# Patient Record
Sex: FEMALE | Race: WHITE | NOT HISPANIC OR LATINO | Employment: OTHER | URBAN - NONMETROPOLITAN AREA
[De-identification: names, ages, dates, MRNs, and addresses within clinical notes are randomized per-mention and may not be internally consistent; named-entity substitution may affect disease eponyms.]

---

## 2017-01-03 RX ORDER — PRAVASTATIN SODIUM 80 MG/1
TABLET ORAL
Qty: 30 TABLET | Refills: 2 | Status: ON HOLD | OUTPATIENT
Start: 2017-01-03 | End: 2017-02-03

## 2017-01-05 RX ORDER — CEFUROXIME AXETIL 500 MG/1
500 TABLET ORAL 2 TIMES DAILY
Qty: 14 TABLET | Refills: 0 | Status: SHIPPED | OUTPATIENT
Start: 2017-01-05 | End: 2017-01-27 | Stop reason: SDUPTHER

## 2017-01-20 ENCOUNTER — DOCUMENTATION (OUTPATIENT)
Dept: CARDIOLOGY | Facility: CLINIC | Age: 82
End: 2017-01-20

## 2017-01-20 ENCOUNTER — HOSPITAL ENCOUNTER (OUTPATIENT)
Dept: EMERGENCY DEPT | Facility: HOSPITAL | Age: 82
Setting detail: OBSERVATION
LOS: 1 days | Discharge: HOME | End: 2017-01-22
Attending: HOSPITALIST | Admitting: HOSPITALIST

## 2017-01-20 DIAGNOSIS — Z78.9 IMPAIRED MOBILITY AND ADLS: ICD-10-CM

## 2017-01-20 DIAGNOSIS — Z74.09 IMPAIRED PHYSICAL MOBILITY: Primary | ICD-10-CM

## 2017-01-20 DIAGNOSIS — Z74.09 IMPAIRED MOBILITY AND ADLS: ICD-10-CM

## 2017-01-20 DIAGNOSIS — R00.2 PALPITATIONS: ICD-10-CM

## 2017-01-20 LAB
ALBUMIN SERPL-MCNC: 4 GM/DL (ref 3.4–4.8)
ALP SERPL-CCNC: 104 U/L (ref 38–126)
ALT SERPL-CCNC: 30 U/L (ref 9–52)
ANION GAP SERPL CALCULATED.3IONS-SCNC: 12 MMOL/L (ref 5–15)
APTT PPP: 31.4 SECONDS (ref 20–40.3)
AST SERPL-CCNC: 29 U/L (ref 14–36)
BASOPHILS # BLD AUTO: 0.09 X1000/UL (ref 0–0.2)
BASOPHILS NFR BLD AUTO: 1 % (ref 0–2)
BILIRUB SERPL-MCNC: 0.5 MG/DL (ref 0.2–1.3)
BUN SERPL-MCNC: 19 MG/DL (ref 7–21)
CALCIUM SERPL-MCNC: 9.7 MG/DL (ref 8.4–10.2)
CHLORIDE SERPL-SCNC: 95 MMOL/L (ref 95–110)
CK MB SERPL-MCNC: 2.5 NG/ML (ref 0–5)
CK SERPL-CCNC: 81 U/L (ref 30–135)
CO2 SERPL-SCNC: 32 MMOL/L (ref 22–31)
CONV AUTO IG#: 0.04 X1000/UL (ref 0.01–0.02)
CONV AUTO IG%: 0.4 % (ref 0–0.5)
CREAT SERPL-MCNC: 1 MG/DL (ref 0.5–1)
EOSINOPHIL # BLD AUTO: 1.1 X1000/UL (ref 0–0.7)
EOSINOPHIL NFR BLD AUTO: 11.8 % (ref 0–7)
ERYTHROCYTE [DISTWIDTH] IN BLOOD: 13.3 % (ref 11.5–14.5)
GLUCOSE SERPL-MCNC: 142 MG/DL (ref 60–100)
GRANULOCYTES # BLD AUTO: 6.38 X1000/UL (ref 2–8.6)
GRANULOCYTES NFR BLD AUTO: 68.2 % (ref 37–80)
HCT VFR BLD CALC: 40 % (ref 35–45)
HGB BLD-MCNC: 13.6 GM/DL (ref 12–15.5)
INR PPP: 1 (ref 0.8–1.2)
LYMPHOCYTES # BLD AUTO: 0.97 X1000/UL (ref 0.6–4.2)
LYMPHOCYTES NFR BLD AUTO: 10.4 % (ref 10–50)
MCH RBC QN: 32.7 PG (ref 26–34)
MCHC RBC-ENTMCNC: 34 GM/DL (ref 31.4–36)
MCV RBC: 96.2 FL (ref 80–98)
MONOCYTES # BLD AUTO: 0.77 X1000/UL (ref 0–0.9)
MONOCYTES NFR BLD AUTO: 8.2 % (ref 0–12)
PLATELET # BLD: 206 X1000/MM3 (ref 150–450)
PMV BLD: 10.7 FL (ref 8–12)
POTASSIUM SERPL-SCNC: 4 MMOL/L (ref 3.5–5.1)
PROT SERPL-MCNC: 7.6 GM/DL (ref 6.3–8.6)
PROTHROMBIN TIME: 13.5 SECONDS (ref 11.1–15.3)
RBC # BLD: 4.16 MEGA/MM3 (ref 3.77–5.16)
SODIUM SERPL-SCNC: 139 MMOL/L (ref 137–145)
TROPONIN I SERPL-MCNC: 0.02 NG/ML (ref 0–0.03)
WBC # BLD: 9.4 X1000/UL (ref 3.2–9.8)

## 2017-01-20 PROCEDURE — 85730 THROMBOPLASTIN TIME PARTIAL: CPT

## 2017-01-20 PROCEDURE — 36415 COLL VENOUS BLD VENIPUNCTURE: CPT

## 2017-01-20 PROCEDURE — 9990

## 2017-01-20 PROCEDURE — 71010: CPT

## 2017-01-20 PROCEDURE — 80053 COMPREHEN METABOLIC PANEL: CPT

## 2017-01-20 PROCEDURE — 9990 CBC

## 2017-01-20 PROCEDURE — 93005 ELECTROCARDIOGRAM TRACING: CPT

## 2017-01-20 PROCEDURE — 85610 PROTHROMBIN TIME: CPT

## 2017-01-20 PROCEDURE — 99285 EMERGENCY DEPT VISIT HI MDM: CPT

## 2017-01-20 PROCEDURE — 85025 COMPLETE CBC W/AUTO DIFF WBC: CPT

## 2017-01-20 PROCEDURE — 82553 CREATINE MB FRACTION: CPT

## 2017-01-20 PROCEDURE — 9990 COMPREHENSIVE METABOLIC PANEL

## 2017-01-20 PROCEDURE — 94640 AIRWAY INHALATION TREATMENT: CPT

## 2017-01-20 PROCEDURE — 82550 ASSAY OF CK (CPK): CPT

## 2017-01-20 PROCEDURE — 99284 EMERGENCY DEPT VISIT MOD MDM: CPT

## 2017-01-20 PROCEDURE — 84484 ASSAY OF TROPONIN QUANT: CPT

## 2017-01-20 RX ORDER — DILTIAZEM HYDROCHLORIDE 120 MG/1
120 CAPSULE, COATED, EXTENDED RELEASE ORAL DAILY
Qty: 30 CAPSULE | Refills: 11 | Status: SHIPPED | OUTPATIENT
Start: 2017-01-20 | End: 2017-03-24 | Stop reason: DRUGHIGH

## 2017-01-20 NOTE — PROGRESS NOTES
PTs daughter called, pts heart rate, 110 bpm. Dr Graham notified, new medication orders rec'd  Will see pt in 2 weeks in office

## 2017-01-21 PROBLEM — R00.2 PALPITATIONS: Status: ACTIVE | Noted: 2017-01-21

## 2017-01-21 LAB
ANION GAP SERPL CALCULATED.3IONS-SCNC: 9 MMOL/L (ref 5–15)
BUN BLD-MCNC: 17 MG/DL (ref 7–21)
BUN/CREAT SERPL: 20.7 (ref 7–25)
CALCIUM SPEC-SCNC: 9 MG/DL (ref 8.4–10.2)
CHLORIDE SERPL-SCNC: 99 MMOL/L (ref 95–110)
CK MB SERPL-MCNC: 2.4 NG/ML (ref 0–5)
CK SERPL-CCNC: 77 U/L (ref 30–135)
CO2 SERPL-SCNC: 32 MMOL/L (ref 22–31)
CREAT BLD-MCNC: 0.82 MG/DL (ref 0.5–1)
DEPRECATED RDW RBC AUTO: 49.1 FL (ref 36.4–46.3)
ERYTHROCYTE [DISTWIDTH] IN BLOOD BY AUTOMATED COUNT: 13.7 % (ref 11.5–14.5)
GFR SERPL CREATININE-BSD FRML MDRD: 66 ML/MIN/1.73 (ref 39–90)
GLUCOSE BLD-MCNC: 99 MG/DL (ref 60–100)
HCT VFR BLD AUTO: 37.2 % (ref 35–45)
HGB BLD-MCNC: 12.4 G/DL (ref 12–15.5)
MCH RBC QN AUTO: 32.5 PG (ref 26.5–34)
MCHC RBC AUTO-ENTMCNC: 33.3 G/DL (ref 31.4–36)
MCV RBC AUTO: 97.6 FL (ref 80–98)
PLATELET # BLD AUTO: 198 10*3/MM3 (ref 150–450)
PMV BLD AUTO: 11.8 FL (ref 8–12)
POTASSIUM BLD-SCNC: 3.9 MMOL/L (ref 3.5–5.1)
RBC # BLD AUTO: 3.81 10*6/MM3 (ref 3.77–5.16)
SODIUM BLD-SCNC: 140 MMOL/L (ref 137–145)
TROPONIN I SERPL-MCNC: 0.02 NG/ML
TROPONIN I SERPL-MCNC: 0.02 NG/ML
TROPONIN I SERPL-MCNC: <0.012 NG/ML
TROPONIN I SERPL-MCNC: <0.012 NG/ML (ref 0–0.03)
WBC NRBC COR # BLD: 8.27 10*3/MM3 (ref 3.2–9.8)

## 2017-01-21 PROCEDURE — 94640 AIRWAY INHALATION TREATMENT: CPT

## 2017-01-21 PROCEDURE — G0378 HOSPITAL OBSERVATION PER HR: HCPCS

## 2017-01-21 PROCEDURE — G8988 SELF CARE GOAL STATUS: HCPCS

## 2017-01-21 PROCEDURE — 93010 ELECTROCARDIOGRAM REPORT: CPT | Performed by: INTERNAL MEDICINE

## 2017-01-21 PROCEDURE — 9990

## 2017-01-21 PROCEDURE — 84484 ASSAY OF TROPONIN QUANT: CPT

## 2017-01-21 PROCEDURE — 93005 ELECTROCARDIOGRAM TRACING: CPT | Performed by: HOSPITALIST

## 2017-01-21 PROCEDURE — 85027 COMPLETE CBC AUTOMATED: CPT | Performed by: HOSPITALIST

## 2017-01-21 PROCEDURE — 82553 CREATINE MB FRACTION: CPT

## 2017-01-21 PROCEDURE — 84484 ASSAY OF TROPONIN QUANT: CPT | Performed by: HOSPITALIST

## 2017-01-21 PROCEDURE — 97530 THERAPEUTIC ACTIVITIES: CPT | Performed by: PHYSICAL THERAPIST

## 2017-01-21 PROCEDURE — 80048 BASIC METABOLIC PNL TOTAL CA: CPT | Performed by: HOSPITALIST

## 2017-01-21 PROCEDURE — G8987 SELF CARE CURRENT STATUS: HCPCS

## 2017-01-21 PROCEDURE — 63710000001 PREDNISONE PER 5 MG: Performed by: FAMILY MEDICINE

## 2017-01-21 PROCEDURE — 82550 ASSAY OF CK (CPK): CPT

## 2017-01-21 PROCEDURE — 96372 THER/PROPH/DIAG INJ SC/IM: CPT

## 2017-01-21 PROCEDURE — 97535 SELF CARE MNGMENT TRAINING: CPT

## 2017-01-21 PROCEDURE — 97166 OT EVAL MOD COMPLEX 45 MIN: CPT

## 2017-01-21 PROCEDURE — 94799 UNLISTED PULMONARY SVC/PX: CPT

## 2017-01-21 PROCEDURE — G8978 MOBILITY CURRENT STATUS: HCPCS | Performed by: PHYSICAL THERAPIST

## 2017-01-21 PROCEDURE — G8979 MOBILITY GOAL STATUS: HCPCS | Performed by: PHYSICAL THERAPIST

## 2017-01-21 PROCEDURE — 94760 N-INVAS EAR/PLS OXIMETRY 1: CPT

## 2017-01-21 PROCEDURE — 97162 PT EVAL MOD COMPLEX 30 MIN: CPT | Performed by: PHYSICAL THERAPIST

## 2017-01-21 PROCEDURE — 25010000002 ENOXAPARIN PER 10 MG: Performed by: HOSPITALIST

## 2017-01-21 RX ORDER — BUDESONIDE AND FORMOTEROL FUMARATE DIHYDRATE 160; 4.5 UG/1; UG/1
2 AEROSOL RESPIRATORY (INHALATION)
Status: DISCONTINUED | OUTPATIENT
Start: 2017-01-21 | End: 2017-01-22 | Stop reason: HOSPADM

## 2017-01-21 RX ORDER — IPRATROPIUM BROMIDE AND ALBUTEROL SULFATE 2.5; .5 MG/3ML; MG/3ML
3 SOLUTION RESPIRATORY (INHALATION) EVERY 4 HOURS PRN
Status: DISCONTINUED | OUTPATIENT
Start: 2017-01-21 | End: 2017-01-22 | Stop reason: HOSPADM

## 2017-01-21 RX ORDER — HYDROCODONE BITARTRATE AND ACETAMINOPHEN 5; 325 MG/1; MG/1
1 TABLET ORAL EVERY 6 HOURS PRN
Status: DISCONTINUED | OUTPATIENT
Start: 2017-01-21 | End: 2017-01-22 | Stop reason: HOSPADM

## 2017-01-21 RX ORDER — PRAVASTATIN SODIUM 40 MG
40 TABLET ORAL DAILY
Status: DISCONTINUED | OUTPATIENT
Start: 2017-01-21 | End: 2017-01-22 | Stop reason: HOSPADM

## 2017-01-21 RX ORDER — AMIODARONE HYDROCHLORIDE 200 MG/1
200 TABLET ORAL DAILY
Status: DISCONTINUED | OUTPATIENT
Start: 2017-01-21 | End: 2017-01-22 | Stop reason: HOSPADM

## 2017-01-21 RX ORDER — ALBUTEROL SULFATE 2.5 MG/3ML
2.5 SOLUTION RESPIRATORY (INHALATION) EVERY 4 HOURS PRN
Status: DISCONTINUED | OUTPATIENT
Start: 2017-01-21 | End: 2017-01-22 | Stop reason: HOSPADM

## 2017-01-21 RX ORDER — DILTIAZEM HYDROCHLORIDE 120 MG/1
120 CAPSULE, COATED, EXTENDED RELEASE ORAL DAILY
Status: DISCONTINUED | OUTPATIENT
Start: 2017-01-21 | End: 2017-01-22 | Stop reason: HOSPADM

## 2017-01-21 RX ORDER — POTASSIUM CHLORIDE 20MEQ/15ML
20 LIQUID (ML) ORAL DAILY
Status: DISCONTINUED | OUTPATIENT
Start: 2017-01-21 | End: 2017-01-22 | Stop reason: HOSPADM

## 2017-01-21 RX ORDER — POTASSIUM CHLORIDE 750 MG/1
10 TABLET, EXTENDED RELEASE ORAL 2 TIMES DAILY
Status: DISCONTINUED | OUTPATIENT
Start: 2017-01-21 | End: 2017-01-22 | Stop reason: HOSPADM

## 2017-01-21 RX ORDER — DOCUSATE SODIUM 100 MG/1
100 CAPSULE, LIQUID FILLED ORAL DAILY
Status: DISCONTINUED | OUTPATIENT
Start: 2017-01-21 | End: 2017-01-22 | Stop reason: HOSPADM

## 2017-01-21 RX ORDER — CALCIUM CARBONATE 200(500)MG
2 TABLET,CHEWABLE ORAL DAILY
Status: DISCONTINUED | OUTPATIENT
Start: 2017-01-21 | End: 2017-01-22 | Stop reason: HOSPADM

## 2017-01-21 RX ORDER — LOSARTAN POTASSIUM 50 MG/1
50 TABLET ORAL
Status: DISCONTINUED | OUTPATIENT
Start: 2017-01-21 | End: 2017-01-22 | Stop reason: HOSPADM

## 2017-01-21 RX ORDER — FUROSEMIDE 40 MG/1
40 TABLET ORAL DAILY
Status: DISCONTINUED | OUTPATIENT
Start: 2017-01-21 | End: 2017-01-22 | Stop reason: HOSPADM

## 2017-01-21 RX ORDER — POTASSIUM CHLORIDE 750 MG/1
TABLET, FILM COATED, EXTENDED RELEASE ORAL
Status: ON HOLD | COMMUNITY
Start: 2014-10-27 | End: 2017-02-03

## 2017-01-21 RX ORDER — ACETAMINOPHEN,DIPHENHYDRAMINE HCL 500; 25 MG/1; MG/1
1 TABLET, FILM COATED ORAL NIGHTLY PRN
Status: ON HOLD | COMMUNITY
End: 2017-02-03

## 2017-01-21 RX ORDER — PRAVASTATIN SODIUM 40 MG
80 TABLET ORAL
COMMUNITY
End: 2017-01-22 | Stop reason: HOSPADM

## 2017-01-21 RX ORDER — SODIUM CHLORIDE 0.9 % (FLUSH) 0.9 %
1-10 SYRINGE (ML) INJECTION AS NEEDED
Status: DISCONTINUED | OUTPATIENT
Start: 2017-01-21 | End: 2017-01-22 | Stop reason: HOSPADM

## 2017-01-21 RX ORDER — ALBUTEROL SULFATE 90 UG/1
2 AEROSOL, METERED RESPIRATORY (INHALATION) EVERY 4 HOURS PRN
Status: DISCONTINUED | OUTPATIENT
Start: 2017-01-21 | End: 2017-01-22 | Stop reason: HOSPADM

## 2017-01-21 RX ORDER — BUDESONIDE AND FORMOTEROL FUMARATE DIHYDRATE 160; 4.5 UG/1; UG/1
2 AEROSOL RESPIRATORY (INHALATION)
COMMUNITY
Start: 2015-01-26 | End: 2017-01-22 | Stop reason: HOSPADM

## 2017-01-21 RX ORDER — SODIUM CHLORIDE 0.9 % (FLUSH) 0.9 %
3 SYRINGE (ML) INJECTION AS NEEDED
Status: DISCONTINUED | OUTPATIENT
Start: 2017-01-21 | End: 2017-01-22 | Stop reason: HOSPADM

## 2017-01-21 RX ORDER — ALBUTEROL SULFATE 2.5 MG/3ML
SOLUTION RESPIRATORY (INHALATION)
Status: COMPLETED
Start: 2017-01-21 | End: 2017-01-21

## 2017-01-21 RX ORDER — PREDNISONE 10 MG/1
10 TABLET ORAL
Status: DISCONTINUED | OUTPATIENT
Start: 2017-01-21 | End: 2017-01-22 | Stop reason: HOSPADM

## 2017-01-21 RX ADMIN — ALBUTEROL SULFATE 2.5 MG: 2.5 SOLUTION RESPIRATORY (INHALATION) at 05:37

## 2017-01-21 RX ADMIN — ALBUTEROL SULFATE 2.5 MG: 2.5 SOLUTION RESPIRATORY (INHALATION) at 16:03

## 2017-01-21 RX ADMIN — LOSARTAN POTASSIUM 50 MG: 50 TABLET, FILM COATED ORAL at 12:23

## 2017-01-21 RX ADMIN — AMIODARONE HYDROCHLORIDE 200 MG: 200 TABLET ORAL at 12:23

## 2017-01-21 RX ADMIN — ALBUTEROL SULFATE 2.5 MG: 2.5 SOLUTION RESPIRATORY (INHALATION) at 09:52

## 2017-01-21 RX ADMIN — POTASSIUM CHLORIDE 20 MEQ: 20 SOLUTION ORAL at 12:25

## 2017-01-21 RX ADMIN — PRAVASTATIN SODIUM 40 MG: 40 TABLET ORAL at 12:25

## 2017-01-21 RX ADMIN — FUROSEMIDE 40 MG: 40 TABLET ORAL at 12:23

## 2017-01-21 RX ADMIN — BUDESONIDE AND FORMOTEROL FUMARATE DIHYDRATE 2 PUFF: 160; 4.5 AEROSOL RESPIRATORY (INHALATION) at 21:39

## 2017-01-21 RX ADMIN — BUDESONIDE AND FORMOTEROL FUMARATE DIHYDRATE 2 PUFF: 160; 4.5 AEROSOL RESPIRATORY (INHALATION) at 12:22

## 2017-01-21 RX ADMIN — POTASSIUM CHLORIDE 10 MEQ: 750 TABLET, EXTENDED RELEASE ORAL at 12:23

## 2017-01-21 RX ADMIN — ENOXAPARIN SODIUM 40 MG: 40 INJECTION SUBCUTANEOUS at 12:25

## 2017-01-21 RX ADMIN — ALBUTEROL SULFATE 2.5 MG: 2.5 SOLUTION RESPIRATORY (INHALATION) at 01:34

## 2017-01-21 RX ADMIN — APIXABAN 5 MG: 5 TABLET, FILM COATED ORAL at 21:39

## 2017-01-21 RX ADMIN — POTASSIUM CHLORIDE 10 MEQ: 750 TABLET, EXTENDED RELEASE ORAL at 19:37

## 2017-01-21 RX ADMIN — NIFEDIPINE 120 MG: 10 CAPSULE ORAL at 12:24

## 2017-01-21 RX ADMIN — PREDNISONE 10 MG: 10 TABLET ORAL at 12:24

## 2017-01-22 VITALS
WEIGHT: 185.6 LBS | DIASTOLIC BLOOD PRESSURE: 73 MMHG | SYSTOLIC BLOOD PRESSURE: 121 MMHG | BODY MASS INDEX: 35.04 KG/M2 | TEMPERATURE: 97.1 F | HEART RATE: 108 BPM | RESPIRATION RATE: 18 BRPM | OXYGEN SATURATION: 98 % | HEIGHT: 61 IN

## 2017-01-22 LAB
ALBUMIN SERPL-MCNC: 3.6 G/DL (ref 3.4–4.8)
ALBUMIN/GLOB SERPL: 1.2 G/DL (ref 1.1–1.8)
ALP SERPL-CCNC: 99 U/L (ref 38–126)
ALT SERPL W P-5'-P-CCNC: 36 U/L (ref 9–52)
ANION GAP SERPL CALCULATED.3IONS-SCNC: 11 MMOL/L (ref 5–15)
AST SERPL-CCNC: 20 U/L (ref 14–36)
BILIRUB SERPL-MCNC: 0.6 MG/DL (ref 0.2–1.3)
BUN BLD-MCNC: 22 MG/DL (ref 7–21)
BUN/CREAT SERPL: 26.2 (ref 7–25)
CALCIUM SPEC-SCNC: 9 MG/DL (ref 8.4–10.2)
CHLORIDE SERPL-SCNC: 99 MMOL/L (ref 95–110)
CO2 SERPL-SCNC: 29 MMOL/L (ref 22–31)
CREAT BLD-MCNC: 0.84 MG/DL (ref 0.5–1)
DEPRECATED RDW RBC AUTO: 46.6 FL (ref 36.4–46.3)
ERYTHROCYTE [DISTWIDTH] IN BLOOD BY AUTOMATED COUNT: 13.4 % (ref 11.5–14.5)
GFR SERPL CREATININE-BSD FRML MDRD: 64 ML/MIN/1.73 (ref 60–90)
GLOBULIN UR ELPH-MCNC: 3.1 GM/DL (ref 2.3–3.5)
GLUCOSE BLD-MCNC: 107 MG/DL (ref 60–100)
HCT VFR BLD AUTO: 38.2 % (ref 35–45)
HGB BLD-MCNC: 13.1 G/DL (ref 12–15.5)
MCH RBC QN AUTO: 32.8 PG (ref 26.5–34)
MCHC RBC AUTO-ENTMCNC: 34.3 G/DL (ref 31.4–36)
MCV RBC AUTO: 95.7 FL (ref 80–98)
PLATELET # BLD AUTO: 201 10*3/MM3 (ref 150–450)
PMV BLD AUTO: 10.9 FL (ref 8–12)
POTASSIUM BLD-SCNC: 4.1 MMOL/L (ref 3.5–5.1)
PROT SERPL-MCNC: 6.7 G/DL (ref 6.3–8.6)
RBC # BLD AUTO: 3.99 10*6/MM3 (ref 3.77–5.16)
SODIUM BLD-SCNC: 139 MMOL/L (ref 137–145)
WBC NRBC COR # BLD: 8.36 10*3/MM3 (ref 3.2–9.8)

## 2017-01-22 PROCEDURE — G0378 HOSPITAL OBSERVATION PER HR: HCPCS

## 2017-01-22 PROCEDURE — 94640 AIRWAY INHALATION TREATMENT: CPT

## 2017-01-22 PROCEDURE — 97110 THERAPEUTIC EXERCISES: CPT

## 2017-01-22 PROCEDURE — 80053 COMPREHEN METABOLIC PANEL: CPT | Performed by: FAMILY MEDICINE

## 2017-01-22 PROCEDURE — 97530 THERAPEUTIC ACTIVITIES: CPT

## 2017-01-22 PROCEDURE — 63710000001 PREDNISONE PER 5 MG: Performed by: FAMILY MEDICINE

## 2017-01-22 PROCEDURE — 85027 COMPLETE CBC AUTOMATED: CPT | Performed by: FAMILY MEDICINE

## 2017-01-22 PROCEDURE — 97755 ASSISTIVE TECHNOLOGY ASSESS: CPT

## 2017-01-22 PROCEDURE — 97535 SELF CARE MNGMENT TRAINING: CPT

## 2017-01-22 RX ADMIN — ALBUTEROL SULFATE 2.5 MG: 2.5 SOLUTION RESPIRATORY (INHALATION) at 00:17

## 2017-01-22 RX ADMIN — FUROSEMIDE 40 MG: 40 TABLET ORAL at 09:35

## 2017-01-22 RX ADMIN — APIXABAN 5 MG: 5 TABLET, FILM COATED ORAL at 09:38

## 2017-01-22 RX ADMIN — DOCUSATE SODIUM 100 MG: 100 CAPSULE, LIQUID FILLED ORAL at 09:36

## 2017-01-22 RX ADMIN — PRAVASTATIN SODIUM 40 MG: 40 TABLET ORAL at 09:36

## 2017-01-22 RX ADMIN — PREDNISONE 10 MG: 10 TABLET ORAL at 09:36

## 2017-01-22 RX ADMIN — NIFEDIPINE 120 MG: 10 CAPSULE ORAL at 09:35

## 2017-01-22 RX ADMIN — AMIODARONE HYDROCHLORIDE 200 MG: 200 TABLET ORAL at 09:36

## 2017-01-22 RX ADMIN — LOSARTAN POTASSIUM 50 MG: 50 TABLET, FILM COATED ORAL at 09:36

## 2017-01-22 RX ADMIN — BUDESONIDE AND FORMOTEROL FUMARATE DIHYDRATE 2 PUFF: 160; 4.5 AEROSOL RESPIRATORY (INHALATION) at 09:35

## 2017-01-22 RX ADMIN — POTASSIUM CHLORIDE 10 MEQ: 750 TABLET, EXTENDED RELEASE ORAL at 09:35

## 2017-01-27 RX ORDER — CEFUROXIME AXETIL 500 MG/1
500 TABLET ORAL 2 TIMES DAILY
Qty: 14 TABLET | Refills: 1 | Status: SHIPPED | OUTPATIENT
Start: 2017-01-27 | End: 2017-01-31

## 2017-01-27 RX ORDER — CEFUROXIME AXETIL 500 MG/1
500 TABLET ORAL 2 TIMES DAILY
Qty: 14 TABLET | Refills: 0 | Status: ON HOLD | OUTPATIENT
Start: 2017-01-27 | End: 2017-02-03

## 2017-01-31 ENCOUNTER — OFFICE VISIT (OUTPATIENT)
Dept: CARDIOLOGY | Facility: CLINIC | Age: 82
End: 2017-01-31

## 2017-01-31 VITALS
DIASTOLIC BLOOD PRESSURE: 82 MMHG | HEART RATE: 76 BPM | WEIGHT: 188 LBS | SYSTOLIC BLOOD PRESSURE: 128 MMHG | HEIGHT: 61 IN | BODY MASS INDEX: 35.5 KG/M2

## 2017-01-31 DIAGNOSIS — I48.0 EPISODIC ATRIAL FIBRILLATION (HCC): ICD-10-CM

## 2017-01-31 DIAGNOSIS — I10 ESSENTIAL HYPERTENSION: Primary | ICD-10-CM

## 2017-01-31 DIAGNOSIS — E78.2 MIXED HYPERLIPIDEMIA: ICD-10-CM

## 2017-01-31 PROCEDURE — 99213 OFFICE O/P EST LOW 20 MIN: CPT | Performed by: INTERNAL MEDICINE

## 2017-01-31 NOTE — PROGRESS NOTES
Norma Harkins  86 y.o. female    01/31/2017  1. Essential hypertension    2. Episodic atrial fibrillation    3. Mixed hyperlipidemia        History of Present Illness    Mrs. Gee is here for follow-up of her above stated problems.  She was recently admitted to [facility] with tachycardia and with adjustments of medication she did improve considerably.  Heart rate has been under good control and was 76 bpm today underlying rhythm appeared to be sinus is been compliant with antiarrhythmic therapy with amiodarone as well.  Blood pressure was in the normal range.  No signs of congestive heart failure was noted.  Next    She does have back pain for which she will have an epidural injection soon and will have to go off anticoagulation with Eliquis for a short period.        SUBJECTIVE    No Known Allergies      Past Medical History   Diagnosis Date   • Chronic obstructive lung disease 04/26/2016     on oxygen      • Chronic respiratory failure with hypoxia 04/26/2016   • Essential hypertension 09/08/2015   • H/O atrial fibrillation without current medication 12/01/2015      on Amiodarone      • Hyperlipidemia    • Nonspecific abnormal finding of lung field    • Obesity    • Paroxysmal atrial fibrillation 03/15/2016         Past Surgical History   Procedure Laterality Date   • Joint replacement       total knee    • Hysterectomy     • Cataract extraction           Family History   Problem Relation Age of Onset   • Heart disease Other    • Hypertension Other    • Lung cancer Other          Social History     Social History   • Marital status:      Spouse name: N/A   • Number of children: N/A   • Years of education: N/A     Occupational History   • Not on file.     Social History Main Topics   • Smoking status: Former Smoker   • Smokeless tobacco: Never Used      Comment:  Patient quit smoking more than 30 years ago, she smoked 1pd for 30 years prior to that   • Alcohol use No   • Drug use: No   • Sexual  "activity: Defer     Other Topics Concern   • Not on file     Social History Narrative         Current Outpatient Prescriptions   Medication Sig Dispense Refill   • acetaminophen (TYLENOL) 500 MG tablet Take 500 mg by mouth 1 (one) time as needed for mild pain (1-3).     • albuterol (PROVENTIL HFA;VENTOLIN HFA) 108 (90 BASE) MCG/ACT inhaler Inhale 2 puffs every 4 (four) hours as needed for wheezing.     • amiodarone (PACERONE) 200 MG tablet Take 1 tablet by mouth Daily. 30 tablet 6   • apixaban (ELIQUIS) 5 MG tablet tablet Take 1 tablet by mouth Every 12 (Twelve) Hours. 60 tablet 6   • calcium carbonate (OS-TRESSA) 600 MG tablet Take 600 mg by mouth daily.     • cefuroxime (CEFTIN) 500 MG tablet Take 1 tablet by mouth 2 (Two) Times a Day. 14 tablet 0   • diltiaZEM CD (CARDIZEM CD) 120 MG 24 hr capsule Take 1 capsule by mouth Daily. 30 capsule 11   • diphenhydrAMINE-acetaminophen (TYLENOL PM)  MG tablet per tablet Take 1 tablet by mouth At Night As Needed for sleep.     • docusate sodium (COLACE) 100 MG capsule Take 100 mg by mouth daily.     • furosemide (LASIX) 40 MG tablet take 1 tablet by mouth once daily 30 tablet 3   • HYDROcodone-acetaminophen (NORCO) 5-325 MG per tablet Take 1 tablet by mouth Every 6 (Six) Hours As Needed for moderate pain (4-6). 30 tablet 0   • ipratropium-albuterol (DUO-NEB) 0.5-2.5 mg/mL nebulizer Take 3 mL by nebulization every 4 (four) hours as needed for wheezing.     • potassium chloride (K-DUR) 10 MEQ CR tablet take 1 tablet by mouth once daily     • pravastatin (PRAVACHOL) 80 MG tablet take 1 tablet by mouth every evening 30 tablet 2   • SYMBICORT 160-4.5 MCG/ACT inhaler inhale 2 puffs by mouth twice a day 30.6 inhaler 2     No current facility-administered medications for this visit.          OBJECTIVE    Visit Vitals   • /82   • Pulse 76   • Ht 61\" (154.9 cm)   • Wt 188 lb (85.3 kg)   • BMI 35.52 kg/m2           Review of Systems     Constitutional:  Denies recent weight " loss, weight gain, fever or chills     HENT:  Denies any hearing loss, epistaxis, hoarseness, or difficulty speaking.     Eyes: Wears eyeglasses or contact lenses     Respiratory:  Dyspnea with exertion chronic,no cough, wheezing, or hemoptysis.     Cardiovascular: Negative for palpations, chest pain     Gastrointestinal:  Denies change in bowel habits, dyspepsia, ulcer disease, hematochezia, or melena.     Endocrine: Negative for cold intolerance, heat intolerance, polydipsia, polyphagia and polyuria. Denies any history of weight change, heat/cold intolerance, polydipsia, polyuria     Genitourinary: Negative.      Musculoskeletal: c/o Back pain    Skin:  Denies any change in hair or nails, rashes, or skin lesions.     Allergic/Immunologic: Negative.  Negative for environmental allergies, food allergies and immunocompromised state.     Neurological:  Denies any history of recurrent headaches, strokes, TIA, or seizure disorder.     Hematological: Denies any food allergies, seasonal allergies, bleeding disorders, or lymphadenopathy.     Psychiatric/Behavioral: Denies any history of depression, substance abuse, or change in cognitive function.         Physical Exam     Constitutional: Cooperative, alert and oriented, On home O2. Chronically ill appearing    HENT:   Head: Normocephalic, normal hair patterns, no masses or tenderness.  Ears, Nose, and Throat: No gross abnormalities. No pallor or cyanosis. Dentition good.   Eyes: EOMS intact, PERRL, conjunctivae and lids unremarkable. Fundoscopic exam and visual fields not performed.   Neck: No palpable masses or adenopathy, no thyromegaly, no JVD, carotid pulses are full and equal bilaterally and without  Bruits.     Cardiovascular: Regular rhythm, S1 and S2 normal, no S3 or S4. Apical impulse not displaced. No murmurs, gallops, or rubs detected.     Pulmonary/Chest: Chest: increased AP diameter, no tenderness to palpation, normal respiratory excursion, no intercostal  retraction, no use of accessory muscles.            Pulmonary: Normal breath sounds. No rales or ronchi.    Abdominal: Abdomen soft, bowel sounds normoactive, no masses, no hepatosplenomegaly, non-tender, no bruits.     Musculoskeletal: No deformities, clubbing, cyanosis, erythema, or edema observed. There are no spinal abnormalities noted. Normal muscle strength and tone. Pulses full and equal in all extremities, no bruits auscultated.     Neurological: No gross motor or sensory deficits noted, affect appropriate, oriented to time, person, place.     Skin: Warm and dry to the touch, no apparent skin lesions or masses noted.     Psychiatric: She has a normal mood and affect. Her behavior is normal. Judgment and thought content normal.         Procedures      Lab Results   Component Value Date    WBC 8.36 01/22/2017    HGB 13.1 01/22/2017    HCT 38.2 01/22/2017    MCV 95.7 01/22/2017     01/22/2017     Lab Results   Component Value Date    GLUCOSE 107 (H) 01/22/2017    BUN 22 (H) 01/22/2017    CREATININE 0.84 01/22/2017    EGFRIFNONA 64 01/22/2017    BCR 26.2 (H) 01/22/2017    CO2 29.0 01/22/2017    CALCIUM 9.0 01/22/2017    ALBUMIN 3.60 01/22/2017    LABIL2 1.2 01/22/2017    AST 20 01/22/2017    ALT 36 01/22/2017     No results found for: CHOL  Lab Results   Component Value Date    TRIG 128 07/22/2015     No results found for: HDL  Lab Results   Component Value Date    LDLCALC 124 07/22/2015     No results found for: LDL  No results found for: HDLLDLRATIO  No components found for: CHOLHDL  No results found for: HGBA1C  Lab Results   Component Value Date    TSH 4.76 (H) 08/12/2015           ASSESSMENT AND PLAN  Mrs. Gee is clinically stable with no evidence of angina and arrhythmia or congestive heart failure.  She has chronic dyspnea which is most likely secondary to a pulmonary status.  Present antiarrhythmic therapy with amiodarone and rate controlled with diltiazem CD, anticoagulation with apixaban,  lipid-lowering with pravastatin and Lasix have been continued.    Diagnoses and all orders for this visit:    Essential hypertension    Episodic atrial fibrillation    Mixed hyperlipidemia        Sofya Graham MD  1/31/2017  10:20 AM

## 2017-02-02 RX ORDER — FUROSEMIDE 40 MG/1
TABLET ORAL
Qty: 30 TABLET | Refills: 3 | Status: ON HOLD | OUTPATIENT
Start: 2017-02-02 | End: 2017-02-03

## 2017-02-03 ENCOUNTER — APPOINTMENT (OUTPATIENT)
Dept: GENERAL RADIOLOGY | Facility: HOSPITAL | Age: 82
End: 2017-02-03

## 2017-02-03 ENCOUNTER — HOSPITAL ENCOUNTER (OUTPATIENT)
Facility: HOSPITAL | Age: 82
Setting detail: OBSERVATION
Discharge: HOME | End: 2017-02-05
Attending: EMERGENCY MEDICINE | Admitting: INTERNAL MEDICINE

## 2017-02-03 DIAGNOSIS — R06.02 SHORTNESS OF BREATH: ICD-10-CM

## 2017-02-03 DIAGNOSIS — J40 BRONCHITIS: ICD-10-CM

## 2017-02-03 DIAGNOSIS — J44.1 CHRONIC OBSTRUCTIVE PULMONARY DISEASE WITH ACUTE EXACERBATION (HCC): Primary | ICD-10-CM

## 2017-02-03 LAB
ALBUMIN SERPL-MCNC: 4.2 G/DL (ref 3.4–4.8)
ALBUMIN/GLOB SERPL: 1.3 G/DL (ref 1.1–1.8)
ALP SERPL-CCNC: 73 U/L (ref 38–126)
ALT SERPL W P-5'-P-CCNC: 30 U/L (ref 9–52)
ANION GAP SERPL CALCULATED.3IONS-SCNC: 9 MMOL/L (ref 5–15)
AST SERPL-CCNC: 27 U/L (ref 14–36)
BASOPHILS # BLD AUTO: 0.04 10*3/MM3 (ref 0–0.2)
BASOPHILS NFR BLD AUTO: 0.3 % (ref 0–2)
BILIRUB SERPL-MCNC: 0.8 MG/DL (ref 0.2–1.3)
BILIRUB UR QL STRIP: NEGATIVE
BUN BLD-MCNC: 13 MG/DL (ref 7–21)
BUN/CREAT SERPL: 19.1 (ref 7–25)
CALCIUM SPEC-SCNC: 9.5 MG/DL (ref 8.4–10.2)
CHLORIDE SERPL-SCNC: 96 MMOL/L (ref 95–110)
CLARITY UR: CLEAR
CO2 SERPL-SCNC: 33 MMOL/L (ref 22–31)
COLOR UR: YELLOW
CREAT BLD-MCNC: 0.68 MG/DL (ref 0.5–1)
DEPRECATED RDW RBC AUTO: 46.1 FL (ref 36.4–46.3)
EOSINOPHIL # BLD AUTO: 2.22 10*3/MM3 (ref 0–0.7)
EOSINOPHIL NFR BLD AUTO: 18.8 % (ref 0–7)
ERYTHROCYTE [DISTWIDTH] IN BLOOD BY AUTOMATED COUNT: 13.3 % (ref 11.5–14.5)
GFR SERPL CREATININE-BSD FRML MDRD: 82 ML/MIN/1.73 (ref 39–90)
GLOBULIN UR ELPH-MCNC: 3.2 GM/DL (ref 2.3–3.5)
GLUCOSE BLD-MCNC: 126 MG/DL (ref 60–100)
GLUCOSE UR STRIP-MCNC: NEGATIVE MG/DL
HCT VFR BLD AUTO: 36.3 % (ref 35–45)
HGB BLD-MCNC: 12.4 G/DL (ref 12–15.5)
HGB UR QL STRIP.AUTO: NEGATIVE
HOLD SPECIMEN: NORMAL
HOLD SPECIMEN: NORMAL
IMM GRANULOCYTES # BLD: 0.03 10*3/MM3 (ref 0–0.02)
IMM GRANULOCYTES NFR BLD: 0.3 % (ref 0–0.5)
KETONES UR QL STRIP: NEGATIVE
LEUKOCYTE ESTERASE UR QL STRIP.AUTO: NEGATIVE
LYMPHOCYTES # BLD AUTO: 0.61 10*3/MM3 (ref 0.6–4.2)
LYMPHOCYTES NFR BLD AUTO: 5.2 % (ref 10–50)
MCH RBC QN AUTO: 33.2 PG (ref 26.5–34)
MCHC RBC AUTO-ENTMCNC: 34.2 G/DL (ref 31.4–36)
MCV RBC AUTO: 97.1 FL (ref 80–98)
MONOCYTES # BLD AUTO: 0.82 10*3/MM3 (ref 0–0.9)
MONOCYTES NFR BLD AUTO: 6.9 % (ref 0–12)
NEUTROPHILS # BLD AUTO: 8.1 10*3/MM3 (ref 2–8.6)
NEUTROPHILS NFR BLD AUTO: 68.5 % (ref 37–80)
NITRITE UR QL STRIP: NEGATIVE
NRBC BLD MANUAL-RTO: 0 /100 WBC (ref 0–0)
NT-PROBNP SERPL-MCNC: 897 PG/ML (ref 0–1800)
PH UR STRIP.AUTO: 7.5 [PH] (ref 5–9)
PLATELET # BLD AUTO: 158 10*3/MM3 (ref 150–450)
PMV BLD AUTO: 11 FL (ref 8–12)
POTASSIUM BLD-SCNC: 3.9 MMOL/L (ref 3.5–5.1)
PROT SERPL-MCNC: 7.4 G/DL (ref 6.3–8.6)
PROT UR QL STRIP: NEGATIVE
RBC # BLD AUTO: 3.74 10*6/MM3 (ref 3.77–5.16)
SODIUM BLD-SCNC: 138 MMOL/L (ref 137–145)
SP GR UR STRIP: 1.01 (ref 1–1.03)
TROPONIN I SERPL-MCNC: 0.02 NG/ML
UROBILINOGEN UR QL STRIP: NORMAL
WBC NRBC COR # BLD: 11.82 10*3/MM3 (ref 3.2–9.8)
WHOLE BLOOD HOLD SPECIMEN: NORMAL

## 2017-02-03 PROCEDURE — G0378 HOSPITAL OBSERVATION PER HR: HCPCS

## 2017-02-03 PROCEDURE — 25010000002 METHYLPREDNISOLONE PER 40 MG: Performed by: INTERNAL MEDICINE

## 2017-02-03 PROCEDURE — 85025 COMPLETE CBC W/AUTO DIFF WBC: CPT | Performed by: EMERGENCY MEDICINE

## 2017-02-03 PROCEDURE — 96375 TX/PRO/DX INJ NEW DRUG ADDON: CPT

## 2017-02-03 PROCEDURE — 93010 ELECTROCARDIOGRAM REPORT: CPT | Performed by: INTERNAL MEDICINE

## 2017-02-03 PROCEDURE — 96366 THER/PROPH/DIAG IV INF ADDON: CPT

## 2017-02-03 PROCEDURE — 71020 HC CHEST PA AND LATERAL: CPT

## 2017-02-03 PROCEDURE — 83880 ASSAY OF NATRIURETIC PEPTIDE: CPT | Performed by: EMERGENCY MEDICINE

## 2017-02-03 PROCEDURE — 25010000002 METHYLPREDNISOLONE PER 125 MG: Performed by: EMERGENCY MEDICINE

## 2017-02-03 PROCEDURE — 25010000002 LORAZEPAM PER 2 MG: Performed by: EMERGENCY MEDICINE

## 2017-02-03 PROCEDURE — 81003 URINALYSIS AUTO W/O SCOPE: CPT | Performed by: EMERGENCY MEDICINE

## 2017-02-03 PROCEDURE — 93005 ELECTROCARDIOGRAM TRACING: CPT | Performed by: EMERGENCY MEDICINE

## 2017-02-03 PROCEDURE — 99284 EMERGENCY DEPT VISIT MOD MDM: CPT

## 2017-02-03 PROCEDURE — 96365 THER/PROPH/DIAG IV INF INIT: CPT

## 2017-02-03 PROCEDURE — 94640 AIRWAY INHALATION TREATMENT: CPT

## 2017-02-03 PROCEDURE — 84484 ASSAY OF TROPONIN QUANT: CPT | Performed by: EMERGENCY MEDICINE

## 2017-02-03 PROCEDURE — 80053 COMPREHEN METABOLIC PANEL: CPT | Performed by: EMERGENCY MEDICINE

## 2017-02-03 PROCEDURE — 94799 UNLISTED PULMONARY SVC/PX: CPT

## 2017-02-03 PROCEDURE — 94760 N-INVAS EAR/PLS OXIMETRY 1: CPT

## 2017-02-03 PROCEDURE — 96376 TX/PRO/DX INJ SAME DRUG ADON: CPT

## 2017-02-03 RX ORDER — SODIUM CHLORIDE 0.9 % (FLUSH) 0.9 %
10 SYRINGE (ML) INJECTION AS NEEDED
Status: DISCONTINUED | OUTPATIENT
Start: 2017-02-03 | End: 2017-02-05

## 2017-02-03 RX ORDER — METHYLPREDNISOLONE SODIUM SUCCINATE 125 MG/2ML
125 INJECTION, POWDER, LYOPHILIZED, FOR SOLUTION INTRAMUSCULAR; INTRAVENOUS ONCE
Status: COMPLETED | OUTPATIENT
Start: 2017-02-03 | End: 2017-02-03

## 2017-02-03 RX ORDER — METHYLPREDNISOLONE SODIUM SUCCINATE 40 MG/ML
40 INJECTION, POWDER, LYOPHILIZED, FOR SOLUTION INTRAMUSCULAR; INTRAVENOUS EVERY 8 HOURS
Status: DISCONTINUED | OUTPATIENT
Start: 2017-02-03 | End: 2017-02-05 | Stop reason: HOSPADM

## 2017-02-03 RX ORDER — ALBUTEROL SULFATE 2.5 MG/3ML
2.5 SOLUTION RESPIRATORY (INHALATION) EVERY 6 HOURS PRN
Status: COMPLETED | OUTPATIENT
Start: 2017-02-03 | End: 2017-02-04

## 2017-02-03 RX ORDER — DOCUSATE SODIUM 100 MG/1
300 CAPSULE, LIQUID FILLED ORAL DAILY
Status: DISCONTINUED | OUTPATIENT
Start: 2017-02-03 | End: 2017-02-04

## 2017-02-03 RX ORDER — LORAZEPAM 2 MG/ML
1 INJECTION INTRAMUSCULAR ONCE
Status: COMPLETED | OUTPATIENT
Start: 2017-02-03 | End: 2017-02-03

## 2017-02-03 RX ORDER — SODIUM CHLORIDE 0.9 % (FLUSH) 0.9 %
1-10 SYRINGE (ML) INJECTION AS NEEDED
Status: DISCONTINUED | OUTPATIENT
Start: 2017-02-03 | End: 2017-02-05

## 2017-02-03 RX ORDER — ACETAMINOPHEN 500 MG
500 TABLET ORAL ONCE AS NEEDED
Status: DISCONTINUED | OUTPATIENT
Start: 2017-02-03 | End: 2017-02-05 | Stop reason: HOSPADM

## 2017-02-03 RX ORDER — AMIODARONE HYDROCHLORIDE 200 MG/1
200 TABLET ORAL DAILY
Status: DISCONTINUED | OUTPATIENT
Start: 2017-02-03 | End: 2017-02-04

## 2017-02-03 RX ORDER — HYDROCODONE BITARTRATE AND ACETAMINOPHEN 5; 325 MG/1; MG/1
1 TABLET ORAL EVERY 6 HOURS PRN
Status: DISCONTINUED | OUTPATIENT
Start: 2017-02-03 | End: 2017-02-05 | Stop reason: HOSPADM

## 2017-02-03 RX ORDER — BUDESONIDE AND FORMOTEROL FUMARATE DIHYDRATE 160; 4.5 UG/1; UG/1
2 AEROSOL RESPIRATORY (INHALATION)
Status: DISCONTINUED | OUTPATIENT
Start: 2017-02-03 | End: 2017-02-04

## 2017-02-03 RX ORDER — DILTIAZEM HYDROCHLORIDE 120 MG/1
120 CAPSULE, COATED, EXTENDED RELEASE ORAL DAILY
Status: DISCONTINUED | OUTPATIENT
Start: 2017-02-03 | End: 2017-02-04

## 2017-02-03 RX ORDER — ALBUTEROL SULFATE 2.5 MG/3ML
2.5 SOLUTION RESPIRATORY (INHALATION)
Status: COMPLETED | OUTPATIENT
Start: 2017-02-03 | End: 2017-02-03

## 2017-02-03 RX ADMIN — ALBUTEROL SULFATE 2.5 MG: 2.5 SOLUTION RESPIRATORY (INHALATION) at 17:40

## 2017-02-03 RX ADMIN — METHYLPREDNISOLONE SODIUM SUCCINATE 40 MG: 40 INJECTION, POWDER, FOR SOLUTION INTRAMUSCULAR; INTRAVENOUS at 19:03

## 2017-02-03 RX ADMIN — ALBUTEROL SULFATE 2.5 MG: 2.5 SOLUTION RESPIRATORY (INHALATION) at 11:26

## 2017-02-03 RX ADMIN — LORAZEPAM 1 MG: 2 INJECTION INTRAMUSCULAR; INTRAVENOUS at 11:26

## 2017-02-03 RX ADMIN — Medication 10 ML: at 10:22

## 2017-02-03 RX ADMIN — Medication 10 ML: at 11:27

## 2017-02-03 RX ADMIN — CEFAZOLIN 1 G: 1 INJECTION, POWDER, FOR SOLUTION INTRAMUSCULAR; INTRAVENOUS; PARENTERAL at 12:35

## 2017-02-03 RX ADMIN — ALBUTEROL SULFATE 2.5 MG: 2.5 SOLUTION RESPIRATORY (INHALATION) at 10:23

## 2017-02-03 RX ADMIN — METHYLPREDNISOLONE SODIUM SUCCINATE 125 MG: 125 INJECTION, POWDER, FOR SOLUTION INTRAMUSCULAR; INTRAVENOUS at 10:22

## 2017-02-03 RX ADMIN — ALBUTEROL SULFATE 2.5 MG: 2.5 SOLUTION RESPIRATORY (INHALATION) at 10:48

## 2017-02-03 RX ADMIN — BUDESONIDE AND FORMOTEROL FUMARATE DIHYDRATE 2 PUFF: 160; 4.5 AEROSOL RESPIRATORY (INHALATION) at 19:13

## 2017-02-03 RX ADMIN — APIXABAN 5 MG: 5 TABLET, FILM COATED ORAL at 21:49

## 2017-02-03 NOTE — ED NOTES
Patient reports has COPD with increased SOA today, Denies any fever, on O/2 at 2lnc at home. Patient denies any fever or cough at this time.     Kavitha Walsh LPN  02/03/17 0844

## 2017-02-03 NOTE — ED PROVIDER NOTES
Subjective   Patient is a 86 y.o. female presenting with shortness of breath.   Shortness of Breath   Severity:  Severe  Onset quality:  Gradual  Duration:  3 days  Timing:  Constant  Progression:  Worsening  Chronicity:  Chronic  Context: activity    Context: not emotional upset, not smoke exposure and not URI    Relieved by:  Nothing  Worsened by:  Exertion  Ineffective treatments:  Oxygen and inhaler  Associated symptoms: cough (mild), sputum production (yellow) and wheezing    Associated symptoms: no abdominal pain, no chest pain, no ear pain, no fever, no headaches, no neck pain, no rash, no sore throat, no syncope and no vomiting    Risk factors: tobacco use (quit years ago)    Risk factors: no recent alcohol use, no family hx of DVT, no hx of cancer and no hx of PE/DVT        Review of Systems   Constitutional: Negative for activity change, appetite change, chills and fever.   HENT: Negative for congestion, ear pain and sore throat.    Eyes: Negative.  Negative for discharge and redness.   Respiratory: Positive for cough (mild), sputum production (yellow), shortness of breath and wheezing. Negative for chest tightness.    Cardiovascular: Negative.  Negative for chest pain, palpitations, leg swelling and syncope.   Gastrointestinal: Negative.  Negative for abdominal pain, diarrhea, nausea and vomiting.   Genitourinary: Negative for difficulty urinating, dysuria, flank pain and urgency.   Musculoskeletal: Positive for back pain. Negative for arthralgias, joint swelling, myalgias and neck pain.   Skin: Negative.  Negative for color change and rash.   Neurological: Negative.  Negative for dizziness, seizures, speech difficulty, weakness, numbness and headaches.   Psychiatric/Behavioral: Negative for behavioral problems.   All other systems reviewed and are negative.      Past Medical History   Diagnosis Date   • Chronic obstructive lung disease 04/26/2016     on oxygen      • Chronic respiratory failure with  hypoxia 04/26/2016   • Essential hypertension 09/08/2015   • Hyperlipidemia    • Obesity    • Paroxysmal atrial fibrillation 03/15/2016       No Known Allergies    Past Surgical History   Procedure Laterality Date   • Joint replacement       total knee    • Hysterectomy     • Cataract extraction         Family History   Problem Relation Age of Onset   • Heart disease Other    • Hypertension Other    • Lung cancer Other        Social History     Social History   • Marital status:      Spouse name: N/A   • Number of children: N/A   • Years of education: N/A     Social History Main Topics   • Smoking status: Former Smoker   • Smokeless tobacco: Never Used      Comment:  Patient quit smoking more than 30 years ago, she smoked 1pd for 30 years prior to that   • Alcohol use No   • Drug use: No   • Sexual activity: Defer     Other Topics Concern   • None     Social History Narrative           Objective   Physical Exam   Constitutional: She is oriented to person, place, and time. She appears well-developed and well-nourished. She appears distressed (mild).   HENT:   Head: Normocephalic and atraumatic.   Right Ear: External ear normal.   Left Ear: External ear normal.   Nose: Nose normal.   Mouth/Throat: Oropharynx is clear and moist.   Eyes: Conjunctivae and EOM are normal. Pupils are equal, round, and reactive to light.   Neck: Normal range of motion. Neck supple.   Cardiovascular: Normal rate, regular rhythm, normal heart sounds and intact distal pulses.    Pulmonary/Chest: She is in respiratory distress. She has wheezes. She has no rales. She exhibits no tenderness.   Abdominal: Soft. Bowel sounds are normal.   Musculoskeletal: Normal range of motion. She exhibits edema (+ 2 edema both ankles).   Neurological: She is alert and oriented to person, place, and time. She has normal reflexes.   Skin: Skin is warm and dry.   Psychiatric: She has a normal mood and affect. Her behavior is normal.   Nursing note and vitals  reviewed.      ECG 12 Lead    Date/Time: 2/3/2017 8:43 AM  Performed by: LAM MILLER  Authorized by: LAM MILLER   Comparison: not compared with previous ECG   Rhythm: sinus rhythm  Rate: normal  QRS axis: normal  Conduction: right bundle branch block  ST Segments: ST segments normal  T Waves: T waves normal  Other: no other findings  Clinical impression: abnormal ECG               ED Course  ED Course   Comment By Time   Patient with and acute exacerbation of COPD and bronchitis. She was given breathing treatments and antibiotics and improved. She admitted to Dr. Wu. Discussed with family about the care.  Lam Miller MD 02/04 1341      Labs Reviewed   COMPREHENSIVE METABOLIC PANEL - Abnormal; Notable for the following:        Result Value    Glucose 126 (*)     CO2 33.0 (*)     All other components within normal limits    Narrative:     The MDRD GFR formula is only valid for adults with stable renal function between ages 18 and 70.   CBC WITH AUTO DIFFERENTIAL - Abnormal; Notable for the following:     WBC 11.82 (*)     RBC 3.74 (*)     Lymphocyte % 5.2 (*)     Eosinophil % 18.8 (*)     Eosinophils, Absolute 2.22 (*)     Immature Grans, Absolute 0.03 (*)     All other components within normal limits   BNP (IN-HOUSE) - Normal   TROPONIN (IN-HOUSE) - Normal   URINALYSIS W/ CULTURE IF INDICATED - Normal    Narrative:     Urine microscopic not indicated.   RAINBOW DRAW    Narrative:     The following orders were created for panel order [facility] Draw.  Procedure                               Abnormality         Status                     ---------                               -----------         ------                     Light Blue Top[[id number]]                                                               Green Top (Gel)[[id number]]                                   Final result               Lavender Top[[id number]]                                      Final result               Gold Top -  SST[[id number]]                                                                 Please view results for these tests on the individual orders.   CBC AND DIFFERENTIAL    Narrative:     The following orders were created for panel order CBC & Differential.  Procedure                               Abnormality         Status                     ---------                               -----------         ------                     CBC Auto Differential[[id number]]         Abnormal            Final result                 Please view results for these tests on the individual orders.   GREEN TOP   LAVENDER TOP   EXTRA TUBES    Narrative:     The following orders were created for panel order Extra Tubes.  Procedure                               Abnormality         Status                     ---------                               -----------         ------                     Light Blue Top[[id number]]                                    Final result               Lavender Top[[id number]]                                      Final result               Gold Top - SST[[id number]]                                    Final result               Blood Culture Bottle Set[[id number]]                                                       Please view results for these tests on the individual orders.   LIGHT BLUE TOP   LAVENDER TOP   GOLD TOP - SST     Xr Chest 2 View    Result Date: 2/3/2017  Narrative: PROCEDURE: Chest PA and lateral REASON FOR EXAM: CHF/COPD Protocol FINDINGS: Comparison study dated January 20, 2017.      . Cardiac and pulmonary vasculature are normal . Lungs are clear. Pleural spaces are normal . No acute osseous abnormality.     Impression: Negative chest Electronically signed by:  Rodrigo Mead  2/3/2017 9:26 AM CST     Xr Chest 1 View    Result Date: 1/20/2017  Narrative: Exam- AP portable chest  Indication- Shortness of breath  Comparison- 8/22/2016  Findings- AP portable chest. The bony structures are intact. The heart size is  at the upper limits of normal. Plaque is present in the aorta. Lungs are mildly hyperinflated. No acute infiltrate, pneumothorax or pleural effusion.  Impression- No acute cardiopulmonary abnormality.  Electronically signed by-  Oscar Whipple  1/20/2017 9-50 PM CST  Electronically Signed By- Oscar Whipple MD On: 2017-01-20 21:50:13                MDM    Final diagnoses:   Chronic obstructive pulmonary disease with acute exacerbation   Bronchitis   Shortness of breath            Lam Tam MD  02/04/17 [id number]

## 2017-02-03 NOTE — ED NOTES
Attempted to call report; nurse unavailable and will call back to receive report.     Maria D Johnson, ABDIFATAH  02/03/17 [id number]

## 2017-02-03 NOTE — H&P
History and Physical  Daren Wu MD  Hospitalist      Patient Care Team:  Krista Matos MD as PCP - General  Jenny Salguero as Care Coordinator (Population Health)    Chief complaint   Chief Complaint   Patient presents with   • Shortness of Breath       Subjective     Patient is a 86 y.o. female presents with dyspnea. Onset of symptoms was gradual starting 3 days ago.  Symptoms are associated with off.  Symptoms are aggravated by exertion.   Symptoms improve with rest.       History  Past Medical History   Diagnosis Date   • Chronic obstructive lung disease 04/26/2016     on oxygen      • Chronic respiratory failure with hypoxia 04/26/2016   • Essential hypertension 09/08/2015   • Hyperlipidemia    • Obesity    • Paroxysmal atrial fibrillation 03/15/2016     Past Surgical History   Procedure Laterality Date   • Joint replacement       total knee    • Hysterectomy     • Cataract extraction       Family History   Problem Relation Age of Onset   • Heart disease Other    • Hypertension Other    • Lung cancer Other      Social History   Substance Use Topics   • Smoking status: Former Smoker   • Smokeless tobacco: Never Used      Comment:  Patient quit smoking more than 30 years ago, she smoked 1pd for 30 years prior to that   • Alcohol use No     Prescriptions Prior to Admission   Medication Sig Dispense Refill Last Dose   • acetaminophen (TYLENOL) 500 MG tablet Take 500 mg by mouth 1 (one) time as needed for mild pain (1-3).   2/2/2017 at 2000   • amiodarone (PACERONE) 200 MG tablet Take 1 tablet by mouth Daily. 30 tablet 6 2/3/2017 at 0700   • apixaban (ELIQUIS) 5 MG tablet tablet Take 1 tablet by mouth Every 12 (Twelve) Hours. 60 tablet 6 2/3/2017 at Unknown time   • diltiaZEM CD (CARDIZEM CD) 120 MG 24 hr capsule Take 1 capsule by mouth Daily. 30 capsule 11 2/3/2017 at Unknown time   • docusate sodium (COLACE) 100 MG capsule Take 300 mg by mouth Daily.   2/3/2017 at Unknown time   • SYMBICORT 160-4.5 MCG/ACT  inhaler inhale 2 puffs by mouth twice a day 30.6 inhaler 2 2/3/2017 at Unknown time   • HYDROcodone-acetaminophen (NORCO) 5-325 MG per tablet Take 1 tablet by mouth Every 6 (Six) Hours As Needed for moderate pain (4-6). 30 tablet 0 Unknown at Unknown time     Allergies:  Review of patient's allergies indicates no known allergies.    Review of Systems  Review of Systems   Constitutional: Negative for appetite change, chills and fever.   Respiratory: Positive for cough, shortness of breath and wheezing.    Cardiovascular: Negative for chest pain and leg swelling.   Gastrointestinal: Negative for abdominal distention and abdominal pain.   Musculoskeletal: Positive for back pain.   Neurological: Positive for dizziness and weakness.   Psychiatric/Behavioral: Negative for confusion. The patient is not nervous/anxious.    All other systems reviewed and are negative.      Objective     Vital Signs  Temp:  [97.9 °F (36.6 °C)] 97.9 °F (36.6 °C)  Heart Rate:  [64-85] 85  Resp:  [18-24] 24  BP: (134-190)/(68-95) 160/95    Physical Exam:  Physical Exam   Constitutional: She is oriented to person, place, and time. She appears well-developed and well-nourished.   HENT:   Head: Normocephalic and atraumatic.   Eyes: EOM are normal. Pupils are equal, round, and reactive to light.   Cardiovascular: Normal rate and regular rhythm.    Pulmonary/Chest: Effort normal. She has wheezes. She exhibits tenderness.   Abdominal: Soft. Bowel sounds are normal.   Musculoskeletal: Normal range of motion. She exhibits no edema.   Neurological: She is alert and oriented to person, place, and time.   Skin: Skin is warm and dry.   Psychiatric: She has a normal mood and affect.   Vitals reviewed.      Results Review:   Lab Results (last 24 hours)     Procedure Component Value Units Date/Time    CBC & Differential [[id number]] Collected:  02/03/17 0837    Specimen:  Blood Updated:  02/03/17 0918    Narrative:       The following orders were created for  panel order CBC & Differential.  Procedure                               Abnormality         Status                     ---------                               -----------         ------                     CBC Auto Differential[[id number]]         Abnormal            Final result                 Please view results for these tests on the individual orders.    CBC Auto Differential [[id number]]  (Abnormal) Collected:  02/03/17 0837    Specimen:  Blood Updated:  02/03/17 0918     WBC 11.82 (H) 10*3/mm3      RBC 3.74 (L) 10*6/mm3      Hemoglobin 12.4 g/dL      Hematocrit 36.3 %      MCV 97.1 fL      MCH 33.2 pg      MCHC 34.2 g/dL      RDW 13.3 %      RDW-SD 46.1 fl      MPV 11.0 fL      Platelets 158 10*3/mm3      Neutrophil % 68.5 %      Lymphocyte % 5.2 (L) %      Monocyte % 6.9 %      Eosinophil % 18.8 (H) %      Basophil % 0.3 %      Immature Grans % 0.3 %      Neutrophils, Absolute 8.10 10*3/mm3      Lymphocytes, Absolute 0.61 10*3/mm3      Monocytes, Absolute 0.82 10*3/mm3      Eosinophils, Absolute 2.22 (H) 10*3/mm3      Basophils, Absolute 0.04 10*3/mm3      Immature Grans, Absolute 0.03 (H) 10*3/mm3      nRBC 0.0 /100 WBC     Urinalysis With / Culture If Indicated [[id number]]  (Normal) Collected:  02/03/17 1008    Specimen:  Urine from Urine, Clean Catch Updated:  02/03/17 1021     Color, UA Yellow      Appearance, UA Clear      pH, UA 7.5      Specific Gravity, UA 1.006      Glucose, UA Negative      Ketones, UA Negative      Bilirubin, UA Negative      Blood, UA Negative      Protein, UA Negative      Leuk Esterase, UA Negative      Nitrite, UA Negative      Urobilinogen, UA 0.2 E.U./dL     Narrative:       Urine microscopic not indicated.    Lavender Top [[id number]] Collected:  02/03/17 1212    Specimen:  Blood from Arm, Left Updated:  02/03/17 1226    Light Blue Top [[id number]] Collected:  02/03/17 1212    Specimen:  Blood from Arm, Left Updated:  02/03/17 1226    Gold Top - SST [[id number]] Collected:  02/03/17  1212    Specimen:  Blood from Arm, Left Updated:  02/03/17 1226    Green Top (Gel) [[id number]] Collected:  02/03/17 1209    Specimen:  Blood from Arm, Left Updated:  02/03/17 1226    Comprehensive Metabolic Panel [[id number]]  (Abnormal) Collected:  02/03/17 1209    Specimen:  Blood from Arm, Left Updated:  02/03/17 1253     Glucose 126 (H) mg/dL      BUN 13 mg/dL      Creatinine 0.68 mg/dL      Sodium 138 mmol/L      Potassium 3.9 mmol/L      Chloride 96 mmol/L      CO2 33.0 (H) mmol/L      Calcium 9.5 mg/dL      Total Protein 7.4 g/dL      Albumin 4.20 g/dL      ALT (SGPT) 30 U/L      AST (SGOT) 27 U/L      Alkaline Phosphatase 73 U/L      Total Bilirubin 0.8 mg/dL      eGFR Non African Amer 82 mL/min/1.73      Globulin 3.2 gm/dL      A/G Ratio 1.3 g/dL      BUN/Creatinine Ratio 19.1      Anion Gap 9.0 mmol/L     Narrative:       The MDRD GFR formula is only valid for adults with stable renal function between ages 18 and 70.    [place] Draw [[id number]] Collected:  02/03/17 0837    Specimen:  Blood Updated:  02/03/17 1301    Narrative:       The following orders were created for panel order [place] Draw.  Procedure                               Abnormality         Status                     ---------                               -----------         ------                     Light Blue Top[[id number]]                                                               Green Top (Gel)[[id number]]                                   In process                 Lavender Top[[id number]]                                      Final result               Gold Top - [facility][[id number]]                                                                 Please view results for these tests on the individual orders.    Lavender Top [[id number]] Collected:  02/03/17 0837    Specimen:  Blood Updated:  02/03/17 1301     Extra Tube hold for add-on       Auto resulted       BNP [[id number]]  (Normal) Collected:  02/03/17 1209    Specimen:  Blood from Arm, Left  Updated:  02/03/17 1302     proBNP 897.0 pg/mL     Troponin [[id number]]  (Normal) Collected:  02/03/17 1209    Specimen:  Blood from Arm, Left Updated:  02/03/17 1307     Troponin I 0.022 ng/mL     Extra Tubes [[id number]] Collected:  02/03/17 1211    Specimen:  Blood from Blood, Venous Line Updated:  02/03/17 1638    Narrative:       The following orders were created for panel order Extra Tubes.  Procedure                               Abnormality         Status                     ---------                               -----------         ------                     Light Blue Top[[id number]]                                    In process                 Lavender Top[[id number]]                                      In process                 Gold Top - SST[[id number]]                                    In process                 Blood Culture Bottle Set[[id number]]                                                       Please view results for these tests on the individual orders.          Imaging Results (last 24 hours)     Procedure Component Value Units Date/Time    XR Chest 2 View [[id number]] Collected:  02/03/17 0925     Updated:  02/03/17 0928    Narrative:           PROCEDURE: Chest PA and lateral    REASON FOR EXAM: CHF/COPD Protocol    FINDINGS: Comparison study dated January 20, 2017.      . Cardiac and pulmonary vasculature are normal . Lungs are clear. Pleural spaces are normal . No acute osseous abnormality.      Impression:       Negative chest    Electronically signed by:  Rodrigo Mead  2/3/2017 9:26 AM CST            Assessment/Plan     Principal Problem:    Chronic respiratory failure with hypoxia and hypercapnia  Active Problems:    Chronic obstructive pulmonary disease with acute exacerbation    Paroxysmal atrial fibrillation      Place in observation.  Provide her with oxygen, nebulized treatments, IV steroids.  The admission chest x-ray is negative.    Daren Wu MD  02/03/17  4:42 PM

## 2017-02-04 PROCEDURE — G0378 HOSPITAL OBSERVATION PER HR: HCPCS

## 2017-02-04 PROCEDURE — 96376 TX/PRO/DX INJ SAME DRUG ADON: CPT

## 2017-02-04 PROCEDURE — 93005 ELECTROCARDIOGRAM TRACING: CPT | Performed by: INTERNAL MEDICINE

## 2017-02-04 PROCEDURE — 94640 AIRWAY INHALATION TREATMENT: CPT

## 2017-02-04 PROCEDURE — 94799 UNLISTED PULMONARY SVC/PX: CPT

## 2017-02-04 PROCEDURE — 94760 N-INVAS EAR/PLS OXIMETRY 1: CPT

## 2017-02-04 PROCEDURE — 25010000002 METHYLPREDNISOLONE PER 40 MG: Performed by: INTERNAL MEDICINE

## 2017-02-04 PROCEDURE — 93010 ELECTROCARDIOGRAM REPORT: CPT | Performed by: INTERNAL MEDICINE

## 2017-02-04 RX ORDER — DOCUSATE SODIUM 100 MG/1
300 CAPSULE, LIQUID FILLED ORAL DAILY
Status: DISCONTINUED | OUTPATIENT
Start: 2017-02-05 | End: 2017-02-05 | Stop reason: HOSPADM

## 2017-02-04 RX ORDER — FUROSEMIDE 40 MG/1
40 TABLET ORAL DAILY
Status: DISCONTINUED | OUTPATIENT
Start: 2017-02-04 | End: 2017-02-05 | Stop reason: HOSPADM

## 2017-02-04 RX ORDER — HYDROCODONE BITARTRATE AND ACETAMINOPHEN 5; 325 MG/1; MG/1
1 TABLET ORAL EVERY 6 HOURS PRN
Status: CANCELLED | OUTPATIENT
Start: 2017-02-04

## 2017-02-04 RX ORDER — AMIODARONE HYDROCHLORIDE 200 MG/1
200 TABLET ORAL DAILY
Status: DISCONTINUED | OUTPATIENT
Start: 2017-02-05 | End: 2017-02-05 | Stop reason: HOSPADM

## 2017-02-04 RX ORDER — DILTIAZEM HYDROCHLORIDE 120 MG/1
120 CAPSULE, COATED, EXTENDED RELEASE ORAL DAILY
Status: DISCONTINUED | OUTPATIENT
Start: 2017-02-05 | End: 2017-02-05 | Stop reason: HOSPADM

## 2017-02-04 RX ORDER — BUDESONIDE AND FORMOTEROL FUMARATE DIHYDRATE 160; 4.5 UG/1; UG/1
2 AEROSOL RESPIRATORY (INHALATION)
Status: DISCONTINUED | OUTPATIENT
Start: 2017-02-04 | End: 2017-02-05 | Stop reason: HOSPADM

## 2017-02-04 RX ADMIN — BUDESONIDE AND FORMOTEROL FUMARATE DIHYDRATE 2 PUFF: 160; 4.5 AEROSOL RESPIRATORY (INHALATION) at 08:12

## 2017-02-04 RX ADMIN — NIFEDIPINE 120 MG: 10 CAPSULE ORAL at 09:20

## 2017-02-04 RX ADMIN — METHYLPREDNISOLONE SODIUM SUCCINATE 40 MG: 40 INJECTION, POWDER, FOR SOLUTION INTRAMUSCULAR; INTRAVENOUS at 17:43

## 2017-02-04 RX ADMIN — BUDESONIDE AND FORMOTEROL FUMARATE DIHYDRATE 2 PUFF: 160; 4.5 AEROSOL RESPIRATORY (INHALATION) at 19:53

## 2017-02-04 RX ADMIN — ALBUTEROL SULFATE 2.5 MG: 2.5 SOLUTION RESPIRATORY (INHALATION) at 14:42

## 2017-02-04 RX ADMIN — ALBUTEROL SULFATE 2.5 MG: 2.5 SOLUTION RESPIRATORY (INHALATION) at 01:02

## 2017-02-04 RX ADMIN — APIXABAN 5 MG: 5 TABLET, FILM COATED ORAL at 09:20

## 2017-02-04 RX ADMIN — FUROSEMIDE 40 MG: 40 TABLET ORAL at 17:44

## 2017-02-04 RX ADMIN — METHYLPREDNISOLONE SODIUM SUCCINATE 40 MG: 40 INJECTION, POWDER, FOR SOLUTION INTRAMUSCULAR; INTRAVENOUS at 02:40

## 2017-02-04 RX ADMIN — AMIODARONE HYDROCHLORIDE 200 MG: 200 TABLET ORAL at 09:19

## 2017-02-04 RX ADMIN — DOCUSATE SODIUM 300 MG: 100 CAPSULE, LIQUID FILLED ORAL at 09:20

## 2017-02-04 RX ADMIN — Medication 10 ML: at 17:46

## 2017-02-04 RX ADMIN — Medication 10 ML: at 17:45

## 2017-02-04 RX ADMIN — METHYLPREDNISOLONE SODIUM SUCCINATE 40 MG: 40 INJECTION, POWDER, FOR SOLUTION INTRAMUSCULAR; INTRAVENOUS at 09:22

## 2017-02-04 NOTE — PLAN OF CARE
Problem: Patient Care Overview (Adult)  Goal: Discharge Needs Assessment  Outcome: Ongoing (interventions implemented as appropriate)    Problem: Fall Risk (Adult)  Goal: Identify Related Risk Factors and Signs and Symptoms  Outcome: Ongoing (interventions implemented as appropriate)  Goal: Absence of Falls  Outcome: Ongoing (interventions implemented as appropriate)    Problem: COPD, Chronic Bronchitis/Emphysema (Adult)  Goal: Signs and Symptoms of Listed Potential Problems Will be Absent or Manageable (COPD, Chronic Bronchitis/Emphysema)  Outcome: Ongoing (interventions implemented as appropriate)

## 2017-02-04 NOTE — PLAN OF CARE
Problem: Patient Care Overview (Adult)  Goal: Plan of Care Review  Outcome: Ongoing (interventions implemented as appropriate)    02/03/17 4498   Coping/Psychosocial Response Interventions   Plan Of Care Reviewed With patient   Patient Care Overview   Progress no change   Outcome Evaluation   Outcome Summary/Follow up Plan patient short of breath this shift, nebs ordered.       Goal: Adult Individualization and Mutuality  Outcome: Ongoing (interventions implemented as appropriate)  Goal: Discharge Needs Assessment  Outcome: Ongoing (interventions implemented as appropriate)    Problem: Fall Risk (Adult)  Goal: Identify Related Risk Factors and Signs and Symptoms  Outcome: Ongoing (interventions implemented as appropriate)  Goal: Absence of Falls  Outcome: Ongoing (interventions implemented as appropriate)    Problem: COPD, Chronic Bronchitis/Emphysema (Adult)  Goal: Signs and Symptoms of Listed Potential Problems Will be Absent or Manageable (COPD, Chronic Bronchitis/Emphysema)  Outcome: Ongoing (interventions implemented as appropriate)

## 2017-02-04 NOTE — PROGRESS NOTES
Progress Note  Daren Wu MD  Hospitalist     LOS: 0 days   Patient Care Team:  Krista Matos MD as PCP - General  Jenny Salguero as Care Coordinator ([facility])    Chief Complaint: shortness of breath, cough    Subjective     Interval History:     Patient Complaints: Feels better, less dyspnea, less cough.  History taken from: patient    Medication Review:   Current Facility-Administered Medications   Medication Dose Route Frequency Provider Last Rate Last Dose   • acetaminophen (TYLENOL) tablet 500 mg  500 mg Oral Once PRN Daren Wu MD       • albuterol (PROVENTIL) nebulizer solution 0.083% 2.5 mg/3mL  2.5 mg Nebulization Q6H PRN Daren Wu MD   2.5 mg at 02/04/17 0102   • amiodarone (PACERONE) tablet 200 mg  200 mg Oral Daily Daren Wu MD   200 mg at 02/03/17 1919   • apixaban (ELIQUIS) tablet 5 mg  5 mg Oral Q12H Daren Wu MD   5 mg at 02/03/17 2149   • budesonide-formoterol (SYMBICORT) 160-4.5 MCG/ACT inhaler 2 puff  2 puff Inhalation BID - RT Daren Wu MD   2 puff at 02/04/17 0812   • diltiaZEM CD (CARDIZEM CD) 24 hr capsule 120 mg  120 mg Oral Daily Daren Wu MD   120 mg at 02/03/17 1919   • docusate sodium (COLACE) capsule 300 mg  300 mg Oral Daily Daren Wu MD   300 mg at 02/03/17 1920   • HYDROcodone-acetaminophen (NORCO) 5-325 MG per tablet 1 tablet  1 tablet Oral Q6H PRN Daren Wu MD       • methylPREDNISolone sodium succinate (SOLU-Medrol) injection 40 mg  40 mg Intravenous Q8H Daren Wu MD   40 mg at 02/04/17 0240   • sodium chloride 0.9 % flush 1-10 mL  1-10 mL Intravenous PRN Daren Wu MD       • sodium chloride 0.9 % flush 10 mL  10 mL Intravenous PRN Lam Tam MD   10 mL at 02/03/17 1127       Review of Systems:   Review of Systems   HENT: Positive for congestion.    Respiratory: Positive for cough (less), shortness of breath (less) and wheezing (less).    Cardiovascular: Negative for chest pain.   Gastrointestinal: Negative  for abdominal pain.   Neurological: Positive for weakness. Negative for light-headedness and numbness.   Psychiatric/Behavioral: Negative for confusion.   All other systems reviewed and are negative.      Objective     Vital Signs  Temp:  [97.1 °F (36.2 °C)-98.1 °F (36.7 °C)] 97.1 °F (36.2 °C)  Heart Rate:  [59-91] 84  Resp:  [18-24] 18  BP: (112-186)/(62-95) 166/77    Physical Exam:  Physical Exam   Constitutional: She is oriented to person, place, and time. She appears well-developed and well-nourished.   HENT:   Head: Normocephalic and atraumatic.   Eyes: EOM are normal. Pupils are equal, round, and reactive to light.   Neck: Neck supple.   Cardiovascular: Normal rate and regular rhythm.    Pulmonary/Chest: Effort normal. She has wheezes (less). She has no rales. She exhibits no tenderness.   Abdominal: Soft.   Neurological: She is alert and oriented to person, place, and time.   Skin: Skin is warm and dry.   Vitals reviewed.       Results Review:    Lab Results (last 24 hours)     Procedure Component Value Units Date/Time    CBC & Differential [[id number]] Collected:  02/03/17 0837    Specimen:  Blood Updated:  02/03/17 0918    Narrative:       The following orders were created for panel order CBC & Differential.  Procedure                               Abnormality         Status                     ---------                               -----------         ------                     CBC Auto Differential[[id number]]         Abnormal            Final result                 Please view results for these tests on the individual orders.    CBC Auto Differential [[id number]]  (Abnormal) Collected:  02/03/17 0837    Specimen:  Blood Updated:  02/03/17 0918     WBC 11.82 (H) 10*3/mm3      RBC 3.74 (L) 10*6/mm3      Hemoglobin 12.4 g/dL      Hematocrit 36.3 %      MCV 97.1 fL      MCH 33.2 pg      MCHC 34.2 g/dL      RDW 13.3 %      RDW-SD 46.1 fl      MPV 11.0 fL      Platelets 158 10*3/mm3      Neutrophil % 68.5 %       Lymphocyte % 5.2 (L) %      Monocyte % 6.9 %      Eosinophil % 18.8 (H) %      Basophil % 0.3 %      Immature Grans % 0.3 %      Neutrophils, Absolute 8.10 10*3/mm3      Lymphocytes, Absolute 0.61 10*3/mm3      Monocytes, Absolute 0.82 10*3/mm3      Eosinophils, Absolute 2.22 (H) 10*3/mm3      Basophils, Absolute 0.04 10*3/mm3      Immature Grans, Absolute 0.03 (H) 10*3/mm3      nRBC 0.0 /100 WBC     Urinalysis With / Culture If Indicated [[id number]]  (Normal) Collected:  02/03/17 1008    Specimen:  Urine from Urine, Clean Catch Updated:  02/03/17 1021     Color, UA Yellow      Appearance, UA Clear      pH, UA 7.5      Specific Gravity, UA 1.006      Glucose, UA Negative      Ketones, UA Negative      Bilirubin, UA Negative      Blood, UA Negative      Protein, UA Negative      Leuk Esterase, UA Negative      Nitrite, UA Negative      Urobilinogen, UA 0.2 E.U./dL     Narrative:       Urine microscopic not indicated.    Comprehensive Metabolic Panel [[id number]]  (Abnormal) Collected:  02/03/17 1209    Specimen:  Blood from Arm, Left Updated:  02/03/17 1253     Glucose 126 (H) mg/dL      BUN 13 mg/dL      Creatinine 0.68 mg/dL      Sodium 138 mmol/L      Potassium 3.9 mmol/L      Chloride 96 mmol/L      CO2 33.0 (H) mmol/L      Calcium 9.5 mg/dL      Total Protein 7.4 g/dL      Albumin 4.20 g/dL      ALT (SGPT) 30 U/L      AST (SGOT) 27 U/L      Alkaline Phosphatase 73 U/L      Total Bilirubin 0.8 mg/dL      eGFR Non African Amer 82 mL/min/1.73      Globulin 3.2 gm/dL      A/G Ratio 1.3 g/dL      BUN/Creatinine Ratio 19.1      Anion Gap 9.0 mmol/L     Narrative:       The MDRD GFR formula is only valid for adults with stable renal function between ages 18 and 70.    Lavender Top [[id number]] Collected:  02/03/17 0837    Specimen:  Blood Updated:  02/03/17 1301     Extra Tube hold for add-on       Auto resulted       BNP [[id number]]  (Normal) Collected:  02/03/17 1209    Specimen:  Blood from Arm, Left Updated:  02/03/17  1302     proBNP 897.0 pg/mL     Troponin [[id number]]  (Normal) Collected:  02/03/17 1209    Specimen:  Blood from Arm, Left Updated:  02/03/17 1307     Troponin I 0.022 ng/mL     Light Blue Top [[id number]] Collected:  02/03/17 1212    Specimen:  Blood from Arm, Left Updated:  02/03/17 1701     Extra Tube hold for add-on       Auto resulted       Lavender Top [[id number]] Collected:  02/03/17 1212    Specimen:  Blood from Arm, Left Updated:  02/03/17 1701     Extra Tube hold for add-on       Auto resulted       [place] Draw [[id number]] Collected:  02/03/17 0837    Specimen:  Blood Updated:  02/03/17 1701    Narrative:       The following orders were created for panel order [place] Draw.  Procedure                               Abnormality         Status                     ---------                               -----------         ------                     Light Blue Top[[id number]]                                                               Green Top (Gel)[[id number]]                                   Final result               Lavender Top[[id number]]                                      Final result               Gold Top - SST[[id number]]                                                                 Please view results for these tests on the individual orders.    Green Top (Gel) [[id number]] Collected:  02/03/17 1209    Specimen:  Blood from Arm, Left Updated:  02/03/17 1701     Extra Tube Hold for add-ons.       Auto resulted.       Extra Tubes [[id number]] Collected:  02/03/17 1211    Specimen:  Blood from Blood, Venous Line Updated:  02/03/17 1701    Narrative:       The following orders were created for panel order Extra Tubes.  Procedure                               Abnormality         Status                     ---------                               -----------         ------                     Light Blue Top[[id number]]                                    Final result               Lavender Top[[id number]]                                       Final result               Gold Top - SST[[id number]]                                    Final result               Blood Culture Bottle Set[[id number]]                                                       Please view results for these tests on the individual orders.    Gold Top - SST [[id number]] Collected:  02/03/17 1212    Specimen:  Blood from Arm, Left Updated:  02/03/17 1701     Extra Tube Hold for add-ons.       Auto resulted.             Imaging Results (last 24 hours)     Procedure Component Value Units Date/Time    XR Chest 2 View [[id number]] Collected:  02/03/17 0925     Updated:  02/03/17 0928    Narrative:           PROCEDURE: Chest PA and lateral    REASON FOR EXAM: CHF/COPD Protocol    FINDINGS: Comparison study dated January 20, 2017.      . Cardiac and pulmonary vasculature are normal . Lungs are clear. Pleural spaces are normal . No acute osseous abnormality.      Impression:       Negative chest    Electronically signed by:  Rodrigo Mead  2/3/2017 9:26 AM CST            Assessment/Plan     Principal Problem:    Chronic respiratory failure with hypoxia and hypercapnia  Active Problems:    Chronic obstructive pulmonary disease with acute exacerbation    Paroxysmal atrial fibrillation      Continue with nebs, IV steroids, O2. Feels better overall.    Daren Wu MD  02/04/17  8:54 AM

## 2017-02-04 NOTE — PLAN OF CARE
Problem: Patient Care Overview (Adult)  Goal: Plan of Care Review  Outcome: Ongoing (interventions implemented as appropriate)    02/04/17 0610   Coping/Psychosocial Response Interventions   Plan Of Care Reviewed With patient   Patient Care Overview   Progress improving   Outcome Evaluation   Outcome Summary/Follow up Plan pt short of breath with mild exertion

## 2017-02-05 VITALS
HEART RATE: 85 BPM | OXYGEN SATURATION: 95 % | BODY MASS INDEX: 35.68 KG/M2 | SYSTOLIC BLOOD PRESSURE: 127 MMHG | RESPIRATION RATE: 18 BRPM | DIASTOLIC BLOOD PRESSURE: 78 MMHG | WEIGHT: 189 LBS | TEMPERATURE: 97.3 F | HEIGHT: 61 IN

## 2017-02-05 PROCEDURE — 94640 AIRWAY INHALATION TREATMENT: CPT

## 2017-02-05 PROCEDURE — 25010000002 METHYLPREDNISOLONE PER 40 MG: Performed by: INTERNAL MEDICINE

## 2017-02-05 PROCEDURE — G0378 HOSPITAL OBSERVATION PER HR: HCPCS

## 2017-02-05 PROCEDURE — 96376 TX/PRO/DX INJ SAME DRUG ADON: CPT

## 2017-02-05 RX ORDER — ALBUTEROL SULFATE 90 UG/1
2 AEROSOL, METERED RESPIRATORY (INHALATION)
Qty: 1 INHALER | Refills: 1 | Status: SHIPPED | OUTPATIENT
Start: 2017-02-05 | End: 2017-11-20 | Stop reason: SDUPTHER

## 2017-02-05 RX ORDER — FUROSEMIDE 40 MG/1
40 TABLET ORAL DAILY
Qty: 30 TABLET | Refills: 1 | Status: SHIPPED | OUTPATIENT
Start: 2017-02-05 | End: 2017-02-13 | Stop reason: SDUPTHER

## 2017-02-05 RX ORDER — METHYLPREDNISOLONE 4 MG/1
TABLET ORAL
Qty: 21 TABLET | Refills: 0 | Status: SHIPPED | OUTPATIENT
Start: 2017-02-05 | End: 2017-02-13

## 2017-02-05 RX ORDER — ALBUTEROL SULFATE 0.63 MG/3ML
1 SOLUTION RESPIRATORY (INHALATION) EVERY 6 HOURS PRN
Qty: 129 VIAL | Refills: 1 | Status: SHIPPED | OUTPATIENT
Start: 2017-02-05 | End: 2017-12-15 | Stop reason: SDUPTHER

## 2017-02-05 RX ADMIN — DOCUSATE SODIUM 300 MG: 100 CAPSULE, LIQUID FILLED ORAL at 09:00

## 2017-02-05 RX ADMIN — METHYLPREDNISOLONE SODIUM SUCCINATE 40 MG: 40 INJECTION, POWDER, FOR SOLUTION INTRAMUSCULAR; INTRAVENOUS at 09:50

## 2017-02-05 RX ADMIN — FUROSEMIDE 40 MG: 40 TABLET ORAL at 09:00

## 2017-02-05 RX ADMIN — DILTIAZEM HYDROCHLORIDE 120 MG: 120 CAPSULE, COATED, EXTENDED RELEASE ORAL at 09:00

## 2017-02-05 RX ADMIN — BUDESONIDE AND FORMOTEROL FUMARATE DIHYDRATE 2 PUFF: 160; 4.5 AEROSOL RESPIRATORY (INHALATION) at 07:25

## 2017-02-05 RX ADMIN — AMIODARONE HYDROCHLORIDE 200 MG: 200 TABLET ORAL at 09:51

## 2017-02-05 RX ADMIN — METHYLPREDNISOLONE SODIUM SUCCINATE 40 MG: 40 INJECTION, POWDER, FOR SOLUTION INTRAMUSCULAR; INTRAVENOUS at 05:00

## 2017-02-05 RX ADMIN — HYDROCODONE BITARTRATE AND ACETAMINOPHEN 1 TABLET: 5; 325 TABLET ORAL at 02:07

## 2017-02-05 NOTE — PLAN OF CARE
Problem: Patient Care Overview (Adult)  Goal: Plan of Care Review  Outcome: Ongoing (interventions implemented as appropriate)    02/05/17 0556   Coping/Psychosocial Response Interventions   Plan Of Care Reviewed With patient   Patient Care Overview   Progress improving   Outcome Evaluation   Outcome Summary/Follow up Plan pt states she is feeling better this shift. Occassional cough.       Goal: Discharge Needs Assessment  Outcome: Ongoing (interventions implemented as appropriate)    Problem: Fall Risk (Adult)  Goal: Identify Related Risk Factors and Signs and Symptoms  Outcome: Outcome(s) achieved Date Met:  02/05/17  Goal: Absence of Falls  Outcome: Ongoing (interventions implemented as appropriate)    Problem: COPD, Chronic Bronchitis/Emphysema (Adult)  Goal: Signs and Symptoms of Listed Potential Problems Will be Absent or Manageable (COPD, Chronic Bronchitis/Emphysema)  Outcome: Ongoing (interventions implemented as appropriate)

## 2017-02-05 NOTE — DISCHARGE SUMMARY
Discharge Summary  Daren Wu MD  Hospitalist     Date of Discharge:  2/5/2017    Discharge Diagnosis: chronic bronchitis with exacerbation    Presenting Problem/History of Present Illness  Shortness of breath [R06.02]  Bronchitis [J40]  Chronic obstructive pulmonary disease with acute exacerbation [J44.1]     Hospital Course  Patient is a 86 y.o. female presented with dyspnea, cough, chronic bronchitis exacerbation. Her CXRay showed no acute infiltrates. She felt better with the help of nebulized treatments, IV steroids. She wants to go home.       Procedures Performed      Consults:   Consults     No orders found from 1/5/2017 to 2/4/2017.          Pertinent Test Results: radiology: X-Ray: no acute infiltrates    Condition on Discharge:  Stable, back to baseline    Vital Signs  Temp:  [97.6 °F (36.4 °C)-98.5 °F (36.9 °C)] 97.6 °F (36.4 °C)  Heart Rate:  [49-84] 68  Resp:  [16-22] 16  BP: (140-144)/(67-82) 144/78    Physical Exam:  Physical Exam   Constitutional: She is oriented to person, place, and time. She appears well-developed.   HENT:   Head: Normocephalic and atraumatic.   Eyes: EOM are normal. Pupils are equal, round, and reactive to light.   Neck: Neck supple.   Cardiovascular: Normal rate.    Pulmonary/Chest: Effort normal and breath sounds normal. No respiratory distress. She has no wheezes. She has no rales. She exhibits no tenderness.   Abdominal: Soft. Bowel sounds are normal.   Neurological: She is alert and oriented to person, place, and time.   Skin: Skin is warm and dry.   Psychiatric: She has a normal mood and affect.   Vitals reviewed.      Discharge Disposition  Home or Self Care    Discharge Medications   Norma Harkins   Home Medication Instructions REHANA:[id number]    Printed on:02/05/17 4107   Medication Information                      acetaminophen (TYLENOL) 500 MG tablet  Take 500 mg by mouth 1 (one) time as needed for mild pain (1-3).             albuterol (ACCUNEB) 0.63 MG/3ML  nebulizer solution  Take 3 mL by nebulization Every 6 (Six) Hours As Needed for wheezing.             albuterol (PROVENTIL HFA;VENTOLIN HFA) 108 (90 BASE) MCG/ACT inhaler  Inhale 2 puffs 4 (Four) Times a Day.             amiodarone (PACERONE) 200 MG tablet  Take 1 tablet by mouth Daily.             apixaban (ELIQUIS) 5 MG tablet tablet  Take 1 tablet by mouth Every 12 (Twelve) Hours.             diltiaZEM CD (CARDIZEM CD) 120 MG 24 hr capsule  Take 1 capsule by mouth Daily.             docusate sodium (COLACE) 100 MG capsule  Take 300 mg by mouth Daily.             furosemide (LASIX) 40 MG tablet  Take 1 tablet by mouth Daily.             HYDROcodone-acetaminophen (NORCO) 5-325 MG per tablet  Take 1 tablet by mouth Every 6 (Six) Hours As Needed for moderate pain (4-6).             MethylPREDNISolone (MEDROL, LEANDRO,) 4 MG tablet  Take as directed on package instructions.             SYMBICORT 160-4.5 MCG/ACT inhaler  inhale 2 puffs by mouth twice a day                 Discharge Diet:     Activity at Discharge:     Follow-up Appointments  Future Appointments  Date Time Provider Department Center   7/27/2017 2:15 PM Sofya Graham MD [facility] None     Referrals and Follow-ups to Schedule     Follow-Up    As directed    Krista Matos MD in 1 week               Continue with the O2 nasal cannula at 2 or 3 L/min. Did not want Home Health.    Test Results Pending at Discharge       Daren Wu MD  02/05/17  7:56 AM

## 2017-02-05 NOTE — PLAN OF CARE
Problem: Patient Care Overview (Adult)  Goal: Plan of Care Review  Outcome: Ongoing (interventions implemented as appropriate)    02/04/17 1811   Coping/Psychosocial Response Interventions   Plan Of Care Reviewed With patient   Patient Care Overview   Progress improving   Outcome Evaluation   Outcome Summary/Follow up Plan Pt lasix started, pt feeling better, having productive cough secretions thinning.       Goal: Adult Individualization and Mutuality  Outcome: Ongoing (interventions implemented as appropriate)  Goal: Discharge Needs Assessment  Outcome: Ongoing (interventions implemented as appropriate)    Problem: Fall Risk (Adult)  Goal: Identify Related Risk Factors and Signs and Symptoms  Outcome: Ongoing (interventions implemented as appropriate)  Goal: Absence of Falls  Outcome: Ongoing (interventions implemented as appropriate)    Problem: COPD, Chronic Bronchitis/Emphysema (Adult)  Goal: Signs and Symptoms of Listed Potential Problems Will be Absent or Manageable (COPD, Chronic Bronchitis/Emphysema)  Outcome: Ongoing (interventions implemented as appropriate)

## 2017-02-05 NOTE — DISCHARGE INSTR - LAB
Dr. Matos's office should call you tomorrow to schedule appointment for 1 week, if not received call please call office and schedule follow-up appointment

## 2017-02-05 NOTE — DISCHARGE INSTRUCTIONS
Notify MD or return to ER with shortness of breath, fever or chest pain or any other unresolved issued.

## 2017-02-06 NOTE — DISCHARGE SUMMARY
"    [facility] Medicine Services  DISCHARGE SUMMARY       Date of Admission: 1/20/2017  Date of Discharge:  1/22/2017  Primary Care Physician: Krista Matos MD    Presenting Problem/History of Present Illness:  Palpitations [R00.2]       Final Discharge Diagnoses:  Hospital Problem List     Palpitations          Consults:   Consults     No orders found from 12/22/2016 to 1/21/2017.          Procedures Performed:                 Pertinent Test Results: None    Chief Complaint on Day of Discharge: Palpitations    Hospital Course:  The patient is a 86 y.o. female who presented to [facility] with complaints of palpitations. She was found to have a HR in the 110s. She was concerned and called her PCP who informed her to go to the ED for further evaluation. She was admitted, and placed on telemetry. She was observed, and HR was more stable. She was SOA on exertion, PT/OT helped to strengthen her. On Day of D/C, patient was asymptomatic and HR was normalized.     Condition on Discharge:  Stable    Physical Exam on Discharge:  Visit Vitals   • /73 (BP Location: Left arm, Patient Position: Sitting)   • Pulse 108   • Temp 97.1 °F (36.2 °C) (Oral)   • Resp 18   • Ht 61\" (154.9 cm)   • Wt 185 lb 9.6 oz (84.2 kg)   • SpO2 98%   • BMI 35.07 kg/m2     Physical Exam   Constitutional: She is oriented to person, place, and time. She appears well-developed and well-nourished.   Cardiovascular: Normal rate, regular rhythm and normal heart sounds.  Exam reveals no gallop and no friction rub.    No murmur heard.  Pulmonary/Chest: Effort normal and breath sounds normal. No respiratory distress. She has no wheezes. She has no rales.   Abdominal: Soft. Bowel sounds are normal. There is no tenderness.   Musculoskeletal: Normal range of motion. She exhibits no edema.   Neurological: She is alert and oriented to person, place, and time.   Skin: Skin is warm and dry.   Psychiatric: " She has a normal mood and affect. Her behavior is normal.   Vitals reviewed.        Discharge Disposition:  Home or Self Care    Discharge Medications:   Norma Harkins   Home Medication Instructions REHANA:[id number]    Printed on:02/06/17 0202   Medication Information                      acetaminophen (TYLENOL) 500 MG tablet  Take 500 mg by mouth 1 (one) time as needed for mild pain (1-3).             amiodarone (PACERONE) 200 MG tablet  Take 1 tablet by mouth Daily.             apixaban (ELIQUIS) 5 MG tablet tablet  Take 1 tablet by mouth Every 12 (Twelve) Hours.             diltiaZEM CD (CARDIZEM CD) 120 MG 24 hr capsule  Take 1 capsule by mouth Daily.             docusate sodium (COLACE) 100 MG capsule  Take 300 mg by mouth Daily.             HYDROcodone-acetaminophen (NORCO) 5-325 MG per tablet  Take 1 tablet by mouth Every 6 (Six) Hours As Needed for moderate pain (4-6).             SYMBICORT 160-4.5 MCG/ACT inhaler  inhale 2 puffs by mouth twice a day                 Discharge Diet:   Diet Instructions     Diet: Cardiac; Thin Liquids, No Restrictions       Discharge Diet:  Cardiac   Fluid Consistency:  Thin Liquids, No Restrictions                 Activity at Discharge:   Activity Instructions     Activity as Tolerated                     Discharge Care Plan/Instructions: Follow up with PCP    Follow-up Appointments:   Future Appointments  Date Time Provider Department Center   7/27/2017 2:15 PM Sofya Graham MD [facility] None       Test Results Pending at Discharge:     Wili Adan MD    Time: Greater than 30 mins spent on discharge planning

## 2017-02-13 ENCOUNTER — OFFICE VISIT (OUTPATIENT)
Dept: INTERNAL MEDICINE | Facility: CLINIC | Age: 82
End: 2017-02-13

## 2017-02-13 VITALS
WEIGHT: 185.7 LBS | DIASTOLIC BLOOD PRESSURE: 80 MMHG | HEIGHT: 61 IN | BODY MASS INDEX: 35.06 KG/M2 | SYSTOLIC BLOOD PRESSURE: 110 MMHG

## 2017-02-13 DIAGNOSIS — J44.1 CHRONIC OBSTRUCTIVE PULMONARY DISEASE WITH ACUTE EXACERBATION (HCC): Primary | ICD-10-CM

## 2017-02-13 DIAGNOSIS — I48.0 PAROXYSMAL ATRIAL FIBRILLATION (HCC): ICD-10-CM

## 2017-02-13 DIAGNOSIS — J96.11 CHRONIC HYPOXEMIC RESPIRATORY FAILURE (HCC): ICD-10-CM

## 2017-02-13 DIAGNOSIS — I10 ESSENTIAL HYPERTENSION: ICD-10-CM

## 2017-02-13 PROCEDURE — 99213 OFFICE O/P EST LOW 20 MIN: CPT | Performed by: INTERNAL MEDICINE

## 2017-02-13 PROCEDURE — 96372 THER/PROPH/DIAG INJ SC/IM: CPT | Performed by: INTERNAL MEDICINE

## 2017-02-13 RX ORDER — CEFUROXIME AXETIL 500 MG/1
500 TABLET ORAL 2 TIMES DAILY
Qty: 14 TABLET | Refills: 0 | Status: SHIPPED | OUTPATIENT
Start: 2017-02-13 | End: 2017-03-28 | Stop reason: HOSPADM

## 2017-02-13 RX ORDER — POTASSIUM CHLORIDE 750 MG/1
10 TABLET, EXTENDED RELEASE ORAL 2 TIMES DAILY
Qty: 60 TABLET | Refills: 5 | Status: ON HOLD | OUTPATIENT
Start: 2017-02-13 | End: 2017-10-10 | Stop reason: SDUPTHER

## 2017-02-13 RX ORDER — FUROSEMIDE 40 MG/1
40 TABLET ORAL DAILY
Qty: 30 TABLET | Refills: 5 | Status: SHIPPED | OUTPATIENT
Start: 2017-02-13 | End: 2017-10-25 | Stop reason: SDUPTHER

## 2017-02-13 RX ORDER — IPRATROPIUM BROMIDE AND ALBUTEROL SULFATE 2.5; .5 MG/3ML; MG/3ML
SOLUTION RESPIRATORY (INHALATION)
Refills: 0 | COMMUNITY
Start: 2017-02-07 | End: 2017-03-28 | Stop reason: HOSPADM

## 2017-02-13 RX ORDER — PREDNISONE 20 MG/1
TABLET ORAL
Qty: 15 TABLET | Refills: 0 | Status: ON HOLD | OUTPATIENT
Start: 2017-02-13 | End: 2017-02-20

## 2017-02-13 RX ORDER — POTASSIUM CHLORIDE 750 MG/1
TABLET, EXTENDED RELEASE ORAL
COMMUNITY
Start: 2017-02-02 | End: 2017-02-13 | Stop reason: SDUPTHER

## 2017-02-13 RX ORDER — METHYLPREDNISOLONE ACETATE 80 MG/ML
80 INJECTION, SUSPENSION INTRA-ARTICULAR; INTRALESIONAL; INTRAMUSCULAR; SOFT TISSUE ONCE
Status: COMPLETED | OUTPATIENT
Start: 2017-02-13 | End: 2017-02-13

## 2017-02-13 RX ADMIN — METHYLPREDNISOLONE ACETATE 80 MG: 80 INJECTION, SUSPENSION INTRA-ARTICULAR; INTRALESIONAL; INTRAMUSCULAR; SOFT TISSUE at 10:30

## 2017-02-13 NOTE — PROGRESS NOTES
Subjective   Norma Harkins is a 86 y.o. female     Patient is in today complaining of chest congestion, cough and worsening wheezing over the last couple of days. Cough is productive of yellowish sputum.  Her chest is feeling tight.  She has been using her Nebulizers 3 times a day.  Patient has severe COPD with frequent exacerbations. She is on Oxygen at 3 liters. Her O2 saturation is 90% on Oxygen.  She was hospitalized for COPD exacerbation couple of weeks ago and was treated with IV steroids.    Her BP is controlled on Diltiazem and Lasix. She remains in NSR, on Amiodarone. Patient is anticoagulated with Eliquis.  She required elective cardioversion for rapid atrial fibrillation in December 2016.  Denies any bruising or bleeding related to therapy with Eliquis.    Past Medical History   Diagnosis Date   • Chronic obstructive lung disease 04/26/2016     on oxygen      • Chronic respiratory failure with hypoxia 04/26/2016   • Essential hypertension 09/08/2015   • Hyperlipidemia    • Obesity    • Paroxysmal atrial fibrillation 03/15/2016     Past Surgical History   Procedure Laterality Date   • Joint replacement       total knee    • Hysterectomy     • Cataract extraction         Social History   Substance Use Topics   • Smoking status: Former Smoker   • Smokeless tobacco: Never Used      Comment:  Patient quit smoking more than 30 years ago, she smoked 1pd for 30 years prior to that   • Alcohol use No     Family History   Problem Relation Age of Onset   • Heart disease Other    • Hypertension Other    • Lung cancer Other      No Known Allergies  Current Outpatient Prescriptions on File Prior to Visit   Medication Sig Dispense Refill   • acetaminophen (TYLENOL) 500 MG tablet Take 500 mg by mouth 1 (one) time as needed for mild pain (1-3).     • albuterol (ACCUNEB) 0.63 MG/3ML nebulizer solution Take 3 mL by nebulization Every 6 (Six) Hours As Needed for wheezing. 129 vial 1   • albuterol (PROVENTIL HFA;VENTOLIN  HFA) 108 (90 BASE) MCG/ACT inhaler Inhale 2 puffs 4 (Four) Times a Day. 1 inhaler 1   • amiodarone (PACERONE) 200 MG tablet Take 1 tablet by mouth Daily. 30 tablet 6   • apixaban (ELIQUIS) 5 MG tablet tablet Take 1 tablet by mouth Every 12 (Twelve) Hours. 60 tablet 6   • diltiaZEM CD (CARDIZEM CD) 120 MG 24 hr capsule Take 1 capsule by mouth Daily. 30 capsule 11   • docusate sodium (COLACE) 100 MG capsule Take 300 mg by mouth Daily.     • HYDROcodone-acetaminophen (NORCO) 5-325 MG per tablet Take 1 tablet by mouth Every 6 (Six) Hours As Needed for moderate pain (4-6). 30 tablet 0   • SYMBICORT 160-4.5 MCG/ACT inhaler inhale 2 puffs by mouth twice a day 30.6 inhaler 2   • [DISCONTINUED] furosemide (LASIX) 40 MG tablet Take 1 tablet by mouth Daily. 30 tablet 1   • [DISCONTINUED] MethylPREDNISolone (MEDROL, LEANDRO,) 4 MG tablet Take as directed on package instructions. 21 tablet 0     No current facility-administered medications on file prior to visit.      Review of Systems   Constitutional: Positive for fatigue.   HENT: Positive for sinus pressure and sore throat. Negative for trouble swallowing and voice change.    Eyes: Negative for photophobia and visual disturbance.   Respiratory: Positive for cough, chest tightness, shortness of breath and wheezing.    Cardiovascular: Negative for chest pain, palpitations and leg swelling.   Gastrointestinal: Negative for abdominal pain, constipation and diarrhea.   Endocrine: Negative for cold intolerance, heat intolerance, polydipsia and polyuria.   Musculoskeletal: Positive for back pain.   Neurological: Negative for dizziness and light-headedness.   Hematological: Does not bruise/bleed easily.       Objective   Physical Exam   Constitutional: She is oriented to person, place, and time. She appears well-developed and well-nourished.   HENT:   Head: Normocephalic and atraumatic.   Nose: Mucosal edema present.   Pharynx injected   Eyes: Pupils are equal, round, and reactive to  light.   Neck: Neck supple. No JVD present. No thyromegaly present.   Cardiovascular: Normal rate and regular rhythm.  Exam reveals no gallop.    No murmur heard.  Pulmonary/Chest: Effort normal. She has wheezes.   Rhonchi bilaterally   Abdominal: Soft. Bowel sounds are normal. She exhibits no mass.   Neurological: She is alert and oriented to person, place, and time.   Skin: Skin is warm and dry.   Psychiatric: She has a normal mood and affect. Her behavior is normal.           Patient Active Problem List   Diagnosis   • Hypertension   • Chronic obstructive pulmonary disease with acute exacerbation   • Chronic respiratory failure with hypoxia and hypercapnia   • Low back pain   • Hyperlipidemia   • Palpitations   • Paroxysmal atrial fibrillation               Assessment    Diagnosis Plan   1. Chronic obstructive pulmonary disease with acute exacerbation  methylPREDNISolone acetate (DEPO-medrol) injection 80 mg   2. Chronic hypoxemic respiratory failure     3. Essential hypertension     4. Paroxysmal atrial fibrillation           Plan      1. Depo Medrol 80 mg IM.      Prednisone taper.      Ceftin 500 mg bid for 1 week.      Continue Symbicort.      Continue Duonebs qid.  2. Patient will remain on 3 liters of Oxygen.      She wopuld benefit from Pulmonary Rehab but is not interested.  3. Continue Cardizem and Lasix.      DASH diet.      1.5 gr Sodium restriction.  4. Patient will continue Amiodarone and Eliquis.      Side effects of the medications discussed with the patient.        Follow up in 3 months or earlier if not improved.

## 2017-02-15 ENCOUNTER — HOSPITAL ENCOUNTER (INPATIENT)
Facility: HOSPITAL | Age: 82
LOS: 4 days | Discharge: NURSING, REHAB OR LONG-TERM CARE | End: 2017-02-20
Attending: EMERGENCY MEDICINE | Admitting: INTERNAL MEDICINE

## 2017-02-15 ENCOUNTER — APPOINTMENT (OUTPATIENT)
Dept: GENERAL RADIOLOGY | Facility: HOSPITAL | Age: 82
End: 2017-02-15

## 2017-02-15 DIAGNOSIS — M54.50 CHRONIC BILATERAL LOW BACK PAIN WITHOUT SCIATICA: ICD-10-CM

## 2017-02-15 DIAGNOSIS — G89.29 CHRONIC BILATERAL LOW BACK PAIN WITHOUT SCIATICA: ICD-10-CM

## 2017-02-15 DIAGNOSIS — J44.1 CHRONIC OBSTRUCTIVE PULMONARY DISEASE WITH ACUTE EXACERBATION (HCC): Primary | ICD-10-CM

## 2017-02-15 DIAGNOSIS — J20.9 ACUTE BRONCHITIS, UNSPECIFIED ORGANISM: ICD-10-CM

## 2017-02-15 DIAGNOSIS — Z74.09 IMPAIRED MOBILITY AND ADLS: ICD-10-CM

## 2017-02-15 DIAGNOSIS — Z74.09 IMPAIRED MOBILITY AND ENDURANCE: ICD-10-CM

## 2017-02-15 DIAGNOSIS — R77.8 TROPONIN I ABOVE REFERENCE RANGE: ICD-10-CM

## 2017-02-15 DIAGNOSIS — R06.02 SHORTNESS OF BREATH: ICD-10-CM

## 2017-02-15 DIAGNOSIS — Z78.9 IMPAIRED MOBILITY AND ADLS: ICD-10-CM

## 2017-02-15 DIAGNOSIS — R73.9 HYPERGLYCEMIA: ICD-10-CM

## 2017-02-15 LAB
ALBUMIN SERPL-MCNC: 3.5 G/DL (ref 3.4–4.8)
ALBUMIN/GLOB SERPL: 1.2 G/DL (ref 1.1–1.8)
ALP SERPL-CCNC: 43 U/L (ref 38–126)
ALT SERPL W P-5'-P-CCNC: 35 U/L (ref 9–52)
ANION GAP SERPL CALCULATED.3IONS-SCNC: 9 MMOL/L (ref 5–15)
AST SERPL-CCNC: 26 U/L (ref 14–36)
BASOPHILS # BLD AUTO: 0.01 10*3/MM3 (ref 0–0.2)
BASOPHILS NFR BLD AUTO: 0.1 % (ref 0–2)
BILIRUB SERPL-MCNC: 0.5 MG/DL (ref 0.2–1.3)
BUN BLD-MCNC: 16 MG/DL (ref 7–21)
BUN/CREAT SERPL: 19.5 (ref 7–25)
CALCIUM SPEC-SCNC: 8.9 MG/DL (ref 8.4–10.2)
CHLORIDE SERPL-SCNC: 91 MMOL/L (ref 95–110)
CO2 SERPL-SCNC: 32 MMOL/L (ref 22–31)
CREAT BLD-MCNC: 0.82 MG/DL (ref 0.5–1)
DEPRECATED RDW RBC AUTO: 45.9 FL (ref 36.4–46.3)
EOSINOPHIL # BLD AUTO: 0.01 10*3/MM3 (ref 0–0.7)
EOSINOPHIL NFR BLD AUTO: 0.1 % (ref 0–7)
ERYTHROCYTE [DISTWIDTH] IN BLOOD BY AUTOMATED COUNT: 13.3 % (ref 11.5–14.5)
GFR SERPL CREATININE-BSD FRML MDRD: 66 ML/MIN/1.73 (ref 39–90)
GLOBULIN UR ELPH-MCNC: 3 GM/DL (ref 2.3–3.5)
GLUCOSE BLD-MCNC: 171 MG/DL (ref 60–100)
HCT VFR BLD AUTO: 34.5 % (ref 35–45)
HGB BLD-MCNC: 11.9 G/DL (ref 12–15.5)
HOLD SPECIMEN: NORMAL
HOLD SPECIMEN: NORMAL
IMM GRANULOCYTES # BLD: 0.05 10*3/MM3 (ref 0–0.02)
IMM GRANULOCYTES NFR BLD: 0.5 % (ref 0–0.5)
INR PPP: 1.04 (ref 0.8–1.2)
LYMPHOCYTES # BLD AUTO: 0.39 10*3/MM3 (ref 0.6–4.2)
LYMPHOCYTES NFR BLD AUTO: 3.8 % (ref 10–50)
MCH RBC QN AUTO: 32.9 PG (ref 26.5–34)
MCHC RBC AUTO-ENTMCNC: 34.5 G/DL (ref 31.4–36)
MCV RBC AUTO: 95.3 FL (ref 80–98)
MONOCYTES # BLD AUTO: 0.57 10*3/MM3 (ref 0–0.9)
MONOCYTES NFR BLD AUTO: 5.5 % (ref 0–12)
NEUTROPHILS # BLD AUTO: 9.32 10*3/MM3 (ref 2–8.6)
NEUTROPHILS NFR BLD AUTO: 90 % (ref 37–80)
NT-PROBNP SERPL-MCNC: 1040 PG/ML (ref 0–1800)
PLATELET # BLD AUTO: 129 10*3/MM3 (ref 150–450)
PMV BLD AUTO: 11.1 FL (ref 8–12)
POTASSIUM BLD-SCNC: 3.5 MMOL/L (ref 3.5–5.1)
PROT SERPL-MCNC: 6.5 G/DL (ref 6.3–8.6)
PROTHROMBIN TIME: 13.6 SECONDS (ref 11.1–15.3)
RBC # BLD AUTO: 3.62 10*6/MM3 (ref 3.77–5.16)
RBC MORPH BLD: NORMAL
SMALL PLATELETS BLD QL SMEAR: ADEQUATE
SODIUM BLD-SCNC: 132 MMOL/L (ref 137–145)
TROPONIN I SERPL-MCNC: 0.04 NG/ML
WBC MORPH BLD: NORMAL
WBC NRBC COR # BLD: 10.35 10*3/MM3 (ref 3.2–9.8)
WHOLE BLOOD HOLD SPECIMEN: NORMAL
WHOLE BLOOD HOLD SPECIMEN: NORMAL

## 2017-02-15 PROCEDURE — G0378 HOSPITAL OBSERVATION PER HR: HCPCS

## 2017-02-15 PROCEDURE — 71020 HC CHEST PA AND LATERAL: CPT

## 2017-02-15 PROCEDURE — 93005 ELECTROCARDIOGRAM TRACING: CPT | Performed by: EMERGENCY MEDICINE

## 2017-02-15 PROCEDURE — 85007 BL SMEAR W/DIFF WBC COUNT: CPT | Performed by: EMERGENCY MEDICINE

## 2017-02-15 PROCEDURE — 93010 ELECTROCARDIOGRAM REPORT: CPT | Performed by: INTERNAL MEDICINE

## 2017-02-15 PROCEDURE — 85025 COMPLETE CBC W/AUTO DIFF WBC: CPT | Performed by: EMERGENCY MEDICINE

## 2017-02-15 PROCEDURE — 84484 ASSAY OF TROPONIN QUANT: CPT | Performed by: EMERGENCY MEDICINE

## 2017-02-15 PROCEDURE — 85610 PROTHROMBIN TIME: CPT | Performed by: EMERGENCY MEDICINE

## 2017-02-15 PROCEDURE — 99285 EMERGENCY DEPT VISIT HI MDM: CPT

## 2017-02-15 PROCEDURE — 25010000002 METHYLPREDNISOLONE PER 125 MG: Performed by: EMERGENCY MEDICINE

## 2017-02-15 PROCEDURE — 36415 COLL VENOUS BLD VENIPUNCTURE: CPT

## 2017-02-15 PROCEDURE — 80053 COMPREHEN METABOLIC PANEL: CPT | Performed by: EMERGENCY MEDICINE

## 2017-02-15 PROCEDURE — 83880 ASSAY OF NATRIURETIC PEPTIDE: CPT | Performed by: EMERGENCY MEDICINE

## 2017-02-15 PROCEDURE — 94640 AIRWAY INHALATION TREATMENT: CPT

## 2017-02-15 RX ORDER — AMIODARONE HYDROCHLORIDE 200 MG/1
200 TABLET ORAL DAILY
Status: DISCONTINUED | OUTPATIENT
Start: 2017-02-15 | End: 2017-02-16

## 2017-02-15 RX ORDER — METHYLPREDNISOLONE SODIUM SUCCINATE 125 MG/2ML
60 INJECTION, POWDER, LYOPHILIZED, FOR SOLUTION INTRAMUSCULAR; INTRAVENOUS EVERY 6 HOURS
Status: DISCONTINUED | OUTPATIENT
Start: 2017-02-16 | End: 2017-02-16

## 2017-02-15 RX ORDER — SODIUM CHLORIDE 0.9 % (FLUSH) 0.9 %
10 SYRINGE (ML) INJECTION AS NEEDED
Status: DISCONTINUED | OUTPATIENT
Start: 2017-02-15 | End: 2017-02-20 | Stop reason: HOSPADM

## 2017-02-15 RX ORDER — ALBUTEROL SULFATE 2.5 MG/3ML
2.5 SOLUTION RESPIRATORY (INHALATION)
Status: DISPENSED | OUTPATIENT
Start: 2017-02-15 | End: 2017-02-15

## 2017-02-15 RX ORDER — ALBUTEROL SULFATE 2.5 MG/3ML
2.5 SOLUTION RESPIRATORY (INHALATION)
Status: COMPLETED | OUTPATIENT
Start: 2017-02-15 | End: 2017-02-16

## 2017-02-15 RX ORDER — SODIUM CHLORIDE 0.9 % (FLUSH) 0.9 %
1-10 SYRINGE (ML) INJECTION AS NEEDED
Status: DISCONTINUED | OUTPATIENT
Start: 2017-02-15 | End: 2017-02-20 | Stop reason: HOSPADM

## 2017-02-15 RX ORDER — DILTIAZEM HYDROCHLORIDE 120 MG/1
120 CAPSULE, COATED, EXTENDED RELEASE ORAL DAILY
Status: DISCONTINUED | OUTPATIENT
Start: 2017-02-16 | End: 2017-02-16

## 2017-02-15 RX ORDER — FUROSEMIDE 40 MG/1
40 TABLET ORAL DAILY
Status: DISCONTINUED | OUTPATIENT
Start: 2017-02-15 | End: 2017-02-16

## 2017-02-15 RX ORDER — POTASSIUM CHLORIDE 750 MG/1
10 TABLET, EXTENDED RELEASE ORAL 2 TIMES DAILY
Status: DISCONTINUED | OUTPATIENT
Start: 2017-02-15 | End: 2017-02-16

## 2017-02-15 RX ORDER — BUDESONIDE AND FORMOTEROL FUMARATE DIHYDRATE 160; 4.5 UG/1; UG/1
2 AEROSOL RESPIRATORY (INHALATION)
Status: DISCONTINUED | OUTPATIENT
Start: 2017-02-15 | End: 2017-02-20 | Stop reason: HOSPADM

## 2017-02-15 RX ORDER — METHYLPREDNISOLONE SODIUM SUCCINATE 125 MG/2ML
125 INJECTION, POWDER, LYOPHILIZED, FOR SOLUTION INTRAMUSCULAR; INTRAVENOUS ONCE
Status: COMPLETED | OUTPATIENT
Start: 2017-02-15 | End: 2017-02-15

## 2017-02-15 RX ORDER — DOCUSATE SODIUM 100 MG/1
300 CAPSULE, LIQUID FILLED ORAL DAILY
Status: DISCONTINUED | OUTPATIENT
Start: 2017-02-15 | End: 2017-02-20 | Stop reason: HOSPADM

## 2017-02-15 RX ORDER — HYDROCODONE BITARTRATE AND ACETAMINOPHEN 5; 325 MG/1; MG/1
1 TABLET ORAL EVERY 6 HOURS PRN
Status: DISCONTINUED | OUTPATIENT
Start: 2017-02-15 | End: 2017-02-16

## 2017-02-15 RX ADMIN — POTASSIUM CHLORIDE 10 MEQ: 750 TABLET, EXTENDED RELEASE ORAL at 22:31

## 2017-02-15 RX ADMIN — METHYLPREDNISOLONE SODIUM SUCCINATE 60 MG: 125 INJECTION, POWDER, FOR SOLUTION INTRAMUSCULAR; INTRAVENOUS at 23:01

## 2017-02-15 RX ADMIN — AMIODARONE HYDROCHLORIDE 200 MG: 200 TABLET ORAL at 22:31

## 2017-02-15 RX ADMIN — ALBUTEROL SULFATE 2.5 MG: 2.5 SOLUTION RESPIRATORY (INHALATION) at 20:36

## 2017-02-15 RX ADMIN — ALBUTEROL SULFATE 2.5 MG: 2.5 SOLUTION RESPIRATORY (INHALATION) at 16:29

## 2017-02-15 RX ADMIN — BUDESONIDE AND FORMOTEROL FUMARATE DIHYDRATE 2 PUFF: 160; 4.5 AEROSOL RESPIRATORY (INHALATION) at 20:43

## 2017-02-15 RX ADMIN — APIXABAN 5 MG: 5 TABLET, FILM COATED ORAL at 22:30

## 2017-02-15 RX ADMIN — METHYLPREDNISOLONE SODIUM SUCCINATE 125 MG: 125 INJECTION, POWDER, FOR SOLUTION INTRAMUSCULAR; INTRAVENOUS at 16:29

## 2017-02-15 RX ADMIN — CEFAZOLIN 1 G: 1 INJECTION, POWDER, FOR SOLUTION INTRAMUSCULAR; INTRAVENOUS; PARENTERAL at 18:56

## 2017-02-15 NOTE — H&P
[facility] Medicine Admission      Date of Admission: 1/20/2017      Primary Care Physician: Krista Matos MD    Following information is obtained partially from patient, family and/or medical records.    Chief Complaint: No chief complaint on file.      HPI: Pt is a 86 y.o. female presenting to the ER with palpitations.  She states that they came on suddenly and she felt as if her heart was beating irregularly.  She did not lose consciousness although she did feel like she was about to pass out.. She had generalized weakness and dizziness during this time.  Her symptoms started yesterday, in the past when this is happened the symptom resolves relatively quickly, however, today it persisted.  Of note, she was started on Cardizem today because of her A. fib as well       Concurrent Medical History:   Patient Active Problem List   Diagnosis   • Hypertension   • Chronic obstructive pulmonary disease with acute exacerbation   • Chronic respiratory failure with hypoxia and hypercapnia   • Low back pain   • Hyperlipidemia   • Palpitations   • Paroxysmal atrial fibrillation       Past Surgical History:   Past Surgical History   Procedure Laterality Date   • Joint replacement       total knee    • Hysterectomy     • Cataract extraction         Family History: family history includes Heart disease in her other; Hypertension in her other; Lung cancer in her other.    Social History:  reports that she has quit smoking. She has never used smokeless tobacco. She reports that she does not drink alcohol or use illicit drugs.    Allergies: No Known Allergies    Home Medications:   Prior to Admission medications    Medication Sig Start Date End Date Taking? Authorizing Provider   acetaminophen (TYLENOL) 500 MG tablet Take 500 mg by mouth 1 (one) time as needed for mild pain (1-3).    Historical Provider, MD   albuterol (ACCUNEB) 0.63 MG/3ML nebulizer solution Take 3 mL by nebulization  Every 6 (Six) Hours As Needed for wheezing. 2/5/17   Daren Wu MD   albuterol (PROVENTIL HFA;VENTOLIN HFA) 108 (90 BASE) MCG/ACT inhaler Inhale 2 puffs 4 (Four) Times a Day. 2/5/17   Daren Wu MD   amiodarone (PACERONE) 200 MG tablet Take 1 tablet by mouth Daily. 12/21/16   Sofya Graham MD   apixaban (ELIQUIS) 5 MG tablet tablet Take 1 tablet by mouth Every 12 (Twelve) Hours. 12/21/16   Sofya Graham MD   cefuroxime (CEFTIN) 500 MG tablet Take 1 tablet by mouth 2 (Two) Times a Day. 2/13/17   Krista Matos MD   diltiaZEM CD (CARDIZEM CD) 120 MG 24 hr capsule Take 1 capsule by mouth Daily. 1/20/17   Sfoya Graham MD   docusate sodium (COLACE) 100 MG capsule Take 300 mg by mouth Daily.    Historical Provider, MD   furosemide (LASIX) 40 MG tablet Take 1 tablet by mouth Daily. 2/13/17   Krista Matos MD   HYDROcodone-acetaminophen (NORCO) 5-325 MG per tablet Take 1 tablet by mouth Every 6 (Six) Hours As Needed for moderate pain (4-6). 12/19/16   Krista Matos MD   ipratropium-albuterol (DUO-NEB) 0.5-2.5 mg/mL nebulizer inhale contents of 1 vial TWO TO FOUR TIMES DAILY IF NEEDED 2/7/17   Historical Provider, MD   potassium chloride (K-DUR,KLOR-CON) 10 MEQ CR tablet Take 1 tablet by mouth 2 (Two) Times a Day. 2/13/17   Krista Matos MD   predniSONE (DELTASONE) 20 MG tablet 1 PO bid for 5 days, 1 PO daily for 5 days 2/13/17   Krista Matos MD   SYMBICORT 160-4.5 MCG/ACT inhaler inhale 2 puffs by mouth twice a day 12/19/16   Krista Matos MD       Review of Systems:  Review of Systems   Constitutional: Negative.    HENT: Negative.    Eyes: Negative.    Respiratory: Positive for shortness of breath.    Cardiovascular: Positive for palpitations.   Gastrointestinal: Negative.    Endocrine: Negative.    Genitourinary: Negative.    Skin: Negative.    Neurological: Negative.       Otherwise complete ROS is negative except as mentioned above and in HPI.    Physical Exam:       Physical Exam   Constitutional: She is oriented to person, place, and time. She appears well-developed and well-nourished.   HENT:   Head: Normocephalic and atraumatic.   Nose: Nose normal.   Mouth/Throat: Oropharynx is clear and moist.   Eyes: Conjunctivae are normal. Pupils are equal, round, and reactive to light. No scleral icterus.   Neck: No tracheal deviation present.   Cardiovascular: Normal heart sounds.  Exam reveals no friction rub.    Tachycardic   Pulmonary/Chest: Effort normal and breath sounds normal. No respiratory distress. She has no wheezes. She has no rales.   Abdominal: Soft. Bowel sounds are normal. She exhibits no distension. There is no tenderness.   Musculoskeletal: Normal range of motion.   Neurological: She is alert and oriented to person, place, and time.   Skin: Skin is warm and dry.         Results Reviewed:  I have personally reviewed current lab, radiology, and data and agree with results.  CBC: (VIVIAN: 01/20/2017 20:48)  ( MsgRcvd 01/20/2017 21:15) Final results     **Test** **Result** **Flag**   **Units** **(Reference)**  WBC 9.4 x1000/uL   (3.2-9.8)   RBC 4.16 hudson/mm3   (3.77-5.16)   Hemoglobin 13.6 gm/dl   (12.0-15.5)   Hematocrit 40.0 %   (35.0-45.0)   MCV 96.2 fl   (80.0-98.0)   MCH 32.7 pg   (26.0-34.0)   MCHC 34.0 gm/dl   (31.4-36.0)   RDW 13.3 %   (11.5-14.5)   Platelet Count 206   x1000/mm3 (150-450)   MPV 10.7 fl   (8.0-12.0)   Auto Segs % 68.2 %   (37.0-80.0)   Auto Lymph % 10.4 %   (10.0-50.0)   Auto Mono % 8.2 %   (0.0-12.0)   Auto Eos % 11.8 H %   (0.0-7.0)   Auto Baso % 1.0 %   (0.0-2.0)   Auto IG% 0.40 %   (0.00-0.50)   Auto Segs # 6.38 x1000/uL   (2.00-8.60)   Auto Lymph # 0.97 x1000/uL   (0.60-4.20)   Auto Mono # 0.77 x1000/uL   (0.00-0.90)   Auto Eos # 1.10 H x1000/uL   (0.00-0.70)   Auto Baso # 0.09 x1000/uL   (0.00-0.20)   Auto IG# 0.040 H x1000/uL   (0.005-0.022)      CMP: (VIVIAN: 01/20/2017 20:48)  ( MsgRcvd 01/20/2017 21:20) Final results     **Test**  **Result** **Flag**   **Units** **(Reference)**  Sodium 139 mmol/L   (137-145)   Potassium 4.0 mmol/L   (3.5-5.1)   Chloride 95 mmol/L   ()   CO2 32 H mmol/L   (22-31)   Anion Gap 12.0 mmol/L   (5.0-15.0)   Glucose 142 H mg/dl   ()   BUN 19 mg/dl   (7-21)   Creatinine 1.0 mg/dl   (0.5-1.0)   GFR (non AA) 53   mL/min/1.73 sq. (39-90)   Invalid if creatinine is changing or the patient is on dialysis. Use   AAresult if patient is -American, non AA result otherwise.     GFR (AA) 64   mL/min/1.73 sq. (39-90)   Calcium, Total 9.7 mg/dl   (8.4-10.2)   Protein,Total 7.6 gm/dl   (6.3-8.6)   Albumin 4.0 gm/dl   (3.4-4.8)   Bili., Total 0.5 mg/dl   (0.2-1.3)   Alk. Phos. 104 U/L   ()   AST 29 U/L   (14-36)   ALT 30 U/L   (9-52)      Chest x-ray shows no acute cardiopulmonary disease    Assessment and Plan:     Palpitations: EKG shows sinus tachycardia.  Patient was recently started on Cardizem by cardiologist.  We'll continue this, place patient on telemetry.  Monitor    Merlin Avila MD  02/14/17  10:16 PM

## 2017-02-15 NOTE — ED PROVIDER NOTES
Subjective   Patient is a 86 y.o. female presenting with shortness of breath.   Shortness of Breath   Severity:  Severe  Onset quality:  Gradual  Duration:  2 weeks  Timing:  Constant  Progression:  Worsening  Chronicity:  Recurrent  Context: URI    Context: not activity, not fumes, not occupational exposure, not pollens and not strong odors    Relieved by:  Nothing  Worsened by:  Nothing  Ineffective treatments: breathint treatments help only some   Associated symptoms: cough and sputum production (cream color)    Associated symptoms: no abdominal pain, no chest pain, no claudication, no diaphoresis, no ear pain, no fever, no headaches, no hemoptysis, no neck pain, no rash, no sore throat and no vomiting    Risk factors: no recent alcohol use, no hx of cancer, no hx of PE/DVT and no tobacco use (quit 35 years ago)        Review of Systems   Constitutional: Negative for activity change, appetite change, chills, diaphoresis and fever.   HENT: Negative for congestion, ear pain and sore throat.    Eyes: Negative.  Negative for discharge and redness.   Respiratory: Positive for cough, sputum production (cream color) and shortness of breath. Negative for hemoptysis and chest tightness.    Cardiovascular: Positive for leg swelling. Negative for chest pain, palpitations and claudication.   Gastrointestinal: Negative.  Negative for abdominal pain, diarrhea, nausea and vomiting.   Genitourinary: Negative for difficulty urinating, dysuria, flank pain and urgency.   Musculoskeletal: Negative.  Negative for arthralgias, back pain, joint swelling, myalgias and neck pain.   Skin: Negative.  Negative for color change and rash.   Neurological: Negative.  Negative for dizziness, seizures, speech difficulty, weakness, numbness and headaches.   Psychiatric/Behavioral: Negative for behavioral problems.   All other systems reviewed and are negative.      Past Medical History   Diagnosis Date   • Chronic obstructive lung disease  04/26/2016     on oxygen      • Chronic respiratory failure with hypoxia 04/26/2016   • Essential hypertension 09/08/2015   • Hyperlipidemia    • Obesity    • Paroxysmal atrial fibrillation 03/15/2016       No Known Allergies    Past Surgical History   Procedure Laterality Date   • Joint replacement       total knee    • Hysterectomy     • Cataract extraction         Family History   Problem Relation Age of Onset   • Heart disease Other    • Hypertension Other    • Lung cancer Other        Social History     Social History   • Marital status:      Spouse name: N/A   • Number of children: N/A   • Years of education: N/A     Social History Main Topics   • Smoking status: Former Smoker   • Smokeless tobacco: Never Used      Comment:  Patient quit smoking more than 30 years ago, she smoked 1pd for 30 years prior to that   • Alcohol use No   • Drug use: No   • Sexual activity: Defer     Other Topics Concern   • None     Social History Narrative           Objective   Physical Exam   Constitutional: She is oriented to person, place, and time. She appears well-developed and well-nourished. She appears distressed.   HENT:   Head: Normocephalic and atraumatic.   Eyes: Conjunctivae and EOM are normal. Pupils are equal, round, and reactive to light.   Neck: Normal range of motion. Neck supple.   Cardiovascular: Normal rate, regular rhythm, normal heart sounds and intact distal pulses.    Pulmonary/Chest: She is in respiratory distress (mild). She has wheezes in the right upper field, the right middle field, the right lower field, the left upper field, the left middle field and the left lower field.   Abdominal: Soft. Bowel sounds are normal.   Musculoskeletal: Normal range of motion. She exhibits edema (mild).   Neurological: She is alert and oriented to person, place, and time.   Skin: Skin is warm and dry.   Psychiatric: She has a normal mood and affect. Her behavior is normal.   Nursing note and vitals  reviewed.      ECG 12 Lead    Date/Time: 2/15/2017 4:25 PM  Performed by: LAM MILLER  Authorized by: LAM MILLER   Interpreted by physician  Comparison: compared with previous ECG   Similar to previous ECG  Rhythm: sinus rhythm  QRS axis: left  Conduction: right bundle branch block  ST Segments: ST segments normal  T Waves: T waves normal  Other: no other findings  Clinical impression: abnormal ECG               ED Course  ED Course   Comment By Time   Patient with acute exacerbation of COPD and bronchitis. Patient was given albuterol and solumedrol and rocephin was ordered. Patient is placed in observation to Dr. RANDY Maldonado. Patient's troponin was mildly elevated at .035. She has no chest pain but his will be followed as this may be a NSTEM. Dr. Wu made aware.  Lam Miller MD 02/15 5024      Labs Reviewed   COMPREHENSIVE METABOLIC PANEL - Abnormal; Notable for the following:        Result Value    Glucose 171 (*)     Sodium 132 (*)     Chloride 91 (*)     CO2 32.0 (*)     All other components within normal limits    Narrative:     The MDRD GFR formula is only valid for adults with stable renal function between ages 18 and 70.   TROPONIN (IN-HOUSE) - Abnormal; Notable for the following:     Troponin I 0.035 (*)     All other components within normal limits   CBC WITH AUTO DIFFERENTIAL - Abnormal; Notable for the following:     WBC 10.35 (*)     RBC 3.62 (*)     Hemoglobin 11.9 (*)     Hematocrit 34.5 (*)     Platelets 129 (*)     Neutrophil % 90.0 (*)     Lymphocyte % 3.8 (*)     Neutrophils, Absolute 9.32 (*)     Lymphocytes, Absolute 0.39 (*)     Immature Grans, Absolute 0.05 (*)     All other components within normal limits   BNP (IN-HOUSE) - Normal   PROTIME-INR - Normal    Narrative:     Therapeutic range for most indications is 2.0-3.0 INR,  or 2.5-3.5 for mechanical heart valves.   SCAN SLIDE   RAINBOW DRAW    Narrative:     The following orders were created for panel order [place]  Draw.  Procedure                               Abnormality         Status                     ---------                               -----------         ------                     Light Blue Top[[id number]]                                    In process                 Green Top (Gel)[[id number]]                                   In process                 Lavender Top[[id number]]                                      In process                 Gold Top - SST[[id number]]                                    In process                   Please view results for these tests on the individual orders.   CBC AND DIFFERENTIAL    Narrative:     The following orders were created for panel order CBC & Differential.  Procedure                               Abnormality         Status                     ---------                               -----------         ------                     Scan Slide[[id number]]                                        Final result               CBC Auto Differential[[id number]]         Abnormal            Final result                 Please view results for these tests on the individual orders.   LIGHT BLUE TOP   GREEN TOP   LAVENDER TOP   GOLD TOP - SST     Xr Chest 2 View    Result Date: 2/15/2017  Narrative: Patient Name:  ANN MARIE BOWERS COOMESPatient ID:  [id number] Ordering: JAZMINE MILLERAttending:  JAZMINE MILLERReferring:  JAZMINE MILLER------------------------------------------------Shortness of breath. Chest, AP and lateral views. Comparison is made with study dated to February 3, 2017. There is the upper limits of normal in size. There are mild bilateral interstitial markings. No pleural effusion is identified. No acute bony abnormality is seen. There are degenerative changes of the right shoulder. Degenerative changes of the thoracic spine. There is osteopenia.     Impression: CONCLUSION: Mild bilateral interstitial markings. Electronically signed by:  Frank Hill MD  2/15/2017 4:56 PM [facility] Workstation:  [id number]    Xr Chest 2 View    Result Date: 2/3/2017  Narrative: PROCEDURE: Chest PA and lateral REASON FOR EXAM: CHF/COPD Protocol FINDINGS: Comparison study dated January 20, 2017.      . Cardiac and pulmonary vasculature are normal . Lungs are clear. Pleural spaces are normal . No acute osseous abnormality.     Impression: Negative chest Electronically signed by:  Rodrigo Mead  2/3/2017 9:26 AM CST     Xr Chest 1 View    Result Date: 1/20/2017  Narrative: Exam- AP portable chest  Indication- Shortness of breath  Comparison- 8/22/2016  Findings- AP portable chest. The bony structures are intact. The heart size is at the upper limits of normal. Plaque is present in the aorta. Lungs are mildly hyperinflated. No acute infiltrate, pneumothorax or pleural effusion.  Impression- No acute cardiopulmonary abnormality.  Electronically signed by-  Oscar Whipple  1/20/2017 9-50 PM CST  Electronically Signed By- Oscar Whipple MD On: 2017-01-20 21:50:13              MDM  Number of Diagnoses or Management Options  Acute bronchitis, unspecified organism: established and worsening  Chronic obstructive pulmonary disease with acute exacerbation: established and worsening  Hyperglycemia: established and worsening  Shortness of breath: established and worsening  Troponin I above reference range: new and requires workup     Amount and/or Complexity of Data Reviewed  Clinical lab tests: ordered and reviewed  Tests in the radiology section of CPT®: ordered and reviewed  Review and summarize past medical records: yes  Discuss the patient with other providers: yes  Independent visualization of images, tracings, or specimens: yes    Risk of Complications, Morbidity, and/or Mortality  Presenting problems: moderate  Diagnostic procedures: low  Management options: low    Patient Progress  Patient progress: improved      Final diagnoses:   Chronic obstructive pulmonary disease with acute exacerbation   Acute bronchitis, unspecified  organism   Shortness of breath   Troponin I above reference range   Hyperglycemia            Lam Tam MD  02/15/17 8921

## 2017-02-16 PROCEDURE — 93010 ELECTROCARDIOGRAM REPORT: CPT | Performed by: INTERNAL MEDICINE

## 2017-02-16 PROCEDURE — 94799 UNLISTED PULMONARY SVC/PX: CPT

## 2017-02-16 PROCEDURE — 93005 ELECTROCARDIOGRAM TRACING: CPT | Performed by: INTERNAL MEDICINE

## 2017-02-16 PROCEDURE — 94640 AIRWAY INHALATION TREATMENT: CPT

## 2017-02-16 PROCEDURE — 25010000002 METHYLPREDNISOLONE PER 125 MG: Performed by: INTERNAL MEDICINE

## 2017-02-16 PROCEDURE — 63710000001 DIPHENHYDRAMINE PER 50 MG: Performed by: INTERNAL MEDICINE

## 2017-02-16 PROCEDURE — 94760 N-INVAS EAR/PLS OXIMETRY 1: CPT

## 2017-02-16 PROCEDURE — 25010000002 METHYLPREDNISOLONE PER 40 MG: Performed by: INTERNAL MEDICINE

## 2017-02-16 RX ORDER — POTASSIUM CHLORIDE 750 MG/1
10 TABLET, EXTENDED RELEASE ORAL 2 TIMES DAILY
Status: DISCONTINUED | OUTPATIENT
Start: 2017-02-16 | End: 2017-02-16

## 2017-02-16 RX ORDER — ALBUTEROL SULFATE 2.5 MG/3ML
2.5 SOLUTION RESPIRATORY (INHALATION)
Status: DISCONTINUED | OUTPATIENT
Start: 2017-02-16 | End: 2017-02-20 | Stop reason: HOSPADM

## 2017-02-16 RX ORDER — HYDROCODONE BITARTRATE AND ACETAMINOPHEN 5; 325 MG/1; MG/1
1 TABLET ORAL EVERY 6 HOURS PRN
Status: DISCONTINUED | OUTPATIENT
Start: 2017-02-16 | End: 2017-02-16

## 2017-02-16 RX ORDER — DIPHENHYDRAMINE HYDROCHLORIDE, ZINC ACETATE 2; .1 G/100G; G/100G
CREAM TOPICAL 3 TIMES DAILY PRN
Status: DISCONTINUED | OUTPATIENT
Start: 2017-02-16 | End: 2017-02-20 | Stop reason: HOSPADM

## 2017-02-16 RX ORDER — DIPHENHYDRAMINE HCL 25 MG
25 CAPSULE ORAL EVERY 6 HOURS PRN
Status: DISCONTINUED | OUTPATIENT
Start: 2017-02-16 | End: 2017-02-17 | Stop reason: RX

## 2017-02-16 RX ORDER — POTASSIUM CHLORIDE 20 MEQ/1
20 TABLET, EXTENDED RELEASE ORAL ONCE
Status: COMPLETED | OUTPATIENT
Start: 2017-02-16 | End: 2017-02-16

## 2017-02-16 RX ORDER — POTASSIUM CHLORIDE 20 MEQ/1
20 TABLET, EXTENDED RELEASE ORAL 2 TIMES DAILY
Status: DISCONTINUED | OUTPATIENT
Start: 2017-02-16 | End: 2017-02-18

## 2017-02-16 RX ORDER — METHYLPREDNISOLONE SODIUM SUCCINATE 40 MG/ML
40 INJECTION, POWDER, LYOPHILIZED, FOR SOLUTION INTRAMUSCULAR; INTRAVENOUS EVERY 6 HOURS
Status: DISCONTINUED | OUTPATIENT
Start: 2017-02-16 | End: 2017-02-17

## 2017-02-16 RX ORDER — HYDROCODONE BITARTRATE AND ACETAMINOPHEN 5; 325 MG/1; MG/1
1 TABLET ORAL EVERY 6 HOURS PRN
Status: DISCONTINUED | OUTPATIENT
Start: 2017-02-16 | End: 2017-02-20 | Stop reason: HOSPADM

## 2017-02-16 RX ORDER — DILTIAZEM HYDROCHLORIDE 120 MG/1
120 CAPSULE, COATED, EXTENDED RELEASE ORAL DAILY
Status: DISCONTINUED | OUTPATIENT
Start: 2017-02-16 | End: 2017-02-20 | Stop reason: HOSPADM

## 2017-02-16 RX ORDER — AMIODARONE HYDROCHLORIDE 200 MG/1
200 TABLET ORAL DAILY
Status: DISCONTINUED | OUTPATIENT
Start: 2017-02-16 | End: 2017-02-20 | Stop reason: HOSPADM

## 2017-02-16 RX ORDER — FUROSEMIDE 40 MG/1
40 TABLET ORAL DAILY
Status: DISCONTINUED | OUTPATIENT
Start: 2017-02-16 | End: 2017-02-20 | Stop reason: HOSPADM

## 2017-02-16 RX ADMIN — DIPHENHYDRAMINE HYDROCHLORIDE 25 MG: 25 CAPSULE ORAL at 20:25

## 2017-02-16 RX ADMIN — ALBUTEROL SULFATE 2.5 MG: 2.5 SOLUTION RESPIRATORY (INHALATION) at 21:22

## 2017-02-16 RX ADMIN — POTASSIUM CHLORIDE 20 MEQ: 20 TABLET, EXTENDED RELEASE ORAL at 17:58

## 2017-02-16 RX ADMIN — BUDESONIDE AND FORMOTEROL FUMARATE DIHYDRATE 2 PUFF: 160; 4.5 AEROSOL RESPIRATORY (INHALATION) at 09:24

## 2017-02-16 RX ADMIN — METHYLPREDNISOLONE SODIUM SUCCINATE 60 MG: 125 INJECTION, POWDER, FOR SOLUTION INTRAMUSCULAR; INTRAVENOUS at 05:56

## 2017-02-16 RX ADMIN — Medication 10 ML: at 08:04

## 2017-02-16 RX ADMIN — FUROSEMIDE 40 MG: 40 TABLET ORAL at 08:05

## 2017-02-16 RX ADMIN — DILTIAZEM HYDROCHLORIDE 120 MG: 120 CAPSULE, COATED, EXTENDED RELEASE ORAL at 08:05

## 2017-02-16 RX ADMIN — BUDESONIDE AND FORMOTEROL FUMARATE DIHYDRATE 2 PUFF: 160; 4.5 AEROSOL RESPIRATORY (INHALATION) at 21:28

## 2017-02-16 RX ADMIN — METHYLPREDNISOLONE SODIUM SUCCINATE 60 MG: 125 INJECTION, POWDER, FOR SOLUTION INTRAMUSCULAR; INTRAVENOUS at 11:15

## 2017-02-16 RX ADMIN — ALBUTEROL SULFATE 2.5 MG: 2.5 SOLUTION RESPIRATORY (INHALATION) at 07:14

## 2017-02-16 RX ADMIN — POTASSIUM CHLORIDE 10 MEQ: 750 TABLET, EXTENDED RELEASE ORAL at 08:05

## 2017-02-16 RX ADMIN — ALBUTEROL SULFATE 2.5 MG: 2.5 SOLUTION RESPIRATORY (INHALATION) at 00:07

## 2017-02-16 RX ADMIN — AMIODARONE HYDROCHLORIDE 200 MG: 200 TABLET ORAL at 08:05

## 2017-02-16 RX ADMIN — METHYLPREDNISOLONE SODIUM SUCCINATE 40 MG: 40 INJECTION, POWDER, FOR SOLUTION INTRAMUSCULAR; INTRAVENOUS at 17:58

## 2017-02-16 RX ADMIN — DOCUSATE SODIUM 300 MG: 100 CAPSULE, LIQUID FILLED ORAL at 08:05

## 2017-02-16 RX ADMIN — ALBUTEROL SULFATE 2.5 MG: 2.5 SOLUTION RESPIRATORY (INHALATION) at 15:33

## 2017-02-16 NOTE — H&P
History and Physical  Daren Wu MD  Hospitalist      Patient Care Team:  Krista Matos MD as PCP - General  Jenny Salguero as Care Coordinator (Population Health)    Chief complaint   Chief Complaint   Patient presents with   • Shortness of Breath       Subjective     Patient is a 86 y.o. female admitted again with cough, congestion, wheezing. She did not do too well since her last discharge 10 days ago or so. She has tried again oral antibiotics / oral steroids but her symptoms have worsened once more.      History  Past Medical History   Diagnosis Date   • Chronic obstructive lung disease 04/26/2016     on oxygen      • Chronic respiratory failure with hypoxia 04/26/2016   • Essential hypertension 09/08/2015   • Hyperlipidemia    • Obesity    • Paroxysmal atrial fibrillation 03/15/2016     Past Surgical History   Procedure Laterality Date   • Joint replacement       total knee    • Hysterectomy     • Cataract extraction       Family History   Problem Relation Age of Onset   • Heart disease Other    • Hypertension Other    • Lung cancer Other      Social History   Substance Use Topics   • Smoking status: Former Smoker   • Smokeless tobacco: Never Used      Comment:  Patient quit smoking more than 30 years ago, she smoked 1pd for 30 years prior to that   • Alcohol use No       (Not in a hospital admission)  Allergies:  Review of patient's allergies indicates no known allergies.    Review of Systems  Review of Systems   Constitutional: Positive for appetite change and fatigue. Negative for chills and fever.   HENT: Positive for congestion.    Respiratory: Positive for cough, chest tightness, shortness of breath and wheezing.    Cardiovascular: Negative for chest pain, palpitations and leg swelling.   Gastrointestinal: Negative for abdominal distention, abdominal pain, constipation and diarrhea.   Genitourinary: Negative for dysuria and frequency.   Musculoskeletal: Positive for back pain.   Skin: Negative for  pallor.   Neurological: Positive for weakness and light-headedness. Negative for syncope.   Psychiatric/Behavioral: Negative for agitation and behavioral problems.   All other systems reviewed and are negative.      Objective     Vital Signs  Temp:  [98.1 °F (36.7 °C)] 98.1 °F (36.7 °C)  Heart Rate:  [88-97] 97  Resp:  [18-20] 18  BP: (132-137)/(81-87) 132/81    Physical Exam:  Physical Exam   Constitutional: She appears well-developed and well-nourished.   HENT:   Head: Normocephalic and atraumatic.   Eyes: EOM are normal.   Neck: Neck supple.   Cardiovascular: Normal rate and regular rhythm.    Pulmonary/Chest: She is in respiratory distress (mild to moderate). She has wheezes. She has rales.   Abdominal: Soft. Bowel sounds are normal.   Musculoskeletal: Normal range of motion. She exhibits no edema or deformity.   Neurological: She is alert.   Skin: Skin is warm and dry.   Psychiatric: She has a normal mood and affect.   Vitals reviewed.      Results Review:   Lab Results (last 24 hours)     Procedure Component Value Units Date/Time    [place] Draw [[id number]] Collected:  02/15/17 1636    Specimen:  Blood Updated:  02/15/17 1701    Narrative:       The following orders were created for panel order [place] Draw.  Procedure                               Abnormality         Status                     ---------                               -----------         ------                     Light Blue Top[[id number]]                                    In process                 Green Top (Gel)[[id number]]                                   In process                 Lavender Top[[id number]]                                      In process                 Gold Top - SST[[id number]]                                    In process                   Please view results for these tests on the individual orders.    Gold Top - SST [[id number]] Collected:  02/15/17 1636    Specimen:  Blood Updated:  02/15/17 1701    Light Blue Top [[id number]]  Collected:  02/15/17 1636    Specimen:  Blood Updated:  02/15/17 1701    Lavender Top [[id number]] Collected:  02/15/17 1636    Specimen:  Blood Updated:  02/15/17 1701    Green Top (Gel) [[id number]] Collected:  02/15/17 1636    Specimen:  Blood Updated:  02/15/17 1701    CBC Auto Differential [[id number]]  (Abnormal) Collected:  02/15/17 1636    Specimen:  Blood Updated:  02/15/17 1713     WBC 10.35 (H) 10*3/mm3      RBC 3.62 (L) 10*6/mm3      Hemoglobin 11.9 (L) g/dL      Hematocrit 34.5 (L) %      MCV 95.3 fL      MCH 32.9 pg      MCHC 34.5 g/dL      RDW 13.3 %      RDW-SD 45.9 fl      MPV 11.1 fL      Platelets 129 (L) 10*3/mm3      Neutrophil % 90.0 (H) %      Lymphocyte % 3.8 (L) %      Monocyte % 5.5 %      Eosinophil % 0.1 %      Basophil % 0.1 %      Immature Grans % 0.5 %      Neutrophils, Absolute 9.32 (H) 10*3/mm3      Lymphocytes, Absolute 0.39 (L) 10*3/mm3      Monocytes, Absolute 0.57 10*3/mm3      Eosinophils, Absolute 0.01 10*3/mm3      Basophils, Absolute 0.01 10*3/mm3      Immature Grans, Absolute 0.05 (H) 10*3/mm3     Comprehensive Metabolic Panel [[id number]]  (Abnormal) Collected:  02/15/17 1636    Specimen:  Blood Updated:  02/15/17 1716     Glucose 171 (H) mg/dL      BUN 16 mg/dL      Creatinine 0.82 mg/dL      Sodium 132 (L) mmol/L      Potassium 3.5 mmol/L      Chloride 91 (L) mmol/L      CO2 32.0 (H) mmol/L      Calcium 8.9 mg/dL      Total Protein 6.5 g/dL      Albumin 3.50 g/dL      ALT (SGPT) 35 U/L      AST (SGOT) 26 U/L      Alkaline Phosphatase 43 U/L      Total Bilirubin 0.5 mg/dL      eGFR Non African Amer 66 mL/min/1.73      Globulin 3.0 gm/dL      A/G Ratio 1.2 g/dL      BUN/Creatinine Ratio 19.5      Anion Gap 9.0 mmol/L     Narrative:       The MDRD GFR formula is only valid for adults with stable renal function between ages 18 and 70.    BNP [[id number]]  (Normal) Collected:  02/15/17 1636    Specimen:  Blood Updated:  02/15/17 1731     proBNP 1040.0 pg/mL     Protime-INR [[id number]]   (Normal) Collected:  02/15/17 1636    Specimen:  Blood Updated:  02/15/17 1733     Protime 13.6 Seconds      INR 1.04     Narrative:       Therapeutic range for most indications is 2.0-3.0 INR,  or 2.5-3.5 for mechanical heart valves.    Troponin [[id number]]  (Abnormal) Collected:  02/15/17 1636    Specimen:  Blood Updated:  02/15/17 1735     Troponin I 0.035 (H) ng/mL     CBC & Differential [[id number]] Collected:  02/15/17 1636    Specimen:  Blood Updated:  02/15/17 1807    Narrative:       The following orders were created for panel order CBC & Differential.  Procedure                               Abnormality         Status                     ---------                               -----------         ------                     Scan Slide[[id number]]                                        Final result               CBC Auto Differential[[id number]]         Abnormal            Final result                 Please view results for these tests on the individual orders.    Scan Slide [[id number]] Collected:  02/15/17 1636    Specimen:  Blood Updated:  02/15/17 1807     RBC Morphology Normal      WBC Morphology Normal      Platelet Estimate Adequate           Imaging Results (last 24 hours)     Procedure Component Value Units Date/Time    XR Chest 2 View [[id number]] Collected:  02/15/17 1654     Updated:  02/15/17 1657    Narrative:       Patient Name:  ANN MARIE BOWERS COOMESPatient ID:  [id number] Ordering:   JAZMINE Genaoending:  JAZMINE MILLERReferring:  JAZMINE MILLER------------------------------------------------Shortness  of breath.    Chest, AP and lateral views.    Comparison is made with study dated to February 3, 2017.    There is the upper limits of normal in size. There are mild  bilateral interstitial markings. No pleural effusion is  identified. No acute bony abnormality is seen. There are  degenerative changes of the right shoulder. Degenerative changes  of the thoracic spine. There is osteopenia.      Impression:        CONCLUSION: Mild bilateral interstitial markings.    Electronically signed by:  Frank Hill MD  2/15/2017 4:56  PM CST Workstation: [id number]          Assessment/Plan     Active Problems:    Acute on chronic respiratory failure with hypoxia    Chronic obstructive pulmonary disease with acute exacerbation    Resume nebulized treatments, IV steroids, inhaled steroids.    Daren Wu MD  02/15/17  7:14 PM

## 2017-02-16 NOTE — PROGRESS NOTES
hospitalist  Active Problems:    Chronic obstructive pulmonary disease with acute exacerbation    Acute on chronic respiratory failure with hypoxia        Subjective: Doing better today.  Decrease shortness of breath.  No chest pain.  Mild dry cough.  No other new complaints.  Chart reviewed.  No family members are present.        Review of Systems:    Review of Systems   Constitutional: Negative for chills and fever.   All other systems reviewed and are negative.      Objective     Vital Signs:  Temp:  [97.8 °F (36.6 °C)-98.6 °F (37 °C)] 97.8 °F (36.6 °C)  Heart Rate:  [] 76  Resp:  [18-20] 18  BP: (122-166)/(70-87) 166/80    Physical Exam:   Physical Exam   Constitutional: She is oriented to person, place, and time. She appears well-developed and well-nourished. No distress.   HENT:   Nose: Nose normal.   Mouth/Throat: Oropharynx is clear and moist. No oropharyngeal exudate.   Eyes: Conjunctivae and EOM are normal. No scleral icterus.   Neck: Normal range of motion. Neck supple. No JVD present. No thyromegaly present.   Cardiovascular: Normal rate, regular rhythm and normal heart sounds.    Pulmonary/Chest: Tachypnea noted. No respiratory distress. She has no wheezes. She has rales.   Abdominal: Soft. She exhibits no distension. There is no tenderness.   Musculoskeletal: Normal range of motion. She exhibits no edema.   Neurological: She is alert and oriented to person, place, and time. No cranial nerve deficit.   Skin: Skin is warm and dry. No rash noted. She is not diaphoretic. No erythema. No pallor.   Psychiatric: She has a normal mood and affect.          Results Review:       Lab Results (last 24 hours)     Procedure Component Value Units Date/Time    CBC Auto Differential [[id number]]  (Abnormal) Collected:  02/15/17 1636    Specimen:  Blood Updated:  02/15/17 1713     WBC 10.35 (H) 10*3/mm3      RBC 3.62 (L) 10*6/mm3      Hemoglobin 11.9 (L) g/dL      Hematocrit 34.5 (L) %      MCV 95.3 fL      MCH 32.9  pg      MCHC 34.5 g/dL      RDW 13.3 %      RDW-SD 45.9 fl      MPV 11.1 fL      Platelets 129 (L) 10*3/mm3      Neutrophil % 90.0 (H) %      Lymphocyte % 3.8 (L) %      Monocyte % 5.5 %      Eosinophil % 0.1 %      Basophil % 0.1 %      Immature Grans % 0.5 %      Neutrophils, Absolute 9.32 (H) 10*3/mm3      Lymphocytes, Absolute 0.39 (L) 10*3/mm3      Monocytes, Absolute 0.57 10*3/mm3      Eosinophils, Absolute 0.01 10*3/mm3      Basophils, Absolute 0.01 10*3/mm3      Immature Grans, Absolute 0.05 (H) 10*3/mm3     Comprehensive Metabolic Panel [[id number]]  (Abnormal) Collected:  02/15/17 1636    Specimen:  Blood Updated:  02/15/17 1716     Glucose 171 (H) mg/dL      BUN 16 mg/dL      Creatinine 0.82 mg/dL      Sodium 132 (L) mmol/L      Potassium 3.5 mmol/L      Chloride 91 (L) mmol/L      CO2 32.0 (H) mmol/L      Calcium 8.9 mg/dL      Total Protein 6.5 g/dL      Albumin 3.50 g/dL      ALT (SGPT) 35 U/L      AST (SGOT) 26 U/L      Alkaline Phosphatase 43 U/L      Total Bilirubin 0.5 mg/dL      eGFR Non African Amer 66 mL/min/1.73      Globulin 3.0 gm/dL      A/G Ratio 1.2 g/dL      BUN/Creatinine Ratio 19.5      Anion Gap 9.0 mmol/L     Narrative:       The MDRD GFR formula is only valid for adults with stable renal function between ages 18 and 70.    BNP [[id number]]  (Normal) Collected:  02/15/17 1636    Specimen:  Blood Updated:  02/15/17 1731     proBNP 1040.0 pg/mL     Protime-INR [[id number]]  (Normal) Collected:  02/15/17 1636    Specimen:  Blood Updated:  02/15/17 1733     Protime 13.6 Seconds      INR 1.04     Narrative:       Therapeutic range for most indications is 2.0-3.0 INR,  or 2.5-3.5 for mechanical heart valves.    Troponin [[id number]]  (Abnormal) Collected:  02/15/17 1636    Specimen:  Blood Updated:  02/15/17 1735     Troponin I 0.035 (H) ng/mL     CBC & Differential [[id number]] Collected:  02/15/17 1636    Specimen:  Blood Updated:  02/15/17 1807    Narrative:       The following orders were created  for panel order CBC & Differential.  Procedure                               Abnormality         Status                     ---------                               -----------         ------                     Scan Slide[[id number]]                                        Final result               CBC Auto Differential[[id number]]         Abnormal            Final result                 Please view results for these tests on the individual orders.    Scan Slide [[id number]] Collected:  02/15/17 1636    Specimen:  Blood Updated:  02/15/17 1807     RBC Morphology Normal      WBC Morphology Normal      Platelet Estimate Adequate     [place] Draw [[id number]] Collected:  02/15/17 1636    Specimen:  Blood Updated:  02/15/17 2101    Narrative:       The following orders were created for panel order [place] Draw.  Procedure                               Abnormality         Status                     ---------                               -----------         ------                     Light Blue Top[[id number]]                                    Final result               Green Top (Gel)[[id number]]                                   Final result               Lavender Top[[id number]]                                      Final result               Gold Top - SST[[id number]]                                    Final result                 Please view results for these tests on the individual orders.    Light Blue Top [[id number]] Collected:  02/15/17 1636    Specimen:  Blood Updated:  02/15/17 2101     Extra Tube hold for add-on       Auto resulted       Green Top (Gel) [[id number]] Collected:  02/15/17 1636    Specimen:  Blood Updated:  02/15/17 2101     Extra Tube Hold for add-ons.       Auto resulted.       Lavender Top [[id number]] Collected:  02/15/17 1636    Specimen:  Blood Updated:  02/15/17 2101     Extra Tube hold for add-on       Auto resulted       Gold Top - SST [[id number]] Collected:  02/15/17 1636    Specimen:  Blood Updated:   02/15/17 2101     Extra Tube Hold for add-ons.       Auto resulted.           Imaging Results (last 24 hours)     Procedure Component Value Units Date/Time    XR Chest 2 View [[id number]] Collected:  02/15/17 1654     Updated:  02/15/17 1657    Narrative:       Patient Name:  ANN MARIE SALDANAaticheo ID:  [id number] Ordering:   JAZMINE MILLERAttending:  JAZMINE MILLERReferring:  JAZMINE MILLER------------------------------------------------Shortness  of breath.    Chest, AP and lateral views.    Comparison is made with study dated to February 3, 2017.    There is the upper limits of normal in size. There are mild  bilateral interstitial markings. No pleural effusion is  identified. No acute bony abnormality is seen. There are  degenerative changes of the right shoulder. Degenerative changes  of the thoracic spine. There is osteopenia.      Impression:       CONCLUSION: Mild bilateral interstitial markings.    Electronically signed by:  Frank Hill MD  2/15/2017 4:56  PM CST Workstation: [id number]        EKG: No new changes    Medication Review: medications have been reviewed    Assessment/Plan:    1.  Acute on chronic respiratory failure, hypoxic in nature.  Home oxygen and nebulizer treatment dependent  -Started on IV steroids, nebulizer treatment, oxygen.  Start tapering steroids.  2. COPD with acute exacerbation  -Treatment as above  3.  History of cardiac arrhythmias  -Resume home medicines since  4.  Hypokalemia  -Replace potassium    DVT prophylaxis: Lovenox  Length of stay: More than 2 midnights  Anticipated disposition: Home        Aneudy Maldonado MD  02/16/17  2:57 PM

## 2017-02-16 NOTE — PLAN OF CARE
Problem: Patient Care Overview (Adult)  Goal: Plan of Care Review  Outcome: Ongoing (interventions implemented as appropriate)    02/16/17 0401   Coping/Psychosocial Response Interventions   Plan Of Care Reviewed With patient   Patient Care Overview   Progress no change       Goal: Discharge Needs Assessment  Outcome: Ongoing (interventions implemented as appropriate)    Problem: Respiratory Insufficiency (Adult)  Goal: Identify Related Risk Factors and Signs and Symptoms  Outcome: Ongoing (interventions implemented as appropriate)  Goal: Acid/Base Balance  Outcome: Ongoing (interventions implemented as appropriate)  Goal: Effective Ventilation  Outcome: Ongoing (interventions implemented as appropriate)  The patient is wearing 3L NC; has a nonproductive cough    Problem: Fall Risk (Adult)  Goal: Identify Related Risk Factors and Signs and Symptoms  Outcome: Ongoing (interventions implemented as appropriate)  Goal: Absence of Falls  Outcome: Ongoing (interventions implemented as appropriate)  The patient continues to remain free of falls

## 2017-02-16 NOTE — PLAN OF CARE
Problem: Patient Care Overview (Adult)  Goal: Plan of Care Review  Outcome: Ongoing (interventions implemented as appropriate)    02/16/17 1701   Coping/Psychosocial Response Interventions   Plan Of Care Reviewed With patient   Patient Care Overview   Progress no change   Outcome Evaluation   Outcome Summary/Follow up Plan pt reports some SOA, neb treatments ordered and pt states they help, given IV steroids. Still on O2 @ 3L nasal cannula       Goal: Adult Individualization and Mutuality  Outcome: Ongoing (interventions implemented as appropriate)  Goal: Discharge Needs Assessment  Outcome: Ongoing (interventions implemented as appropriate)    Problem: Fall Risk (Adult)  Goal: Identify Related Risk Factors and Signs and Symptoms  Outcome: Outcome(s) achieved Date Met:  02/16/17  Goal: Absence of Falls  Outcome: Ongoing (interventions implemented as appropriate)

## 2017-02-17 LAB
ANION GAP SERPL CALCULATED.3IONS-SCNC: 7 MMOL/L (ref 5–15)
BUN BLD-MCNC: 25 MG/DL (ref 7–21)
BUN/CREAT SERPL: 34.2 (ref 7–25)
CALCIUM SPEC-SCNC: 9.3 MG/DL (ref 8.4–10.2)
CHLORIDE SERPL-SCNC: 93 MMOL/L (ref 95–110)
CO2 SERPL-SCNC: 34 MMOL/L (ref 22–31)
CREAT BLD-MCNC: 0.73 MG/DL (ref 0.5–1)
GFR SERPL CREATININE-BSD FRML MDRD: 76 ML/MIN/1.73 (ref 60–90)
GLUCOSE BLD-MCNC: 292 MG/DL (ref 60–100)
POTASSIUM BLD-SCNC: 4.3 MMOL/L (ref 3.5–5.1)
SODIUM BLD-SCNC: 134 MMOL/L (ref 137–145)
TROPONIN I SERPL-MCNC: 0.03 NG/ML

## 2017-02-17 PROCEDURE — 97535 SELF CARE MNGMENT TRAINING: CPT

## 2017-02-17 PROCEDURE — 97162 PT EVAL MOD COMPLEX 30 MIN: CPT

## 2017-02-17 PROCEDURE — 94799 UNLISTED PULMONARY SVC/PX: CPT

## 2017-02-17 PROCEDURE — 84484 ASSAY OF TROPONIN QUANT: CPT | Performed by: INTERNAL MEDICINE

## 2017-02-17 PROCEDURE — 97166 OT EVAL MOD COMPLEX 45 MIN: CPT

## 2017-02-17 PROCEDURE — 94640 AIRWAY INHALATION TREATMENT: CPT

## 2017-02-17 PROCEDURE — 80048 BASIC METABOLIC PNL TOTAL CA: CPT | Performed by: INTERNAL MEDICINE

## 2017-02-17 PROCEDURE — G8988 SELF CARE GOAL STATUS: HCPCS

## 2017-02-17 PROCEDURE — 63710000001 DIPHENHYDRAMINE PER 50 MG: Performed by: INTERNAL MEDICINE

## 2017-02-17 PROCEDURE — G8987 SELF CARE CURRENT STATUS: HCPCS

## 2017-02-17 PROCEDURE — 94760 N-INVAS EAR/PLS OXIMETRY 1: CPT

## 2017-02-17 PROCEDURE — G8978 MOBILITY CURRENT STATUS: HCPCS

## 2017-02-17 PROCEDURE — 25010000002 METHYLPREDNISOLONE PER 40 MG: Performed by: INTERNAL MEDICINE

## 2017-02-17 PROCEDURE — G8979 MOBILITY GOAL STATUS: HCPCS

## 2017-02-17 RX ORDER — METHYLPREDNISOLONE SODIUM SUCCINATE 40 MG/ML
40 INJECTION, POWDER, LYOPHILIZED, FOR SOLUTION INTRAMUSCULAR; INTRAVENOUS EVERY 8 HOURS
Status: DISCONTINUED | OUTPATIENT
Start: 2017-02-17 | End: 2017-02-20 | Stop reason: HOSPADM

## 2017-02-17 RX ORDER — PRAVASTATIN SODIUM 40 MG
80 TABLET ORAL NIGHTLY
Status: DISCONTINUED | OUTPATIENT
Start: 2017-02-17 | End: 2017-02-20 | Stop reason: HOSPADM

## 2017-02-17 RX ORDER — DIPHENHYDRAMINE HCL 25 MG
25 TABLET ORAL EVERY 6 HOURS PRN
Status: DISCONTINUED | OUTPATIENT
Start: 2017-02-17 | End: 2017-02-20 | Stop reason: HOSPADM

## 2017-02-17 RX ADMIN — DOCUSATE SODIUM 300 MG: 100 CAPSULE, LIQUID FILLED ORAL at 08:02

## 2017-02-17 RX ADMIN — DIPHENHYDRAMINE HCL 25 MG: 25 TABLET ORAL at 15:37

## 2017-02-17 RX ADMIN — BUDESONIDE AND FORMOTEROL FUMARATE DIHYDRATE 2 PUFF: 160; 4.5 AEROSOL RESPIRATORY (INHALATION) at 19:50

## 2017-02-17 RX ADMIN — METHYLPREDNISOLONE SODIUM SUCCINATE 40 MG: 40 INJECTION, POWDER, FOR SOLUTION INTRAMUSCULAR; INTRAVENOUS at 21:18

## 2017-02-17 RX ADMIN — METHYLPREDNISOLONE SODIUM SUCCINATE 40 MG: 40 INJECTION, POWDER, FOR SOLUTION INTRAMUSCULAR; INTRAVENOUS at 14:01

## 2017-02-17 RX ADMIN — ALBUTEROL SULFATE 2.5 MG: 2.5 SOLUTION RESPIRATORY (INHALATION) at 19:50

## 2017-02-17 RX ADMIN — METHYLPREDNISOLONE SODIUM SUCCINATE 40 MG: 40 INJECTION, POWDER, FOR SOLUTION INTRAMUSCULAR; INTRAVENOUS at 02:08

## 2017-02-17 RX ADMIN — POTASSIUM CHLORIDE 20 MEQ: 20 TABLET, EXTENDED RELEASE ORAL at 17:29

## 2017-02-17 RX ADMIN — Medication 80 MG: at 22:45

## 2017-02-17 RX ADMIN — FUROSEMIDE 40 MG: 40 TABLET ORAL at 08:03

## 2017-02-17 RX ADMIN — POTASSIUM CHLORIDE 20 MEQ: 20 TABLET, EXTENDED RELEASE ORAL at 08:05

## 2017-02-17 RX ADMIN — DIPHENHYDRAMINE HYDROCHLORIDE 25 MG: 25 CAPSULE ORAL at 07:18

## 2017-02-17 RX ADMIN — ALBUTEROL SULFATE 2.5 MG: 2.5 SOLUTION RESPIRATORY (INHALATION) at 13:49

## 2017-02-17 RX ADMIN — ALBUTEROL SULFATE 2.5 MG: 2.5 SOLUTION RESPIRATORY (INHALATION) at 08:23

## 2017-02-17 RX ADMIN — DILTIAZEM HYDROCHLORIDE 120 MG: 120 CAPSULE, COATED, EXTENDED RELEASE ORAL at 08:04

## 2017-02-17 RX ADMIN — AMIODARONE HYDROCHLORIDE 200 MG: 200 TABLET ORAL at 08:03

## 2017-02-17 RX ADMIN — ALBUTEROL SULFATE 2.5 MG: 2.5 SOLUTION RESPIRATORY (INHALATION) at 02:28

## 2017-02-17 RX ADMIN — METHYLPREDNISOLONE SODIUM SUCCINATE 40 MG: 40 INJECTION, POWDER, FOR SOLUTION INTRAMUSCULAR; INTRAVENOUS at 05:56

## 2017-02-17 RX ADMIN — BUDESONIDE AND FORMOTEROL FUMARATE DIHYDRATE 2 PUFF: 160; 4.5 AEROSOL RESPIRATORY (INHALATION) at 11:18

## 2017-02-17 NOTE — PROGRESS NOTES
Acute Care - Physical Therapy Initial Evaluation  [facility]     Patient Name: Norma Harkins  : 1931  MRN: [id number]  Today's Date: 2017   Onset of Illness/Injury or Date of Surgery Date: 17  Date of Referral to PT: 17  Referring Physician: Dr CLOVER Maldonado      Admit Date: 2/15/2017     Visit Dx:    ICD-10-CM ICD-9-CM   1. Chronic obstructive pulmonary disease with acute exacerbation J44.1 491.21   2. Acute bronchitis, unspecified organism J20.9 466.0   3. Shortness of breath R06.02 786.05   4. Troponin I above reference range R79.89 790.6   5. Hyperglycemia R73.9 790.29   6. Impaired mobility and endurance Z74.09 V49.89     Patient Active Problem List   Diagnosis   • Hypertension   • Chronic obstructive pulmonary disease with acute exacerbation   • Acute on chronic respiratory failure with hypoxia   • Low back pain   • Hyperlipidemia   • Paroxysmal atrial fibrillation     Past Medical History   Diagnosis Date   • Chronic obstructive lung disease 2016     on oxygen      • Chronic respiratory failure with hypoxia 2016   • Essential hypertension 2015   • Hyperlipidemia    • Obesity    • Paroxysmal atrial fibrillation 03/15/2016     Past Surgical History   Procedure Laterality Date   • Joint replacement       total knee    • Hysterectomy     • Cataract extraction            PT ASSESSMENT (last 72 hours)      PT Evaluation       17 1021 17 0855    Rehab Evaluation    Document Type evaluation  -RW evaluation  -AW    Subjective Information agree to therapy  -RW no complaints;agree to therapy  -AW    Patient Effort, Rehab Treatment  excellent  -AW    General Information    Patient Profile Review yes  -RW yes  -AW    Onset of Illness/Injury or Date of Surgery Date  17  -AW    Referring Physician  Dr CLOVER Maldonado  -AW    Precautions/Limitations fall precautions;oxygen therapy device and L/min  -RW     Prior Level of Function independent:;ADL's;transfer;gait;all  household mobility   independent with laundry  -RW independent:;gait;transfer  -AW    Equipment Currently Used at Home other (see comments);bath bench;cane, straight;grab bar;raised toilet;oxygen;walker, standard   rollator  -RW oxygen   rollator  -AW    Plans/Goals Discussed With patient  -RW patient  -AW    Risks Reviewed patient:;LOB  -RW     Benefits Reviewed patient:;increase independence  -RW patient:;improve function  -AW    Barriers to Rehab none identified  -RW medically complex  -AW    Living Environment    Lives With alone  -RW alone  -AW    Living Arrangements house  -RW house  -AW    Home Accessibility stairs to enter home;bed and bath on same level;tub/shower is not walk in;grab bars present (bathtub)  -RW stairs to enter home  -AW    Number of Stairs to Enter Home 1  -RW 1  -AW    Stair Railings at Home none  -RW none  -AW    Type of Financial/Environmental Concern none  -RW     Transportation Available family or friend will provide  -RW     Clinical Impression    Date of Referral to PT  02/16/17  -AW    PT Diagnosis  impaired mobility and endurance  -AW    Prognosis   good  -AW    Functional Level At Time Of Evaluation  assist with gait, decreased endurance  -AW    Criteria for Skilled Therapeutic Interventions Met  no;current level of function same as previous level of function  -AW    Impairments Found (describe specific impairments)  gait, locomotion, and balance;aerobic capacity/endurance  -AW    Rehab Potential  good, to achieve stated therapy goals  -AW    Predicted Duration of Therapy Intervention (days/wks)  till d/c  -AW    Vital Signs    Pre Systolic BP Rehab 132  -RW     Pre Treatment Diastolic BP 64  -RW     Pretreatment Heart Rate (beats/min) 79  -RW     Pre SpO2 (%) 96  -RW 96  -AW    O2 Delivery Pre Treatment supplemental O2  -RW supplemental O2  -AW    Intra SpO2 (%)  86  -AW    O2 Delivery Intra Treatment  supplemental O2  -AW    Post SpO2 (%)  95  -AW    O2 Delivery Post Treatment   supplemental O2  -AW    Pre Patient Position Sitting  -RW     Pain Assessment    Pain Assessment No/denies pain  -RW No/denies pain  -AW    Vision Assessment/Intervention    Visual Impairment WFL  -RW     Cognitive Assessment/Intervention    Current Cognitive/Communication Assessment functional  -RW functional  -AW    Orientation Status oriented x 4  -RW oriented x 4  -AW    Follows Commands/Answers Questions 100% of the time  -% of the time  -AW    ROM (Range of Motion)    General ROM no range of motion deficits identified  -RW upper extremity range of motion deficits identified  -AW    General ROM Detail  --   shoulders limited ~90'  -AW    MMT (Manual Muscle Testing)    General MMT Assessment upper extremity strength deficits identified  -RW no strength deficits identified  -AW    General MMT Assessment Detail grossly 4-/5  -RW     Upper Extremity    Upper Ext Manual Muscle Testing left shoulder strength deficit;right shoulder strength deficit  -RW     Upper Ext Manual Muscle Testing Detail 3/5  -RW     Bed Mobility, Assessment/Treatment    Bed Mob, Supine to Sit, [place]  not tested  -AW    Bed Mob, Sit to Supine, [place]  not tested  -AW    Transfer Assessment/Treatment    Transfers, Sit-Stand [place]  contact guard assist  -AW    Transfers, Stand-Sit [place]  contact guard assist  -AW    Transfers, Sit-Stand-Sit, Assist Device  rolling walker  -AW    Toilet Transfer, [place]  contact guard assist  -AW    Toilet Transfer, Assistive Device  rolling walker  -AW    Gait Assessment/Treatment    Gait, [place] Level  contact guard assist;verbal cues required  -AW    Gait, Assistive Device  rolling walker  -AW    Gait, Distance (Feet)  60  -AW    Gait, Safety Issues  other (see comments)   straddles AD with turns  -AW    Sensory Assessment/Intervention    Light Touch  LUE;RUE;LLE;RLE  -AW    LUE Light Touch  WNL  -AW    RUE Light Touch  WNL  -AW    LLE Light Touch  WNL  -AW    RLE  Light Touch  WNL  -AW    Positioning and Restraints    Pre-Treatment Position  sitting in chair/recliner  -AW    Post Treatment Position  chair  -AW    In Chair  call light within reach  -AW      02/15/17 2260       General Information    Equipment Currently Used at Home oxygen;respiratory supplies  -MH     Living Environment    Lives With alone  -MH     Living Arrangements house  -     Home Accessibility no concerns  -     Stair Railings at Home none  -     Type of Financial/Environmental Concern none  -     Transportation Available car  -       User Key  (r) = Recorded By, (t) = Taken By, (c) = Cosigned By    Initials Name Provider Type    RW Dayanna Cook, OTR/L Occupational Therapist    AW Gail Barajas, PT Physical Therapist     Ghulam Perales, RN Registered Nurse          Physical Therapy Education     Title: PT OT SLP Therapies (Active)     Topic: Physical Therapy (Active)     Point: Precautions (Active)    Learning Progress Summary    Learner Readiness Method Response Comment Documented by Status   Patient Acceptance E NR Pt instructed to remain within walker during turns inastead of straddling walker. Pt may need reinforcement. AW 02/17/17 1037 Active                      User Key     Initials Effective Dates Name Provider Type Discipline     10/17/16 -  Gail Barajas, PT Physical Therapist PT                PT Recommendation and Plan  Anticipated Discharge Disposition: skilled nursing facility, home with home health  Planned Therapy Interventions: gait training, home exercise program, stair training, strengthening, transfer training  PT Frequency: 5 times/wk  Plan of Care Review  Plan Of Care Reviewed With: patient  Outcome Summary/Follow up Plan: Pt presents with decreased endurance and safety . Pt needs PT to improve functional mobility and safety awareness.           IP PT Goals       02/17/17 1039          Transfer Training PT LTG    Transfer Training PT LTG, Date Established  02/17/17  -AW      Transfer Training PT LTG, Time to Achieve by discharge  -AW      Transfer Training PT LTG, Activity Type sit to stand/stand to sit  -AW      Transfer Training PT LTG, [place] Level conditional independence  -AW      Transfer Training PT LTG, Assist Device walker, rolling  -AW      Gait Training PT STG    Gait Training Goal PT STG, Date Established 02/17/17  -AW      Gait Training Goal PT STG, Time to Achieve 3 days  -AW      Gait Training Goal PT STG, [place] Level supervision required  -AW      Gait Training Goal PT STG, Assist Device walker, rolling  -AW      Gait Training Goal PT STG, Distance to Achieve 100  -AW      Gait Training PT LTG    Gait Training Goal PT LTG, Date Established 02/17/17  -AW      Gait Training Goal PT LTG, Time to Achieve by discharge  -AW      Gait Training Goal PT LTG, [place] Level conditional independence  -AW      Gait Training Goal PT LTG, Assist Device walker, rolling  -AW      Gait Training Goal PT LTG, Distance to Achieve 150  -AW      Stair Training PT LTG    Stair Training Goal PT LTG, Date Established 02/17/17  -AW      Stair Training Goal PT LTG, Time to Achieve by discharge  -AW      Stair Training Goal PT LTG, Number of Steps 1  -AW      Stair Training Goal PT LTG, [place] Level contact guard assist  -AW        User Key  (r) = Recorded By, (t) = Taken By, (c) = Cosigned By    Initials Name Provider Type    MAXIMILIANO Barajas, PT Physical Therapist                Outcome Measures       02/17/17 0855          How much help from another person do you currently need...    Turning from your back to your side while in flat bed without using bedrails? 4  -AW      Moving from lying on back to sitting on the side of a flat bed without bedrails? 3  -AW      Moving to and from a bed to a chair (including a wheelchair)? 3  -AW      Standing up from a chair using your arms (e.g., wheelchair, bedside chair)? 3  -AW      Climbing 3-5 steps with a  railing? 3  -AW      To walk in hospital room? 3  -AW      AM-PAC 6 Clicks Score 19  -AW      Functional Assessment    Outcome Measure Options AM-PAC 6 Clicks Basic Mobility (PT)  -AW        User Key  (r) = Recorded By, (t) = Taken By, (c) = Cosigned By    Initials Name Provider Type    MAXIMILIANO Barajas PT Physical Therapist           Time Calculation:         PT Charges       02/17/17 1043          Time Calculation    Start Time 0855  -AW      Stop Time 0912  -AW      Time Calculation (min) 17 min  -AW      PT Received On 02/17/17  -AW      PT Goal Re-Cert Due Date 03/02/17  -AW        User Key  (r) = Recorded By, (t) = Taken By, (c) = Cosigned By    Initials Name Provider Type    MAXIMILIANO Barajas PT Physical Therapist          Therapy Charges for Today     Code Description Service Date Service Provider Modifiers Qty    83577630053 HC PT MOBILITY CURRENT 2/17/2017 Gail Barajas, PT GP, CJ 1    56872843755 HC PT MOBILITY PROJECTED 2/17/2017 Gail Barajas, PT GP, CI 1    68420620578 HC PT EVAL MOD COMPLEXITY 1 2/17/2017 Gail Barajas, PT GP 1          PT G-Codes  Outcome Measure Options: AM-PAC 6 Clicks Basic Mobility (PT)  Score: 19  Functional Limitation: Mobility: Walking and moving around  Mobility: Walking and Moving Around Current Status (): At least 20 percent but less than 40 percent impaired, limited or restricted  Mobility: Walking and Moving Around Goal Status (): At least 1 percent but less than 20 percent impaired, limited or restricted      Gail Barajas, PT  2/17/2017

## 2017-02-17 NOTE — DISCHARGE PLACEMENT REQUEST
"Norma Harkins (86 y.o. Female)     Date of Birth Social Security Number Address Home Phone MRN    01/05/1931  [address] [facility] [phone] [id number]    Scientologist Marital Status          Muslim        Admission Date Admission Type Admitting Provider Attending Provider Department, Room/Bed    2/15/17 Emergency Alfredo Maldonado MD Patel, Harshul Amrutlal, MD [address], [address]    Discharge Date Discharge Disposition Discharge Destination                      Attending Provider: Alfredo Maldonado MD     Allergies:  No Known Allergies    Isolation:  None   Infection:  None   Code Status:  FULL    Ht:  61\" (154.9 cm)   Wt:  186 lb (84.4 kg)    Admission Cmt:  None   Principal Problem:  None                Active Insurance as of 2/15/2017     Primary Coverage     Payor Plan Insurance Group Employer/Plan Group    MEDICARE MEDICARE A & B      Payor Plan Address Payor Plan Phone Number Effective From Effective To    [address] [phone] 1/1/1996     [place] [address]       Subscriber Name Subscriber Birth Date Member ID       NORMA HARKINS 1/5/1931 [id number]           Secondary Coverage     Payor Plan Insurance Group Employer/Plan Group    [employer] MED SUPP AAR HEALTH CARE OPTIONS      Payor Plan Address Payor Plan Phone Number Effective From Effective To    [facility] [phone] 1/1/2016     [address]       [place] [address]       Subscriber Name Subscriber Birth Date Member ID       NORMA HARKINS 1/5/1931 [id number]                 Emergency Contacts      (Rel.) Home Phone Work Phone Mobile Phone    Dang Harry (Daughter) -- [phone]               History & Physical      Daren Wu MD at 2/15/2017  7:14 PM          History and Physical  Daren Wu MD  Hospitalist      Patient Care Team:  Krista Matos MD as PCP - General  Jenny Salguero as Care Coordinator (Population " Health)    Chief complaint   Chief Complaint   Patient presents with   • Shortness of Breath       Subjective     Patient is a 86 y.o. female admitted again with cough, congestion, wheezing. She did not do too well since her last discharge 10 days ago or so. She has tried again oral antibiotics / oral steroids but her symptoms have worsened once more.      History  Past Medical History   Diagnosis Date   • Chronic obstructive lung disease 04/26/2016     on oxygen      • Chronic respiratory failure with hypoxia 04/26/2016   • Essential hypertension 09/08/2015   • Hyperlipidemia    • Obesity    • Paroxysmal atrial fibrillation 03/15/2016     Past Surgical History   Procedure Laterality Date   • Joint replacement       total knee    • Hysterectomy     • Cataract extraction       Family History   Problem Relation Age of Onset   • Heart disease Other    • Hypertension Other    • Lung cancer Other      Social History   Substance Use Topics   • Smoking status: Former Smoker   • Smokeless tobacco: Never Used      Comment:  Patient quit smoking more than 30 years ago, she smoked 1pd for 30 years prior to that   • Alcohol use No       (Not in a hospital admission)  Allergies:  Review of patient's allergies indicates no known allergies.    Review of Systems  Review of Systems   Constitutional: Positive for appetite change and fatigue. Negative for chills and fever.   HENT: Positive for congestion.    Respiratory: Positive for cough, chest tightness, shortness of breath and wheezing.    Cardiovascular: Negative for chest pain, palpitations and leg swelling.   Gastrointestinal: Negative for abdominal distention, abdominal pain, constipation and diarrhea.   Genitourinary: Negative for dysuria and frequency.   Musculoskeletal: Positive for back pain.   Skin: Negative for pallor.   Neurological: Positive for weakness and light-headedness. Negative for syncope.   Psychiatric/Behavioral: Negative for agitation and behavioral  problems.   All other systems reviewed and are negative.      Objective     Vital Signs  Temp:  [98.1 °F (36.7 °C)] 98.1 °F (36.7 °C)  Heart Rate:  [88-97] 97  Resp:  [18-20] 18  BP: (132-137)/(81-87) 132/81    Physical Exam:  Physical Exam   Constitutional: She appears well-developed and well-nourished.   HENT:   Head: Normocephalic and atraumatic.   Eyes: EOM are normal.   Neck: Neck supple.   Cardiovascular: Normal rate and regular rhythm.    Pulmonary/Chest: She is in respiratory distress (mild to moderate). She has wheezes. She has rales.   Abdominal: Soft. Bowel sounds are normal.   Musculoskeletal: Normal range of motion. She exhibits no edema or deformity.   Neurological: She is alert.   Skin: Skin is warm and dry.   Psychiatric: She has a normal mood and affect.   Vitals reviewed.      Results Review:   Lab Results (last 24 hours)     Procedure Component Value Units Date/Time    [place] Draw [[id number]] Collected:  02/15/17 1636    Specimen:  Blood Updated:  02/15/17 1701    Narrative:       The following orders were created for panel order [place] Draw.  Procedure                               Abnormality         Status                     ---------                               -----------         ------                     Light Blue Top[[id number]]                                    In process                 Green Top (Gel)[[id number]]                                   In process                 Lavender Top[[id number]]                                      In process                 Gold Top - SST[[id number]]                                    In process                   Please view results for these tests on the individual orders.    Gold Top - SST [[id number]] Collected:  02/15/17 1636    Specimen:  Blood Updated:  02/15/17 1701    Light Blue Top [[id number]] Collected:  02/15/17 1636    Specimen:  Blood Updated:  02/15/17 1701    Lavender Top [[id number]] Collected:  02/15/17 1636    Specimen:  Blood Updated:   02/15/17 1701    Green Top (Gel) [[id number]] Collected:  02/15/17 1636    Specimen:  Blood Updated:  02/15/17 1701    CBC Auto Differential [[id number]]  (Abnormal) Collected:  02/15/17 1636    Specimen:  Blood Updated:  02/15/17 1713     WBC 10.35 (H) 10*3/mm3      RBC 3.62 (L) 10*6/mm3      Hemoglobin 11.9 (L) g/dL      Hematocrit 34.5 (L) %      MCV 95.3 fL      MCH 32.9 pg      MCHC 34.5 g/dL      RDW 13.3 %      RDW-SD 45.9 fl      MPV 11.1 fL      Platelets 129 (L) 10*3/mm3      Neutrophil % 90.0 (H) %      Lymphocyte % 3.8 (L) %      Monocyte % 5.5 %      Eosinophil % 0.1 %      Basophil % 0.1 %      Immature Grans % 0.5 %      Neutrophils, Absolute 9.32 (H) 10*3/mm3      Lymphocytes, Absolute 0.39 (L) 10*3/mm3      Monocytes, Absolute 0.57 10*3/mm3      Eosinophils, Absolute 0.01 10*3/mm3      Basophils, Absolute 0.01 10*3/mm3      Immature Grans, Absolute 0.05 (H) 10*3/mm3     Comprehensive Metabolic Panel [[id number]]  (Abnormal) Collected:  02/15/17 1636    Specimen:  Blood Updated:  02/15/17 1716     Glucose 171 (H) mg/dL      BUN 16 mg/dL      Creatinine 0.82 mg/dL      Sodium 132 (L) mmol/L      Potassium 3.5 mmol/L      Chloride 91 (L) mmol/L      CO2 32.0 (H) mmol/L      Calcium 8.9 mg/dL      Total Protein 6.5 g/dL      Albumin 3.50 g/dL      ALT (SGPT) 35 U/L      AST (SGOT) 26 U/L      Alkaline Phosphatase 43 U/L      Total Bilirubin 0.5 mg/dL      eGFR Non African Amer 66 mL/min/1.73      Globulin 3.0 gm/dL      A/G Ratio 1.2 g/dL      BUN/Creatinine Ratio 19.5      Anion Gap 9.0 mmol/L     Narrative:       The MDRD GFR formula is only valid for adults with stable renal function between ages 18 and 70.    BNP [[id number]]  (Normal) Collected:  02/15/17 1636    Specimen:  Blood Updated:  02/15/17 1731     proBNP 1040.0 pg/mL     Protime-INR [[id number]]  (Normal) Collected:  02/15/17 1636    Specimen:  Blood Updated:  02/15/17 1733     Protime 13.6 Seconds      INR 1.04     Narrative:       Therapeutic  range for most indications is 2.0-3.0 INR,  or 2.5-3.5 for mechanical heart valves.    Troponin [[id number]]  (Abnormal) Collected:  02/15/17 1636    Specimen:  Blood Updated:  02/15/17 1735     Troponin I 0.035 (H) ng/mL     CBC & Differential [[id number]] Collected:  02/15/17 1636    Specimen:  Blood Updated:  02/15/17 1807    Narrative:       The following orders were created for panel order CBC & Differential.  Procedure                               Abnormality         Status                     ---------                               -----------         ------                     Scan Slide[[id number]]                                        Final result               CBC Auto Differential[[id number]]         Abnormal            Final result                 Please view results for these tests on the individual orders.    Scan Slide [[id number]] Collected:  02/15/17 1636    Specimen:  Blood Updated:  02/15/17 1807     RBC Morphology Normal      WBC Morphology Normal      Platelet Estimate Adequate           Imaging Results (last 24 hours)     Procedure Component Value Units Date/Time    XR Chest 2 View [[id number]] Collected:  02/15/17 1654     Updated:  02/15/17 1657    Narrative:       Patient Name:  ANN MARIE BWOERS COOMESPatient ID:  [id number] Ordering:   JAZMINE MILLERAttending:  JAZMINE MILLERReferring:  JAZMINE MILLER------------------------------------------------Shortness  of breath.    Chest, AP and lateral views.    Comparison is made with study dated to February 3, 2017.    There is the upper limits of normal in size. There are mild  bilateral interstitial markings. No pleural effusion is  identified. No acute bony abnormality is seen. There are  degenerative changes of the right shoulder. Degenerative changes  of the thoracic spine. There is osteopenia.      Impression:       CONCLUSION: Mild bilateral interstitial markings.    Electronically signed by:  Frank Hill MD  2/15/2017 4:56  PM CST Workstation:  [facility]          Assessment/Plan     Active Problems:    Acute on chronic respiratory failure with hypoxia    Chronic obstructive pulmonary disease with acute exacerbation    Resume nebulized treatments, IV steroids, inhaled steroids.    Daren Wu MD  02/15/17  7:14 PM       Electronically signed by Daren Wu MD at 2/15/2017 11:30 PM        Lines, Drains & Airways    Active LDAs     Name:   Placement date:   Placement time:   Site:   Days:    Peripheral IV Line - Single Lumen 02/15/17 2250 cephalic vein (lateral side of arm), right 22 gauge  02/15/17    2250      1                Hospital Medications (active)       Dose Frequency Start End    albuterol (PROVENTIL) nebulizer solution 0.083% 2.5 mg/3mL 2.5 mg Every 6 Hours - RT 2/16/2017     Sig - Route: Take 2.5 mg by nebulization Every 6 (Six) Hours. - Nebulization    amiodarone (PACERONE) tablet 200 mg 200 mg Daily 2/16/2017     Sig - Route: Take 1 tablet by mouth Daily. - Oral    Non-formulary Exception Code: Patient supplied medication    apixaban (ELIQUIS) tablet 5 mg 5 mg Every 12 Hours Scheduled 2/16/2017     Sig - Route: Take 1 tablet by mouth Every 12 (Twelve) Hours. - Oral    Non-formulary Exception Code: Patient supplied medication    budesonide-formoterol (SYMBICORT) 160-4.5 MCG/ACT inhaler 2 puff 2 puff 2 Times Daily - RT 2/15/2017     Sig - Route: Inhale 2 puffs 2 (Two) Times a Day. - Inhalation    diltiaZEM CD (CARDIZEM CD) 24 hr capsule 120 mg 120 mg Daily 2/16/2017     Sig - Route: Take 1 capsule by mouth Daily. - Oral    Non-formulary Exception Code: Patient supplied medication    diphenhydrAMINE (BENADRYL) capsule 25 mg 25 mg Every 6 Hours PRN 2/16/2017     Sig - Route: Take 1 capsule by mouth Every 6 (Six) Hours As Needed for itching. - Oral    diphenhydrAMINE-zinc acetate 2-0.1 % cream  3 Times Daily PRN 2/16/2017     Sig - Route: Apply  topically 3 (Three) Times a Day As Needed for itching or rash. - Topical    docusate sodium  (COLACE) capsule 300 mg 300 mg Daily 2/15/2017     Sig - Route: Take 3 capsules by mouth Daily. - Oral    furosemide (LASIX) tablet 40 mg 40 mg Daily 2/16/2017     Sig - Route: Take 1 tablet by mouth Daily. - Oral    Non-formulary Exception Code: Patient supplied medication    HYDROcodone-acetaminophen (NORCO) 5-325 MG per tablet 1 tablet 1 tablet Every 6 Hours PRN 2/16/2017 2/26/2017    Sig - Route: Take 1 tablet by mouth Every 6 (Six) Hours As Needed for moderate pain (4-6). - Oral    Non-formulary Exception Code: Patient supplied medication    methylPREDNISolone sodium succinate (SOLU-Medrol) injection 40 mg 40 mg Every 8 Hours 2/17/2017     Sig - Route: Infuse 1 mL into a venous catheter Every 8 (Eight) Hours. - Intravenous    potassium chloride (K-DUR,KLOR-CON) CR tablet 20 mEq 20 mEq 2 Times Daily 2/16/2017     Sig - Route: Take 1 tablet by mouth 2 (Two) Times a Day. - Oral    Non-formulary Exception Code: Patient supplied medication    potassium chloride (K-DUR,KLOR-CON) CR tablet 20 mEq 20 mEq Once 2/16/2017 2/16/2017    Sig - Route: Take 1 tablet by mouth 1 (One) Time. - Oral    sodium chloride 0.9 % flush 1-10 mL 1-10 mL As Needed 2/15/2017     Sig - Route: Infuse 1-10 mL into a venous catheter As Needed for line care. - Intravenous    sodium chloride 0.9 % flush 10 mL 10 mL As Needed 2/15/2017     Sig - Route: Infuse 10 mL into a venous catheter As Needed for line care. - Intravenous    methylPREDNISolone sodium succinate (SOLU-Medrol) injection 40 mg (Discontinued) 40 mg Every 6 Hours 2/16/2017 2/17/2017    Sig - Route: Infuse 1 mL into a venous catheter Every 6 (Six) Hours. - Intravenous            Lab Results (last 24 hours)     Procedure Component Value Units Date/Time    Troponin [[id number]]  (Normal) Collected:  02/17/17 1145    Specimen:  Blood Updated:  02/17/17 1231     Troponin I 0.032 ng/mL     Basic Metabolic Panel [[id number]]  (Abnormal) Collected:  02/17/17 1145    Specimen:  Blood Updated:   02/17/17 1234     Glucose 292 (H) mg/dL      BUN 25 (H) mg/dL      Creatinine 0.73 mg/dL      Sodium 134 (L) mmol/L      Potassium 4.3 mmol/L      Chloride 93 (L) mmol/L      CO2 34.0 (H) mmol/L      Calcium 9.3 mg/dL      eGFR Non African Amer 76 mL/min/1.73      BUN/Creatinine Ratio 34.2 (H)      Anion Gap 7.0 mmol/L     Narrative:       The MDRD GFR formula is only valid for adults with stable renal function between ages 18 and 70.        Imaging Results (last 24 hours)     ** No results found for the last 24 hours. **           Physician Progress Notes (all)      Aneudy Maldonado MD at 2/16/2017  2:57 PM  Version 1 of 1         hospitalist  Active Problems:    Chronic obstructive pulmonary disease with acute exacerbation    Acute on chronic respiratory failure with hypoxia        Subjective: Doing better today.  Decrease shortness of breath.  No chest pain.  Mild dry cough.  No other new complaints.  Chart reviewed.  No family members are present.        Review of Systems:    Review of Systems   Constitutional: Negative for chills and fever. other systems reviewed and are negative.      Objective     Vital Signs:  Temp:  [97.8 °F (36.6 °C)-98.6 °F (37 °C)] 97.8 °F (36.6 °C)  Heart Rate:  [] 76  Resp:  [18-20] 18  BP: (122-166)/(70-87) 166/80    Physical Exam:   Physical Exam   Constitutional: She is oriented to person, place, and time. She appears well-developed and well-nourished. No distress.   HENT:   Nose: Nose normal.   Mouth/Throat: Oropharynx is clear and moist. No oropharyngeal exudate.   Eyes: Conjunctivae and EOM are normal. No scleral icterus.   Neck: Normal range of motion. Neck supple. No JVD present. No thyromegaly present.   Cardiovascular: Normal rate, regular rhythm and normal heart sounds.    Pulmonary/Chest: Tachypnea noted. No respiratory distress. She has no wheezes. She has rales.   Abdominal: Soft. She exhibits no distension. There is no tenderness.   Musculoskeletal: Normal  range of motion. She exhibits no edema.   Neurological: She is alert and oriented to person, place, and time. No cranial nerve deficit.   Skin: Skin is warm and dry. No rash noted. She is not diaphoretic. No erythema. No pallor.   Psychiatric: She has a normal mood and affect.          Results Review:       Lab Results (last 24 hours)     Procedure Component Value Units Date/Time    CBC Auto Differential [[id number]]  (Abnormal) Collected:  02/15/17 1636    Specimen:  Blood Updated:  02/15/17 1713     WBC 10.35 (H) 10*3/mm3      RBC 3.62 (L) 10*6/mm3      Hemoglobin 11.9 (L) g/dL      Hematocrit 34.5 (L) %      MCV 95.3 fL      MCH 32.9 pg      MCHC 34.5 g/dL      RDW 13.3 %      RDW-SD 45.9 fl      MPV 11.1 fL      Platelets 129 (L) 10*3/mm3      Neutrophil % 90.0 (H) %      Lymphocyte % 3.8 (L) %      Monocyte % 5.5 %      Eosinophil % 0.1 %      Basophil % 0.1 %      Immature Grans % 0.5 %      Neutrophils, Absolute 9.32 (H) 10*3/mm3      Lymphocytes, Absolute 0.39 (L) 10*3/mm3      Monocytes, Absolute 0.57 10*3/mm3      Eosinophils, Absolute 0.01 10*3/mm3      Basophils, Absolute 0.01 10*3/mm3      Immature Grans, Absolute 0.05 (H) 10*3/mm3     Comprehensive Metabolic Panel [[id number]]  (Abnormal) Collected:  02/15/17 1636    Specimen:  Blood Updated:  02/15/17 1716     Glucose 171 (H) mg/dL      BUN 16 mg/dL      Creatinine 0.82 mg/dL      Sodium 132 (L) mmol/L      Potassium 3.5 mmol/L      Chloride 91 (L) mmol/L      CO2 32.0 (H) mmol/L      Calcium 8.9 mg/dL      Total Protein 6.5 g/dL      Albumin 3.50 g/dL      ALT (SGPT) 35 U/L      AST (SGOT) 26 U/L      Alkaline Phosphatase 43 U/L      Total Bilirubin 0.5 mg/dL      eGFR Non African Amer 66 mL/min/1.73      Globulin 3.0 gm/dL      A/G Ratio 1.2 g/dL      BUN/Creatinine Ratio 19.5      Anion Gap 9.0 mmol/L     Narrative:       The MDRD GFR formula is only valid for adults with stable renal function between ages 18 and 70.    BNP [[id number]]  (Normal)  Collected:  02/15/17 1636    Specimen:  Blood Updated:  02/15/17 1731     proBNP 1040.0 pg/mL     Protime-INR [[id number]]  (Normal) Collected:  02/15/17 1636    Specimen:  Blood Updated:  02/15/17 1733     Protime 13.6 Seconds      INR 1.04     Narrative:       Therapeutic range for most indications is 2.0-3.0 INR,  or 2.5-3.5 for mechanical heart valves.    Troponin [[id number]]  (Abnormal) Collected:  02/15/17 1636    Specimen:  Blood Updated:  02/15/17 1735     Troponin I 0.035 (H) ng/mL     CBC & Differential [[id number]] Collected:  02/15/17 1636    Specimen:  Blood Updated:  02/15/17 1807    Narrative:       The following orders were created for panel order CBC & Differential.  Procedure                               Abnormality         Status                     ---------                               -----------         ------                     Scan Slide[[id number]]                                        Final result               CBC Auto Differential[[id number]]         Abnormal            Final result                 Please view results for these tests on the individual orders.    Scan Slide [[id number]] Collected:  02/15/17 1636    Specimen:  Blood Updated:  02/15/17 1807     RBC Morphology Normal      WBC Morphology Normal      Platelet Estimate Adequate     [place] Draw [[id number]] Collected:  02/15/17 1636    Specimen:  Blood Updated:  02/15/17 2101    Narrative:       The following orders were created for panel order [place] Draw.  Procedure                               Abnormality         Status                     ---------                               -----------         ------                     Light Blue Top[[id number]]                                    Final result               Green Top (Gel)[[id number]]                                   Final result               Lavender Top[[id number]]                                      Final result               Gold Top - SST[[id number]]                                     Final result                 Please view results for these tests on the individual orders.    Light Blue Top [[id number]] Collected:  02/15/17 1636    Specimen:  Blood Updated:  02/15/17 2101     Extra Tube hold for add-on       Auto resulted       Green Top (Gel) [[id number]] Collected:  02/15/17 1636    Specimen:  Blood Updated:  02/15/17 2101     Extra Tube Hold for add-ons.       Auto resulted.       Lavender Top [[id number]] Collected:  02/15/17 1636    Specimen:  Blood Updated:  02/15/17 2101     Extra Tube hold for add-on       Auto resulted       Gold Top - SST [[id number]] Collected:  02/15/17 1636    Specimen:  Blood Updated:  02/15/17 2101     Extra Tube Hold for add-ons.       Auto resulted.           Imaging Results (last 24 hours)     Procedure Component Value Units Date/Time    XR Chest 2 View [[id number]] Collected:  02/15/17 1654     Updated:  02/15/17 1657    Narrative:       Patient Name:  ANN MARIE BOWERS COOMESPatient ID:  [id number] Ordering:   JAZMINE MILLERAttending:  JAZMINE MILLERReferring:  JAZMINE MLILER------------------------------------------------Shortness  of breath.    Chest, AP and lateral views.    Comparison is made with study dated to February 3, 2017.    There is the upper limits of normal in size. There are mild  bilateral interstitial markings. No pleural effusion is  identified. No acute bony abnormality is seen. There are  degenerative changes of the right shoulder. Degenerative changes  of the thoracic spine. There is osteopenia.      Impression:       CONCLUSION: Mild bilateral interstitial markings.    Electronically signed by:  Frank Hill MD  2/15/2017 4:56  PM CST Workstation: [employer]        EKG: No new changes    Medication Review: medications have been reviewed    Assessment/Plan:    1.  Acute on chronic respiratory failure, hypoxic in nature.  Home oxygen and nebulizer treatment dependent  -Started on IV steroids, nebulizer treatment, oxygen.  Start tapering  steroids.  2. COPD with acute exacerbation  -Treatment as above  3.  History of cardiac arrhythmias  -Resume home medicines since  4.  Hypokalemia  -Replace potassium    DVT prophylaxis: Lovenox  Length of stay: More than 2 midnights  Anticipated disposition: Home        Aneudy Maldonado MD  02/16/17  2:57 PM             Electronically signed by Aneudy Maldonado MD at 2/16/2017  3:01 PM      Aneudy Maldonado MD at 2/17/2017 11:21 AM  Version 1 of 1         hospitalist  Active Problems:    Chronic obstructive pulmonary disease with acute exacerbation    Acute on chronic respiratory failure with hypoxia        Subjective: Doing better .  Decrease shortness of breath.  No chest pain.  Mild dry cough.  Having rash off and on since yesterday evening.  Gets better after getting Benadryl and her Solu-Medrol.  Also does have itching but that also gets better with above medicines.  No other new complaints.  Chart reviewed.got 1 dose of Ancef in ER.  No family members are present.  Allergies: No prior history of allergy to penicillin or related medicines.      Review of Systems:    Review of Systems   Constitutional: Negative for chills and fever. other systems reviewed and are negative.      Objective     Vital Signs:  Temp:  [96.3 °F (35.7 °C)-97.5 °F (36.4 °C)] 97.5 °F (36.4 °C)  Heart Rate:  [78-92] 81  Resp:  [18-22] 20  BP: (132-154)/(62-88) 136/62    Physical Exam:   Physical Exam   Constitutional: She is oriented to person, place, and time. She appears well-developed and well-nourished. No distress.   HENT:   Nose: Nose normal.   Mouth/Throat: Oropharynx is clear and moist. No oropharyngeal exudate.   Eyes: Conjunctivae and EOM are normal. No scleral icterus.   Neck: Normal range of motion. Neck supple. No JVD present. No thyromegaly present.   Cardiovascular: Normal rate, regular rhythm and normal heart sounds.    Pulmonary/Chest: Tachypnea noted. No respiratory distress. She has no wheezes. She has  rales.   Abdominal: Soft. She exhibits no distension. There is no tenderness.   Musculoskeletal: Normal range of motion. She exhibits no edema.   Neurological: She is alert and oriented to person, place, and time. No cranial nerve deficit.   Skin: Skin is warm and dry. No rash noted. She is not diaphoretic. No erythema. No pallor.   Psychiatric: She has a normal mood and affect.          Results Review:       Lab Results (last 24 hours)     ** No results found for the last 24 hours. **        Imaging Results (last 24 hours)     Procedure Component Value Units Date/Time    XR Chest 2 View [[id number]] Collected:  02/15/17 1654     Updated:  02/15/17 1657    Narrative:       Patient Name:  ANN MARIE BOWERS COOMESPatient ID:  [id number] Ordering:   JAZMINE MILLERAttending:  JAZMINE MILLERReferring:  JAZMINE MILLER------------------------------------------------Shortness  of breath.    Chest, AP and lateral views.    Comparison is made with study dated to February 3, 2017.    There is the upper limits of normal in size. There are mild  bilateral interstitial markings. No pleural effusion is  identified. No acute bony abnormality is seen. There are  degenerative changes of the right shoulder. Degenerative changes  of the thoracic spine. There is osteopenia.      Impression:       CONCLUSION: Mild bilateral interstitial markings.    Electronically signed by:  Frank Hill MD  2/15/2017 4:56  PM CST Workstation: [employer]        EKG: No new changes    Medication Review: medications have been reviewed    Assessment/Plan:    1.  Acute on chronic respiratory failure, hypoxic in nature.  Home oxygen and nebulizer treatment dependent  -Started on IV steroids, nebulizer treatment, oxygen.  Continue tapering steroids.  2. COPD with acute exacerbation  -Treatment as above  3.  History of cardiac arrhythmias  -Resume home medicines   4.  Hypokalemia  -Replace potassium, better, checked this morning  5.  Rash, unclear etiology, suggestive  of possibility of urticaria  -Continue when necessary Benadryl and continue steroids  6.  Borderline abnormal troponin  -Repeat troponin  7.  Generalized functional decline  -PT and OT to evaluate and treat    DVT prophylaxis: Eliquis  Length of stay: More than 2 midnights  Anticipated disposition: Home possibly in 2 days        Aneudy Maldonado MD  02/17/17  11:21 AM             Electronically signed by Aneudy Maldonado MD at 2/17/2017 11:26 AM        Consult Notes (all)     No notes of this type exist for this encounter.           Physical Therapy Notes (all)      Gail Barajas, PT at 2/17/2017 10:43 AM  Version 1 of 1         Problem: Patient Care Overview (Adult)  Goal: Plan of Care Review  Outcome: Ongoing (interventions implemented as appropriate)    02/17/17 1039   Coping/Psychosocial Response Interventions   Plan Of Care Reviewed With patient   Outcome Evaluation   Outcome Summary/Follow up Plan Pt presents with decreased endurance and safety . Pt needs PT to improve functional mobility and safety awareness.          Problem: Inpatient Physical Therapy  Goal: Transfer Training Goal 1 LTG- PT  Outcome: Ongoing (interventions implemented as appropriate)    02/17/17 1039   Transfer Training PT LTG   Transfer Training PT LTG, Date Established 02/17/17   Transfer Training PT LTG, Time to Achieve by discharge   Transfer Training PT LTG, Activity Type sit to stand/stand to sit   Transfer Training PT LTG, [place] Level conditional independence   Transfer Training PT LTG, Assist Device walker, rolling       Goal: Gait Training Goal STG- PT  Outcome: Ongoing (interventions implemented as appropriate)    02/17/17 1039   Gait Training PT STG   Gait Training Goal PT STG, Date Established 02/17/17   Gait Training Goal PT STG, Time to Achieve 3 days   Gait Training Goal PT STG, [place] Level supervision required   Gait Training Goal PT STG, Assist Device walker, rolling   Gait Training Goal PT STG,  Distance to Achieve 100       Goal: Gait Training Goal LTG- PT  Outcome: Ongoing (interventions implemented as appropriate)    17 1039   Gait Training PT LTG   Gait Training Goal PT LTG, Date Established 17   Gait Training Goal PT LTG, Time to Achieve by discharge   Gait Training Goal PT LTG, [place] Level conditional independence   Gait Training Goal PT LTG, Assist Device walker, rolling   Gait Training Goal PT LTG, Distance to Achieve 150       Goal: Stair Training Goal LTG- PT  Outcome: Ongoing (interventions implemented as appropriate)    17 1039   Stair Training PT LTG   Stair Training Goal PT LTG, Date Established 17   Stair Training Goal PT LTG, Time to Achieve by discharge   Stair Training Goal PT LTG, Number of Steps 1   Stair Training Goal PT LTG, [place] Level contact guard assist              Electronically signed by Gail Barajas PT at 2017 10:43 AM      Gail Barajas PT at 2017 10:45 AM  Version 1 of 1         Acute Care - Physical Therapy Initial Evaluation  [facility]     Patient Name: Norma Harkins  : 1931  MRN: [id number]  Today's Date: 2017   Onset of Illness/Injury or Date of Surgery Date: 17  Date of Referral to PT: 17  Referring Physician: Dr CLOVER Maldonado      Admit Date: 2/15/2017     Visit Dx:    ICD-10-CM ICD-9-CM   1. Chronic obstructive pulmonary disease with acute exacerbation J44.1 491.21   2. Acute bronchitis, unspecified organism J20.9 466.0   3. Shortness of breath R06.02 786.05   4. Troponin I above reference range R79.89 790.6   5. Hyperglycemia R73.9 790.29   6. Impaired mobility and endurance Z74.09 V49.89     Patient Active Problem List   Diagnosis   • Hypertension   • Chronic obstructive pulmonary disease with acute exacerbation   • Acute on chronic respiratory failure with hypoxia   • Low back pain   • Hyperlipidemia   • Paroxysmal atrial fibrillation     Past Medical History   Diagnosis Date   • Chronic  obstructive lung disease 04/26/2016     on oxygen      • Chronic respiratory failure with hypoxia 04/26/2016   • Essential hypertension 09/08/2015   • Hyperlipidemia    • Obesity    • Paroxysmal atrial fibrillation 03/15/2016     Past Surgical History   Procedure Laterality Date   • Joint replacement       total knee    • Hysterectomy     • Cataract extraction            PT ASSESSMENT (last 72 hours)      PT Evaluation       02/17/17 1021 02/17/17 0855    Rehab Evaluation    Document Type evaluation  -RW evaluation  -AW    Subjective Information agree to therapy  -RW no complaints;agree to therapy  -AW    Patient Effort, Rehab Treatment  excellent  -AW    General Information    Patient Profile Review yes  -RW yes  -AW    Onset of Illness/Injury or Date of Surgery Date  02/16/17  -AW    Referring Physician  Dr CLOVER Maldonado  -AW    Precautions/Limitations fall precautions;oxygen therapy device and L/min  -RW     Prior Level of Function independent:;ADL's;transfer;gait;all household mobility   independent with laundry  -RW independent:;gait;transfer  -AW    Equipment Currently Used at Home other (see comments);bath bench;cane, straight;grab bar;raised toilet;oxygen;walker, standard   rollator  -RW oxygen   rollator  -AW    Plans/Goals Discussed With patient  -RW patient  -AW    Risks Reviewed patient:;LOB  -RW     Benefits Reviewed patient:;increase independence  -RW patient:;improve function  -AW    Barriers to Rehab none identified  -RW medically complex  -AW    Living Environment    Lives With alone  -RW alone  -AW    Living Arrangements house  -RW house  -AW    Home Accessibility stairs to enter home;bed and bath on same level;tub/shower is not walk in;grab bars present (bathtub)  -RW stairs to enter home  -AW    Number of Stairs to Enter Home 1  -RW 1  -AW    Stair Railings at Home none  -RW none  -AW    Type of Financial/Environmental Concern none  -RW     Transportation Available family or friend will provide  -RW      Clinical Impression    Date of Referral to PT  02/16/17  -AW    PT Diagnosis  impaired mobility and endurance  -AW    Prognosis   good  -AW    Functional Level At Time Of Evaluation  assist with gait, decreased endurance  -AW    Criteria for Skilled Therapeutic Interventions Met  no;current level of function same as previous level of function  -AW    Impairments Found (describe specific impairments)  gait, locomotion, and balance;aerobic capacity/endurance  -AW    Rehab Potential  good, to achieve stated therapy goals  -AW    Predicted Duration of Therapy Intervention (days/wks)  till d/c  -AW    Vital Signs    Pre Systolic BP Rehab 132  -RW     Pre Treatment Diastolic BP 64  -RW     Pretreatment Heart Rate (beats/min) 79  -RW     Pre SpO2 (%) 96  -RW 96  -AW    O2 Delivery Pre Treatment supplemental O2  -RW supplemental O2  -AW    Intra SpO2 (%)  86  -AW    O2 Delivery Intra Treatment  supplemental O2  -AW    Post SpO2 (%)  95  -AW    O2 Delivery Post Treatment  supplemental O2  -AW    Pre Patient Position Sitting  -RW     Pain Assessment    Pain Assessment No/denies pain  -RW No/denies pain  -AW    Vision Assessment/Intervention    Visual Impairment WFL  -RW     Cognitive Assessment/Intervention    Current Cognitive/Communication Assessment functional  -RW functional  -AW    Orientation Status oriented x 4  -RW oriented x 4  -AW    Follows Commands/Answers Questions 100% of the time  -% of the time  -AW    ROM (Range of Motion)    General ROM no range of motion deficits identified  -RW upper extremity range of motion deficits identified  -AW    General ROM Detail  --   shoulders limited ~90'  -AW    MMT (Manual Muscle Testing)    General MMT Assessment upper extremity strength deficits identified  -RW no strength deficits identified  -AW    General MMT Assessment Detail grossly 4-/5  -RW     Upper Extremity    Upper Ext Manual Muscle Testing left shoulder strength deficit;right shoulder strength deficit   -RW     Upper Ext Manual Muscle Testing Detail 3/5  -RW     Bed Mobility, Assessment/Treatment    Bed Mob, Supine to Sit, [place]  not tested  -AW    Bed Mob, Sit to Supine, [place]  not tested  -AW    Transfer Assessment/Treatment    Transfers, Sit-Stand [place]  contact guard assist  -AW    Transfers, Stand-Sit [place]  contact guard assist  -AW    Transfers, Sit-Stand-Sit, Assist Device  rolling walker  -AW    Toilet Transfer, [place]  contact guard assist  -AW    Toilet Transfer, Assistive Device  rolling walker  -AW    Gait Assessment/Treatment    Gait, [place] Level  contact guard assist;verbal cues required  -AW    Gait, Assistive Device  rolling walker  -AW    Gait, Distance (Feet)  60  -AW    Gait, Safety Issues  other (see comments)   straddles AD with turns  -AW    Sensory Assessment/Intervention    Light Touch  LUE;RUE;LLE;RLE  -AW    LUE Light Touch  WNL  -AW    RUE Light Touch  WNL  -AW    LLE Light Touch  WNL  -AW    RLE Light Touch  WNL  -AW    Positioning and Restraints    Pre-Treatment Position  sitting in chair/recliner  -AW    Post Treatment Position  chair  -AW    In Chair  call light within reach  -AW      02/15/17 5960       General Information    Equipment Currently Used at Home oxygen;respiratory supplies  -MH     Living Environment    Lives With alone  -MH     Living Arrangements house  -MH     Home Accessibility no concerns  -     Stair Railings at Home none  -     Type of Financial/Environmental Concern none  -     Transportation Available car  -       User Key  (r) = Recorded By, (t) = Taken By, (c) = Cosigned By    Initials Name Provider Type    RW Dayanna Cook OTR/L Occupational Therapist    AW Gail Barajas, PT Physical Therapist     Ghulam Perales RN Registered Nurse          Physical Therapy Education     Title: PT OT SLP Therapies (Active)     Topic: Physical Therapy (Active)     Point: Precautions (Active)    Learning Progress  Summary    Learner Readiness Method Response Comment Documented by Status   Patient Acceptance E NR Pt instructed to remain within walker during turns inastead of straddling walker. Pt may need reinforcement. AW 02/17/17 1037 Active                      User Key     Initials Effective Dates Name Provider Type Discipline    AW 10/17/16 -  Gail Barajas, PT Physical Therapist PT                PT Recommendation and Plan  Anticipated Discharge Disposition: skilled nursing facility, home with home health  Planned Therapy Interventions: gait training, home exercise program, stair training, strengthening, transfer training  PT Frequency: 5 times/wk  Plan of Care Review  Plan Of Care Reviewed With: patient  Outcome Summary/Follow up Plan: Pt presents with decreased endurance and safety . Pt needs PT to improve functional mobility and safety awareness.           IP PT Goals       02/17/17 1039          Transfer Training PT LTG    Transfer Training PT LTG, Date Established 02/17/17  -AW      Transfer Training PT LTG, Time to Achieve by discharge  -AW      Transfer Training PT LTG, Activity Type sit to stand/stand to sit  -AW      Transfer Training PT LTG, [place] Level conditional independence  -AW      Transfer Training PT LTG, Assist Device walker, rolling  -AW      Gait Training PT STG    Gait Training Goal PT STG, Date Established 02/17/17  -AW      Gait Training Goal PT STG, Time to Achieve 3 days  -AW      Gait Training Goal PT STG, [place] Level supervision required  -AW      Gait Training Goal PT STG, Assist Device walker, rolling  -AW      Gait Training Goal PT STG, Distance to Achieve 100  -AW      Gait Training PT LTG    Gait Training Goal PT LTG, Date Established 02/17/17  -AW      Gait Training Goal PT LTG, Time to Achieve by discharge  -AW      Gait Training Goal PT LTG, [place] Level conditional independence  -AW      Gait Training Goal PT LTG, Assist Device walker, rolling  -AW      Gait  Training Goal PT LTG, Distance to Achieve 150  -AW      Stair Training PT LTG    Stair Training Goal PT LTG, Date Established 02/17/17  -AW      Stair Training Goal PT LTG, Time to Achieve by discharge  -AW      Stair Training Goal PT LTG, Number of Steps 1  -AW      Stair Training Goal PT LTG, [place] Level contact guard assist  -AW        User Key  (r) = Recorded By, (t) = Taken By, (c) = Cosigned By    Initials Name Provider Type    MAXIMILIANO Barajas PT Physical Therapist                Outcome Measures       02/17/17 0855          How much help from another person do you currently need...    Turning from your back to your side while in flat bed without using bedrails? 4  -AW      Moving from lying on back to sitting on the side of a flat bed without bedrails? 3  -AW      Moving to and from a bed to a chair (including a wheelchair)? 3  -AW      Standing up from a chair using your arms (e.g., wheelchair, bedside chair)? 3  -AW      Climbing 3-5 steps with a railing? 3  -AW      To walk in hospital room? 3  -AW      AM-PAC 6 Clicks Score 19  -AW      Functional Assessment    Outcome Measure Options AM-PAC 6 Clicks Basic Mobility (PT)  -AW        User Key  (r) = Recorded By, (t) = Taken By, (c) = Cosigned By    Initials Name Provider Type    MAXIMILIANO Barajas PT Physical Therapist           Time Calculation:         PT Charges       02/17/17 1043          Time Calculation    Start Time 0855  -AW      Stop Time 0912  -AW      Time Calculation (min) 17 min  -AW      PT Received On 02/17/17  -AW      PT Goal Re-Cert Due Date 03/02/17  -AW        User Key  (r) = Recorded By, (t) = Taken By, (c) = Cosigned By    Initials Name Provider Type    MAXIMILIANO Barajas PT Physical Therapist          Therapy Charges for Today     Code Description Service Date Service Provider Modifiers Qty    61310418146 HC PT MOBILITY CURRENT 2/17/2017 Gail Barajas, PT GP, CJ 1    87733083195 HC PT MOBILITY PROJECTED 2/17/2017 Gail DOWNEY  Karl, PT GP, CI 1    [id number]  PT EVAL MOD COMPLEXITY 1 2/17/2017 Gail Barajas, PT GP 1          PT G-Codes  Outcome Measure Options: AM-PAC 6 Clicks Basic Mobility (PT)  Score: 19  Functional Limitation: Mobility: Walking and moving around  Mobility: Walking and Moving Around Current Status (): At least 20 percent but less than 40 percent impaired, limited or restricted  Mobility: Walking and Moving Around Goal Status (): At least 1 percent but less than 20 percent impaired, limited or restricted      Gail Barajas, PT  2/17/2017          Electronically signed by Gail Barajas, PT at 2/17/2017 10:45 AM           Occupational Therapy Notes (all)      Dayanna Cook OTR/L at 2/17/2017  1:24 PM  Version 1 of 1         Problem: Patient Care Overview (Adult)  Goal: Plan of Care Review  Outcome: Ongoing (interventions implemented as appropriate)    02/17/17 1311   Coping/Psychosocial Response Interventions   Plan Of Care Reviewed With patient   Outcome Evaluation   Outcome Summary/Follow up Plan Initial OT evaluation completed. Pt ambulated to bathroom, performed toilet transfer, & washed her hands at the sink with CGA. Post functional activity, her SpO2 was 82% on 3L. Pt would benefit from skilled OT services to increase ax tolerance for ability to safely perform ADLs & functional transfers. She may benefit from TCU/SNF placement or HH OT evaluation at discharge.        Goal: Discharge Needs Assessment  Outcome: Ongoing (interventions implemented as appropriate)    02/17/17 1311   Discharge Needs Assessment   Equipment Needed After Discharge none   Discharge Facility/Level Of Care Needs home with home health;nursing facility, skilled         Problem: Inpatient Occupational Therapy  Goal: Transfer Training Goal 1 LTG- OT  Outcome: Ongoing (interventions implemented as appropriate)    02/17/17 1311   Transfer Training OT LTG   Transfer Training OT LTG, Date Established 02/17/17   Transfer Training  OT LTG, Time to Achieve by discharge   Transfer Training OT LTG, Activity Type bed to chair /chair to bed;walk-in shower;sit to stand/stand to sit;toilet   Transfer Training OT LTG, [place] Level conditional independence       Goal: Dymanic Standing Balance Goal STG- OT  Outcome: Ongoing (interventions implemented as appropriate)    17 1311   Dynamic Standing Balance OT STG   Dynamic Standing Balance OT STG, Date Established 17   Dynamic Standing Balance OT STG, Time to Achieve 5 - 7 days   Dynamic Standing Balance OT STG, [place] Level supervision required   Dynamic Standing Balance OT STG, Assist Device assistive Device   Dynamic Standing Balance OT STG, Additional Goal 5 min       Goal: ADL Goal LTG- OT  Outcome: Ongoing (interventions implemented as appropriate)    17 1311   ADL OT LTG   ADL OT LTG, Date Established 17   ADL OT LTG, Time to Achieve by discharge   ADL OT LTG, Activity Type ADL skills   ADL OT LTG, [place] Level modified independent       Goal: Activity Tolerance Goal STG- OT  Outcome: Ongoing (interventions implemented as appropriate)    17 1311   Activity Tolerance OT STG   Activity Tolerance Goal OT STG, Date Established 17   Activity Tolerance Goal OT STG, Time to Achieve 5 - 7 days   Activity Tolerance Goal OT STG, Activity Level 10 min activity;O2 sat >/equal to 90%              Electronically signed by Dayanna Cook OTR/L at 2017  1:24 PM      Dayanna Cook OTR/L at 2017  1:26 PM  Version 1 of 1         Acute Care - Occupational Therapy Initial Evaluation  [facility]     Patient Name: Norma Harkins  : 1931  MRN: [id number]  Today's Date: 2017  Onset of Illness/Injury or Date of Surgery Date: 17  Date of Referral to OT: 17  Referring Physician: Dr. HERNÁN Maldonado    Admit Date: 2/15/2017       ICD-10-CM ICD-9-CM   1. Chronic obstructive pulmonary disease with acute exacerbation J44.1 491.21   2.  Acute bronchitis, unspecified organism J20.9 466.0   3. Shortness of breath R06.02 786.05   4. Troponin I above reference range R79.89 790.6   5. Hyperglycemia R73.9 790.29   6. Impaired mobility and endurance Z74.09 V49.89   7. Impaired mobility and ADLs Z74.09 799.89     Patient Active Problem List   Diagnosis   • Hypertension   • Chronic obstructive pulmonary disease with acute exacerbation   • Acute on chronic respiratory failure with hypoxia   • Low back pain   • Hyperlipidemia   • Paroxysmal atrial fibrillation     Past Medical History   Diagnosis Date   • Chronic obstructive lung disease 04/26/2016     on oxygen      • Chronic respiratory failure with hypoxia 04/26/2016   • Essential hypertension 09/08/2015   • Hyperlipidemia    • Obesity    • Paroxysmal atrial fibrillation 03/15/2016     Past Surgical History   Procedure Laterality Date   • Joint replacement       total knee    • Hysterectomy     • Cataract extraction            OT ASSESSMENT FLOWSHEET (last 72 hours)      OT Evaluation       02/17/17 1021 02/17/17 0855 02/15/17 2250          Rehab Evaluation    Document Type evaluation  -RW evaluation  -AW       Subjective Information agree to therapy  -RW no complaints;agree to therapy  -AW       Patient Effort, Rehab Treatment good  -RW excellent  -AW       Symptoms Noted During/After Treatment shortness of breath;significant change in vital signs  -RW        General Information    Patient Profile Review yes  -RW yes  -AW       Onset of Illness/Injury or Date of Surgery Date  02/16/17  -AW       Referring Physician Dr. HERNÁN Maldonado  -RW Dr CLOVER Maldonado  -AW       Precautions/Limitations fall precautions;oxygen therapy device and L/min  -RW        Prior Level of Function independent:;ADL's;transfer;gait;all household mobility   independent with laundry  -RW independent:;gait;transfer  -AW       Equipment Currently Used at Home other (see comments);bath bench;cane, straight;grab bar;raised toilet;oxygen;walker,  "standard   rollator  -RW oxygen   rollator  - oxygen;respiratory supplies  -      Plans/Goals Discussed With patient  -RW patient  -AW       Risks Reviewed patient:;LOB  -RW        Benefits Reviewed patient:;increase independence  -RW patient:;improve function  -AW       Barriers to Rehab none identified  -RW medically complex  -AW       Living Environment    Lives With alone  -RW alone  -AW alone  -      Living Arrangements house  -RW house  -AW house  -      Home Accessibility stairs to enter home;bed and bath on same level;tub/shower is not walk in;grab bars present (bathtub)  -RW stairs to enter home  -AW no concerns  -      Number of Stairs to Enter Home 1  -RW 1  -AW       Stair Railings at Home none  -RW none  -AW none  -      Type of Financial/Environmental Concern none  -RW  none  -      Transportation Available family or friend will provide  -RW  car  -      Clinical Impression    Date of Referral to OT 02/16/17  -RW        OT Diagnosis impaired mobility and ADLs  -RW        Impairments Found (describe specific impairments) aerobic capacity/endurance;gait, locomotion, and balance;muscle performance;ventilation and respiration/gas exchange   ADLs, functional mobility/transfers, ax tolerance, safety  -RW        Patient/Family Goals Statement \"be able to breathe while I do things\"  -RW        Criteria for Skilled Therapeutic Interventions Met yes;treatment indicated  -RW        Rehab Potential good, to achieve stated therapy goals  -RW        Therapy Frequency other (see comments)   3-14 times/wk  -RW        Predicted Duration of Therapy Intervention (days/wks) until discharge  -RW        Anticipated Discharge Disposition home with home health  -RW        Functional Level Prior    Ambulation 1-->assistive equipment  -RW  1-->assistive equipment  -      Transferring 1-->assistive equipment  -RW  1-->assistive equipment  -      Toileting 1-->assistive equipment  -  1-->assistive equipment  " -      Bathing 1-->assistive equipment  -RW  1-->assistive equipment  -      Dressing 0-->independent  -RW  0-->independent  -      Eating 0-->independent  -RW  0-->independent  -      Communication 0-->understands/communicates without difficulty  -RW  0-->understands/communicates without difficulty  -      Swallowing 0-->swallows foods/liquids without difficulty  -RW  0-->swallows foods/liquids without difficulty  -      Prior Functional Level Comment   N/A  -      Vital Signs    Pre Systolic BP Rehab 132  -RW        Pre Treatment Diastolic BP 64  -RW        Post Systolic BP Rehab 144  -RW        Post Treatment Diastolic BP 92  -RW        Pretreatment Heart Rate (beats/min) 79  -RW        Posttreatment Heart Rate (beats/min) 80  -RW        Pre SpO2 (%) 96  -RW 96  -AW       O2 Delivery Pre Treatment supplemental O2  -RW supplemental O2  -AW       Intra SpO2 (%) 82  -RW 86  -AW       O2 Delivery Intra Treatment supplemental O2  -RW supplemental O2  -AW       Post SpO2 (%) 94  -RW 95  -AW       O2 Delivery Post Treatment supplemental O2  -RW supplemental O2  -AW       Pre Patient Position Sitting  -RW        Intra Patient Position Sitting  -RW        Post Patient Position Sitting  -RW        Pain Assessment    Pain Assessment No/denies pain  -RW No/denies pain  -AW       Vision Assessment/Intervention    Visual Impairment WFL  -RW        Cognitive Assessment/Intervention    Current Cognitive/Communication Assessment functional  -RW functional  -AW       Orientation Status oriented x 4  -RW oriented x 4  -AW       Follows Commands/Answers Questions 100% of the time  -% of the time  -AW       Personal Safety decreased awareness, need for assist;decreased awareness, need for safety;mild impairment  -RW        Personal Safety Interventions gait belt;nonskid shoes/slippers when out of bed;supervised activity  -RW        ROM (Range of Motion)    General ROM no range of motion deficits identified  -RW  upper extremity range of motion deficits identified  -AW       General ROM Detail  --   shoulders limited ~90'  -AW       MMT (Manual Muscle Testing)    General MMT Assessment upper extremity strength deficits identified  -RW no strength deficits identified  -AW       General MMT Assessment Detail grossly 4-/5  -RW        Upper Extremity    Upper Ext Manual Muscle Testing left shoulder strength deficit;right shoulder strength deficit  -RW        Upper Ext Manual Muscle Testing Detail 3/5  -RW        Bed Mobility, Assessment/Treatment    Bed Mob, Supine to Sit, [place]  not tested  -AW       Bed Mob, Sit to Supine, [place]  not tested  -AW       Transfer Assessment/Treatment    Transfers, Sit-Stand [place] stand by assist  -RW contact guard assist  -AW       Transfers, Stand-Sit [place] stand by assist  -RW contact guard assist  -AW       Transfers, Sit-Stand-Sit, Assist Device  rolling walker  -AW       Toilet Transfer, [place] contact guard assist  -RW contact guard assist  -AW       Toilet Transfer, Assistive Device elevated toilet seat;rolling walker  -RW rolling walker  -AW       Transfer, Impairments impaired balance  -RW        Functional Mobility    Functional Mobility- Ind. Level contact guard assist  -RW        Functional Mobility- Device rolling walker  -RW        Functional Mobility-Distance (Feet) 25  -RW        Functional Mobility- Safety Issues supplemental O2;other (see comments)   ambulates with RW too far away  -RW        Toileting Assessment/Training    Toileting Assess/Train, Assistive Device grab bars;raised toilet seat  -RW        Toileting Assess/Train, Position sitting  -RW        Toileting Assess/Train, Indepen Level supervision required  -RW        Toileting Assess/Train, Impairments impaired balance  -RW        Toileting Assess/Train, Comment Pt managed gown & performed bladder hygiene.  -RW        Grooming Assessment/Training    Grooming Assess/Train, Position  standing;sink side  -RW        Grooming Assess/Train, Indepen Level contact guard assist  -RW        Grooming Assess/Train, Impairments strength decreased;impaired balance  -RW        Grooming Assess/Train, Comment Pt stood at sink to wash hands after using toilet.  -RW        Balance Skills Training    Standing-Level of Assistance Contact guard  -RW        Static Standing Balance Support Left upper extremity supported  -RW        Standing-Balance Activities Forward lean  -RW        Sensory Assessment/Intervention    Light Touch LUE;RUE  -RW LUE;RUE;LLE;RLE  -AW       LUE Light Touch WNL  -RW WNL  -AW       RUE Light Touch WNL  -RW WNL  -AW       LLE Light Touch  WNL  -AW       RLE Light Touch  WNL  -AW       Edema Management    Edema Amount none  -RW        General Therapy Interventions    Planned Therapy Interventions activity intolerance;adaptive equipment training;ADL retraining;balance training;energy conservation;home exercise program;strengthening;transfer training  -RW        Positioning and Restraints    Pre-Treatment Position sitting in chair/recliner  -RW sitting in chair/recliner  -AW       Post Treatment Position chair  -RW chair  -AW       In Chair reclined;call light within reach;encouraged to call for assist  -RW call light within reach  -AW         User Key  (r) = Recorded By, (t) = Taken By, (c) = Cosigned By    Initials Name Effective Dates    RW Dayanna Cook, OTR/L 10/17/16 -     AW Gail Barajas, PT 10/17/16 -      Ghulam Perales, ABDIFATAH 10/17/16 -            Occupational Therapy Education     Title: PT OT SLP Therapies (Active)     Topic: Occupational Therapy (Active)     Point: ADL training (Done)    Description: Instruct learner(s) on proper safety adaptation and remediation techniques during self care or transfers.   Instruct in proper use of assistive devices.    Learning Progress Summary    Learner Readiness Method Response Comment Documented by Status   Patient Acceptance E VU,NR  functional transfer safety, fall precautions  02/17/17 1156 Done               Point: Precautions (Done)    Description: Instruct learner(s) on prescribed precautions during self-care and functional transfers.    Learning Progress Summary    Learner Readiness Method Response Comment Documented by Status   Patient Acceptance E VU,NR functional transfer safety, fall precautions  02/17/17 1156 Done                      User Key     Initials Effective Dates Name Provider Type Discipline     10/17/16 -  Dayanna Cook, OTR/L Occupational Therapist OT                  OT Recommendation and Plan  Anticipated Discharge Disposition: home with home health  Planned Therapy Interventions: activity intolerance, adaptive equipment training, ADL retraining, balance training, energy conservation, home exercise program, strengthening, transfer training  Therapy Frequency: other (see comments) (3-14 times/wk)  Plan of Care Review  Plan Of Care Reviewed With: patient  Outcome Summary/Follow up Plan: Initial OT evaluation completed. Pt ambulated to bathroom, performed toilet transfer, & washed her hands at the sink with CGA. Post functional activity, her SpO2 was 82% on 3L. Pt would benefit from skilled OT services to increase ax tolerance for ability to safely perform ADLs & functional transfers. She may benefit from TCU/SNF placement or HH OT evaluation at discharge.           OT Goals       02/17/17 1311          Transfer Training OT LTG    Transfer Training OT LTG, Date Established 02/17/17  -RW      Transfer Training OT LTG, Time to Achieve by discharge  -RW      Transfer Training OT LTG, Activity Type bed to chair /chair to bed;walk-in shower;sit to stand/stand to sit;toilet  -RW      Transfer Training OT LTG, [place] Level conditional independence  -RW      Dynamic Standing Balance OT STG    Dynamic Standing Balance OT STG, Date Established 02/17/17  -RW      Dynamic Standing Balance OT STG, Time to Achieve 5 - 7  days  -RW      Dynamic Standing Balance OT STG, [place] Level supervision required  -RW      Dynamic Standing Balance OT STG, Assist Device assistive Device  -RW      Dynamic Standing Balance OT STG, Additional Goal 5 min  -RW      ADL OT LTG    ADL OT LTG, Date Established 02/17/17  -RW      ADL OT LTG, Time to Achieve by discharge  -RW      ADL OT LTG, Activity Type ADL skills  -RW      ADL OT LTG, [place] Level modified independent  -RW      Activity Tolerance OT STG    Activity Tolerance Goal OT STG, Date Established 02/17/17  -RW      Activity Tolerance Goal OT STG, Time to Achieve 5 - 7 days  -RW      Activity Tolerance Goal OT STG, Activity Level 10 min activity;O2 sat >/equal to 90%  -RW        User Key  (r) = Recorded By, (t) = Taken By, (c) = Cosigned By    Initials Name Provider Type    MONSERRAT Cook OTR/L Occupational Therapist                Outcome Measures       02/17/17 1021 02/17/17 0855       How much help from another person do you currently need...    Turning from your back to your side while in flat bed without using bedrails?  4  -AW     Moving from lying on back to sitting on the side of a flat bed without bedrails?  3  -AW     Moving to and from a bed to a chair (including a wheelchair)?  3  -AW     Standing up from a chair using your arms (e.g., wheelchair, bedside chair)?  3  -AW     Climbing 3-5 steps with a railing?  3  -AW     To walk in hospital room?  3  -AW     AM-PAC 6 Clicks Score  19  -AW     How much help from another is currently needed...    Putting on and taking off regular lower body clothing? 3  -RW      Bathing (including washing, rinsing, and drying) 3  -RW      Toileting (which includes using toilet bed pan or urinal) 3  -RW      Putting on and taking off regular upper body clothing 3  -RW      Taking care of personal grooming (such as brushing teeth) 3  -RW      Eating meals 4  -RW      Score 19  -RW      Functional Assessment    Outcome Measure Options  AM-PAC 6 Clicks Daily Activity (OT)  -RW AM-PAC 6 Clicks Basic Mobility (PT)  -AW       User Key  (r) = Recorded By, (t) = Taken By, (c) = Cosigned By    Initials Name Provider Type    RW Dayanna Cook, OTR/L Occupational Therapist    MAXIMILIANO Barajas, PT Physical Therapist          Time Calculation:   OT Start Time: 1021  OT Stop Time: 1101  OT Time Calculation (min): 40 min    Therapy Charges for Today     Code Description Service Date Service Provider Modifiers Qty    [id number]  OT SELFCARE CURRENT 2/17/2017 Dayanna CHERYL Wieling, OTR/L GO, CK 1    [id number] HC OT SELFCARE PROJECTED 2/17/2017 Dayanna E Wieling, OTR/L GO, CJ 1    [id number]  OT EVAL MOD COMPLEXITY 2 2/17/2017 Dayannaaminata Pastoreling, OTR/L GO 1    [id number]  OT SELF CARE/MGMT/TRAIN EA 15 MIN 2/17/2017 Dayanna Cook, OTR/L GO 1          OT G-codes  OT Professional Judgement Used?: Yes  OT Functional Scales Options: AM-PAC 6 Clicks Daily Activity (OT)  Score: 19  Functional Limitation: Self care  Self Care Current Status (): At least 40 percent but less than 60 percent impaired, limited or restricted  Self Care Goal Status (): At least 20 percent but less than 40 percent impaired, limited or restricted    Dayanna Cook, OTR/L  2/17/2017     Electronically signed by Dayanna Cook OTR/L at 2/17/2017  1:27 PM        Respiratory Therapy Notes (last 24 hours) (Notes from 2/16/2017  3:06 PM through 2/17/2017  3:06 PM)     No notes of this type exist for this encounter.

## 2017-02-17 NOTE — PLAN OF CARE
Problem: Patient Care Overview (Adult)  Goal: Plan of Care Review  Outcome: Ongoing (interventions implemented as appropriate)    02/17/17 1039   Coping/Psychosocial Response Interventions   Plan Of Care Reviewed With patient   Outcome Evaluation   Outcome Summary/Follow up Plan Pt presents with decreased endurance and safety . Pt needs PT to improve functional mobility and safety awareness.          Problem: Inpatient Physical Therapy  Goal: Transfer Training Goal 1 LTG- PT  Outcome: Ongoing (interventions implemented as appropriate)    02/17/17 1039   Transfer Training PT LTG   Transfer Training PT LTG, Date Established 02/17/17   Transfer Training PT LTG, Time to Achieve by discharge   Transfer Training PT LTG, Activity Type sit to stand/stand to sit   Transfer Training PT LTG, [place] Level conditional independence   Transfer Training PT LTG, Assist Device walker, rolling       Goal: Gait Training Goal STG- PT  Outcome: Ongoing (interventions implemented as appropriate)    02/17/17 1039   Gait Training PT STG   Gait Training Goal PT STG, Date Established 02/17/17   Gait Training Goal PT STG, Time to Achieve 3 days   Gait Training Goal PT STG, [place] Level supervision required   Gait Training Goal PT STG, Assist Device walker, rolling   Gait Training Goal PT STG, Distance to Achieve 100       Goal: Gait Training Goal LTG- PT  Outcome: Ongoing (interventions implemented as appropriate)    02/17/17 1039   Gait Training PT LTG   Gait Training Goal PT LTG, Date Established 02/17/17   Gait Training Goal PT LTG, Time to Achieve by discharge   Gait Training Goal PT LTG, [place] Level conditional independence   Gait Training Goal PT LTG, Assist Device walker, rolling   Gait Training Goal PT LTG, Distance to Achieve 150       Goal: Stair Training Goal LTG- PT  Outcome: Ongoing (interventions implemented as appropriate)    02/17/17 1039   Stair Training PT LTG   Stair Training Goal PT LTG, Date Established  02/17/17   Stair Training Goal PT LTG, Time to Achieve by discharge   Stair Training Goal PT LTG, Number of Steps 1   Stair Training Goal PT LTG, [place] Level contact guard assist

## 2017-02-17 NOTE — DISCHARGE PLACEMENT REQUEST
"Norma Harkins (86 y.o. Female)     Date of Birth Social Security Number Address Home Phone MRN    01/05/1931  [address] [facility] [id number] [phone] [id number]    [employer]al Marital Status          Latter day        Admission Date Admission Type Admitting Provider Attending Provider Department, Room/Bed    2/15/17 Emergency Alfredo Maldonado MD Patel, Harshul Amrutlal, MD [address], 414[address]1    Discharge Date Discharge Disposition Discharge Destination                      Attending Provider: Alfredo Maldonado MD     Allergies:  No Known Allergies    Isolation:  None   Infection:  None   Code Status:  FULL    Ht:  61\" (154.9 cm)   Wt:  186 lb (84.4 kg)    Admission Cmt:  None   Principal Problem:  None                Active Insurance as of 2/15/2017     Primary Coverage     Payor Plan Insurance Group Employer/Plan Group    MEDICARE MEDICARE A & B      Payor Plan Address Payor Plan Phone Number Effective From Effective To    [address] [phone] 1/1/1996     [place] [address]       Subscriber Name Subscriber Birth Date Member ID       NORMA HARKINS 1/5/1931 [id number]           Secondary Coverage     Payor Plan Insurance Group Employer/Plan Group    AARP MED SUPP AAR HEALTH CARE OPTIONS      Payor Plan Address Payor Plan Phone Number Effective From Effective To    [facility] [phone] 1/1/2016     [address]       [place] [address]       Subscriber Name Subscriber Birth Date Member ID       NORMA HARKINS 1/5/1931 [id number]                 Emergency Contacts      (Rel.) Home Phone Work Phone Mobile Phone    Dang Harry (Daughter) -- [phone]               History & Physical      Daren Wu MD at 2/15/2017  7:14 PM          History and Physical  Daren Wu MD  Hospitalist      Patient Care Team:  Krista Matos MD as PCP - General  Jenny Salguero as Care Coordinator (Population " Health)    Chief complaint   Chief Complaint   Patient presents with   • Shortness of Breath       Subjective     Patient is a 86 y.o. female admitted again with cough, congestion, wheezing. She did not do too well since her last discharge 10 days ago or so. She has tried again oral antibiotics / oral steroids but her symptoms have worsened once more.      History  Past Medical History   Diagnosis Date   • Chronic obstructive lung disease 04/26/2016     on oxygen      • Chronic respiratory failure with hypoxia 04/26/2016   • Essential hypertension 09/08/2015   • Hyperlipidemia    • Obesity    • Paroxysmal atrial fibrillation 03/15/2016     Past Surgical History   Procedure Laterality Date   • Joint replacement       total knee    • Hysterectomy     • Cataract extraction       Family History   Problem Relation Age of Onset   • Heart disease Other    • Hypertension Other    • Lung cancer Other      Social History   Substance Use Topics   • Smoking status: Former Smoker   • Smokeless tobacco: Never Used      Comment:  Patient quit smoking more than 30 years ago, she smoked 1pd for 30 years prior to that   • Alcohol use No       (Not in a hospital admission)  Allergies:  Review of patient's allergies indicates no known allergies.    Review of Systems  Review of Systems   Constitutional: Positive for appetite change and fatigue. Negative for chills and fever.   HENT: Positive for congestion.    Respiratory: Positive for cough, chest tightness, shortness of breath and wheezing.    Cardiovascular: Negative for chest pain, palpitations and leg swelling.   Gastrointestinal: Negative for abdominal distention, abdominal pain, constipation and diarrhea.   Genitourinary: Negative for dysuria and frequency.   Musculoskeletal: Positive for back pain.   Skin: Negative for pallor.   Neurological: Positive for weakness and light-headedness. Negative for syncope.   Psychiatric/Behavioral: Negative for agitation and behavioral  problems.   All other systems reviewed and are negative.      Objective     Vital Signs  Temp:  [98.1 °F (36.7 °C)] 98.1 °F (36.7 °C)  Heart Rate:  [88-97] 97  Resp:  [18-20] 18  BP: (132-137)/(81-87) 132/81    Physical Exam:  Physical Exam   Constitutional: She appears well-developed and well-nourished.   HENT:   Head: Normocephalic and atraumatic.   Eyes: EOM are normal.   Neck: Neck supple.   Cardiovascular: Normal rate and regular rhythm.    Pulmonary/Chest: She is in respiratory distress (mild to moderate). She has wheezes. She has rales.   Abdominal: Soft. Bowel sounds are normal.   Musculoskeletal: Normal range of motion. She exhibits no edema or deformity.   Neurological: She is alert.   Skin: Skin is warm and dry.   Psychiatric: She has a normal mood and affect.   Vitals reviewed.      Results Review:   Lab Results (last 24 hours)     Procedure Component Value Units Date/Time    [facility] Draw [[id number]] Collected:  02/15/17 1636    Specimen:  Blood Updated:  02/15/17 1701    Narrative:       The following orders were created for panel order [facility] Draw.  Procedure                               Abnormality         Status                     ---------                               -----------         ------                     Light Blue Top[[id number]]                                    In process                 Green Top (Gel)[[id number]]                                   In process                 Lavender Top[[id number]]                                      In process                 Gold Top - SST[[id number]]                                    In process                   Please view results for these tests on the individual orders.    Gold Top - SST [[id number]] Collected:  02/15/17 1636    Specimen:  Blood Updated:  02/15/17 1701    Light Blue Top [[id number]] Collected:  02/15/17 1636    Specimen:  Blood Updated:  02/15/17 1701    Lavender Top [[id number]] Collected:  02/15/17 1636    Specimen:  Blood Updated:   02/15/17 1701    Green Top (Gel) [[id number]] Collected:  02/15/17 1636    Specimen:  Blood Updated:  02/15/17 1701    CBC Auto Differential [[id number]]  (Abnormal) Collected:  02/15/17 1636    Specimen:  Blood Updated:  02/15/17 1713     WBC 10.35 (H) 10*3/mm3      RBC 3.62 (L) 10*6/mm3      Hemoglobin 11.9 (L) g/dL      Hematocrit 34.5 (L) %      MCV 95.3 fL      MCH 32.9 pg      MCHC 34.5 g/dL      RDW 13.3 %      RDW-SD 45.9 fl      MPV 11.1 fL      Platelets 129 (L) 10*3/mm3      Neutrophil % 90.0 (H) %      Lymphocyte % 3.8 (L) %      Monocyte % 5.5 %      Eosinophil % 0.1 %      Basophil % 0.1 %      Immature Grans % 0.5 %      Neutrophils, Absolute 9.32 (H) 10*3/mm3      Lymphocytes, Absolute 0.39 (L) 10*3/mm3      Monocytes, Absolute 0.57 10*3/mm3      Eosinophils, Absolute 0.01 10*3/mm3      Basophils, Absolute 0.01 10*3/mm3      Immature Grans, Absolute 0.05 (H) 10*3/mm3     Comprehensive Metabolic Panel [[id number]]  (Abnormal) Collected:  02/15/17 1636    Specimen:  Blood Updated:  02/15/17 1716     Glucose 171 (H) mg/dL      BUN 16 mg/dL      Creatinine 0.82 mg/dL      Sodium 132 (L) mmol/L      Potassium 3.5 mmol/L      Chloride 91 (L) mmol/L      CO2 32.0 (H) mmol/L      Calcium 8.9 mg/dL      Total Protein 6.5 g/dL      Albumin 3.50 g/dL      ALT (SGPT) 35 U/L      AST (SGOT) 26 U/L      Alkaline Phosphatase 43 U/L      Total Bilirubin 0.5 mg/dL      eGFR Non African Amer 66 mL/min/1.73      Globulin 3.0 gm/dL      A/G Ratio 1.2 g/dL      BUN/Creatinine Ratio 19.5      Anion Gap 9.0 mmol/L     Narrative:       The MDRD GFR formula is only valid for adults with stable renal function between ages 18 and 70.    BNP [[id number]]  (Normal) Collected:  02/15/17 1636    Specimen:  Blood Updated:  02/15/17 1731     proBNP 1040.0 pg/mL     Protime-INR [[id number]]  (Normal) Collected:  02/15/17 1636    Specimen:  Blood Updated:  02/15/17 1733     Protime 13.6 Seconds      INR 1.04     Narrative:       Therapeutic  range for most indications is 2.0-3.0 INR,  or 2.5-3.5 for mechanical heart valves.    Troponin [[id number]]  (Abnormal) Collected:  02/15/17 1636    Specimen:  Blood Updated:  02/15/17 1735     Troponin I 0.035 (H) ng/mL     CBC & Differential [[id number]] Collected:  02/15/17 1636    Specimen:  Blood Updated:  02/15/17 1807    Narrative:       The following orders were created for panel order CBC & Differential.  Procedure                               Abnormality         Status                     ---------                               -----------         ------                     Scan Slide[[id number]]                                        Final result               CBC Auto Differential[[id number]]         Abnormal            Final result                 Please view results for these tests on the individual orders.    Scan Slide [[id number]] Collected:  02/15/17 1636    Specimen:  Blood Updated:  02/15/17 1807     RBC Morphology Normal      WBC Morphology Normal      Platelet Estimate Adequate           Imaging Results (last 24 hours)     Procedure Component Value Units Date/Time    XR Chest 2 View [[id number]] Collected:  02/15/17 1654     Updated:  02/15/17 1657    Narrative:       Patient Name:  ANN MARIE BOWERS COOMESPatient ID:  [id number] Ordering:   JAZMINE MILLERAttending:  JAZMINE MILLERReferring:  JAZMINE MILLER------------------------------------------------Shortness  of breath.    Chest, AP and lateral views.    Comparison is made with study dated to February 3, 2017.    There is the upper limits of normal in size. There are mild  bilateral interstitial markings. No pleural effusion is  identified. No acute bony abnormality is seen. There are  degenerative changes of the right shoulder. Degenerative changes  of the thoracic spine. There is osteopenia.      Impression:       CONCLUSION: Mild bilateral interstitial markings.    Electronically signed by:  Frank Hill MD  2/15/2017 4:56  PM CST Workstation:  [facility]          Assessment/Plan     Active Problems:    Acute on chronic respiratory failure with hypoxia    Chronic obstructive pulmonary disease with acute exacerbation    Resume nebulized treatments, IV steroids, inhaled steroids.    Daren Wu MD  02/15/17  7:14 PM       Electronically signed by Daren Wu MD at 2/15/2017 11:30 PM        Hospital Medications (active)       Dose Frequency Start End    albuterol (PROVENTIL) nebulizer solution 0.083% 2.5 mg/3mL 2.5 mg Every 6 Hours - RT 2/16/2017     Sig - Route: Take 2.5 mg by nebulization Every 6 (Six) Hours. - Nebulization    amiodarone (PACERONE) tablet 200 mg 200 mg Daily 2/16/2017     Sig - Route: Take 1 tablet by mouth Daily. - Oral    Non-formulary Exception Code: Patient supplied medication    apixaban (ELIQUIS) tablet 5 mg 5 mg Every 12 Hours Scheduled 2/16/2017     Sig - Route: Take 1 tablet by mouth Every 12 (Twelve) Hours. - Oral    Non-formulary Exception Code: Patient supplied medication    budesonide-formoterol (SYMBICORT) 160-4.5 MCG/ACT inhaler 2 puff 2 puff 2 Times Daily - RT 2/15/2017     Sig - Route: Inhale 2 puffs 2 (Two) Times a Day. - Inhalation    diltiaZEM CD (CARDIZEM CD) 24 hr capsule 120 mg 120 mg Daily 2/16/2017     Sig - Route: Take 1 capsule by mouth Daily. - Oral    Non-formulary Exception Code: Patient supplied medication    diphenhydrAMINE (BENADRYL) capsule 25 mg 25 mg Every 6 Hours PRN 2/16/2017     Sig - Route: Take 1 capsule by mouth Every 6 (Six) Hours As Needed for itching. - Oral    diphenhydrAMINE-zinc acetate 2-0.1 % cream  3 Times Daily PRN 2/16/2017     Sig - Route: Apply  topically 3 (Three) Times a Day As Needed for itching or rash. - Topical    docusate sodium (COLACE) capsule 300 mg 300 mg Daily 2/15/2017     Sig - Route: Take 3 capsules by mouth Daily. - Oral    furosemide (LASIX) tablet 40 mg 40 mg Daily 2/16/2017     Sig - Route: Take 1 tablet by mouth Daily. - Oral    Non-formulary Exception Code:  Patient supplied medication    HYDROcodone-acetaminophen (NORCO) 5-325 MG per tablet 1 tablet 1 tablet Every 6 Hours PRN 2/16/2017 2/26/2017    Sig - Route: Take 1 tablet by mouth Every 6 (Six) Hours As Needed for moderate pain (4-6). - Oral    Non-formulary Exception Code: Patient supplied medication    methylPREDNISolone sodium succinate (SOLU-Medrol) injection 40 mg 40 mg Every 6 Hours 2/16/2017     Sig - Route: Infuse 1 mL into a venous catheter Every 6 (Six) Hours. - Intravenous    potassium chloride (K-DUR,KLOR-CON) CR tablet 20 mEq 20 mEq 2 Times Daily 2/16/2017     Sig - Route: Take 1 tablet by mouth 2 (Two) Times a Day. - Oral    Non-formulary Exception Code: Patient supplied medication    potassium chloride (K-DUR,KLOR-CON) CR tablet 20 mEq 20 mEq Once 2/16/2017 2/16/2017    Sig - Route: Take 1 tablet by mouth 1 (One) Time. - Oral    sodium chloride 0.9 % flush 1-10 mL 1-10 mL As Needed 2/15/2017     Sig - Route: Infuse 1-10 mL into a venous catheter As Needed for line care. - Intravenous    sodium chloride 0.9 % flush 10 mL 10 mL As Needed 2/15/2017     Sig - Route: Infuse 10 mL into a venous catheter As Needed for line care. - Intravenous    methylPREDNISolone sodium succinate (SOLU-Medrol) injection 60 mg (Discontinued) 60 mg Every 6 Hours 2/16/2017 2/16/2017    Sig - Route: Infuse 0.96 mL into a venous catheter Every 6 (Six) Hours. - Intravenous    potassium chloride (K-DUR,KLOR-CON) CR tablet 10 mEq (Discontinued) 10 mEq 2 Times Daily 2/16/2017 2/16/2017    Sig - Route: Take 1 tablet by mouth 2 (Two) Times a Day. - Oral    Non-formulary Exception Code: Patient supplied medication             Physician Progress Notes (last 72 hours) (Notes from 2/14/2017  9:32 AM through 2/17/2017  9:32 AM)      Aneudy Maldonado MD at 2/16/2017  2:57 PM  Version 1 of 1         hospitalist  Active Problems:    Chronic obstructive pulmonary disease with acute exacerbation    Acute on chronic respiratory failure with  hypoxia        Subjective: Doing better today.  Decrease shortness of breath.  No chest pain.  Mild dry cough.  No other new complaints.  Chart reviewed.  No family members are present.        Review of Systems:    Review of Systems   Constitutional: Negative for chills and fever. other systems reviewed and are negative.      Objective     Vital Signs:  Temp:  [97.8 °F (36.6 °C)-98.6 °F (37 °C)] 97.8 °F (36.6 °C)  Heart Rate:  [] 76  Resp:  [18-20] 18  BP: (122-166)/(70-87) 166/80    Physical Exam:   Physical Exam   Constitutional: She is oriented to person, place, and time. She appears well-developed and well-nourished. No distress.   HENT:   Nose: Nose normal.   Mouth/Throat: Oropharynx is clear and moist. No oropharyngeal exudate.   Eyes: Conjunctivae and EOM are normal. No scleral icterus.   Neck: Normal range of motion. Neck supple. No JVD present. No thyromegaly present.   Cardiovascular: Normal rate, regular rhythm and normal heart sounds.    Pulmonary/Chest: Tachypnea noted. No respiratory distress. She has no wheezes. She has rales.   Abdominal: Soft. She exhibits no distension. There is no tenderness.   Musculoskeletal: Normal range of motion. She exhibits no edema.   Neurological: She is alert and oriented to person, place, and time. No cranial nerve deficit.   Skin: Skin is warm and dry. No rash noted. She is not diaphoretic. No erythema. No pallor.   Psychiatric: She has a normal mood and affect.          Results Review:       Lab Results (last 24 hours)     Procedure Component Value Units Date/Time    CBC Auto Differential [[id number]]  (Abnormal) Collected:  02/15/17 1636    Specimen:  Blood Updated:  02/15/17 1713     WBC 10.35 (H) 10*3/mm3      RBC 3.62 (L) 10*6/mm3      Hemoglobin 11.9 (L) g/dL      Hematocrit 34.5 (L) %      MCV 95.3 fL      MCH 32.9 pg      MCHC 34.5 g/dL      RDW 13.3 %      RDW-SD 45.9 fl      MPV 11.1 fL      Platelets 129 (L) 10*3/mm3      Neutrophil % 90.0 (H) %       Lymphocyte % 3.8 (L) %      Monocyte % 5.5 %      Eosinophil % 0.1 %      Basophil % 0.1 %      Immature Grans % 0.5 %      Neutrophils, Absolute 9.32 (H) 10*3/mm3      Lymphocytes, Absolute 0.39 (L) 10*3/mm3      Monocytes, Absolute 0.57 10*3/mm3      Eosinophils, Absolute 0.01 10*3/mm3      Basophils, Absolute 0.01 10*3/mm3      Immature Grans, Absolute 0.05 (H) 10*3/mm3     Comprehensive Metabolic Panel [[id number]]  (Abnormal) Collected:  02/15/17 1636    Specimen:  Blood Updated:  02/15/17 1716     Glucose 171 (H) mg/dL      BUN 16 mg/dL      Creatinine 0.82 mg/dL      Sodium 132 (L) mmol/L      Potassium 3.5 mmol/L      Chloride 91 (L) mmol/L      CO2 32.0 (H) mmol/L      Calcium 8.9 mg/dL      Total Protein 6.5 g/dL      Albumin 3.50 g/dL      ALT (SGPT) 35 U/L      AST (SGOT) 26 U/L      Alkaline Phosphatase 43 U/L      Total Bilirubin 0.5 mg/dL      eGFR Non African Amer 66 mL/min/1.73      Globulin 3.0 gm/dL      A/G Ratio 1.2 g/dL      BUN/Creatinine Ratio 19.5      Anion Gap 9.0 mmol/L     Narrative:       The MDRD GFR formula is only valid for adults with stable renal function between ages 18 and 70.    BNP [[id number]]  (Normal) Collected:  02/15/17 1636    Specimen:  Blood Updated:  02/15/17 1731     proBNP 1040.0 pg/mL     Protime-INR [[id number]]  (Normal) Collected:  02/15/17 1636    Specimen:  Blood Updated:  02/15/17 1733     Protime 13.6 Seconds      INR 1.04     Narrative:       Therapeutic range for most indications is 2.0-3.0 INR,  or 2.5-3.5 for mechanical heart valves.    Troponin [[id number]]  (Abnormal) Collected:  02/15/17 1636    Specimen:  Blood Updated:  02/15/17 1735     Troponin I 0.035 (H) ng/mL     CBC & Differential [[id number]] Collected:  02/15/17 1636    Specimen:  Blood Updated:  02/15/17 1807    Narrative:       The following orders were created for panel order CBC & Differential.  Procedure                               Abnormality         Status                     ---------                                -----------         ------                     Scan Slide[[id number]]                                        Final result               CBC Auto Differential[[id number]]         Abnormal            Final result                 Please view results for these tests on the individual orders.    Scan Slide [[id number]] Collected:  02/15/17 1636    Specimen:  Blood Updated:  02/15/17 1807     RBC Morphology Normal      WBC Morphology Normal      Platelet Estimate Adequate     [facility] Draw [[id number]] Collected:  02/15/17 1636    Specimen:  Blood Updated:  02/15/17 2101    Narrative:       The following orders were created for panel order [facility] Draw.  Procedure                               Abnormality         Status                     ---------                               -----------         ------                     Light Blue Top[[id number]]                                    Final result               Green Top (Gel)[[id number]]                                   Final result               Lavender Top[[id number]]                                      Final result               Gold Top - SST[[id number]]                                    Final result                 Please view results for these tests on the individual orders.    Light Blue Top [[id number]] Collected:  02/15/17 1636    Specimen:  Blood Updated:  02/15/17 2101     Extra Tube hold for add-on       Auto resulted       Green Top (Gel) [[id number]] Collected:  02/15/17 1636    Specimen:  Blood Updated:  02/15/17 2101     Extra Tube Hold for add-ons.       Auto resulted.       Lavender Top [[id number]] Collected:  02/15/17 1636    Specimen:  Blood Updated:  02/15/17 2101     Extra Tube hold for add-on       Auto resulted       Gold Top - SST [[id number]] Collected:  02/15/17 1636    Specimen:  Blood Updated:  02/15/17 2101     Extra Tube Hold for add-ons.       Auto resulted.           Imaging Results (last 24 hours)     Procedure Component Value Units  Date/Time    XR Chest 2 View [[id number]] Collected:  02/15/17 1654     Updated:  02/15/17 1657    Narrative:       Patient Name:  ANN MARIE Mejía ID:  [id number] Ordering:   JAZMINE Genaoending:  JAZMINE MILLERReferring:  JAZMINE MILLER------------------------------------------------Shortness  of breath.    Chest, AP and lateral views.    Comparison is made with study dated to February 3, 2017.    There is the upper limits of normal in size. There are mild  bilateral interstitial markings. No pleural effusion is  identified. No acute bony abnormality is seen. There are  degenerative changes of the right shoulder. Degenerative changes  of the thoracic spine. There is osteopenia.      Impression:       CONCLUSION: Mild bilateral interstitial markings.    Electronically signed by:  Frank Hill MD  2/15/2017 4:56  PM CST Workstation: [id number]        EKG: No new changes    Medication Review: medications have been reviewed    Assessment/Plan:    1.  Acute on chronic respiratory failure, hypoxic in nature.  Home oxygen and nebulizer treatment dependent  -Started on IV steroids, nebulizer treatment, oxygen.  Start tapering steroids.  2. COPD with acute exacerbation  -Treatment as above  3.  History of cardiac arrhythmias  -Resume home medicines since  4.  Hypokalemia  -Replace potassium    DVT prophylaxis: Lovenox  Length of stay: More than 2 midnights  Anticipated disposition: Home        Aneudy Maldonado MD  02/16/17  2:57 PM             Electronically signed by Aneudy Maldonado MD at 2/16/2017  3:01 PM

## 2017-02-17 NOTE — PLAN OF CARE
Problem: Patient Care Overview (Adult)  Goal: Plan of Care Review  Outcome: Ongoing (interventions implemented as appropriate)    02/17/17 0543   Coping/Psychosocial Response Interventions   Plan Of Care Reviewed With patient   Patient Care Overview   Progress improving   Outcome Evaluation   Outcome Summary/Follow up Plan Pt SOA during shift twice, usually after a coughing spell. Pt slept well in chair. Pt demonstrates controlled breathing when SOA. Pt skin rash improved after PO benadryl.       Goal: Adult Individualization and Mutuality  Outcome: Ongoing (interventions implemented as appropriate)    Problem: Respiratory Insufficiency (Adult)  Goal: Identify Related Risk Factors and Signs and Symptoms  Outcome: Ongoing (interventions implemented as appropriate)  Goal: Acid/Base Balance  Outcome: Ongoing (interventions implemented as appropriate)  Goal: Effective Ventilation  Outcome: Ongoing (interventions implemented as appropriate)    Problem: Fall Risk (Adult)  Goal: Absence of Falls  Outcome: Ongoing (interventions implemented as appropriate)

## 2017-02-17 NOTE — PROGRESS NOTES
Acute Care - Occupational Therapy Initial Evaluation  [facility]     Patient Name: Norma Harkins  : 1931  MRN: [id number]  Today's Date: 2017  Onset of Illness/Injury or Date of Surgery Date: 17  Date of Referral to OT: 17  Referring Physician: Dr. HERNÁN Maldonado    Admit Date: 2/15/2017       ICD-10-CM ICD-9-CM   1. Chronic obstructive pulmonary disease with acute exacerbation J44.1 491.21   2. Acute bronchitis, unspecified organism J20.9 466.0   3. Shortness of breath R06.02 786.05   4. Troponin I above reference range R79.89 790.6   5. Hyperglycemia R73.9 790.29   6. Impaired mobility and endurance Z74.09 V49.89   7. Impaired mobility and ADLs Z74.09 799.89     Patient Active Problem List   Diagnosis   • Hypertension   • Chronic obstructive pulmonary disease with acute exacerbation   • Acute on chronic respiratory failure with hypoxia   • Low back pain   • Hyperlipidemia   • Paroxysmal atrial fibrillation     Past Medical History   Diagnosis Date   • Chronic obstructive lung disease 2016     on oxygen      • Chronic respiratory failure with hypoxia 2016   • Essential hypertension 2015   • Hyperlipidemia    • Obesity    • Paroxysmal atrial fibrillation 03/15/2016     Past Surgical History   Procedure Laterality Date   • Joint replacement       total knee    • Hysterectomy     • Cataract extraction            OT ASSESSMENT FLOWSHEET (last 72 hours)      OT Evaluation       17 1021 17 0855 02/15/17 2250          Rehab Evaluation    Document Type evaluation  -RW evaluation  -AW       Subjective Information agree to therapy  -RW no complaints;agree to therapy  -AW       Patient Effort, Rehab Treatment good  -RW excellent  -AW       Symptoms Noted During/After Treatment shortness of breath;significant change in vital signs  -RW        General Information    Patient Profile Review yes  -RW yes  -AW       Onset of Illness/Injury or Date of Surgery Date  17  -AW   "     Referring Physician Dr. HERNÁN Maldonado  -RW Dr CLOVER Maldonado  -AW       Precautions/Limitations fall precautions;oxygen therapy device and L/min  -RW        Prior Level of Function independent:;ADL's;transfer;gait;all household mobility   independent with laundry  -RW independent:;gait;transfer  -AW       Equipment Currently Used at Home other (see comments);bath bench;cane, straight;grab bar;raised toilet;oxygen;walker, standard   rollator  -RW oxygen   rollator  -AW oxygen;respiratory supplies  -      Plans/Goals Discussed With patient  -RW patient  -AW       Risks Reviewed patient:;LOB  -RW        Benefits Reviewed patient:;increase independence  -RW patient:;improve function  -AW       Barriers to Rehab none identified  -RW medically complex  -AW       Living Environment    Lives With alone  -RW alone  -AW alone  -      Living Arrangements house  -RW house  -AW house  -      Home Accessibility stairs to enter home;bed and bath on same level;tub/shower is not walk in;grab bars present (bathtub)  -RW stairs to enter home  -AW no concerns  -      Number of Stairs to Enter Home 1  -RW 1  -AW       Stair Railings at Home none  -RW none  -AW none  -      Type of Financial/Environmental Concern none  -RW  none  -      Transportation Available family or friend will provide  -RW  car  -      Clinical Impression    Date of Referral to OT 02/16/17  -RW        OT Diagnosis impaired mobility and ADLs  -RW        Impairments Found (describe specific impairments) aerobic capacity/endurance;gait, locomotion, and balance;muscle performance;ventilation and respiration/gas exchange   ADLs, functional mobility/transfers, ax tolerance, safety  -RW        Patient/Family Goals Statement \"be able to breathe while I do things\"  -RW        Criteria for Skilled Therapeutic Interventions Met yes;treatment indicated  -RW        Rehab Potential good, to achieve stated therapy goals  -RW        Therapy Frequency other (see comments) "   3-14 times/wk  -RW        Predicted Duration of Therapy Intervention (days/wks) until discharge  -RW        Anticipated Discharge Disposition home with home health  -RW        Functional Level Prior    Ambulation 1-->assistive equipment  -RW  1-->assistive equipment  -      Transferring 1-->assistive equipment  -RW  1-->assistive equipment  -      Toileting 1-->assistive equipment  -RW  1-->assistive equipment  -      Bathing 1-->assistive equipment  -RW  1-->assistive equipment  -      Dressing 0-->independent  -RW  0-->independent  -MH      Eating 0-->independent  -RW  0-->independent  -      Communication 0-->understands/communicates without difficulty  -RW  0-->understands/communicates without difficulty  -      Swallowing 0-->swallows foods/liquids without difficulty  -RW  0-->swallows foods/liquids without difficulty  -MH      Prior Functional Level Comment   N/A  -MH      Vital Signs    Pre Systolic BP Rehab 132  -RW        Pre Treatment Diastolic BP 64  -RW        Post Systolic BP Rehab 144  -RW        Post Treatment Diastolic BP 92  -RW        Pretreatment Heart Rate (beats/min) 79  -RW        Posttreatment Heart Rate (beats/min) 80  -RW        Pre SpO2 (%) 96  -RW 96  -AW       O2 Delivery Pre Treatment supplemental O2  -RW supplemental O2  -AW       Intra SpO2 (%) 82  -RW 86  -AW       O2 Delivery Intra Treatment supplemental O2  -RW supplemental O2  -AW       Post SpO2 (%) 94  -RW 95  -AW       O2 Delivery Post Treatment supplemental O2  -RW supplemental O2  -AW       Pre Patient Position Sitting  -RW        Intra Patient Position Sitting  -RW        Post Patient Position Sitting  -RW        Pain Assessment    Pain Assessment No/denies pain  -RW No/denies pain  -AW       Vision Assessment/Intervention    Visual Impairment WFL  -RW        Cognitive Assessment/Intervention    Current Cognitive/Communication Assessment functional  -RW functional  -AW       Orientation Status oriented x 4  -RW  oriented x 4  -AW       Follows Commands/Answers Questions 100% of the time  -% of the time  -AW       Personal Safety decreased awareness, need for assist;decreased awareness, need for safety;mild impairment  -RW        Personal Safety Interventions gait belt;nonskid shoes/slippers when out of bed;supervised activity  -RW        ROM (Range of Motion)    General ROM no range of motion deficits identified  -RW upper extremity range of motion deficits identified  -AW       General ROM Detail  --   shoulders limited ~90'  -AW       MMT (Manual Muscle Testing)    General MMT Assessment upper extremity strength deficits identified  -RW no strength deficits identified  -AW       General MMT Assessment Detail grossly 4-/5  -RW        Upper Extremity    Upper Ext Manual Muscle Testing left shoulder strength deficit;right shoulder strength deficit  -RW        Upper Ext Manual Muscle Testing Detail 3/5  -RW        Bed Mobility, Assessment/Treatment    Bed Mob, Supine to Sit, [place]  not tested  -AW       Bed Mob, Sit to Supine, [place]  not tested  -AW       Transfer Assessment/Treatment    Transfers, Sit-Stand [place] stand by assist  -RW contact guard assist  -AW       Transfers, Stand-Sit [place] stand by assist  -RW contact guard assist  -AW       Transfers, Sit-Stand-Sit, Assist Device  rolling walker  -AW       Toilet Transfer, [place] contact guard assist  -RW contact guard assist  -AW       Toilet Transfer, Assistive Device elevated toilet seat;rolling walker  -RW rolling walker  -AW       Transfer, Impairments impaired balance  -RW        Functional Mobility    Functional Mobility- Ind. Level contact guard assist  -RW        Functional Mobility- Device rolling walker  -RW        Functional Mobility-Distance (Feet) 25  -RW        Functional Mobility- Safety Issues supplemental O2;other (see comments)   ambulates with RW too far away  -RW        Toileting Assessment/Training    Toileting  Assess/Train, Assistive Device grab bars;raised toilet seat  -RW        Toileting Assess/Train, Position sitting  -RW        Toileting Assess/Train, Indepen Level supervision required  -RW        Toileting Assess/Train, Impairments impaired balance  -RW        Toileting Assess/Train, Comment Pt managed gown & performed bladder hygiene.  -RW        Grooming Assessment/Training    Grooming Assess/Train, Position standing;sink side  -RW        Grooming Assess/Train, Indepen Level contact guard assist  -RW        Grooming Assess/Train, Impairments strength decreased;impaired balance  -RW        Grooming Assess/Train, Comment Pt stood at sink to wash hands after using toilet.  -RW        Balance Skills Training    Standing-Level of Assistance Contact guard  -RW        Static Standing Balance Support Left upper extremity supported  -RW        Standing-Balance Activities Forward lean  -RW        Sensory Assessment/Intervention    Light Touch LUE;RUE  -RW LUE;RUE;LLE;RLE  -AW       LUE Light Touch WNL  -RW WNL  -AW       RUE Light Touch WNL  -RW WNL  -AW       LLE Light Touch  WNL  -AW       RLE Light Touch  WNL  -AW       Edema Management    Edema Amount none  -RW        General Therapy Interventions    Planned Therapy Interventions activity intolerance;adaptive equipment training;ADL retraining;balance training;energy conservation;home exercise program;strengthening;transfer training  -RW        Positioning and Restraints    Pre-Treatment Position sitting in chair/recliner  -RW sitting in chair/recliner  -AW       Post Treatment Position chair  -RW chair  -AW       In Chair reclined;call light within reach;encouraged to call for assist  -RW call light within reach  -AW         User Key  (r) = Recorded By, (t) = Taken By, (c) = Cosigned By    Initials Name Effective Dates    RW Dayanna Cook, OTR/L 10/17/16 -     AW Gail Barajas, PT 10/17/16 -      Ghulam Perales, RN 10/17/16 -            Occupational Therapy  Education     Title: PT OT SLP Therapies (Active)     Topic: Occupational Therapy (Active)     Point: ADL training (Done)    Description: Instruct learner(s) on proper safety adaptation and remediation techniques during self care or transfers.   Instruct in proper use of assistive devices.    Learning Progress Summary    Learner Readiness Method Response Comment Documented by Status   Patient Acceptance E VU,NR functional transfer safety, fall precautions RW 02/17/17 1156 Done               Point: Precautions (Done)    Description: Instruct learner(s) on prescribed precautions during self-care and functional transfers.    Learning Progress Summary    Learner Readiness Method Response Comment Documented by Status   Patient Acceptance E VU,NR functional transfer safety, fall precautions RW 02/17/17 1156 Done                      User Key     Initials Effective Dates Name Provider Type Discipline     10/17/16 -  Dayanna Cook, OTR/L Occupational Therapist OT                  OT Recommendation and Plan  Anticipated Discharge Disposition: home with home health  Planned Therapy Interventions: activity intolerance, adaptive equipment training, ADL retraining, balance training, energy conservation, home exercise program, strengthening, transfer training  Therapy Frequency: other (see comments) (3-14 times/wk)  Plan of Care Review  Plan Of Care Reviewed With: patient  Outcome Summary/Follow up Plan: Initial OT evaluation completed. Pt ambulated to bathroom, performed toilet transfer, & washed her hands at the sink with CGA. Post functional activity, her SpO2 was 82% on 3L. Pt would benefit from skilled OT services to increase ax tolerance for ability to safely perform ADLs & functional transfers. She may benefit from TCU/SNF placement or  OT evaluation at discharge.           OT Goals       02/17/17 1311          Transfer Training OT LTG    Transfer Training OT LTG, Date Established 02/17/17  -      Transfer  Training OT LTG, Time to Achieve by discharge  -RW      Transfer Training OT LTG, Activity Type bed to chair /chair to bed;walk-in shower;sit to stand/stand to sit;toilet  -RW      Transfer Training OT LTG, [place] Level conditional independence  -RW      Dynamic Standing Balance OT STG    Dynamic Standing Balance OT STG, Date Established 02/17/17  -RW      Dynamic Standing Balance OT STG, Time to Achieve 5 - 7 days  -RW      Dynamic Standing Balance OT STG, [place] Level supervision required  -RW      Dynamic Standing Balance OT STG, Assist Device assistive Device  -RW      Dynamic Standing Balance OT STG, Additional Goal 5 min  -RW      ADL OT LTG    ADL OT LTG, Date Established 02/17/17  -RW      ADL OT LTG, Time to Achieve by discharge  -RW      ADL OT LTG, Activity Type ADL skills  -RW      ADL OT LTG, [place] Level modified independent  -RW      Activity Tolerance OT STG    Activity Tolerance Goal OT STG, Date Established 02/17/17  -RW      Activity Tolerance Goal OT STG, Time to Achieve 5 - 7 days  -RW      Activity Tolerance Goal OT STG, Activity Level 10 min activity;O2 sat >/equal to 90%  -RW        User Key  (r) = Recorded By, (t) = Taken By, (c) = Cosigned By    Initials Name Provider Type    RW Dayanna Cook OTR/L Occupational Therapist                Outcome Measures       02/17/17 1021 02/17/17 0855       How much help from another person do you currently need...    Turning from your back to your side while in flat bed without using bedrails?  4  -AW     Moving from lying on back to sitting on the side of a flat bed without bedrails?  3  -AW     Moving to and from a bed to a chair (including a wheelchair)?  3  -AW     Standing up from a chair using your arms (e.g., wheelchair, bedside chair)?  3  -AW     Climbing 3-5 steps with a railing?  3  -AW     To walk in hospital room?  3  -AW     AM-PAC 6 Clicks Score  19  -AW     How much help from another is currently needed...     Putting on and taking off regular lower body clothing? 3  -RW      Bathing (including washing, rinsing, and drying) 3  -RW      Toileting (which includes using toilet bed pan or urinal) 3  -RW      Putting on and taking off regular upper body clothing 3  -RW      Taking care of personal grooming (such as brushing teeth) 3  -RW      Eating meals 4  -RW      Score 19  -RW      Functional Assessment    Outcome Measure Options AM-PAC 6 Clicks Daily Activity (OT)  -RW AM-PAC 6 Clicks Basic Mobility (PT)  -AW       User Key  (r) = Recorded By, (t) = Taken By, (c) = Cosigned By    Initials Name Provider Type    RW Dayannaaminata Pastoreling, OTR/L Occupational Therapist    AW Gail Barajas, PT Physical Therapist          Time Calculation:   OT Start Time: 1021  OT Stop Time: 1101  OT Time Calculation (min): 40 min    Therapy Charges for Today     Code Description Service Date Service Provider Modifiers Qty    87996207999  OT SELFCARE CURRENT 2/17/2017 Dayanna E Wieling, OTR/L GO, CK 1    [id number]  OT SELFCARE PROJECTED 2/17/2017 Dayanna E Wieling, OTR/L GO, CJ 1    [id number]  OT EVAL MOD COMPLEXITY 2 2/17/2017 Dayanna E Wieling, OTR/L GO 1    [id number]  OT SELF CARE/MGMT/TRAIN EA 15 MIN 2/17/2017 Dayanna E Wieling, OTR/L GO 1          OT G-codes  OT Professional Judgement Used?: Yes  OT Functional Scales Options: AM-PAC 6 Clicks Daily Activity (OT)  Score: 19  Functional Limitation: Self care  Self Care Current Status (): At least 40 percent but less than 60 percent impaired, limited or restricted  Self Care Goal Status (): At least 20 percent but less than 40 percent impaired, limited or restricted    Dayanna Cook, OTR/L  2/17/2017

## 2017-02-17 NOTE — PLAN OF CARE
Problem: Patient Care Overview (Adult)  Goal: Plan of Care Review  Outcome: Ongoing (interventions implemented as appropriate)    02/17/17 1311   Coping/Psychosocial Response Interventions   Plan Of Care Reviewed With patient   Outcome Evaluation   Outcome Summary/Follow up Plan Initial OT evaluation completed. Pt ambulated to bathroom, performed toilet transfer, & washed her hands at the sink with CGA. Post functional activity, her SpO2 was 82% on 3L. Pt would benefit from skilled OT services to increase ax tolerance for ability to safely perform ADLs & functional transfers. She may benefit from TCU/SNF placement or  OT evaluation at discharge.        Goal: Discharge Needs Assessment  Outcome: Ongoing (interventions implemented as appropriate)    02/17/17 1311   Discharge Needs Assessment   Equipment Needed After Discharge none   Discharge Facility/Level Of Care Needs home with home health;nursing facility, skilled         Problem: Inpatient Occupational Therapy  Goal: Transfer Training Goal 1 LTG- OT  Outcome: Ongoing (interventions implemented as appropriate)    02/17/17 1311   Transfer Training OT LTG   Transfer Training OT LTG, Date Established 02/17/17   Transfer Training OT LTG, Time to Achieve by discharge   Transfer Training OT LTG, Activity Type bed to chair /chair to bed;walk-in shower;sit to stand/stand to sit;toilet   Transfer Training OT LTG, [place] Level conditional independence       Goal: Dymanic Standing Balance Goal STG- OT  Outcome: Ongoing (interventions implemented as appropriate)    02/17/17 1311   Dynamic Standing Balance OT STG   Dynamic Standing Balance OT STG, Date Established 02/17/17   Dynamic Standing Balance OT STG, Time to Achieve 5 - 7 days   Dynamic Standing Balance OT STG, [place] Level supervision required   Dynamic Standing Balance OT STG, Assist Device assistive Device   Dynamic Standing Balance OT STG, Additional Goal 5 min       Goal: ADL Goal LTG- OT  Outcome:  Ongoing (interventions implemented as appropriate)    02/17/17 1311   ADL OT LTG   ADL OT LTG, Date Established 02/17/17   ADL OT LTG, Time to Achieve by discharge   ADL OT LTG, Activity Type ADL skills   ADL OT LTG, [place] Level modified independent       Goal: Activity Tolerance Goal STG- OT  Outcome: Ongoing (interventions implemented as appropriate)    02/17/17 1311   Activity Tolerance OT STG   Activity Tolerance Goal OT STG, Date Established 02/17/17   Activity Tolerance Goal OT STG, Time to Achieve 5 - 7 days   Activity Tolerance Goal OT STG, Activity Level 10 min activity;O2 sat >/equal to 90%

## 2017-02-17 NOTE — PLAN OF CARE
Problem: Patient Care Overview (Adult)  Goal: Plan of Care Review  Outcome: Ongoing (interventions implemented as appropriate)    02/17/17 1613   Coping/Psychosocial Response Interventions   Plan Of Care Reviewed With patient   Patient Care Overview   Progress improving   Outcome Evaluation   Outcome Summary/Follow up Plan pt reports less SOA today, and hives are improving with benedryl. pt steroids being weaned       Goal: Adult Individualization and Mutuality  Outcome: Ongoing (interventions implemented as appropriate)  Goal: Discharge Needs Assessment  Outcome: Ongoing (interventions implemented as appropriate)    Problem: Respiratory Insufficiency (Adult)  Goal: Identify Related Risk Factors and Signs and Symptoms  Outcome: Outcome(s) achieved Date Met:  02/17/17  Goal: Acid/Base Balance  Outcome: Ongoing (interventions implemented as appropriate)    Problem: Fall Risk (Adult)  Goal: Absence of Falls  Outcome: Ongoing (interventions implemented as appropriate)

## 2017-02-17 NOTE — PROGRESS NOTES
hospitalist  Active Problems:    Chronic obstructive pulmonary disease with acute exacerbation    Acute on chronic respiratory failure with hypoxia        Subjective: Doing better .  Decrease shortness of breath.  No chest pain.  Mild dry cough.  Having rash off and on since yesterday evening.  Gets better after getting Benadryl and her Solu-Medrol.  Also does have itching but that also gets better with above medicines.  No other new complaints.  Chart reviewed.  She got 1 dose of Ancef in ER.  No family members are present.  Allergies: No prior history of allergy to penicillin or related medicines.      Review of Systems:    Review of Systems   Constitutional: Negative for chills and fever.   All other systems reviewed and are negative.      Objective     Vital Signs:  Temp:  [96.3 °F (35.7 °C)-97.5 °F (36.4 °C)] 97.5 °F (36.4 °C)  Heart Rate:  [78-92] 81  Resp:  [18-22] 20  BP: (132-154)/(62-88) 136/62    Physical Exam:   Physical Exam   Constitutional: She is oriented to person, place, and time. She appears well-developed and well-nourished. No distress.   HENT:   Nose: Nose normal.   Mouth/Throat: Oropharynx is clear and moist. No oropharyngeal exudate.   Eyes: Conjunctivae and EOM are normal. No scleral icterus.   Neck: Normal range of motion. Neck supple. No JVD present. No thyromegaly present.   Cardiovascular: Normal rate, regular rhythm and normal heart sounds.    Pulmonary/Chest: Tachypnea noted. No respiratory distress. She has no wheezes. She has rales.   Abdominal: Soft. She exhibits no distension. There is no tenderness.   Musculoskeletal: Normal range of motion. She exhibits no edema.   Neurological: She is alert and oriented to person, place, and time. No cranial nerve deficit.   Skin: Skin is warm and dry. No rash noted. She is not diaphoretic. No erythema. No pallor.   Psychiatric: She has a normal mood and affect.          Results Review:       Lab Results (last 24 hours)     ** No results found  for the last 24 hours. **        Imaging Results (last 24 hours)     Procedure Component Value Units Date/Time    XR Chest 2 View [[id number]] Collected:  02/15/17 1654     Updated:  02/15/17 1657    Narrative:       Patient Name:  ANN MARIE Cartyent ID:  [id number] Ordering:   JAZMINE MILLERAttending:  JAZMINE MILLERReferring:  JAZMINE MILLER------------------------------------------------Shortness  of breath.    Chest, AP and lateral views.    Comparison is made with study dated to February 3, 2017.    There is the upper limits of normal in size. There are mild  bilateral interstitial markings. No pleural effusion is  identified. No acute bony abnormality is seen. There are  degenerative changes of the right shoulder. Degenerative changes  of the thoracic spine. There is osteopenia.      Impression:       CONCLUSION: Mild bilateral interstitial markings.    Electronically signed by:  Frank Hill MD  2/15/2017 4:56  PM CST Workstation: [id number]        EKG: No new changes    Medication Review: medications have been reviewed    Assessment/Plan:    1.  Acute on chronic respiratory failure, hypoxic in nature.  Home oxygen and nebulizer treatment dependent  -Started on IV steroids, nebulizer treatment, oxygen.  Continue tapering steroids.  2. COPD with acute exacerbation  -Treatment as above  3.  History of cardiac arrhythmias  -Resume home medicines   4.  Hypokalemia  -Replace potassium, better, checked this morning  5.  Rash, unclear etiology, suggestive of possibility of urticaria  -Continue when necessary Benadryl and continue steroids  6.  Borderline abnormal troponin  -Repeat troponin  7.  Generalized functional decline  -PT and OT to evaluate and treat    DVT prophylaxis: Eliquis  Length of stay: More than 2 midnights  Anticipated disposition: Home possibly in 2 days        Aneudy Maldonado MD  02/17/17  11:21 AM

## 2017-02-18 LAB
ANION GAP SERPL CALCULATED.3IONS-SCNC: 4 MMOL/L (ref 5–15)
BASOPHILS # BLD AUTO: 0.01 10*3/MM3 (ref 0–0.2)
BASOPHILS NFR BLD AUTO: 0.1 % (ref 0–2)
BUN BLD-MCNC: 26 MG/DL (ref 7–21)
BUN/CREAT SERPL: 32.1 (ref 7–25)
CALCIUM SPEC-SCNC: 8.9 MG/DL (ref 8.4–10.2)
CHLORIDE SERPL-SCNC: 94 MMOL/L (ref 95–110)
CO2 SERPL-SCNC: 36 MMOL/L (ref 22–31)
CREAT BLD-MCNC: 0.81 MG/DL (ref 0.5–1)
DEPRECATED RDW RBC AUTO: 47.8 FL (ref 36.4–46.3)
EOSINOPHIL # BLD AUTO: 0 10*3/MM3 (ref 0–0.7)
EOSINOPHIL NFR BLD AUTO: 0 % (ref 0–7)
ERYTHROCYTE [DISTWIDTH] IN BLOOD BY AUTOMATED COUNT: 13.4 % (ref 11.5–14.5)
GFR SERPL CREATININE-BSD FRML MDRD: 67 ML/MIN/1.73 (ref 60–90)
GLUCOSE BLD-MCNC: 249 MG/DL (ref 60–100)
HCT VFR BLD AUTO: 35.4 % (ref 35–45)
HGB BLD-MCNC: 12 G/DL (ref 12–15.5)
IMM GRANULOCYTES # BLD: 0.05 10*3/MM3 (ref 0–0.02)
IMM GRANULOCYTES NFR BLD: 0.4 % (ref 0–0.5)
LYMPHOCYTES # BLD AUTO: 0.7 10*3/MM3 (ref 0.6–4.2)
LYMPHOCYTES NFR BLD AUTO: 5.2 % (ref 10–50)
MCH RBC QN AUTO: 32.9 PG (ref 26.5–34)
MCHC RBC AUTO-ENTMCNC: 33.9 G/DL (ref 31.4–36)
MCV RBC AUTO: 97 FL (ref 80–98)
MONOCYTES # BLD AUTO: 0.8 10*3/MM3 (ref 0–0.9)
MONOCYTES NFR BLD AUTO: 6 % (ref 0–12)
NEUTROPHILS # BLD AUTO: 11.8 10*3/MM3 (ref 2–8.6)
NEUTROPHILS NFR BLD AUTO: 88.3 % (ref 37–80)
PLATELET # BLD AUTO: 161 10*3/MM3 (ref 150–450)
PMV BLD AUTO: 11.1 FL (ref 8–12)
POTASSIUM BLD-SCNC: 4.9 MMOL/L (ref 3.5–5.1)
RBC # BLD AUTO: 3.65 10*6/MM3 (ref 3.77–5.16)
SODIUM BLD-SCNC: 134 MMOL/L (ref 137–145)
WBC NRBC COR # BLD: 13.36 10*3/MM3 (ref 3.2–9.8)

## 2017-02-18 PROCEDURE — 85025 COMPLETE CBC W/AUTO DIFF WBC: CPT | Performed by: INTERNAL MEDICINE

## 2017-02-18 PROCEDURE — 63710000001 DIPHENHYDRAMINE PER 50 MG: Performed by: INTERNAL MEDICINE

## 2017-02-18 PROCEDURE — 63710000001 INSULIN DETEMIR PER 5 UNITS: Performed by: INTERNAL MEDICINE

## 2017-02-18 PROCEDURE — 25010000002 METHYLPREDNISOLONE PER 40 MG: Performed by: INTERNAL MEDICINE

## 2017-02-18 PROCEDURE — 94799 UNLISTED PULMONARY SVC/PX: CPT

## 2017-02-18 PROCEDURE — 94640 AIRWAY INHALATION TREATMENT: CPT

## 2017-02-18 PROCEDURE — 80048 BASIC METABOLIC PNL TOTAL CA: CPT | Performed by: INTERNAL MEDICINE

## 2017-02-18 PROCEDURE — 94760 N-INVAS EAR/PLS OXIMETRY 1: CPT

## 2017-02-18 PROCEDURE — 97116 GAIT TRAINING THERAPY: CPT

## 2017-02-18 RX ORDER — POTASSIUM CHLORIDE 20 MEQ/1
20 TABLET, EXTENDED RELEASE ORAL DAILY
Status: DISCONTINUED | OUTPATIENT
Start: 2017-02-19 | End: 2017-02-20 | Stop reason: HOSPADM

## 2017-02-18 RX ADMIN — POTASSIUM CHLORIDE 20 MEQ: 20 TABLET, EXTENDED RELEASE ORAL at 08:44

## 2017-02-18 RX ADMIN — DIPHENHYDRAMINE HCL 25 MG: 25 TABLET ORAL at 07:33

## 2017-02-18 RX ADMIN — METHYLPREDNISOLONE SODIUM SUCCINATE 40 MG: 40 INJECTION, POWDER, FOR SOLUTION INTRAMUSCULAR; INTRAVENOUS at 13:25

## 2017-02-18 RX ADMIN — BUDESONIDE AND FORMOTEROL FUMARATE DIHYDRATE 2 PUFF: 160; 4.5 AEROSOL RESPIRATORY (INHALATION) at 11:23

## 2017-02-18 RX ADMIN — DIPHENHYDRAMINE HCL 25 MG: 25 TABLET ORAL at 00:30

## 2017-02-18 RX ADMIN — AMIODARONE HYDROCHLORIDE 200 MG: 200 TABLET ORAL at 08:45

## 2017-02-18 RX ADMIN — METHYLPREDNISOLONE SODIUM SUCCINATE 40 MG: 40 INJECTION, POWDER, FOR SOLUTION INTRAMUSCULAR; INTRAVENOUS at 21:33

## 2017-02-18 RX ADMIN — ALBUTEROL SULFATE 2.5 MG: 2.5 SOLUTION RESPIRATORY (INHALATION) at 01:01

## 2017-02-18 RX ADMIN — ALBUTEROL SULFATE 2.5 MG: 2.5 SOLUTION RESPIRATORY (INHALATION) at 11:16

## 2017-02-18 RX ADMIN — METHYLPREDNISOLONE SODIUM SUCCINATE 40 MG: 40 INJECTION, POWDER, FOR SOLUTION INTRAMUSCULAR; INTRAVENOUS at 05:02

## 2017-02-18 RX ADMIN — ALBUTEROL SULFATE 2.5 MG: 2.5 SOLUTION RESPIRATORY (INHALATION) at 19:15

## 2017-02-18 RX ADMIN — INSULIN DETEMIR 20 UNITS: 100 INJECTION, SOLUTION SUBCUTANEOUS at 15:59

## 2017-02-18 RX ADMIN — Medication 80 MG: at 20:07

## 2017-02-18 RX ADMIN — DOCUSATE SODIUM 300 MG: 100 CAPSULE, LIQUID FILLED ORAL at 08:47

## 2017-02-18 RX ADMIN — FUROSEMIDE 40 MG: 40 TABLET ORAL at 08:47

## 2017-02-18 RX ADMIN — BUDESONIDE AND FORMOTEROL FUMARATE DIHYDRATE 2 PUFF: 160; 4.5 AEROSOL RESPIRATORY (INHALATION) at 19:15

## 2017-02-18 RX ADMIN — DILTIAZEM HYDROCHLORIDE 120 MG: 120 CAPSULE, COATED, EXTENDED RELEASE ORAL at 08:48

## 2017-02-18 NOTE — PROGRESS NOTES
hospitalist  Active Problems:    Chronic obstructive pulmonary disease with acute exacerbation    Acute on chronic respiratory failure with hypoxia        Subjective: Doing better .  Decrease shortness of breath.  No chest pain.  Mild dry cough.  Still getting rash with itching but milder than before and gets better with Benadryl.  No other new complaints.  Chart reviewed.  She got 1 dose of Ancef in ER.  No family members are present.  Allergies: No prior history of allergy to penicillin or related medicines.      Review of Systems:    Review of Systems   Constitutional: Negative for chills and fever.   All other systems reviewed and are negative.      Objective     Vital Signs:  Temp:  [95.8 °F (35.4 °C)-98.4 °F (36.9 °C)] 95.8 °F (35.4 °C)  Heart Rate:  [] 66  Resp:  [20-22] 20  BP: (128-169)/(56-81) 166/80    Physical Exam:   Physical Exam   Constitutional: She is oriented to person, place, and time. She appears well-developed and well-nourished. No distress.   HENT:   Nose: Nose normal.   Mouth/Throat: Oropharynx is clear and moist. No oropharyngeal exudate.   Eyes: Conjunctivae and EOM are normal. No scleral icterus.   Neck: Neck supple. No JVD present.   Cardiovascular: Normal rate, regular rhythm and normal heart sounds.    Pulmonary/Chest: Tachypnea noted. No respiratory distress. She has wheezes (mild, bilaterally). She has rales.   Abdominal: Soft. She exhibits no distension. There is no tenderness.   Musculoskeletal: Normal range of motion. She exhibits no edema.   Neurological: She is alert and oriented to person, place, and time. No cranial nerve deficit.   Skin: Skin is warm and dry. No rash noted. She is not diaphoretic. No erythema. No pallor.   Psychiatric: She has a normal mood and affect.          Results Review:       Lab Results (last 24 hours)     Procedure Component Value Units Date/Time    Troponin [[id number]]  (Normal) Collected:  02/17/17 1145    Specimen:  Blood Updated:  02/17/17  1231     Troponin I 0.032 ng/mL     Basic Metabolic Panel [[id number]]  (Abnormal) Collected:  02/17/17 1145    Specimen:  Blood Updated:  02/17/17 1234     Glucose 292 (H) mg/dL      BUN 25 (H) mg/dL      Creatinine 0.73 mg/dL      Sodium 134 (L) mmol/L      Potassium 4.3 mmol/L      Chloride 93 (L) mmol/L      CO2 34.0 (H) mmol/L      Calcium 9.3 mg/dL      eGFR Non African Amer 76 mL/min/1.73      BUN/Creatinine Ratio 34.2 (H)      Anion Gap 7.0 mmol/L     Narrative:       The MDRD GFR formula is only valid for adults with stable renal function between ages 18 and 70.    CBC & Differential [[id number]] Collected:  02/18/17 0621    Specimen:  Blood Updated:  02/18/17 0656    Narrative:       The following orders were created for panel order CBC & Differential.  Procedure                               Abnormality         Status                     ---------                               -----------         ------                     CBC Auto Differential[[id number]]         Abnormal            Final result                 Please view results for these tests on the individual orders.    CBC Auto Differential [[id number]]  (Abnormal) Collected:  02/18/17 0621    Specimen:  Blood Updated:  02/18/17 0656     WBC 13.36 (H) 10*3/mm3      RBC 3.65 (L) 10*6/mm3      Hemoglobin 12.0 g/dL      Hematocrit 35.4 %      MCV 97.0 fL      MCH 32.9 pg      MCHC 33.9 g/dL      RDW 13.4 %      RDW-SD 47.8 (H) fl      MPV 11.1 fL      Platelets 161 10*3/mm3      Neutrophil % 88.3 (H) %      Lymphocyte % 5.2 (L) %      Monocyte % 6.0 %      Eosinophil % 0.0 %      Basophil % 0.1 %      Immature Grans % 0.4 %      Neutrophils, Absolute 11.80 (H) 10*3/mm3      Lymphocytes, Absolute 0.70 10*3/mm3      Monocytes, Absolute 0.80 10*3/mm3      Eosinophils, Absolute 0.00 10*3/mm3      Basophils, Absolute 0.01 10*3/mm3      Immature Grans, Absolute 0.05 (H) 10*3/mm3     Basic Metabolic Panel [[id number]]  (Abnormal) Collected:  02/18/17 0621    Specimen:   Blood Updated:  02/18/17 0706     Glucose 249 (H) mg/dL      BUN 26 (H) mg/dL      Creatinine 0.81 mg/dL      Sodium 134 (L) mmol/L      Potassium 4.9 mmol/L      Chloride 94 (L) mmol/L      CO2 36.0 (H) mmol/L      Calcium 8.9 mg/dL      eGFR Non African Amer 67 mL/min/1.73      BUN/Creatinine Ratio 32.1 (H)      Anion Gap 4.0 (L) mmol/L     Narrative:       The MDRD GFR formula is only valid for adults with stable renal function between ages 18 and 70.          Medication Review: medications have been reviewed    Assessment/Plan:    1.  Acute on chronic respiratory failure, hypoxic in nature.  Home oxygen and nebulizer treatment dependent  -Started on IV steroids, nebulizer treatment, oxygen.  Continue tapering steroids, but will keep same dose today due to wheezing.  2. COPD with acute exacerbation  -Treatment as above  3.  History of cardiac arrhythmias  -Resume home medicines   4.  Hypokalemia  -Replace potassium, better, reduce potassium to once a day  5.  Rash, unclear etiology, suggestive of possibility of urticaria  -Continue when necessary Benadryl and continue steroids  6.  Borderline abnormal troponin  -Repeat troponin is back to normal  7.  Generalized functional decline  -PT and OT to evaluate and treat  8.  Hyperglycemia secondary to steroids  -Start Levemir during hospitalization, we will discontinue on discharge  DVT prophylaxis: Eliquis  Length of stay: More than 2 midnights  Anticipated disposition: Home possibly or to nursing home on Monday        Aneudy Maldonado MD  02/18/17  12:26 PM

## 2017-02-18 NOTE — PLAN OF CARE
Problem: Patient Care Overview (Adult)  Goal: Plan of Care Review  Outcome: Ongoing (interventions implemented as appropriate)    02/18/17 0350   Coping/Psychosocial Response Interventions   Plan Of Care Reviewed With patient   Patient Care Overview   Progress improving   Outcome Evaluation   Outcome Summary/Follow up Plan Pt had episode of itching gave benadryl, pt likes to sleep in chair because she sleeps in recliner at hime.       Goal: Adult Individualization and Mutuality  Outcome: Ongoing (interventions implemented as appropriate)  Goal: Discharge Needs Assessment  Outcome: Ongoing (interventions implemented as appropriate)    Problem: Respiratory Insufficiency (Adult)  Goal: Acid/Base Balance  Outcome: Ongoing (interventions implemented as appropriate)  Goal: Effective Ventilation  Outcome: Ongoing (interventions implemented as appropriate)    Problem: Fall Risk (Adult)  Goal: Absence of Falls  Outcome: Ongoing (interventions implemented as appropriate)

## 2017-02-18 NOTE — PLAN OF CARE
Problem: Patient Care Overview (Adult)  Goal: Plan of Care Review  Outcome: Ongoing (interventions implemented as appropriate)    02/18/17 1133   Coping/Psychosocial Response Interventions   Plan Of Care Reviewed With patient   Patient Care Overview   Progress improving   Outcome Evaluation   Outcome Summary/Follow up Plan pt was able to increase gait distance and maintain O2 WNL. Pt would benefit from continued PT for endurance and strengthening       Goal: Discharge Needs Assessment  Outcome: Ongoing (interventions implemented as appropriate)    02/16/17 0401 02/16/17 1542 02/17/17 1021   Discharge Needs Assessment   Concerns To Be Addressed no discharge needs identified --  --    Readmission Within The Last 30 Days other (see comments)  (the patient was just here two weeks ago with COPD ) --  --    Equipment Needed After Discharge --  --  --    Discharge Facility/Level Of Care Needs --  --  --    Current Discharge Risk --  chronically ill --    Discharge Planning Comments --  pt. is interested in home care services vs. NH placement.  --    Living Environment   Transportation Available --  --  family or friend will provide   Self-Care   Equipment Currently Used at Home --  --  other (see comments);bath bench;cane, straight;grab bar;raised toilet;oxygen;walker, standard  (rollator)     02/17/17 1311 02/18/17 1133   Discharge Needs Assessment   Concerns To Be Addressed --  --    Readmission Within The Last 30 Days --  --    Equipment Needed After Discharge --  walker, rolling   Discharge Facility/Level Of Care Needs home with home health;nursing facility, skilled --    Current Discharge Risk --  --    Discharge Planning Comments --  --    Living Environment   Transportation Available --  --    Self-Care   Equipment Currently Used at Home --  --          Problem: Inpatient Physical Therapy  Goal: Transfer Training Goal 1 LTG- PT  Outcome: Outcome(s) achieved Date Met:  02/18/17 02/17/17 1039 02/18/17 1134    Transfer Training PT LTG   Transfer Training PT LTG, Date Established 02/17/17 --    Transfer Training PT LTG, Time to Achieve by discharge --    Transfer Training PT LTG, Activity Type sit to stand/stand to sit --    Transfer Training PT LTG, [place] Level conditional independence --    Transfer Training PT LTG, Assist Device walker, rolling --    Transfer Training PT LTG, Date Goal Reviewed --  02/18/17   Transfer Training PT LTG, Outcome --  goal met       Goal: Gait Training Goal STG- PT  Outcome: Outcome(s) achieved Date Met:  02/18/17 02/17/17 1039 02/18/17 1133   Gait Training PT STG   Gait Training Goal PT STG, Date Established 02/17/17 --    Gait Training Goal PT STG, Time to Achieve 3 days --    Gait Training Goal PT STG, [place] Level supervision required --    Gait Training Goal PT STG, Assist Device walker, rolling --    Gait Training Goal PT STG, Distance to Achieve 100 --    Gait Training Goal PT STG, Date Goal Reviewed --  02/18/17   Gait Training Goal PT STG, Outcome --  goal met       Goal: Gait Training Goal LTG- PT  Outcome: Ongoing (interventions implemented as appropriate)    02/17/17 1039 02/18/17 1133   Gait Training PT LTG   Gait Training Goal PT LTG, Date Established 02/17/17 --    Gait Training Goal PT LTG, Time to Achieve by discharge --    Gait Training Goal PT LTG, [place] Level conditional independence --    Gait Training Goal PT LTG, Assist Device walker, rolling --    Gait Training Goal PT LTG, Distance to Achieve 150 --    Gait Training Goal PT LTG, Date Goal Reviewed --  02/18/17   Gait Training Goal PT LTG, Outcome --  goal ongoing       Goal: Stair Training Goal LTG- PT  Outcome: Ongoing (interventions implemented as appropriate)    02/17/17 1039 02/18/17 1133   Stair Training PT LTG   Stair Training Goal PT LTG, Date Established 02/17/17 --    Stair Training Goal PT LTG, Time to Achieve by discharge --    Stair Training Goal PT LTG, Number of Steps 1 --     Stair Training Goal PT LTG, [place] Level contact guard assist --    Stair Training Goal PT LTG, Date Goal Reviewed --  02/18/17   Stair Training Goal PT LTG, Outcome --  goal ongoing

## 2017-02-18 NOTE — PROGRESS NOTES
Acute Care - Physical Therapy Treatment Note  [facility]     Patient Name: Norma Harkins  : 1931  MRN: [id number]  Today's Date: 2017  Onset of Illness/Injury or Date of Surgery Date: 17  Date of Referral to PT: 17  Referring Physician: Dr. HERNÁN Maldonado    Admit Date: 2/15/2017    Visit Dx:    ICD-10-CM ICD-9-CM   1. Chronic obstructive pulmonary disease with acute exacerbation J44.1 491.21   2. Acute bronchitis, unspecified organism J20.9 466.0   3. Shortness of breath R06.02 786.05   4. Troponin I above reference range R79.89 790.6   5. Hyperglycemia R73.9 790.29   6. Impaired mobility and endurance Z74.09 V49.89   7. Impaired mobility and ADLs Z74.09 799.89     Patient Active Problem List   Diagnosis   • Hypertension   • Chronic obstructive pulmonary disease with acute exacerbation   • Acute on chronic respiratory failure with hypoxia   • Low back pain   • Hyperlipidemia   • Paroxysmal atrial fibrillation               Adult Rehabilitation Note       17 1133          Rehab Assessment/Intervention    Discipline physical therapy assistant  -MARIA ESTHER      Document Type therapy note (daily note)  -MARIA ESTHER      Subjective Information agree to therapy;complains of;dyspnea  -MARIA ESTHER      Symptoms Noted During/After Treatment shortness of breath  -MARIA ESTHER      Precautions/Limitations fall precautions;oxygen therapy device and L/min  -MARIA ESTHER      Recorded by [MARIA ESTHER] Charlene Back PTA      Vital Signs    Pre Systolic BP Rehab 136  -MARIA ESTHER      Pre Treatment Diastolic BP 75  -MARIA ESTHER      Pretreatment Heart Rate (beats/min) 64  -MARIA ESTHER      Posttreatment Heart Rate (beats/min) 92  -MARIA ESTHER      Pre SpO2 (%) 84   after exiting bathroom, reovered to 92%  -MARIA ESTHER      O2 Delivery Pre Treatment supplemental O2  -MARIA ESTHER      Intra SpO2 (%) 95   during gait, Amb. on 4L  -MARIA ESTHER      Post SpO2 (%) 95  -MARIA ESTHER      O2 Delivery Post Treatment supplemental O2  -MARIA ESTHER      Pre Patient Position --   in bathroom  -MARIA ESTHER      Intra Patient Position Standing  -MARIA ESTHER       Post Patient Position Sitting  -MARIA ESTHER      Recorded by [MARIA ESTHER] Charlene Back PTA      Pain Assessment    Pain Assessment No/denies pain  -MARIA ESTHER      Recorded by [MARIA ESTHER] Charlene Back PTA      Cognitive Assessment/Intervention    Current Cognitive/Communication Assessment functional  -      Orientation Status oriented x 4  -MARIA ESTHER      Follows Commands/Answers Questions 100% of the time  -MARIA ESTHER      Personal Safety WNL/WFL  -MARIA ESTHER      Personal Safety Interventions nonskid shoes/slippers when out of bed;gait belt;supervised activity  -MARIA ESTHER      Recorded by [MARIA ESTHER] Charlene Back PTA      Bed Mobility, Assessment/Treatment    Bed Mobility, Roll Left, [place] not tested  -MARIA ESTHER      Recorded by [MARIA ESTHER] Charlene Back PTA      Transfer Assessment/Treatment    Transfers, Bed-Chair [place] stand by assist;conditional independence  -      Transfers, Chair-Bed [place] conditional independence  -MARIA ESTHER      Transfers, Bed-Chair-Bed, Assist Device --   none  -MARIA ESTHER      Transfers, Sit-Stand [place] independent  -      Transfers, Stand-Sit [place] independent  -      Transfers, Sit-Stand-Sit, Assist Device --   none. Pt entered and exit bathroom w/o A from staff  -MARIA ESTHER      Toilet Transfer, [place] independent  -      Transfer, Impairments --   decreased O2 with activity  -MARIA ESTHER      Recorded by [MARIA ESTHER] Charlene Back PTA      Gait Assessment/Treatment    Gait, [place] Level stand by assist;contact guard assist  -MARIA ESTHER      Gait, Assistive Device rolling walker  -MARIA ESTHER      Gait, Distance (Feet) 150  -MARIA ESTHER      Gait, Gait Pattern Analysis swing-through gait  -MARIA ESTHER      Gait, Comment vc to keep AD closer to body  -MARIA ESTHER      Recorded by [MARIA ESTHER] Charlene Back PTA      Functional Mobility    Functional Mobility- Ind. Level supervision required;independent  -MARIA ESTHER      Functional Mobility- Device rolling walker  -MARIA ESTHER      Functional Mobility- Safety Issues supplemental O2  -MARIA ESTHER      Recorded by [MARIA ESTHER] Charlene Back PTA      Positioning and  Restraints    Pre-Treatment Position sitting in chair/recliner  -MARIA ESTHER      Post Treatment Position chair  -MARIA ESTHER      In Chair sitting;call light within reach  -MARIA ESTHER      Recorded by [MARIA ESTHER] Charlene Back PTA        User Key  (r) = Recorded By, (t) = Taken By, (c) = Cosigned By    Initials Name Effective Dates    MARIA ESTHER Charlene Back PTA 10/17/16 -                 IP PT Goals       02/18/17 1133 02/17/17 1039       Transfer Training PT LTG    Transfer Training PT LTG, Date Established  02/17/17  -AW     Transfer Training PT LTG, Time to Achieve  by discharge  -AW     Transfer Training PT LTG, Activity Type  sit to stand/stand to sit  -AW     Transfer Training PT LTG, [place] Level  conditional independence  -AW     Transfer Training PT LTG, Assist Device  walker, rolling  -AW     Transfer Training PT  LTG, Date Goal Reviewed 02/18/17  -MARIA ESTHER      Transfer Training PT LTG, Outcome goal met  -MARIA ESTHER      Gait Training PT STG    Gait Training Goal PT STG, Date Established  02/17/17  -AW     Gait Training Goal PT STG, Time to Achieve  3 days  -AW     Gait Training Goal PT STG, [place] Level  supervision required  -AW     Gait Training Goal PT STG, Assist Device  walker, rolling  -AW     Gait Training Goal PT STG, Distance to Achieve  100  -AW     Gait Training Goal PT STG, Date Goal Reviewed 02/18/17  -MARIA ESTHER      Gait Training Goal PT STG, Outcome goal met  -MARIA ESTHER      Gait Training PT LTG    Gait Training Goal PT LTG, Date Established  02/17/17  -AW     Gait Training Goal PT LTG, Time to Achieve  by discharge  -AW     Gait Training Goal PT LTG, [place] Level  conditional independence  -AW     Gait Training Goal PT LTG, Assist Device  walker, rolling  -AW     Gait Training Goal PT LTG, Distance to Achieve  150  -AW     Gait Training Goal PT LTG, Date Goal Reviewed 02/18/17  -MARIA ESTHER      Gait Training Goal PT LTG, Outcome goal ongoing  -MARIA ESTHER      Stair Training PT LTG    Stair Training Goal PT LTG, Date Established  02/17/17  -AW      Stair Training Goal PT LTG, Time to Achieve  by discharge  -AW     Stair Training Goal PT LTG, Number of Steps  1  -AW     Stair Training Goal PT LTG, [place] Level  contact guard assist  -AW     Stair Training Goal PT LTG, Date Goal Reviewed 02/18/17  -MARIA ESTHER      Stair Training Goal PT LTG, Outcome goal ongoing  -MARIA ESTHER        User Key  (r) = Recorded By, (t) = Taken By, (c) = Cosigned By    Initials Name Provider Type    AW Gail Barajas, PT Physical Therapist    MARIA ESTHER Back, PTA Physical Therapy Assistant          Physical Therapy Education     Title: PT OT SLP Therapies (Active)     Topic: Physical Therapy (Active)     Point: Precautions (Active)    Learning Progress Summary    Learner Readiness Method Response Comment Documented by Status   Patient Acceptance E NR Pt instructed to remain within walker during turns inastead of straddling walker. Pt may need reinforcement.  02/17/17 1037 Active                      User Key     Initials Effective Dates Name Provider Type Discipline     10/17/16 -  Gail Barajas, PT Physical Therapist PT                    PT Recommendation and Plan  Anticipated Discharge Disposition: skilled nursing facility, home with home health  Planned Therapy Interventions: gait training, home exercise program, stair training, strengthening, transfer training  PT Frequency: 5 times/wk  Plan of Care Review  Plan Of Care Reviewed With: patient  Progress: improving  Outcome Summary/Follow up Plan: pt was able to increase gait distance and maintain O2 WNL.  Pt would benefit from continued PT for endurance and strengthening          Outcome Measures       02/18/17 1133 02/17/17 1021 02/17/17 0855    How much help from another person do you currently need...    Turning from your back to your side while in flat bed without using bedrails? 4  -MARIA ESTHER  4  -AW    Moving from lying on back to sitting on the side of a flat bed without bedrails? 4  -MARIA ESTHER  3  -AW    Moving to and from a bed to a chair  (including a wheelchair)? 4  -MARIA ESTHER  3  -AW    Standing up from a chair using your arms (e.g., wheelchair, bedside chair)? 4  -MARIA ESTHER  3  -AW    Climbing 3-5 steps with a railing? 3  -MARIA ESTHER  3  -AW    To walk in hospital room? 4  -MARIA ESTHER  3  -AW    AM-PAC 6 Clicks Score 23  -MARIA ESTHER  19  -AW    How much help from another is currently needed...    Putting on and taking off regular lower body clothing?  3  -RW     Bathing (including washing, rinsing, and drying)  3  -RW     Toileting (which includes using toilet bed pan or urinal)  3  -RW     Putting on and taking off regular upper body clothing  3  -RW     Taking care of personal grooming (such as brushing teeth)  3  -RW     Eating meals  4  -RW     Score  19  -RW     Functional Assessment    Outcome Measure Options  AM-PAC 6 Clicks Daily Activity (OT)  -RW AM-PAC 6 Clicks Basic Mobility (PT)  -AW      User Key  (r) = Recorded By, (t) = Taken By, (c) = Cosigned By    Initials Name Provider Type    MONSERRAT Cook, OTR/L Occupational Therapist    MAXIMILIANO Barajas, PT Physical Therapist    MARIA ESTHER Back PTA Physical Therapy Assistant           Time Calculation:         PT Charges       02/18/17 1312          Time Calculation    Start Time 1133  -      Stop Time 1150  -MARIA ESTHER      Time Calculation (min) 17 min  -MARIA ESTHER      PT Received On 02/17/17  -MARIA ESTHER      PT - Next Appointment 02/20/17  -MARIA ESTHER      PT Goal Re-Cert Due Date 03/02/17  -MARIA ESTHER      Time Calculation- PT    Total Timed Code Minutes- PT 17 minute(s)  -MARIA ESTHER        User Key  (r) = Recorded By, (t) = Taken By, (c) = Cosigned By    Initials Name Provider Type    MARIA ESTHER Back PTA Physical Therapy Assistant          Therapy Charges for Today     Code Description Service Date Service Provider Modifiers Qty    [id number] HC GAIT TRAINING EA 15 MIN 2/18/2017 Charlene Back PTA GP 1          PT G-Codes  Outcome Measure Options: AM-PAC 6 Clicks Daily Activity (OT)  Score: 19  Functional Limitation: Mobility: Walking and moving  around  Mobility: Walking and Moving Around Current Status (): At least 20 percent but less than 40 percent impaired, limited or restricted  Mobility: Walking and Moving Around Goal Status (): At least 1 percent but less than 20 percent impaired, limited or restricted    Charlene Back, PTA  2/18/2017

## 2017-02-19 LAB
GLUCOSE BLDC GLUCOMTR-MCNC: 224 MG/DL (ref 70–130)
GLUCOSE BLDC GLUCOMTR-MCNC: 225 MG/DL (ref 70–130)
GLUCOSE BLDC GLUCOMTR-MCNC: 296 MG/DL (ref 70–130)

## 2017-02-19 PROCEDURE — 94640 AIRWAY INHALATION TREATMENT: CPT

## 2017-02-19 PROCEDURE — 25010000002 METHYLPREDNISOLONE PER 40 MG: Performed by: INTERNAL MEDICINE

## 2017-02-19 PROCEDURE — 82962 GLUCOSE BLOOD TEST: CPT

## 2017-02-19 PROCEDURE — 63710000001 INSULIN DETEMIR PER 5 UNITS: Performed by: INTERNAL MEDICINE

## 2017-02-19 PROCEDURE — 97530 THERAPEUTIC ACTIVITIES: CPT

## 2017-02-19 PROCEDURE — 94760 N-INVAS EAR/PLS OXIMETRY 1: CPT

## 2017-02-19 PROCEDURE — 94799 UNLISTED PULMONARY SVC/PX: CPT

## 2017-02-19 RX ORDER — SODIUM CHLORIDE 0.9 % (FLUSH) 0.9 %
SYRINGE (ML) INJECTION
Status: COMPLETED
Start: 2017-02-19 | End: 2017-02-19

## 2017-02-19 RX ADMIN — FUROSEMIDE 40 MG: 40 TABLET ORAL at 03:04

## 2017-02-19 RX ADMIN — METHYLPREDNISOLONE SODIUM SUCCINATE 40 MG: 40 INJECTION, POWDER, FOR SOLUTION INTRAMUSCULAR; INTRAVENOUS at 13:34

## 2017-02-19 RX ADMIN — SODIUM CHLORIDE, PRESERVATIVE FREE: 5 INJECTION INTRAVENOUS at 07:38

## 2017-02-19 RX ADMIN — METHYLPREDNISOLONE SODIUM SUCCINATE 40 MG: 40 INJECTION, POWDER, FOR SOLUTION INTRAMUSCULAR; INTRAVENOUS at 05:04

## 2017-02-19 RX ADMIN — ALBUTEROL SULFATE 2.5 MG: 2.5 SOLUTION RESPIRATORY (INHALATION) at 00:50

## 2017-02-19 RX ADMIN — METHYLPREDNISOLONE SODIUM SUCCINATE 40 MG: 40 INJECTION, POWDER, FOR SOLUTION INTRAMUSCULAR; INTRAVENOUS at 21:40

## 2017-02-19 RX ADMIN — DOCUSATE SODIUM 300 MG: 100 CAPSULE, LIQUID FILLED ORAL at 08:08

## 2017-02-19 RX ADMIN — BUDESONIDE AND FORMOTEROL FUMARATE DIHYDRATE 2 PUFF: 160; 4.5 AEROSOL RESPIRATORY (INHALATION) at 19:55

## 2017-02-19 RX ADMIN — INSULIN DETEMIR 20 UNITS: 100 INJECTION, SOLUTION SUBCUTANEOUS at 08:10

## 2017-02-19 RX ADMIN — Medication 80 MG: at 20:47

## 2017-02-19 RX ADMIN — BUDESONIDE AND FORMOTEROL FUMARATE DIHYDRATE 2 PUFF: 160; 4.5 AEROSOL RESPIRATORY (INHALATION) at 08:25

## 2017-02-19 RX ADMIN — AMIODARONE HYDROCHLORIDE 200 MG: 200 TABLET ORAL at 08:08

## 2017-02-19 RX ADMIN — POTASSIUM CHLORIDE 20 MEQ: 20 TABLET, EXTENDED RELEASE ORAL at 08:09

## 2017-02-19 RX ADMIN — ALBUTEROL SULFATE 2.5 MG: 2.5 SOLUTION RESPIRATORY (INHALATION) at 19:55

## 2017-02-19 RX ADMIN — DILTIAZEM HYDROCHLORIDE 120 MG: 120 CAPSULE, COATED, EXTENDED RELEASE ORAL at 08:07

## 2017-02-19 RX ADMIN — ALBUTEROL SULFATE 2.5 MG: 2.5 SOLUTION RESPIRATORY (INHALATION) at 08:21

## 2017-02-19 NOTE — PLAN OF CARE
Problem: Patient Care Overview (Adult)  Goal: Plan of Care Review  Outcome: Ongoing (interventions implemented as appropriate)    02/19/17 0348   Coping/Psychosocial Response Interventions   Plan Of Care Reviewed With patient   Patient Care Overview   Progress improving   Outcome Evaluation   Outcome Summary/Follow up Plan Pt doing well with self ADL's, itching has decreased, pt still wheezing.       Goal: Adult Individualization and Mutuality  Outcome: Ongoing (interventions implemented as appropriate)  Goal: Discharge Needs Assessment  Outcome: Ongoing (interventions implemented as appropriate)    Problem: Respiratory Insufficiency (Adult)  Goal: Acid/Base Balance  Outcome: Ongoing (interventions implemented as appropriate)    Problem: Fall Risk (Adult)  Goal: Absence of Falls  Outcome: Ongoing (interventions implemented as appropriate)

## 2017-02-19 NOTE — PLAN OF CARE
Problem: Patient Care Overview (Adult)  Goal: Adult Individualization and Mutuality  Outcome: Ongoing (interventions implemented as appropriate)    Problem: Respiratory Insufficiency (Adult)  Goal: Acid/Base Balance  Outcome: Ongoing (interventions implemented as appropriate)

## 2017-02-19 NOTE — PLAN OF CARE
Problem: Patient Care Overview (Adult)  Goal: Plan of Care Review  Outcome: Ongoing (interventions implemented as appropriate)    02/19/17 0348 02/19/17 1304   Coping/Psychosocial Response Interventions   Plan Of Care Reviewed With patient --    Patient Care Overview   Progress improving --    Outcome Evaluation   Outcome Summary/Follow up Plan --  Pt cooperated well with tx. O2 declines during functional mobility. Pt educated on monitoring O2 during ADLs       Goal: Adult Individualization and Mutuality  Outcome: Ongoing (interventions implemented as appropriate)    02/17/17 1613   Individualization   Patient Specific Preferences pt prefers wearing her own pajamas   Mutuality/Individual Preferences   What Anxieties, Fears or Concerns Do You Have About Your Health or Care? none stated       Goal: Discharge Needs Assessment  Outcome: Ongoing (interventions implemented as appropriate)    02/16/17 0401 02/16/17 1542 02/17/17 1021   Discharge Needs Assessment   Concerns To Be Addressed no discharge needs identified --  --    Readmission Within The Last 30 Days other (see comments)  (the patient was just here two weeks ago with COPD ) --  --    Equipment Needed After Discharge --  --  --    Discharge Facility/Level Of Care Needs --  --  --    Current Discharge Risk --  chronically ill --    Discharge Planning Comments --  pt. is interested in home care services vs. NH placement.  --    Living Environment   Transportation Available --  --  family or friend will provide   Self-Care   Equipment Currently Used at Home --  --  other (see comments);bath bench;cane, straight;grab bar;raised toilet;oxygen;walker, standard  (rollator)     02/17/17 1311 02/18/17 1133   Discharge Needs Assessment   Concerns To Be Addressed --  --    Readmission Within The Last 30 Days --  --    Equipment Needed After Discharge --  walker, rolling   Discharge Facility/Level Of Care Needs home with home health;nursing facility, skilled --    Current  Discharge Risk --  --    Discharge Planning Comments --  --    Living Environment   Transportation Available --  --    Self-Care   Equipment Currently Used at Home --  --          Problem: Inpatient Occupational Therapy  Goal: Transfer Training Goal 1 LTG- OT  Outcome: Ongoing (interventions implemented as appropriate)    02/17/17 1311 02/19/17 1304   Transfer Training OT LTG   Transfer Training OT LTG, Date Established 02/17/17 --    Transfer Training OT LTG, Time to Achieve by discharge --    Transfer Training OT LTG, Activity Type bed to chair /chair to bed;walk-in shower;sit to stand/stand to sit;toilet --    Transfer Training OT LTG, [place] Level conditional independence --    Transfer Training OT LTG, Date Goal Reviewed --  02/19/17       Goal: Dymanic Standing Balance Goal STG- OT  Outcome: Ongoing (interventions implemented as appropriate)    02/17/17 1311 02/19/17 1304   Dynamic Standing Balance OT STG   Dynamic Standing Balance OT STG, Date Established 02/17/17 --    Dynamic Standing Balance OT STG, Time to Achieve 5 - 7 days --    Dynamic Standing Balance OT STG, [place] Level supervision required --    Dynamic Standing Balance OT STG, Assist Device assistive Device --    Dynamic Standing Balance OT STG, Additional Goal 5 min --    Dynamic Standing Balance OT STG, Date Goal Reviewed --  02/19/17 02/17/17 1311 02/19/17 1304   Dynamic Standing Balance OT STG   Dynamic Standing Balance OT STG, Date Established 02/17/17 --    Dynamic Standing Balance OT STG, Time to Achieve 5 - 7 days --    Dynamic Standing Balance OT STG, [place] Level supervision required --    Dynamic Standing Balance OT STG, Assist Device assistive Device --    Dynamic Standing Balance OT STG, Additional Goal 5 min --    Dynamic Standing Balance OT STG, Date Goal Reviewed --  02/19/17       Goal: ADL Goal LTG- OT  Outcome: Ongoing (interventions implemented as appropriate)    02/17/17 1311 02/19/17 1304   ADL OT  LTG   ADL OT LTG, Date Established 02/17/17 --    ADL OT LTG, Time to Achieve by discharge --    ADL OT LTG, Activity Type ADL skills --    ADL OT LTG, [place] Level modified independent --    ADL OT LTG, Date Goal Reviewed --  02/19/17       Goal: Activity Tolerance Goal STG- OT  Outcome: Ongoing (interventions implemented as appropriate)    02/17/17 1311 02/19/17 1304   Activity Tolerance OT STG   Activity Tolerance Goal OT STG, Date Established 02/17/17 --    Activity Tolerance Goal OT STG, Time to Achieve 5 - 7 days --    Activity Tolerance Goal OT STG, Activity Level 10 min activity;O2 sat >/equal to 90% --    Activity Tolerance Goal OT STG, Date Goal Reviewed --  02/19/17

## 2017-02-19 NOTE — PROGRESS NOTES
hospitalist  Active Problems:    Chronic obstructive pulmonary disease with acute exacerbation    Acute on chronic respiratory failure with hypoxia        Subjective: Doing much better .  Breathing much better.  No chest pain.  Mild dry cough.  No more rash.  Just little itching.  No other new complaints.  Chart reviewed.  She got 1 dose of Ancef in ER.  No family members are present.  Allergies: No prior history of allergy to penicillin or related medicines.      Review of Systems:    Review of Systems   Constitutional: Negative for chills and fever.   All other systems reviewed and are negative.      Objective     Vital Signs:  Temp:  [96.5 °F (35.8 °C)-97.4 °F (36.3 °C)] 96.5 °F (35.8 °C)  Heart Rate:  [73-91] 82  Resp:  [17-20] 20  BP: (143-163)/(69-94) 163/77    Physical Exam:   Physical Exam   Constitutional: She is oriented to person, place, and time. She appears well-developed and well-nourished. No distress.   HENT:   Nose: Nose normal.   Mouth/Throat: Oropharynx is clear and moist. No oropharyngeal exudate.   Eyes: Conjunctivae and EOM are normal. No scleral icterus.   Neck: Neck supple. No JVD present.   Cardiovascular: Normal rate, regular rhythm and normal heart sounds.    Pulmonary/Chest: Tachypnea noted. No respiratory distress. She has wheezes (mild, bilaterally). She has rales.   Abdominal: Soft. She exhibits no distension. There is no tenderness.   Musculoskeletal: Normal range of motion. She exhibits no edema.   Neurological: She is alert and oriented to person, place, and time. No cranial nerve deficit.   Skin: Skin is warm and dry. No rash noted. She is not diaphoretic. No erythema. No pallor.   Psychiatric: She has a normal mood and affect.          Results Review:       Lab Results (last 24 hours)     Procedure Component Value Units Date/Time    POC Glucose Fingerstick [[id number]]  (Abnormal) Collected:  02/19/17 0739    Specimen:  Blood Updated:  02/19/17 0755     Glucose 224 (H) mg/dL        RN NotifiedMeter: [id number]: [id number] MARLON MARIE             Medication Review: medications have been reviewed    Assessment/Plan:    1.  Acute on chronic respiratory failure, hypoxic in nature.  Home oxygen and nebulizer treatment dependent  -Started on IV steroids, nebulizer treatment, oxygen.  Continue tapering steroids, but continue on same dose.  She will switch over to by mouth tomorrow and discharge planning tomorrow.  2. COPD with acute exacerbation  -Treatment as above  3.  History of cardiac arrhythmias  -Resume home medicines   4.  Hypokalemia  -Replace potassium, better, reduce potassium to once a day  5.  Rash, unclear etiology, suggestive of possibility of urticaria  -Continue when necessary Benadryl and continue steroids  6.  Borderline abnormal troponin  -Repeat troponin is back to normal  7.  Generalized functional decline  -PT and OT to evaluate and treat  8.  Hyperglycemia secondary to steroids  -Started Levemir during hospitalization, we will discontinue on discharge  DVT prophylaxis: Eliquis  Length of stay: More than 2 midnights  Anticipated disposition:  possibly  to nursing home on Monday        Aneudy Maldonado MD  02/19/17  12:39 PM

## 2017-02-20 VITALS
HEIGHT: 61 IN | WEIGHT: 186 LBS | OXYGEN SATURATION: 92 % | SYSTOLIC BLOOD PRESSURE: 137 MMHG | BODY MASS INDEX: 35.12 KG/M2 | RESPIRATION RATE: 18 BRPM | HEART RATE: 95 BPM | TEMPERATURE: 96.6 F | DIASTOLIC BLOOD PRESSURE: 68 MMHG

## 2017-02-20 LAB
GLUCOSE BLDC GLUCOMTR-MCNC: 254 MG/DL (ref 70–130)
GLUCOSE BLDC GLUCOMTR-MCNC: 270 MG/DL (ref 70–130)
GLUCOSE BLDC GLUCOMTR-MCNC: 311 MG/DL (ref 70–130)

## 2017-02-20 PROCEDURE — 94640 AIRWAY INHALATION TREATMENT: CPT

## 2017-02-20 PROCEDURE — 63710000001 INSULIN DETEMIR PER 5 UNITS: Performed by: INTERNAL MEDICINE

## 2017-02-20 PROCEDURE — 25010000002 METHYLPREDNISOLONE PER 40 MG: Performed by: INTERNAL MEDICINE

## 2017-02-20 PROCEDURE — 94799 UNLISTED PULMONARY SVC/PX: CPT

## 2017-02-20 PROCEDURE — 82962 GLUCOSE BLOOD TEST: CPT

## 2017-02-20 PROCEDURE — 97530 THERAPEUTIC ACTIVITIES: CPT

## 2017-02-20 PROCEDURE — 97535 SELF CARE MNGMENT TRAINING: CPT

## 2017-02-20 PROCEDURE — 94760 N-INVAS EAR/PLS OXIMETRY 1: CPT

## 2017-02-20 RX ORDER — PREDNISONE 20 MG/1
TABLET ORAL
Qty: 15 TABLET | Refills: 0 | Status: SHIPPED | OUTPATIENT
Start: 2017-02-20 | End: 2017-03-24

## 2017-02-20 RX ORDER — ACETAMINOPHEN 325 MG/1
650 TABLET ORAL EVERY 6 HOURS PRN
Status: DISCONTINUED | OUTPATIENT
Start: 2017-02-20 | End: 2017-02-20

## 2017-02-20 RX ORDER — PRAVASTATIN SODIUM 80 MG/1
80 TABLET ORAL NIGHTLY
Qty: 30 TABLET | Refills: 0 | Status: SHIPPED | OUTPATIENT
Start: 2017-02-20 | End: 2017-06-15

## 2017-02-20 RX ADMIN — ALBUTEROL SULFATE 2.5 MG: 2.5 SOLUTION RESPIRATORY (INHALATION) at 01:03

## 2017-02-20 RX ADMIN — AMIODARONE HYDROCHLORIDE 200 MG: 200 TABLET ORAL at 08:12

## 2017-02-20 RX ADMIN — BUDESONIDE AND FORMOTEROL FUMARATE DIHYDRATE 2 PUFF: 160; 4.5 AEROSOL RESPIRATORY (INHALATION) at 10:17

## 2017-02-20 RX ADMIN — Medication 3 ML: at 08:16

## 2017-02-20 RX ADMIN — INSULIN DETEMIR 20 UNITS: 100 INJECTION, SOLUTION SUBCUTANEOUS at 07:42

## 2017-02-20 RX ADMIN — ALBUTEROL SULFATE 2.5 MG: 2.5 SOLUTION RESPIRATORY (INHALATION) at 10:17

## 2017-02-20 RX ADMIN — METHYLPREDNISOLONE SODIUM SUCCINATE 40 MG: 40 INJECTION, POWDER, FOR SOLUTION INTRAMUSCULAR; INTRAVENOUS at 05:08

## 2017-02-20 RX ADMIN — FUROSEMIDE 40 MG: 40 TABLET ORAL at 03:02

## 2017-02-20 RX ADMIN — DILTIAZEM HYDROCHLORIDE 120 MG: 120 CAPSULE, COATED, EXTENDED RELEASE ORAL at 08:13

## 2017-02-20 RX ADMIN — POTASSIUM CHLORIDE 20 MEQ: 20 TABLET, EXTENDED RELEASE ORAL at 08:14

## 2017-02-20 NOTE — PLAN OF CARE
Problem: Inpatient Physical Therapy  Goal: Transfer Training Goal 1 LTG- PT  Outcome: Outcome(s) achieved Date Met:  02/20/17 02/17/17 1039 02/20/17 1616   Transfer Training PT LTG   Transfer Training PT LTG, Date Established 02/17/17 --    Transfer Training PT LTG, Time to Achieve by discharge --    Transfer Training PT LTG, Activity Type sit to stand/stand to sit --    Transfer Training PT LTG, [place] Level conditional independence --    Transfer Training PT LTG, Assist Device walker, rolling --    Transfer Training PT LTG, Date Goal Reviewed --  02/20/17   Transfer Training PT LTG, Outcome --  goal met       Goal: Gait Training Goal STG- PT  Outcome: Outcome(s) achieved Date Met:  02/20/17 02/17/17 1039 02/20/17 1616   Gait Training PT STG   Gait Training Goal PT STG, Date Established 02/17/17 --    Gait Training Goal PT STG, Time to Achieve 3 days --    Gait Training Goal PT STG, [place] Level supervision required --    Gait Training Goal PT STG, Assist Device walker, rolling --    Gait Training Goal PT STG, Distance to Achieve 100 --    Gait Training Goal PT STG, Date Goal Reviewed --  02/20/17   Gait Training Goal PT STG, Outcome --  goal met       Goal: Gait Training Goal LTG- PT  Outcome: Unable to achieve outcome(s) by discharge Date Met:  02/20/17 02/17/17 1039 02/20/17 1616   Gait Training PT LTG   Gait Training Goal PT LTG, Date Established 02/17/17 --    Gait Training Goal PT LTG, Time to Achieve by discharge --    Gait Training Goal PT LTG, [place] Level conditional independence --    Gait Training Goal PT LTG, Assist Device walker, rolling --    Gait Training Goal PT LTG, Distance to Achieve 150 --    Gait Training Goal PT LTG, Date Goal Reviewed --  02/20/17   Gait Training Goal PT LTG, Outcome --  goal not met   Gait Training Goal PT LTG, Reason Goal Not Met --  discharged from facility       Goal: Stair Training Goal LTG- PT  Outcome: Unable to achieve outcome(s) by  discharge Date Met:  02/20/17 02/17/17 1039 02/20/17 1616   Stair Training PT LTG   Stair Training Goal PT LTG, Date Established 02/17/17 --    Stair Training Goal PT LTG, Time to Achieve by discharge --    Stair Training Goal PT LTG, Number of Steps 1 --    Stair Training Goal PT LTG, [place] Level contact guard assist --    Stair Training Goal PT LTG, Date Goal Reviewed --  02/20/17   Stair Training Goal PT LTG, Outcome --  goal not met   Stair Training Goal PT LTG, Reason Goal Not Met --  discharged from facility

## 2017-02-20 NOTE — PROGRESS NOTES
Acute Care - Occupational Therapy Treatment Note  [facility]     Patient Name: Norma Harkins  : 1931  MRN: [id number]  Today's Date: 2017  Onset of Illness/Injury or Date of Surgery Date: 17  Date of Referral to OT: 17  Referring Physician: Dr. HERNÁN Maldonado      Admit Date: 2/15/2017    Visit Dx:     ICD-10-CM ICD-9-CM   1. Chronic obstructive pulmonary disease with acute exacerbation J44.1 491.21   2. Acute bronchitis, unspecified organism J20.9 466.0   3. Shortness of breath R06.02 786.05   4. Troponin I above reference range R79.89 790.6   5. Hyperglycemia R73.9 790.29   6. Impaired mobility and endurance Z74.09 V49.89   7. Impaired mobility and ADLs Z74.09 799.89   8. Chronic bilateral low back pain without sciatica M54.5 724.2    G89.29 338.29     Patient Active Problem List   Diagnosis   • Hypertension   • Chronic obstructive pulmonary disease with acute exacerbation   • Acute on chronic respiratory failure with hypoxia   • Low back pain   • Hyperlipidemia   • Paroxysmal atrial fibrillation             Adult Rehabilitation Note       17 0950 17 1125 17 1133    Rehab Assessment/Intervention    Discipline occupational therapist  -LW occupational therapy assistant  -SC physical therapy assistant  -MARIA ESTHER    Document Type therapy note (daily note)  -LW progress note  -SC therapy note (daily note)  -MARIA ESTHER    Subjective Information no complaints  -LW  agree to therapy;complains of;dyspnea  -MARIA ESTHER    Patient Effort, Rehab Treatment good  -LW good  -SC     Symptoms Noted During/After Treatment  significant change in vital signs   O2 levels decline  -SC shortness of breath  -MARIA ESTHER    Precautions/Limitations fall precautions  -LW  fall precautions;oxygen therapy device and L/min  -MARIA ESTHER    Recorded by [LW] PATRICK Chan/ROBINSON [SC] PATRICK Rosado/ROBINSON [MARIA ESTHER] Charlene Back PTA    Vital Signs    Pre Systolic BP Rehab 136  -LW  136  -MARIA ESTHER    Pre Treatment Diastolic BP 68  -LW  75  -MARIA ESTHER     Pretreatment Heart Rate (beats/min) 73  -LW  64  -MARIA ESTHER    Posttreatment Heart Rate (beats/min) 69  -LW  92  -MARIA ESTHER    Pre SpO2 (%) 97  -LW 94  -SC 84   after exiting bathroom, reovered to 92%  -MARIA ESTHER    O2 Delivery Pre Treatment   supplemental O2  -MARIA ESTHER    Intra SpO2 (%)  87  -SC 95   during gait, Amb. on 4L  -MARIA ESTHER    Post SpO2 (%) 96  -LW 96  -SC 95  -MARIA ESTHER    O2 Delivery Post Treatment   supplemental O2  -MARIA ESTHER    Pre Patient Position   --   in bathroom  -MARIA ESTHER    Intra Patient Position Sitting  -LW  Standing  -MARIA ESTHER    Post Patient Position Sitting  -LW  Sitting  -MARIA ESTHER    Recorded by [LW] Marlyn Suárez, NGUYEN/L [SC] Dang Andujar, NGUYEN/L [MARIA ESTHER] Charlene Back PTA    Pain Assessment    Pain Assessment   No/denies pain  -MARIA ESTHER    Recorded by   [MARIA ESTHER] Charlene Back PTA    Vision Assessment/Intervention    Visual Impairment WFL  -LW      Recorded by [LW] Marlyn Suárez, NGUYEN/L      Cognitive Assessment/Intervention    Current Cognitive/Communication Assessment   functional  -MARIA ESTHER    Orientation Status   oriented x 4  -MARIA ESTHER    Follows Commands/Answers Questions   100% of the time  -MARIA ESTHER    Personal Safety   WNL/WFL  -MARIA ESTHER    Personal Safety Interventions   nonskid shoes/slippers when out of bed;gait belt;supervised activity  -MARIA ESTHER    Recorded by   [MARIA ESTHER] Charlene Back PTA    Bed Mobility, Assessment/Treatment    Bed Mobility, Roll Left, [place]   not tested  -MARIA ESTHER    Recorded by   [MARIA ESTHER] Charlene Back PTA    Transfer Assessment/Treatment    Transfers, Bed-Chair [place]   stand by assist;conditional independence  -    Transfers, Chair-Bed [place]   conditional independence  -    Transfers, Bed-Chair-Bed, Assist Device   --   none  -MARIA ESTHER    Transfers, Sit-Stand [place] conditional independence  -LW  independent  -MARIA ESTHER    Transfers, Stand-Sit [place] conditional independence  -  independent  -MARIA ESTHER    Transfers, Sit-Stand-Sit, Assist Device   --   none. Pt entered and exit bathroom w/o A from staff  -MARIA ESTHER    Toilet Transfer,  [place]   independent  -    Transfer, Impairments   --   decreased O2 with activity  -MARIA ESTHER    Recorded by [LW] PATRICK Chan/L  [MARIA ESTHER] Charlene Back PTA    Gait Assessment/Treatment    Gait, [place] Level   stand by assist;contact guard assist  -    Gait, Assistive Device   rolling walker  -    Gait, Distance (Feet)   150  -MARIA ESTHER    Gait, Gait Pattern Analysis   swing-through gait  -    Gait, Comment   vc to keep AD closer to body  -    Recorded by   [MARIA ESTHER] Charlene Back PTA    Functional Mobility    Functional Mobility- Ind. Level conditional independence  - supervision required  -SC supervision required;independent  -MARIA ESTHER    Functional Mobility- Device  rolling walker  -SC rolling walker  -    Functional Mobility-Distance (Feet) 10  -LW 18  -SC     Functional Mobility- Safety Issues   supplemental O2  -MARIA ESTHER    Recorded by [LW] PATRICK Chan/ROBINSON [SC] PATRICK Rosado/ROBINSON [MARIA ESTHER] Charlene Back PTA    Grooming Assessment/Training    Grooming Assess/Train, Assistive Device other (see comments)   Oral care, brush hair and was face, hands  -      Grooming Assess/Train, Position sink side;standing  -      Grooming Assess/Train, Indepen Level conditional independence  -LW      Recorded by [LW] PATRICK Chan/L      Balance Skills Training    Standing-Level of Assistance Distant supervision  -      Static Standing Balance Support No upper extremity supported  -      Standing-Balance Activities --   grooming   -      Standing Balance # of Minutes 15  -LW      Recorded by [LW] PATRICK Chan/L      Therapy Exercises    Bilateral Upper Extremity  AROM:;15 reps;elbow flexion/extension;pronation/supination;shoulder abduction/adduction;shoulder extension/flexion;shoulder ER/IR;shoulder horizontal abd/add  -SC     Recorded by  [SC] PATRICK Rosado/L     Positioning and Restraints    Pre-Treatment Position sitting in chair/recliner  - sitting in chair/recliner  -SC  sitting in chair/recliner  -MARIA ESTHER    Post Treatment Position chair  -LW chair  -SC chair  -MARIA ESTHER    In Chair sitting;call light within reach;encouraged to call for assist;exit alarm on  -LW  sitting;call light within reach  -MARIA ESTHER    Recorded by [LW] Marlyn Suárez, NGUYEN/L [SC] Dang Andujar, NGUYEN/L [MARIA ESTHER] Charlene Back, LYRIC      User Key  (r) = Recorded By, (t) = Taken By, (c) = Cosigned By    Initials Name Effective Dates    MARIA ESTHER Charlene Back, PTA 10/17/16 -     SC Dang Andujar, NGUYEN/L 10/17/16 -     LW Marlyn Suárez, NGUYEN/L 10/17/16 -                 OT Goals       02/20/17 0950 02/19/17 1304 02/17/17 1311    Transfer Training OT LTG    Transfer Training OT LTG, Date Established   02/17/17  -RW    Transfer Training OT LTG, Time to Achieve   by discharge  -RW    Transfer Training OT LTG, Activity Type   bed to chair /chair to bed;walk-in shower;sit to stand/stand to sit;toilet  -RW    Transfer Training OT LTG, [place] Level   conditional independence  -RW    Transfer Training OT LTG, Date Goal Reviewed 02/20/17  -LW 02/19/17  -SC     Transfer Training OT LTG, Outcome goal met  -LW      Dynamic Standing Balance OT STG    Dynamic Standing Balance OT STG, Date Established   02/17/17  -RW    Dynamic Standing Balance OT STG, Time to Achieve   5 - 7 days  -RW    Dynamic Standing Balance OT STG, [place] Level   supervision required  -RW    Dynamic Standing Balance OT STG, Assist Device   assistive Device  -RW    Dynamic Standing Balance OT STG, Additional Goal   5 min  -RW    Dynamic Standing Balance OT STG, Date Goal Reviewed 02/20/17  -LW 02/19/17  -SC     Dynamic Standing Balance OT STG, Outcome goal met  -LW      ADL OT LTG    ADL OT LTG, Date Established   02/17/17  -RW    ADL OT LTG, Time to Achieve   by discharge  -RW    ADL OT LTG, Activity Type   ADL skills  -RW    ADL OT LTG, [place] Level   modified independent  -RW    ADL OT LTG, Date Goal Reviewed 02/20/17  -LW 02/19/17  -SC     ADL OT LTG,  Outcome goal ongoing  -LW      Activity Tolerance OT STG    Activity Tolerance Goal OT STG, Date Established   02/17/17  -RW    Activity Tolerance Goal OT STG, Time to Achieve   5 - 7 days  -RW    Activity Tolerance Goal OT STG, [place] Level   10 min activity;O2 sat >/equal to 90%  -RW    Activity Tolerance Goal OT STG, Date Goal Reviewed 02/20/17  -LW 02/19/17  -SC     Activity Tolerance Goal OT STG, Outcome goal met  -LW        User Key  (r) = Recorded By, (t) = Taken By, (c) = Cosigned By    Initials Name Provider Type    RW Dayanna Cook, OTR/L Occupational Therapist    RADHA Andujar NGUYEN/L Occupational Therapy Assistant    LALITHA MckeonA/L Occupational Therapy Assistant          Occupational Therapy Education     Title: PT OT SLP Therapies (Active)     Topic: Occupational Therapy (Done)     Point: ADL training (Done)    Description: Instruct learner(s) on proper safety adaptation and remediation techniques during self care or transfers.   Instruct in proper use of assistive devices.    Learning Progress Summary    Learner Readiness Method Response Comment Documented by Status   Patient Acceptance E VU  LW 02/20/17 1247 Done    Acceptance E VU,NR functional transfer safety, fall precautions  02/17/17 1156 Done               Point: Home exercise program (Done)    Description: Instruct learner(s) on appropriate technique for monitoring, assisting and/or progressing therapeutic exercises/activities.    Learning Progress Summary    Learner Readiness Method Response Comment Documented by Status   Patient Acceptance E VU  LW 02/20/17 1247 Done    Eager E VU  SC 02/19/17 1303 Done               Point: Precautions (Done)    Description: Instruct learner(s) on prescribed precautions during self-care and functional transfers.    Learning Progress Summary    Learner Readiness Method Response Comment Documented by Status   Patient Acceptance E VU  LW 02/20/17 1247 Done    Acceptance E VU,NR  functional transfer safety, fall precautions  02/17/17 1156 Done               Point: Body mechanics (Done)    Description: Instruct learner(s) on proper positioning and spine alignment during self-care, functional mobility activities and/or exercises.    Learning Progress Summary    Learner Readiness Method Response Comment Documented by Status   Patient Acceptance CHERYL PIERCE   02/20/17 1247 Done                      User Key     Initials Effective Dates Name Provider Type Discipline     10/17/16 -  Dayanna Cook OTAUGUSTINE/L Occupational Therapist OT    SC 10/17/16 -  PATRICK Rosado/L Occupational Therapy Assistant OT     10/17/16 -  PATRICK Chan/L Occupational Therapy Assistant OT                  OT Recommendation and Plan  Anticipated Discharge Disposition: home with home health  Planned Therapy Interventions: activity intolerance, adaptive equipment training, ADL retraining, balance training, energy conservation, home exercise program, strengthening, transfer training  Therapy Frequency: other (see comments) (3-14 times/wk)  Plan of Care Review  Progress: progress toward functional goals as expected  Outcome Summary/Follow up Plan: Pt is working on building strength and independence. Follow POC.        Outcome Measures       02/20/17 0950 02/18/17 1133       How much help from another person do you currently need...    Turning from your back to your side while in flat bed without using bedrails?  4  -MARIA ESTHER     Moving from lying on back to sitting on the side of a flat bed without bedrails?  4  -MARIA ESTHER     Moving to and from a bed to a chair (including a wheelchair)?  4  -MARIA ESTHER     Standing up from a chair using your arms (e.g., wheelchair, bedside chair)?  4  -MARIA ESTHER     Climbing 3-5 steps with a railing?  3  -MARIA ESTHER     To walk in hospital room?  4  -MARIA ESTHER     AM-PAC 6 Clicks Score  23  -MARIA ESTHER     How much help from another is currently needed...    Putting on and taking off regular lower body clothing? 3  -LW       Bathing (including washing, rinsing, and drying) 3  -LW      Toileting (which includes using toilet bed pan or urinal) 3  -LW      Putting on and taking off regular upper body clothing 3  -LW      Taking care of personal grooming (such as brushing teeth) 3  -LW      Eating meals 4  -LW      Score 19  -LW        User Key  (r) = Recorded By, (t) = Taken By, (c) = Cosigned By    Initials Name Provider Type    MARIA ESTHER Back, PTA Physical Therapy Assistant    LW BRANDYN Chan Occupational Therapy Assistant           Time Calculation:         Time Calculation- OT       02/20/17 1251          Time Calculation- OT    OT Start Time 0850  -LW      OT Stop Time 0915  -LW      OT Time Calculation (min) 25 min  -LW      Total Timed Code Minutes- OT 25 minute(s)  -LW      OT Received On 02/20/17  -LW        User Key  (r) = Recorded By, (t) = Taken By, (c) = Cosigned By    Initials Name Provider Type    LW BRANDYN Chan Occupational Therapy Assistant           Therapy Charges for Today     Code Description Service Date Service Provider Modifiers Qty    88553299254 HC OT THERAPEUTIC ACT EA 15 MIN 2/20/2017 BRANDYN Cahn GO 1    10956523306 HC OT SELF CARE/MGMT/TRAIN EA 15 MIN 2/20/2017 BRANDYN Chan GO 1          OT G-codes  OT Professional Judgement Used?: Yes  OT Functional Scales Options: AM-PAC 6 Clicks Daily Activity (OT)  Score: 19  Functional Limitation: Self care  Self Care Current Status (): At least 40 percent but less than 60 percent impaired, limited or restricted  Self Care Goal Status (): At least 20 percent but less than 40 percent impaired, limited or restricted    BRANDYN Chan  2/20/2017

## 2017-02-20 NOTE — PLAN OF CARE
Problem: Patient Care Overview (Adult)  Goal: Plan of Care Review  Outcome: Ongoing (interventions implemented as appropriate)    02/20/17 0958   Coping/Psychosocial Response Interventions   Plan Of Care Reviewed With patient   Patient Care Overview   Progress improving   Outcome Evaluation   Outcome Summary/Follow up Plan Pt independent in room; spends most of waking hours sitting in chiar to aid breathing       Goal: Adult Individualization and Mutuality  Outcome: Ongoing (interventions implemented as appropriate)  Goal: Discharge Needs Assessment  Outcome: Ongoing (interventions implemented as appropriate)    Problem: Respiratory Insufficiency (Adult)  Goal: Acid/Base Balance  Outcome: Ongoing (interventions implemented as appropriate)  Goal: Effective Ventilation  Outcome: Ongoing (interventions implemented as appropriate)    Problem: Fall Risk (Adult)  Goal: Absence of Falls  Outcome: Ongoing (interventions implemented as appropriate)

## 2017-02-20 NOTE — PLAN OF CARE
Problem: Patient Care Overview (Adult)  Goal: Plan of Care Review  Outcome: Ongoing (interventions implemented as appropriate)    02/20/17 0349   Outcome Evaluation   Outcome Summary/Follow up Plan Pt slept well, improving.       Goal: Adult Individualization and Mutuality  Outcome: Ongoing (interventions implemented as appropriate)  Goal: Discharge Needs Assessment  Outcome: Ongoing (interventions implemented as appropriate)    Problem: Respiratory Insufficiency (Adult)  Goal: Acid/Base Balance  Outcome: Ongoing (interventions implemented as appropriate)  Goal: Effective Ventilation  Outcome: Ongoing (interventions implemented as appropriate)    Problem: Fall Risk (Adult)  Goal: Absence of Falls  Outcome: Ongoing (interventions implemented as appropriate)

## 2017-02-20 NOTE — DISCHARGE SUMMARY
Date of Admission: 2/15/2017  Date of Discharge:  2/20/2017    Discharge Diagnosis:    Acute respiratory failure, hypoxemic in nature  Acute COPD exacerbation  Refer to my assessment and plan below    Presenting Problem/History of Present Illness    Shortness of breath    Hospital Course    Patient was presented with acute shortness of breath.  Patient found to be in acute hypoxic respiratory failure with COPD exacerbation and started on IV steroids, nebulizer treatment, oxygen and antibiotics.  Patient started doing much better with that.  But developed hyperglycemia for which I put patients on insulin.  Insulin to be discontinued when patient completes steroid therapy.  patient does not have history of diabetes.  Steroids were gradually tapered off and change over to oral steroids at the time of the discharge.  Patient also does have significant functional decline and she could not take care of herself at home.  PT and OT were ordered and patient was willing to go to the nursing home and was accepted so she is being discharged to nursing home in improved and stable condition.  Patient also developed some rash of unclear etiology but it was getting better with steroids and Benadryl and completely disappeared later on.  I suspect if it could be due to ancef which she received in emergency room.  Procedures Performed:    None    Consults:  None  Consults     No orders found from 1/17/2017 to 2/16/2017.          Pertinent Test Results:    Refer to chart    Condition on Discharge: Improved and stable  Subjective: Doing much better .  Breathing much better.  No chest pain.  Mild dry cough.  No more rash.  Just little itching.  No other new complaints.  Chart reviewed.  She got 1 dose of Ancef in ER.  No family members are present.  Allergies: No prior history of allergy to penicillin or related medicines.      Review of Systems:    Review of Systems   Constitutional: Negative for chills and fever.   All other systems  reviewed and are negative.      Objective     Vital Signs:  Temp:  [96.5 °F (35.8 °C)-98.3 °F (36.8 °C)] 96.6 °F (35.9 °C)  Heart Rate:  [71-95] 95  Resp:  [18-20] 18  BP: (137-183)/(68-87) 137/68    Physical Exam:   Physical Exam   Constitutional: She is oriented to person, place, and time. She appears well-developed and well-nourished. No distress.   HENT:   Nose: Nose normal.   Mouth/Throat: Oropharynx is clear and moist. No oropharyngeal exudate.   Eyes: Conjunctivae and EOM are normal. No scleral icterus.   Neck: Neck supple. No JVD present.   Cardiovascular: Normal rate, regular rhythm and normal heart sounds.    Pulmonary/Chest: Tachypnea noted. No respiratory distress. She has wheezes (mild, bilaterally). She has rales.   Abdominal: Soft. She exhibits no distension. There is no tenderness.   Musculoskeletal: Normal range of motion. She exhibits no edema.   Neurological: She is alert and oriented to person, place, and time. No cranial nerve deficit.   Skin: Skin is warm and dry. No rash noted. She is not diaphoretic. No erythema. No pallor.   Psychiatric: She has a normal mood and affect.          Results Review:       Lab Results (last 24 hours)     Procedure Component Value Units Date/Time    POC Glucose Fingerstick [[id number]]  (Abnormal) Collected:  02/19/17 1954    Specimen:  Blood Updated:  02/19/17 2008     Glucose 225 (H) mg/dL       RN NotifiedMeter: [id number]: [id number] BROWN LEON       POC Glucose Fingerstick [[id number]]  (Abnormal) Collected:  02/19/17 1526    Specimen:  Blood Updated:  02/20/17 0025     Glucose 311 (H) mg/dL       RN NotifiedMeter: [id number]: [id number] MARLONCECILIO MARIE       POC Glucose Fingerstick [[id number]]  (Abnormal) Collected:  02/20/17 0721    Specimen:  Blood Updated:  02/20/17 0824     Glucose 254 (H) mg/dL       RN NotifiedMeter: [id number]: [id number] DAVILA SULEIMAN       POC Glucose Fingerstick [[id number]]  (Abnormal) Collected:   17 1038    Specimen:  Blood Updated:  17 1117     Glucose 270 (H) mg/dL       RN NotifiedMeter: [id number]: [id number] GIOVANNI BEARDEN             Medication Review: medications have been reviewed    Assessment/Plan: Overall doing much better.    1.  Acute on chronic respiratory failure, hypoxic in nature.  Home oxygen and nebulizer treatment dependent  -Started on IV steroids, nebulizer treatment, oxygen.  Continue tapering steroids, but continue on same dose.  She will switch over to by mouth on discharge.  2. COPD with acute exacerbation  -Treatment as above  3.  History of cardiac arrhythmias  -Resume home medicines   4.  Hypokalemia  -Replace potassium, better, reduce potassium to once a day  5.  Rash, unclear etiology, suggestive of possibility of urticaria  -Continue when necessary Benadryl and continue steroids  6.  Borderline abnormal troponin  -Repeat troponin is back to normal  7.  Generalized functional decline  -PT and OT to evaluate and treat  8.  Hyperglycemia secondary to steroids  -Started Levemir during hospitalization, we will discontinue on discharge  DVT prophylaxis: Eliquis  Length of stay: More than 2 midnights  Anticipated disposition:  To nursing home today        Vital Signs past 24hrs  BP range: BP: (137-183)/(68-87) 137/68  Pulse range: Heart Rate:  [71-95] 95  Resp rate range: Resp:  [18-20] 18  Temp range: Temp (24hrs), Av.2 °F (36.2 °C), Min:96.5 °F (35.8 °C), Max:98.3 °F (36.8 °C)    O2 Device: nasal cannulaFlow (L/min):  [3] 3  Oxygen range:SpO2:  [92 %-98 %] 92 %   186 lb (84.4 kg); Body mass index is 35.14 kg/(m^2).    Intake/Output Summary (Last 24 hours) at 17 1732  Last data filed at 17 1003   Gross per 24 hour   Intake   1200 ml   Output      0 ml   Net   1200 ml       Physical Exam  Refer to my note above    Discharge Disposition  Skilled Nursing Facility (DC - External)    Discharge Medications     Your medication list       As of  2/20/2017  1:06 PM      START taking these medications       Instructions Last Dose Given Next Dose Due    insulin detemir 100 UNIT/ML injection   Commonly known as:  LEVEMIR        Inject 20 Units under the skin Every Morning for 10 days. Stop after 10 days         pravastatin 80 MG tablet   Commonly known as:  PRAVACHOL        Take 1 tablet by mouth Every Night.           CONTINUE taking these medications       Instructions Last Dose Given Next Dose Due    acetaminophen 500 MG tablet   Commonly known as:  TYLENOL              albuterol 108 (90 BASE) MCG/ACT inhaler   Commonly known as:  PROVENTIL HFA;VENTOLIN HFA        Inhale 2 puffs 4 (Four) Times a Day.         albuterol 0.63 MG/3ML nebulizer solution   Commonly known as:  ACCUNEB        Take 3 mL by nebulization Every 6 (Six) Hours As Needed for wheezing.         amiodarone 200 MG tablet   Commonly known as:  PACERONE        Take 1 tablet by mouth Daily.         apixaban 5 MG tablet tablet   Commonly known as:  ELIQUIS        Take 1 tablet by mouth Every 12 (Twelve) Hours.         cefuroxime 500 MG tablet   Commonly known as:  CEFTIN        Take 1 tablet by mouth 2 (Two) Times a Day.         diltiaZEM  MG 24 hr capsule   Commonly known as:  CARDIZEM CD        Take 1 capsule by mouth Daily.         docusate sodium 100 MG capsule   Commonly known as:  COLACE              furosemide 40 MG tablet   Commonly known as:  LASIX        Take 1 tablet by mouth Daily.         HYDROcodone-acetaminophen 5-325 MG per tablet   Commonly known as:  NORCO   Notes to Patient:  Do not exceed 4,000 mg of acetaminophen in 24 hours.        Take 1 tablet by mouth Every 6 (Six) Hours As Needed for moderate pain (4-6).         ipratropium-albuterol 0.5-2.5 mg/mL nebulizer   Commonly known as:  DUO-NEB              potassium chloride 10 MEQ CR tablet   Commonly known as:  K-DUR,KLOR-CON        Take 1 tablet by mouth 2 (Two) Times a Day.         predniSONE 20 MG tablet   Commonly  known as:  DELTASONE        1 PO bid for 5 days, 1 PO daily for 5 days         SYMBICORT 160-4.5 MCG/ACT inhaler   Generic drug:  budesonide-formoterol   Notes to Patient:  Rinse mouth with water after use.        inhale 2 puffs by mouth twice a day              Where to Get Your Medications      [facility] [address] [place] [address] - [address] [place] [address] - [phone]  - [phone]   [address] [address]     Phone:  [phone]    • insulin detemir 100 UNIT/ML injection   • pravastatin 80 MG tablet   • predniSONE 20 MG tablet             Discharge Diet:  No active diet order   ADA, cardiac diet.    Activity at Discharge:    As tolerated.  PT and OT ordered at nursing home.    Follow-up Appointments  Follow-up Information     Follow up with [facility] .    Specialties:  Skilled Nursing Facility, Intermediate Care Facility    Why:  Facility provider to see within 7 days.    Contact information:    [address] [address] [phone]        Follow up with Krista Matos MD Follow up in 1 week(s).    Specialty:  Internal Medicine    Contact information:    [address] [place] [address] [phone]          Referrals and Follow-ups to Schedule     Ambulatory Referral to Occupational Therapy    As directed        Ambulatory Referral to Physical Therapy Evaluate and treat    As directed    Specialty modality needed?:  Evaluate and treat                 Discharge Instructions    See nursing notes    Test Results Pending at Discharge       Aneudy Maldonado MD  02/20/17  5:32 PM    Time: I spent more than 30mins in the discharge planning of this patient.    EMR Dragon/Transcription disclaimer:   Much of this encounter note is an electronic transcription/translation of spoken language to printed text. The electronic translation of spoken language may permit erroneous, or at times,  nonsensical words or phrases to be inadvertently transcribed; Although I have reviewed the note for such errors, some may still exist.

## 2017-02-20 NOTE — PLAN OF CARE
Problem: Patient Care Overview (Adult)  Goal: Plan of Care Review  Outcome: Ongoing (interventions implemented as appropriate)    02/20/17 0950   Patient Care Overview   Progress progress toward functional goals as expected   Outcome Evaluation   Outcome Summary/Follow up Plan Pt is working on building strength and independence. Follow POC.         02/20/17 0950   Patient Care Overview   Progress progress toward functional goals as expected   Outcome Evaluation   Outcome Summary/Follow up Plan Pt is working on building strength and independence. Follow POC.       Goal: Discharge Needs Assessment  Outcome: Ongoing (interventions implemented as appropriate)    02/16/17 0401 02/16/17 1542 02/17/17 1021   Discharge Needs Assessment   Concerns To Be Addressed --  --  --    Readmission Within The Last 30 Days other (see comments)  (the patient was just here two weeks ago with COPD ) --  --    Equipment Needed After Discharge --  --  --    Discharge Facility/Level Of Care Needs --  --  --    Current Discharge Risk --  chronically ill --    Discharge Planning Comments --  pt. is interested in home care services vs. NH placement.  --    Living Environment   Transportation Available --  --  family or friend will provide   Self-Care   Equipment Currently Used at Home --  --  other (see comments);bath bench;cane, straight;grab bar;raised toilet;oxygen;walker, standard  (rollator)     02/17/17 1311 02/18/17 1133 02/20/17 0958   Discharge Needs Assessment   Concerns To Be Addressed --  --  no discharge needs identified   Readmission Within The Last 30 Days --  --  --    Equipment Needed After Discharge --  walker, rolling --    Discharge Facility/Level Of Care Needs home with home health;nursing facility, skilled --  --    Current Discharge Risk --  --  --    Discharge Planning Comments --  --  --    Living Environment   Transportation Available --  --  --    Self-Care   Equipment Currently Used at Home --  --  --           Problem: Inpatient Occupational Therapy  Goal: Transfer Training Goal 1 LTG- OT  Outcome: Ongoing (interventions implemented as appropriate)    02/17/17 1311 02/20/17 0950   Transfer Training OT LTG   Transfer Training OT LTG, Date Established 02/17/17 --    Transfer Training OT LTG, Time to Achieve by discharge --    Transfer Training OT LTG, Activity Type bed to chair /chair to bed;walk-in shower;sit to stand/stand to sit;toilet --    Transfer Training OT LTG, [place] Level conditional independence --    Transfer Training OT LTG, Date Goal Reviewed --  02/20/17   Transfer Training OT LTG, Outcome --  goal met       Goal: Dymanic Standing Balance Goal STG- OT  Outcome: Ongoing (interventions implemented as appropriate)    02/17/17 1311 02/20/17 0950   Dynamic Standing Balance OT STG   Dynamic Standing Balance OT STG, Date Established 02/17/17 --    Dynamic Standing Balance OT STG, Time to Achieve 5 - 7 days --    Dynamic Standing Balance OT STG, [place] Level supervision required --    Dynamic Standing Balance OT STG, Assist Device assistive Device --    Dynamic Standing Balance OT STG, Additional Goal 5 min --    Dynamic Standing Balance OT STG, Date Goal Reviewed --  02/20/17   Dynamic Standing Balance OT STG, Outcome --  goal met       Goal: ADL Goal LTG- OT    02/17/17 1311 02/20/17 0950   ADL OT LTG   ADL OT LTG, Date Established 02/17/17 --    ADL OT LTG, Time to Achieve by discharge --    ADL OT LTG, Activity Type ADL skills --    ADL OT LTG, [place] Level modified independent --    ADL OT LTG, Date Goal Reviewed --  02/20/17   ADL OT LTG, Outcome --  goal ongoing       Goal: Activity Tolerance Goal STG- OT    02/17/17 1311 02/20/17 0950   Activity Tolerance OT STG   Activity Tolerance Goal OT STG, Date Established 02/17/17 --    Activity Tolerance Goal OT STG, Time to Achieve 5 - 7 days --    Activity Tolerance Goal OT STG, Activity Level 10 min activity;O2 sat >/equal to 90% --     Activity Tolerance Goal OT STG, Date Goal Reviewed --  02/20/17   Activity Tolerance Goal OT STG, Outcome --  goal met

## 2017-02-20 NOTE — PLAN OF CARE
Problem: Respiratory Insufficiency (Adult)  Goal: Acid/Base Balance  Outcome: Ongoing (interventions implemented as appropriate)    02/19/17 1930   Respiratory Insufficiency (Adult)   Acid/Base Balance making progress toward outcome

## 2017-02-20 NOTE — DISCHARGE PLACEMENT REQUEST
"Norma Harkins (86 y.o. Female)     Date of Birth Social Security Number Address Home Phone MRN    01/05/1931  [address] [facility] [address] [phone] [id number]    Rastafari Marital Status          Latter-day        Admission Date Admission Type Admitting Provider Attending Provider Department, Room/Bed    2/15/17 Emergency Alfredo Maldonado MD Patel, Harshul Amrutlal, MD [address], 414/1    Discharge Date Discharge Disposition Discharge Destination                      Attending Provider: Alfredo Maldonado MD     Allergies:  No Known Allergies    Isolation:  None   Infection:  None   Code Status:  FULL    Ht:  61\" (154.9 cm)   Wt:  186 lb (84.4 kg)    Admission Cmt:  None   Principal Problem:  None                Active Insurance as of 2/15/2017     Primary Coverage     Payor Plan Insurance Group Employer/Plan Group    MEDICARE MEDICARE A & B      Payor Plan Address Payor Plan Phone Number Effective From Effective To    [address] [phone] 1/1/1996     [place] [address]       Subscriber Name Subscriber Birth Date Member ID       NORMA HARKINS 1/5/1931 [id number]           Secondary Coverage     Payor Plan Insurance Group Employer/Plan Group    AARP MED SUPP AAR HEALTH CARE OPTIONS      Payor Plan Address Payor Plan Phone Number Effective From Effective To    [facility] [phone] 1/1/2016     [address]       [place] [address]       Subscriber Name Subscriber Birth Date Member ID       NORMA HARKINS 1/5/1931 [id number]                 Emergency Contacts      (Rel.) Home Phone Work Phone Mobile Phone    Dang Harry (Daughter) -- [phone]            Emergency Contact Information     Name Relation Home Work Mobile    Dang Harry Daughter  [phone]          Insurance Information                MEDICARE/MEDICARE A & B Phone: [phone]    Subscriber: KaterineCarol rangela L Subscriber#: " [id number]    Group#:  Precert#:         AAR MED SUPP/[facility] HEALTH CARE OPTIONS Phone: [phone]    Subscriber: Norma Harkins Subscriber#: [id number]    Group#:  Precert#:              History & Physical      Daren Wu MD at 2/15/2017  7:14 PM          History and Physical  Daren Wu MD  Hospitalist      Patient Care Team:  Krista Matos MD as PCP - General  Jenny Salguero as Care Coordinator (Population Health)    Chief complaint   Chief Complaint   Patient presents with   • Shortness of Breath       Subjective     Patient is a 86 y.o. female admitted again with cough, congestion, wheezing. She did not do too well since her last discharge 10 days ago or so. She has tried again oral antibiotics / oral steroids but her symptoms have worsened once more.      History  Past Medical History   Diagnosis Date   • Chronic obstructive lung disease 04/26/2016     on oxygen      • Chronic respiratory failure with hypoxia 04/26/2016   • Essential hypertension 09/08/2015   • Hyperlipidemia    • Obesity    • Paroxysmal atrial fibrillation 03/15/2016     Past Surgical History   Procedure Laterality Date   • Joint replacement       total knee    • Hysterectomy     • Cataract extraction       Family History   Problem Relation Age of Onset   • Heart disease Other    • Hypertension Other    • Lung cancer Other      Social History   Substance Use Topics   • Smoking status: Former Smoker   • Smokeless tobacco: Never Used      Comment:  Patient quit smoking more than 30 years ago, she smoked 1pd for 30 years prior to that   • Alcohol use No       (Not in a hospital admission)  Allergies:  Review of patient's allergies indicates no known allergies.    Review of Systems  Review of Systems   Constitutional: Positive for appetite change and fatigue. Negative for chills and fever.   HENT: Positive for congestion.    Respiratory: Positive for cough, chest tightness, shortness of breath and wheezing.    Cardiovascular:  Negative for chest pain, palpitations and leg swelling.   Gastrointestinal: Negative for abdominal distention, abdominal pain, constipation and diarrhea.   Genitourinary: Negative for dysuria and frequency.   Musculoskeletal: Positive for back pain.   Skin: Negative for pallor.   Neurological: Positive for weakness and light-headedness. Negative for syncope.   Psychiatric/Behavioral: Negative for agitation and behavioral problems.   All other systems reviewed and are negative.      Objective     Vital Signs  Temp:  [98.1 °F (36.7 °C)] 98.1 °F (36.7 °C)  Heart Rate:  [88-97] 97  Resp:  [18-20] 18  BP: (132-137)/(81-87) 132/81    Physical Exam:  Physical Exam   Constitutional: She appears well-developed and well-nourished.   HENT:   Head: Normocephalic and atraumatic.   Eyes: EOM are normal.   Neck: Neck supple.   Cardiovascular: Normal rate and regular rhythm.    Pulmonary/Chest: She is in respiratory distress (mild to moderate). She has wheezes. She has rales.   Abdominal: Soft. Bowel sounds are normal.   Musculoskeletal: Normal range of motion. She exhibits no edema or deformity.   Neurological: She is alert.   Skin: Skin is warm and dry.   Psychiatric: She has a normal mood and affect.   Vitals reviewed.      Results Review:   Lab Results (last 24 hours)     Procedure Component Value Units Date/Time    [place] Draw [[id number]] Collected:  02/15/17 1636    Specimen:  Blood Updated:  02/15/17 1701    Narrative:       The following orders were created for panel order [place] Draw.  Procedure                               Abnormality         Status                     ---------                               -----------         ------                     Light Blue Top[[id number]]                                    In process                 Green Top (Gel)[[id number]]                                   In process                 Lavender Top[[id number]]                                      In process                 Gold Top  - SST[[id number]]                                    In process                   Please view results for these tests on the individual orders.    Gold Top - SST [[id number]] Collected:  02/15/17 1636    Specimen:  Blood Updated:  02/15/17 1701    Light Blue Top [[id number]] Collected:  02/15/17 1636    Specimen:  Blood Updated:  02/15/17 1701    Lavender Top [[id number]] Collected:  02/15/17 1636    Specimen:  Blood Updated:  02/15/17 1701    Green Top (Gel) [[id number]] Collected:  02/15/17 1636    Specimen:  Blood Updated:  02/15/17 1701    CBC Auto Differential [[id number]]  (Abnormal) Collected:  02/15/17 1636    Specimen:  Blood Updated:  02/15/17 1713     WBC 10.35 (H) 10*3/mm3      RBC 3.62 (L) 10*6/mm3      Hemoglobin 11.9 (L) g/dL      Hematocrit 34.5 (L) %      MCV 95.3 fL      MCH 32.9 pg      MCHC 34.5 g/dL      RDW 13.3 %      RDW-SD 45.9 fl      MPV 11.1 fL      Platelets 129 (L) 10*3/mm3      Neutrophil % 90.0 (H) %      Lymphocyte % 3.8 (L) %      Monocyte % 5.5 %      Eosinophil % 0.1 %      Basophil % 0.1 %      Immature Grans % 0.5 %      Neutrophils, Absolute 9.32 (H) 10*3/mm3      Lymphocytes, Absolute 0.39 (L) 10*3/mm3      Monocytes, Absolute 0.57 10*3/mm3      Eosinophils, Absolute 0.01 10*3/mm3      Basophils, Absolute 0.01 10*3/mm3      Immature Grans, Absolute 0.05 (H) 10*3/mm3     Comprehensive Metabolic Panel [[id number]]  (Abnormal) Collected:  02/15/17 1636    Specimen:  Blood Updated:  02/15/17 1716     Glucose 171 (H) mg/dL      BUN 16 mg/dL      Creatinine 0.82 mg/dL      Sodium 132 (L) mmol/L      Potassium 3.5 mmol/L      Chloride 91 (L) mmol/L      CO2 32.0 (H) mmol/L      Calcium 8.9 mg/dL      Total Protein 6.5 g/dL      Albumin 3.50 g/dL      ALT (SGPT) 35 U/L      AST (SGOT) 26 U/L      Alkaline Phosphatase 43 U/L      Total Bilirubin 0.5 mg/dL      eGFR Non African Amer 66 mL/min/1.73      Globulin 3.0 gm/dL      A/G Ratio 1.2 g/dL      BUN/Creatinine Ratio 19.5      Anion Gap 9.0 mmol/L      Narrative:       The MDRD GFR formula is only valid for adults with stable renal function between ages 18 and 70.    BNP [[id number]]  (Normal) Collected:  02/15/17 1636    Specimen:  Blood Updated:  02/15/17 1731     proBNP 1040.0 pg/mL     Protime-INR [[id number]]  (Normal) Collected:  02/15/17 1636    Specimen:  Blood Updated:  02/15/17 1733     Protime 13.6 Seconds      INR 1.04     Narrative:       Therapeutic range for most indications is 2.0-3.0 INR,  or 2.5-3.5 for mechanical heart valves.    Troponin [[id number]]  (Abnormal) Collected:  02/15/17 1636    Specimen:  Blood Updated:  02/15/17 1735     Troponin I 0.035 (H) ng/mL     CBC & Differential [[id number]] Collected:  02/15/17 1636    Specimen:  Blood Updated:  02/15/17 1807    Narrative:       The following orders were created for panel order CBC & Differential.  Procedure                               Abnormality         Status                     ---------                               -----------         ------                     Scan Slide[[id number]]                                        Final result               CBC Auto Differential[[id number]]         Abnormal            Final result                 Please view results for these tests on the individual orders.    Scan Slide [[id number]] Collected:  02/15/17 1636    Specimen:  Blood Updated:  02/15/17 1807     RBC Morphology Normal      WBC Morphology Normal      Platelet Estimate Adequate           Imaging Results (last 24 hours)     Procedure Component Value Units Date/Time    XR Chest 2 View [[id number]] Collected:  02/15/17 1654     Updated:  02/15/17 1657    Narrative:       Patient Name:  ANN MARIE BOWERS COOMESPatient ID:  [id number] Ordering:   JAZMINE MILLERAttending:  JAZMINE MILLERReferring:  JAZMINE MILLER------------------------------------------------Shortness  of breath.    Chest, AP and lateral views.    Comparison is made with study dated to February 3, 2017.    There is the upper limits of normal in  size. There are mild  bilateral interstitial markings. No pleural effusion is  identified. No acute bony abnormality is seen. There are  degenerative changes of the right shoulder. Degenerative changes  of the thoracic spine. There is osteopenia.      Impression:       CONCLUSION: Mild bilateral interstitial markings.    Electronically signed by:  Frank Hill MD  2/15/2017 4:56  PM CST Workstation: [id number]          Assessment/Plan     Active Problems:    Acute on chronic respiratory failure with hypoxia    Chronic obstructive pulmonary disease with acute exacerbation    Resume nebulized treatments, IV steroids, inhaled steroids.    Daren Wu MD  02/15/17  7:14 PM       Electronically signed by Daren Wu MD at 2/15/2017 11:30 PM        Vital Signs (last 24 hours)       02/19 0700  -  02/20 0659 02/20 0700  -  02/20 1158   Most Recent    Temp (°F) 96.5 -  98.3    96.6 -  97.4     96.6 (35.9)    Heart Rate 71 -  96    79 -  95     95    Resp 8 -  20      18     18    /88 -  168/76    137/68 -  (!) 183/87     137/68    SpO2 (%) 95 -  98    92 -  96     92          Intake & Output (last day)       02/19 0701 - 02/20 0700 02/20 0701 - 02/21 0700    P.O. 1280 480    Total Intake(mL/kg) 1280 (15.2) 480 (5.7)    Net +1280 +480          Unmeasured Urine Occurrence 3 x 2 x    Unmeasured Stool Occurrence 1 x 1 x        Lines, Drains & Airways    Active LDAs     None                Hospital Medications (active)       Dose Frequency Start End    albuterol (PROVENTIL) nebulizer solution 0.083% 2.5 mg/3mL 2.5 mg Every 6 Hours - RT 2/16/2017     Sig - Route: Take 2.5 mg by nebulization Every 6 (Six) Hours. - Nebulization    amiodarone (PACERONE) tablet 200 mg 200 mg Daily 2/16/2017     Sig - Route: Take 1 tablet by mouth Daily. - Oral    Non-formulary Exception Code: Patient supplied medication    apixaban (ELIQUIS) tablet 5 mg 5 mg Every 12 Hours Scheduled 2/16/2017     Sig - Route: Take 1 tablet by  mouth Every 12 (Twelve) Hours. - Oral    Non-formulary Exception Code: Patient supplied medication    budesonide-formoterol (SYMBICORT) 160-4.5 MCG/ACT inhaler 2 puff 2 puff 2 Times Daily - RT 2/15/2017     Sig - Route: Inhale 2 puffs 2 (Two) Times a Day. - Inhalation    diltiaZEM CD (CARDIZEM CD) 24 hr capsule 120 mg 120 mg Daily 2/16/2017     Sig - Route: Take 1 capsule by mouth Daily. - Oral    Non-formulary Exception Code: Patient supplied medication    diphenhydrAMINE (BENADRYL) tablet 25 mg 25 mg Every 6 Hours PRN 2/17/2017     Sig - Route: Take 1 tablet by mouth Every 6 (Six) Hours As Needed for itching. - Oral    diphenhydrAMINE-zinc acetate 2-0.1 % cream  3 Times Daily PRN 2/16/2017     Sig - Route: Apply  topically 3 (Three) Times a Day As Needed for itching or rash. - Topical    docusate sodium (COLACE) capsule 300 mg 300 mg Daily 2/15/2017     Sig - Route: Take 3 capsules by mouth Daily. - Oral    furosemide (LASIX) tablet 40 mg 40 mg Daily 2/16/2017     Sig - Route: Take 1 tablet by mouth Daily. - Oral    Non-formulary Exception Code: Patient supplied medication    HYDROcodone-acetaminophen (NORCO) 5-325 MG per tablet 1 tablet 1 tablet Every 6 Hours PRN 2/16/2017 2/26/2017    Sig - Route: Take 1 tablet by mouth Every 6 (Six) Hours As Needed for moderate pain (4-6). - Oral    Non-formulary Exception Code: Patient supplied medication    insulin detemir (LEVEMIR) injection 20 Units 20 Units Every Morning 2/18/2017     Sig - Route: Inject 20 Units under the skin Every Morning. - Subcutaneous    methylPREDNISolone sodium succinate (SOLU-Medrol) injection 40 mg 40 mg Every 8 Hours 2/17/2017     Sig - Route: Infuse 1 mL into a venous catheter Every 8 (Eight) Hours. - Intravenous    potassium chloride (K-DUR,KLOR-CON) CR tablet 20 mEq 20 mEq Daily 2/19/2017     Sig - Route: Take 1 tablet by mouth Daily. - Oral    Non-formulary Exception Code: Patient supplied medication    pravastatin (PRAVACHOL) tablet 80 mg  80 mg Nightly 2/17/2017     Sig - Route: Take 2 tablets by mouth Every Night. - Oral    Notes to Pharmacy: Pt taking home med    sodium chloride 0.9 % flush 1-10 mL 1-10 mL As Needed 2/15/2017     Sig - Route: Infuse 1-10 mL into a venous catheter As Needed for line care. - Intravenous    sodium chloride 0.9 % flush 10 mL 10 mL As Needed 2/15/2017     Sig - Route: Infuse 10 mL into a venous catheter As Needed for line care. - Intravenous    acetaminophen (TYLENOL) tablet 650 mg (Discontinued) 650 mg Every 6 Hours PRN 2/20/2017 2/20/2017    Sig - Route: Take 2 tablets by mouth Every 6 (Six) Hours As Needed for mild pain (1-3) or fever. - Oral            Lab Results (last 24 hours)     Procedure Component Value Units Date/Time    POC Glucose Fingerstick [[id number]]  (Abnormal) Collected:  02/19/17 1105    Specimen:  Blood Updated:  02/19/17 1312     Glucose 296 (H) mg/dL       RN NotifiedMeter: [id number]: [id number] MARLON MARIE       POC Glucose Fingerstick [[id number]]  (Abnormal) Collected:  02/19/17 1954    Specimen:  Blood Updated:  02/19/17 2008     Glucose 225 (H) mg/dL       RN NotifiedMeter: [id number]: [id number] BROWN LEON       POC Glucose Fingerstick [[id number]]  (Abnormal) Collected:  02/19/17 1526    Specimen:  Blood Updated:  02/20/17 0025     Glucose 311 (H) mg/dL       RN NotifiedMeter: [id number]: [id number] MARLON CYNTHIA       POC Glucose Fingerstick [[id number]]  (Abnormal) Collected:  02/20/17 0721    Specimen:  Blood Updated:  02/20/17 0824     Glucose 254 (H) mg/dL       RN NotifiedMeter: [id number]: [id number] GIOVANNI BEARDEN       POC Glucose Fingerstick [[id number]]  (Abnormal) Collected:  02/20/17 1038    Specimen:  Blood Updated:  02/20/17 1117     Glucose 270 (H) mg/dL       RN NotifiedMeter: [id number]: [id number] GIOVANNI BEARDEN           Imaging Results (all)     Procedure Component Value Units Date/Time    XR Chest 2 View [[id number]]  Collected:  02/15/17 1654     Updated:  02/15/17 1657    Narrative:       Patient Name:  ANN MARIE BOWERS COOMESPatient ID:  [id number] Ordering:   JAZMINE MILLERAttending:  JAZMINE MILLERReferring:  JAZMINE MILLER------------------------------------------------Shortness  of breath.    Chest, AP and lateral views.    Comparison is made with study dated to February 3, 2017.    There is the upper limits of normal in size. There are mild  bilateral interstitial markings. No pleural effusion is  identified. No acute bony abnormality is seen. There are  degenerative changes of the right shoulder. Degenerative changes  of the thoracic spine. There is osteopenia.      Impression:       CONCLUSION: Mild bilateral interstitial markings.    Electronically signed by:  Frank Hill MD  2/15/2017 4:56  PM CST Workstation: [id number]          ECG/EMG Results (all)     Procedure Component Value Units Date/Time    SCANNED EKG [[id number]] Resulted:  02/15/17      Updated:  02/15/17 2143    ECG 12 Lead [[id number]] Collected:  02/15/17 1613     Updated:  02/16/17 0919    Narrative:       Test Reason : SOABlood Pressure : **/** mmHGVent. Rate : 093 BPM     Atrial Rate : 093 BPM   P-R Int : 180 ms          QRS Dur : 146 ms    QT Int : 412 ms       P-R-T Axes : 063 -48 017 degrees   QTc Int : 512 msNormal sinus rhythmLeft axis   deviationRight bundle branch blockAbnormal ECGWhen compared with ECG of 04-FEB-2017 06:37,QRS axis shifted leftConfirmed by GUERDA BOOGIE, B. N. (157),  SEKOU PATE (5) on2/16/2017 9:19:21 AMReferred By:             Confirmed By:GERSON CROFT MD    ECG 12 Lead [[id number]] Collected:  02/16/17 0633     Updated:  02/16/17 1305    Narrative:       Test Reason : AFibBlood Pressure : **/** mmHGVent. Rate : 066 BPM     Atrial Rate : 066 BPM   P-R Int : 176 ms          QRS Dur : 148 ms    QT Int : 466 ms       P-R-T Axes : 061 -42 -24 degrees   QTc Int : 488 msSinus rhythm with premature atrial   complexes with  aberrant conductionLeft axis deviationRight bundle branch blockAbnormal ECGWhen compared with ECG of 15-FEB-2017 16:13,aberrant conduction is now presentT wave inversion now evident in Inferior leadsT wave inversion less evident in   Anterior leadsConfirmed by GUERDA BOOGIE, B. N. (157),  SEKOU PATE (5) on2/16/2017 1:05:06 PMReferred By:             Confirmed By:GERSON CROFT MD    SCANNED EKG [[id number]] Resulted:  02/15/17      Updated:  02/16/17 1324    SCANNED EKG [[id number]] Resulted:  02/15/17      Updated:  02/16/17 1402    SCANNED EKG [[id number]] Resulted:  02/15/17      Updated:  02/17/17 1349          Operative/Procedure Notes (all)     No notes of this type exist for this encounter.           Physician Progress Notes (last 72 hours) (Notes from 2/17/2017 11:58 AM through 2/20/2017 11:58 AM)      Aneudy Maldonado MD at 2/18/2017 12:26 PM  Version 1 of 1         hospitalist  Active Problems:    Chronic obstructive pulmonary disease with acute exacerbation    Acute on chronic respiratory failure with hypoxia        Subjective: Doing better .  Decrease shortness of breath.  No chest pain.  Mild dry cough.  Still getting rash with itching but milder than before and gets better with Benadryl.  No other new complaints.  Chart reviewed.  She got 1 dose of Ancef in ER.  No family members are present.  Allergies: No prior history of allergy to penicillin or related medicines.      Review of Systems:    Review of Systems   Constitutional: Negative for chills and fever. other systems reviewed and are negative.      Objective     Vital Signs:  Temp:  [95.8 °F (35.4 °C)-98.4 °F (36.9 °C)] 95.8 °F (35.4 °C)  Heart Rate:  [] 66  Resp:  [20-22] 20  BP: (128-169)/(56-81) 166/80    Physical Exam:   Physical Exam   Constitutional: She is oriented to person, place, and time. She appears well-developed and well-nourished. No distress.   HENT:   Nose: Nose normal.   Mouth/Throat: Oropharynx is clear and  moist. No oropharyngeal exudate.   Eyes: Conjunctivae and EOM are normal. No scleral icterus.   Neck: Neck supple. No JVD present.   Cardiovascular: Normal rate, regular rhythm and normal heart sounds.    Pulmonary/Chest: Tachypnea noted. No respiratory distress. She has wheezes (mild, bilaterally). She has rales.   Abdominal: Soft. She exhibits no distension. There is no tenderness.   Musculoskeletal: Normal range of motion. She exhibits no edema.   Neurological: She is alert and oriented to person, place, and time. No cranial nerve deficit.   Skin: Skin is warm and dry. No rash noted. She is not diaphoretic. No erythema. No pallor.   Psychiatric: She has a normal mood and affect.          Results Review:       Lab Results (last 24 hours)     Procedure Component Value Units Date/Time    Troponin [[id number]]  (Normal) Collected:  02/17/17 1145    Specimen:  Blood Updated:  02/17/17 1231     Troponin I 0.032 ng/mL     Basic Metabolic Panel [[id number]]  (Abnormal) Collected:  02/17/17 1145    Specimen:  Blood Updated:  02/17/17 1234     Glucose 292 (H) mg/dL      BUN 25 (H) mg/dL      Creatinine 0.73 mg/dL      Sodium 134 (L) mmol/L      Potassium 4.3 mmol/L      Chloride 93 (L) mmol/L      CO2 34.0 (H) mmol/L      Calcium 9.3 mg/dL      eGFR Non African Amer 76 mL/min/1.73      BUN/Creatinine Ratio 34.2 (H)      Anion Gap 7.0 mmol/L     Narrative:       The MDRD GFR formula is only valid for adults with stable renal function between ages 18 and 70.    CBC & Differential [[id number]] Collected:  02/18/17 0621    Specimen:  Blood Updated:  02/18/17 0656    Narrative:       The following orders were created for panel order CBC & Differential.  Procedure                               Abnormality         Status                     ---------                               -----------         ------                     CBC Auto Differential[[id number]]         Abnormal            Final result                 Please view results  for these tests on the individual orders.    CBC Auto Differential [[id number]]  (Abnormal) Collected:  02/18/17 0621    Specimen:  Blood Updated:  02/18/17 0656     WBC 13.36 (H) 10*3/mm3      RBC 3.65 (L) 10*6/mm3      Hemoglobin 12.0 g/dL      Hematocrit 35.4 %      MCV 97.0 fL      MCH 32.9 pg      MCHC 33.9 g/dL      RDW 13.4 %      RDW-SD 47.8 (H) fl      MPV 11.1 fL      Platelets 161 10*3/mm3      Neutrophil % 88.3 (H) %      Lymphocyte % 5.2 (L) %      Monocyte % 6.0 %      Eosinophil % 0.0 %      Basophil % 0.1 %      Immature Grans % 0.4 %      Neutrophils, Absolute 11.80 (H) 10*3/mm3      Lymphocytes, Absolute 0.70 10*3/mm3      Monocytes, Absolute 0.80 10*3/mm3      Eosinophils, Absolute 0.00 10*3/mm3      Basophils, Absolute 0.01 10*3/mm3      Immature Grans, Absolute 0.05 (H) 10*3/mm3     Basic Metabolic Panel [[id number]]  (Abnormal) Collected:  02/18/17 0621    Specimen:  Blood Updated:  02/18/17 0706     Glucose 249 (H) mg/dL      BUN 26 (H) mg/dL      Creatinine 0.81 mg/dL      Sodium 134 (L) mmol/L      Potassium 4.9 mmol/L      Chloride 94 (L) mmol/L      CO2 36.0 (H) mmol/L      Calcium 8.9 mg/dL      eGFR Non African Amer 67 mL/min/1.73      BUN/Creatinine Ratio 32.1 (H)      Anion Gap 4.0 (L) mmol/L     Narrative:       The MDRD GFR formula is only valid for adults with stable renal function between ages 18 and 70.          Medication Review: medications have been reviewed    Assessment/Plan:    1.  Acute on chronic respiratory failure, hypoxic in nature.  Home oxygen and nebulizer treatment dependent  -Started on IV steroids, nebulizer treatment, oxygen.  Continue tapering steroids, but will keep same dose today due to wheezing.  2. COPD with acute exacerbation  -Treatment as above  3.  History of cardiac arrhythmias  -Resume home medicines   4.  Hypokalemia  -Replace potassium, better, reduce potassium to once a day  5.  Rash, unclear etiology, suggestive of possibility of urticaria  -Continue  when necessary Benadryl and continue steroids  6.  Borderline abnormal troponin  -Repeat troponin is back to normal  7.  Generalized functional decline  -PT and OT to evaluate and treat   Hyperglycemia secondary to steroids  -Start Levemir during hospitalization, we will discontinue on discharge  DVT prophylaxis: Eliquis  Length of stay: More than 2 midnights  Anticipated disposition: Home possibly or to nursing home on Monday        Aneudy Maldonado MD  02/18/17  12:26 PM             Electronically signed by Aneudy Maldonado MD at 2/18/2017 12:30 PM      Aneudy Maldonado MD at 2/19/2017 12:39 PM  Version 1 of 1         hospitalist  Active Problems:    Chronic obstructive pulmonary disease with acute exacerbation    Acute on chronic respiratory failure with hypoxia        Subjective: Doing much better .  Breathing much better.  No chest pain.  Mild dry cough.  No more rash.  Just little itching.  No other new complaints.  Chart reviewed.  She got 1 dose of Ancef in ER.  No family members are present.  Allergies: No prior history of allergy to penicillin or related medicines.      Review of Systems:    Review of Systems   Constitutional: Negative for chills and fever. other systems reviewed and are negative.      Objective     Vital Signs:  Temp:  [96.5 °F (35.8 °C)-97.4 °F (36.3 °C)] 96.5 °F (35.8 °C)  Heart Rate:  [73-91] 82  Resp:  [17-20] 20  BP: (143-163)/(69-94) 163/77    Physical Exam:   Physical Exam   Constitutional: She is oriented to person, place, and time. She appears well-developed and well-nourished. No distress.   HENT:   Nose: Nose normal.   Mouth/Throat: Oropharynx is clear and moist. No oropharyngeal exudate.   Eyes: Conjunctivae and EOM are normal. No scleral icterus.   Neck: Neck supple. No JVD present.   Cardiovascular: Normal rate, regular rhythm and normal heart sounds.    Pulmonary/Chest: Tachypnea noted. No respiratory distress. She has wheezes (mild, bilaterally). She has  rales.   Abdominal: Soft. She exhibits no distension. There is no tenderness.   Musculoskeletal: Normal range of motion. She exhibits no edema.   Neurological: She is alert and oriented to person, place, and time. No cranial nerve deficit.   Skin: Skin is warm and dry. No rash noted. She is not diaphoretic. No erythema. No pallor.   Psychiatric: She has a normal mood and affect.          Results Review:       Lab Results (last 24 hours)     Procedure Component Value Units Date/Time    POC Glucose Fingerstick [[id number]]  (Abnormal) Collected:  02/19/17 0739    Specimen:  Blood Updated:  02/19/17 0755     Glucose 224 (H) mg/dL       RN NotifiedMeter: [id number]: [id number] MARLON MARIE             Medication Review: medications have been reviewed    Assessment/Plan:    1.  Acute on chronic respiratory failure, hypoxic in nature.  Home oxygen and nebulizer treatment dependent  -Started on IV steroids, nebulizer treatment, oxygen.  Continue tapering steroids, but continue on same dose.  She will switch over to by mouth tomorrow and discharge planning tomorrow.  2. COPD with acute exacerbation  -Treatment as above  3.  History of cardiac arrhythmias  -Resume home medicines   4.  Hypokalemia  -Replace potassium, better, reduce potassium to once a day  5.  Rash, unclear etiology, suggestive of possibility of urticaria  -Continue when necessary Benadryl and continue steroids  6.  Borderline abnormal troponin  -Repeat troponin is back to normal  7.  Generalized functional decline  -PT and OT to evaluate and treat  8.  Hyperglycemia secondary to steroids  -Started Levemir during hospitalization, we will discontinue on discharge  DVT prophylaxis: Eliquis  Length of stay: More than 2 midnights  Anticipated disposition:  possibly  to nursing home on Monday        Aneudy Maldonado MD  02/19/17  12:39 PM             Electronically signed by Aneudy Maldonado MD at 2/19/2017 12:43 PM        Consult Notes (all)      No notes of this type exist for this encounter.        Nutrition Notes (last 24 hours) (Notes from 2/19/2017 11:59 AM through 2/20/2017 11:59 AM)     No notes of this type exist for this encounter.              Discharge Order     None

## 2017-02-20 NOTE — THERAPY DISCHARGE NOTE
Acute Care - Physical Therapy Discharge Summary  [facility]       Patient Name: Norma Harkins  : 1931  MRN: [id number]    Today's Date: 2017  Onset of Illness/Injury or Date of Surgery Date: 17    Date of Referral to PT: 17  Referring Physician: Dr. HERNÁN Maldonado      Admit Date: 2/15/2017      PT Recommendation and Plan    Visit Dx:    ICD-10-CM ICD-9-CM   1. Chronic obstructive pulmonary disease with acute exacerbation J44.1 491.21   2. Acute bronchitis, unspecified organism J20.9 466.0   3. Shortness of breath R06.02 786.05   4. Troponin I above reference range R79.89 790.6   5. Hyperglycemia R73.9 790.29   6. Impaired mobility and endurance Z74.09 V49.89   7. Impaired mobility and ADLs Z74.09 799.89   8. Chronic bilateral low back pain without sciatica M54.5 724.2    G89.29 338.29             Outcome Measures       17 0950 17 1133       How much help from another person do you currently need...    Turning from your back to your side while in flat bed without using bedrails?  4  -MARIA ESTHER     Moving from lying on back to sitting on the side of a flat bed without bedrails?  4  -MARIA ESTHER     Moving to and from a bed to a chair (including a wheelchair)?  4  -MARIA ESTHER     Standing up from a chair using your arms (e.g., wheelchair, bedside chair)?  4  -MARIA ESTHER     Climbing 3-5 steps with a railing?  3  -MARIA ESTHER     To walk in hospital room?  4  -MARIA ESTHER     AM-PAC 6 Clicks Score  23  -MARIA ESTHER     How much help from another is currently needed...    Putting on and taking off regular lower body clothing? 3  -LW      Bathing (including washing, rinsing, and drying) 3  -LW      Toileting (which includes using toilet bed pan or urinal) 3  -LW      Putting on and taking off regular upper body clothing 3  -LW      Taking care of personal grooming (such as brushing teeth) 3  -LW      Eating meals 4  -LW      Score 19  -LW        User Key  (r) = Recorded By, (t) = Taken By, (c) = Cosigned By    Initials Name Provider Type    MARIA ESTHER  Charlene Back, PTA Physical Therapy Assistant    LW Marlyn Suárez, NGUYEN/L Occupational Therapy Assistant                      IP PT Goals       02/18/17 1133 02/17/17 1039       Transfer Training PT LTG    Transfer Training PT LTG, Date Established  02/17/17  -AW     Transfer Training PT LTG, Time to Achieve  by discharge  -AW     Transfer Training PT LTG, Activity Type  sit to stand/stand to sit  -AW     Transfer Training PT LTG, [place] Level  conditional independence  -AW     Transfer Training PT LTG, Assist Device  walker, rolling  -AW     Transfer Training PT  LTG, Date Goal Reviewed 02/18/17  -      Transfer Training PT LTG, Outcome goal met  -MARIA ESTHER      Gait Training PT STG    Gait Training Goal PT STG, Date Established  02/17/17  -AW     Gait Training Goal PT STG, Time to Achieve  3 days  -AW     Gait Training Goal PT STG, [place] Level  supervision required  -AW     Gait Training Goal PT STG, Assist Device  walker, rolling  -AW     Gait Training Goal PT STG, Distance to Achieve  100  -AW     Gait Training Goal PT STG, Date Goal Reviewed 02/18/17  -MARIA ESTHER      Gait Training Goal PT STG, Outcome goal met  -MARIA ESTHER      Gait Training PT LTG    Gait Training Goal PT LTG, Date Established  02/17/17  -AW     Gait Training Goal PT LTG, Time to Achieve  by discharge  -AW     Gait Training Goal PT LTG, [place] Level  conditional independence  -AW     Gait Training Goal PT LTG, Assist Device  walker, rolling  -AW     Gait Training Goal PT LTG, Distance to Achieve  150  -AW     Gait Training Goal PT LTG, Date Goal Reviewed 02/18/17  -MARIA ESTHER      Gait Training Goal PT LTG, Outcome goal ongoing  -MARIA ESTHER      Stair Training PT LTG    Stair Training Goal PT LTG, Date Established  02/17/17  -AW     Stair Training Goal PT LTG, Time to Achieve  by discharge  -AW     Stair Training Goal PT LTG, Number of Steps  1  -AW     Stair Training Goal PT LTG, [place] Level  contact guard assist  -AW     Stair Training Goal PT LTG,  Date Goal Reviewed 02/18/17  -MARIA ESTHER      Stair Training Goal PT LTG, Outcome goal ongoing  -MARIA ESTHER        User Key  (r) = Recorded By, (t) = Taken By, (c) = Cosigned By    Initials Name Provider Type    AW Gail Barajas, PT Physical Therapist    MARIA ESTHER Back, PTA Physical Therapy Assistant              PT Discharge Summary  Anticipated Discharge Disposition: skilled nursing facility  Outcomes Achieved: Other  Discharge Destination: SNF      Alina Venegas, PT   2/20/2017

## 2017-02-20 NOTE — THERAPY DISCHARGE NOTE
Acute Care - Physical Therapy Discharge Summary  [facility]       Patient Name: Norma Harkins  : 1931  MRN: [id number]    Today's Date: 2017  Onset of Illness/Injury or Date of Surgery Date: 17    Date of Referral to PT: 17  Referring Physician: Dr. HERNÁN Maldonado      Admit Date: 2/15/2017      PT Recommendation and Plan    Visit Dx:    ICD-10-CM ICD-9-CM   1. Chronic obstructive pulmonary disease with acute exacerbation J44.1 491.21   2. Acute bronchitis, unspecified organism J20.9 466.0   3. Shortness of breath R06.02 786.05   4. Troponin I above reference range R79.89 790.6   5. Hyperglycemia R73.9 790.29   6. Impaired mobility and endurance Z74.09 V49.89   7. Impaired mobility and ADLs Z74.09 799.89   8. Chronic bilateral low back pain without sciatica M54.5 724.2    G89.29 338.29             Outcome Measures       17 0950 17 1133       How much help from another person do you currently need...    Turning from your back to your side while in flat bed without using bedrails?  4  -MARIA ESTHER     Moving from lying on back to sitting on the side of a flat bed without bedrails?  4  -MARIA ESTHER     Moving to and from a bed to a chair (including a wheelchair)?  4  -MARIA ESTHER     Standing up from a chair using your arms (e.g., wheelchair, bedside chair)?  4  -MARIA ESTHER     Climbing 3-5 steps with a railing?  3  -MARIA ESTHER     To walk in hospital room?  4  -MARIA ESTHER     AM-PAC 6 Clicks Score  23  -MARIA ESTHER     How much help from another is currently needed...    Putting on and taking off regular lower body clothing? 3  -LW      Bathing (including washing, rinsing, and drying) 3  -LW      Toileting (which includes using toilet bed pan or urinal) 3  -LW      Putting on and taking off regular upper body clothing 3  -LW      Taking care of personal grooming (such as brushing teeth) 3  -LW      Eating meals 4  -LW      Score 19  -LW        User Key  (r) = Recorded By, (t) = Taken By, (c) = Cosigned By    Initials Name Provider Type    MARIA ESTHER  Charlene Back, PTA Physical Therapy Assistant    LW Marlyn Suárez, NGUYEN/L Occupational Therapy Assistant                      IP PT Goals       02/20/17 1616 02/18/17 1133 02/17/17 1039    Transfer Training PT LTG    Transfer Training PT LTG, Date Established   02/17/17  -AW    Transfer Training PT LTG, Time to Achieve   by discharge  -AW    Transfer Training PT LTG, Activity Type   sit to stand/stand to sit  -AW    Transfer Training PT LTG, [place] Level   conditional independence  -AW    Transfer Training PT LTG, Assist Device   walker, rolling  -AW    Transfer Training PT  LTG, Date Goal Reviewed (P)  02/20/17  -CB 02/18/17  -MARIA ESTHER     Transfer Training PT LTG, Outcome (P)  goal met  -CB goal met  -MARIA ESTHER     Gait Training PT STG    Gait Training Goal PT STG, Date Established   02/17/17  -AW    Gait Training Goal PT STG, Time to Achieve   3 days  -AW    Gait Training Goal PT STG, [place] Level   supervision required  -AW    Gait Training Goal PT STG, Assist Device   walker, rolling  -AW    Gait Training Goal PT STG, Distance to Achieve   100  -AW    Gait Training Goal PT STG, Date Goal Reviewed (P)  02/20/17  -CB 02/18/17  -MARIA ESTHER     Gait Training Goal PT STG, Outcome (P)  goal met  -CB goal met  -MARIA ESTHER     Gait Training PT LTG    Gait Training Goal PT LTG, Date Established   02/17/17  -AW    Gait Training Goal PT LTG, Time to Achieve   by discharge  -AW    Gait Training Goal PT LTG, [place] Level   conditional independence  -AW    Gait Training Goal PT LTG, Assist Device   walker, rolling  -AW    Gait Training Goal PT LTG, Distance to Achieve   150  -AW    Gait Training Goal PT LTG, Date Goal Reviewed (P)  02/20/17  -CB 02/18/17  -MARIA ESTHER     Gait Training Goal PT LTG, Outcome (P)  goal not met  -CB goal ongoing  -MARIA ESTHER     Gait Training Goal PT LTG, Reason Goal Not Met (P)  discharged from facility  -CB      Stair Training PT LTG    Stair Training Goal PT LTG, Date Established   02/17/17  -AW    Stair Training  Goal PT LTG, Time to Achieve   by discharge  -AW    Stair Training Goal PT LTG, Number of Steps   1  -AW    Stair Training Goal PT LTG, [place] Level   contact guard assist  -AW    Stair Training Goal PT LTG, Date Goal Reviewed (P)  02/20/17  -CB 02/18/17  -MARIA ESTHER     Stair Training Goal PT LTG, Outcome (P)  goal not met  -CB goal ongoing  -MARIA ESTHER     Stair Training Goal PT LTG, Reason Goal Not Met (P)  discharged from facility  -CB        User Key  (r) = Recorded By, (t) = Taken By, (c) = Cosigned By    Initials Name Provider Type    CB Delmy Hughes, PT Physical Therapist    MAXIMILIANO Barajas, PT Physical Therapist    MARIA ESTHER Back, PTA Physical Therapy Assistant              PT Discharge Summary  Anticipated Discharge Disposition: skilled nursing facility  Reason for Discharge: Discharge from facility, Per MD order  Outcomes Achieved: Patient able to partially acheive established goals  Discharge Destination: SNF      Delmy Hughes, PT   2/20/2017

## 2017-03-24 ENCOUNTER — APPOINTMENT (OUTPATIENT)
Dept: GENERAL RADIOLOGY | Facility: HOSPITAL | Age: 82
End: 2017-03-24

## 2017-03-24 ENCOUNTER — HOSPITAL ENCOUNTER (INPATIENT)
Facility: HOSPITAL | Age: 82
LOS: 4 days | Discharge: NURSING, REHAB OR LONG-TERM CARE | End: 2017-03-28
Attending: FAMILY MEDICINE | Admitting: FAMILY MEDICINE

## 2017-03-24 DIAGNOSIS — I50.32 CHRONIC DIASTOLIC CONGESTIVE HEART FAILURE (HCC): ICD-10-CM

## 2017-03-24 DIAGNOSIS — Z74.09 IMPAIRED MOBILITY AND ADLS: ICD-10-CM

## 2017-03-24 DIAGNOSIS — Z74.09 DECREASED FUNCTIONAL MOBILITY AND ENDURANCE: ICD-10-CM

## 2017-03-24 DIAGNOSIS — Z78.9 IMPAIRED MOBILITY AND ADLS: ICD-10-CM

## 2017-03-24 DIAGNOSIS — I48.91 ATRIAL FIBRILLATION WITH RVR (HCC): Primary | ICD-10-CM

## 2017-03-24 LAB
ALBUMIN SERPL-MCNC: 3.6 G/DL (ref 3.4–4.8)
ALBUMIN/GLOB SERPL: 1.1 G/DL (ref 1.1–1.8)
ALP SERPL-CCNC: 83 U/L (ref 38–126)
ALT SERPL W P-5'-P-CCNC: 39 U/L (ref 9–52)
ANION GAP SERPL CALCULATED.3IONS-SCNC: 10 MMOL/L (ref 5–15)
ANION GAP SERPL CALCULATED.3IONS-SCNC: 11 MMOL/L (ref 5–15)
AST SERPL-CCNC: 34 U/L (ref 14–36)
BASOPHILS # BLD AUTO: 0.04 10*3/MM3 (ref 0–0.2)
BASOPHILS # BLD AUTO: 0.05 10*3/MM3 (ref 0–0.2)
BASOPHILS NFR BLD AUTO: 0.3 % (ref 0–2)
BASOPHILS NFR BLD AUTO: 0.4 % (ref 0–2)
BILIRUB SERPL-MCNC: 0.7 MG/DL (ref 0.2–1.3)
BUN BLD-MCNC: 29 MG/DL (ref 7–21)
BUN BLD-MCNC: 30 MG/DL (ref 7–21)
BUN/CREAT SERPL: 31.3 (ref 7–25)
BUN/CREAT SERPL: 32.2 (ref 7–25)
CALCIUM SPEC-SCNC: 8.8 MG/DL (ref 8.4–10.2)
CALCIUM SPEC-SCNC: 9.1 MG/DL (ref 8.4–10.2)
CHLORIDE SERPL-SCNC: 88 MMOL/L (ref 95–110)
CHLORIDE SERPL-SCNC: 89 MMOL/L (ref 95–110)
CK MB SERPL-CCNC: 1.05 NG/ML (ref 0–5)
CK SERPL-CCNC: 30 U/L (ref 30–135)
CO2 SERPL-SCNC: 33 MMOL/L (ref 22–31)
CO2 SERPL-SCNC: 34 MMOL/L (ref 22–31)
CREAT BLD-MCNC: 0.9 MG/DL (ref 0.5–1)
CREAT BLD-MCNC: 0.96 MG/DL (ref 0.5–1)
DEPRECATED RDW RBC AUTO: 50 FL (ref 36.4–46.3)
DEPRECATED RDW RBC AUTO: 52.1 FL (ref 36.4–46.3)
EOSINOPHIL # BLD AUTO: 0.11 10*3/MM3 (ref 0–0.7)
EOSINOPHIL # BLD AUTO: 0.14 10*3/MM3 (ref 0–0.7)
EOSINOPHIL NFR BLD AUTO: 0.9 % (ref 0–7)
EOSINOPHIL NFR BLD AUTO: 1.1 % (ref 0–7)
ERYTHROCYTE [DISTWIDTH] IN BLOOD BY AUTOMATED COUNT: 14.1 % (ref 11.5–14.5)
ERYTHROCYTE [DISTWIDTH] IN BLOOD BY AUTOMATED COUNT: 14.7 % (ref 11.5–14.5)
GFR SERPL CREATININE-BSD FRML MDRD: 55 ML/MIN/1.73 (ref 39–90)
GFR SERPL CREATININE-BSD FRML MDRD: 59 ML/MIN/1.73 (ref 39–90)
GLOBULIN UR ELPH-MCNC: 3.3 GM/DL (ref 2.3–3.5)
GLUCOSE BLD-MCNC: 115 MG/DL (ref 60–100)
GLUCOSE BLD-MCNC: 162 MG/DL (ref 60–100)
HCT VFR BLD AUTO: 32.5 % (ref 35–45)
HCT VFR BLD AUTO: 33 % (ref 35–45)
HGB BLD-MCNC: 10.7 G/DL (ref 12–15.5)
HGB BLD-MCNC: 10.9 G/DL (ref 12–15.5)
HOLD SPECIMEN: NORMAL
HOLD SPECIMEN: NORMAL
IMM GRANULOCYTES # BLD: 0.18 10*3/MM3 (ref 0–0.02)
IMM GRANULOCYTES # BLD: 0.18 10*3/MM3 (ref 0–0.02)
IMM GRANULOCYTES NFR BLD: 1.4 % (ref 0–0.5)
IMM GRANULOCYTES NFR BLD: 1.4 % (ref 0–0.5)
INR PPP: 1.58 (ref 0.8–1.2)
LYMPHOCYTES # BLD AUTO: 0.79 10*3/MM3 (ref 0.6–4.2)
LYMPHOCYTES # BLD AUTO: 1.04 10*3/MM3 (ref 0.6–4.2)
LYMPHOCYTES NFR BLD AUTO: 6.3 % (ref 10–50)
LYMPHOCYTES NFR BLD AUTO: 8 % (ref 10–50)
MCH RBC QN AUTO: 32 PG (ref 26.5–34)
MCH RBC QN AUTO: 32 PG (ref 26.5–34)
MCHC RBC AUTO-ENTMCNC: 32.9 G/DL (ref 31.4–36)
MCHC RBC AUTO-ENTMCNC: 33 G/DL (ref 31.4–36)
MCV RBC AUTO: 96.8 FL (ref 80–98)
MCV RBC AUTO: 97.3 FL (ref 80–98)
MONOCYTES # BLD AUTO: 0.86 10*3/MM3 (ref 0–0.9)
MONOCYTES # BLD AUTO: 0.94 10*3/MM3 (ref 0–0.9)
MONOCYTES NFR BLD AUTO: 6.6 % (ref 0–12)
MONOCYTES NFR BLD AUTO: 7.4 % (ref 0–12)
NEUTROPHILS # BLD AUTO: 10.58 10*3/MM3 (ref 2–8.6)
NEUTROPHILS # BLD AUTO: 10.72 10*3/MM3 (ref 2–8.6)
NEUTROPHILS NFR BLD AUTO: 82.5 % (ref 37–80)
NEUTROPHILS NFR BLD AUTO: 83.7 % (ref 37–80)
NRBC BLD MANUAL-RTO: 0 /100 WBC (ref 0–0)
NT-PROBNP SERPL-MCNC: 3140 PG/ML (ref 0–1800)
PLATELET # BLD AUTO: 222 10*3/MM3 (ref 150–450)
PLATELET # BLD AUTO: 233 10*3/MM3 (ref 150–450)
PMV BLD AUTO: 10.2 FL (ref 8–12)
PMV BLD AUTO: 10.6 FL (ref 8–12)
POTASSIUM BLD-SCNC: 4 MMOL/L (ref 3.5–5.1)
POTASSIUM BLD-SCNC: 4 MMOL/L (ref 3.5–5.1)
PROT SERPL-MCNC: 6.9 G/DL (ref 6.3–8.6)
PROTHROMBIN TIME: 18.9 SECONDS (ref 11.1–15.3)
RBC # BLD AUTO: 3.34 10*6/MM3 (ref 3.77–5.16)
RBC # BLD AUTO: 3.41 10*6/MM3 (ref 3.77–5.16)
SODIUM BLD-SCNC: 132 MMOL/L (ref 137–145)
SODIUM BLD-SCNC: 133 MMOL/L (ref 137–145)
TROPONIN I SERPL-MCNC: 0.04 NG/ML
TROPONIN I SERPL-MCNC: 0.04 NG/ML
TSH SERPL DL<=0.05 MIU/L-ACNC: 4.47 MIU/ML (ref 0.46–4.68)
WBC NRBC COR # BLD: 12.64 10*3/MM3 (ref 3.2–9.8)
WBC NRBC COR # BLD: 12.99 10*3/MM3 (ref 3.2–9.8)
WHOLE BLOOD HOLD SPECIMEN: NORMAL
WHOLE BLOOD HOLD SPECIMEN: NORMAL

## 2017-03-24 PROCEDURE — 99284 EMERGENCY DEPT VISIT MOD MDM: CPT

## 2017-03-24 PROCEDURE — 85025 COMPLETE CBC W/AUTO DIFF WBC: CPT | Performed by: FAMILY MEDICINE

## 2017-03-24 PROCEDURE — 94640 AIRWAY INHALATION TREATMENT: CPT

## 2017-03-24 PROCEDURE — 93005 ELECTROCARDIOGRAM TRACING: CPT | Performed by: FAMILY MEDICINE

## 2017-03-24 PROCEDURE — 80053 COMPREHEN METABOLIC PANEL: CPT | Performed by: FAMILY MEDICINE

## 2017-03-24 PROCEDURE — 84443 ASSAY THYROID STIM HORMONE: CPT | Performed by: FAMILY MEDICINE

## 2017-03-24 PROCEDURE — 93010 ELECTROCARDIOGRAM REPORT: CPT | Performed by: INTERNAL MEDICINE

## 2017-03-24 PROCEDURE — 85610 PROTHROMBIN TIME: CPT | Performed by: FAMILY MEDICINE

## 2017-03-24 PROCEDURE — 71020 HC CHEST PA AND LATERAL: CPT

## 2017-03-24 PROCEDURE — 82553 CREATINE MB FRACTION: CPT | Performed by: FAMILY MEDICINE

## 2017-03-24 PROCEDURE — 84484 ASSAY OF TROPONIN QUANT: CPT | Performed by: FAMILY MEDICINE

## 2017-03-24 PROCEDURE — 82550 ASSAY OF CK (CPK): CPT | Performed by: FAMILY MEDICINE

## 2017-03-24 PROCEDURE — 94799 UNLISTED PULMONARY SVC/PX: CPT

## 2017-03-24 PROCEDURE — 83880 ASSAY OF NATRIURETIC PEPTIDE: CPT | Performed by: FAMILY MEDICINE

## 2017-03-24 RX ORDER — DOCUSATE SODIUM 100 MG/1
100 CAPSULE, LIQUID FILLED ORAL 2 TIMES DAILY
Status: DISCONTINUED | OUTPATIENT
Start: 2017-03-24 | End: 2017-03-28 | Stop reason: HOSPADM

## 2017-03-24 RX ORDER — AMIODARONE HYDROCHLORIDE 200 MG/1
200 TABLET ORAL DAILY
Status: DISCONTINUED | OUTPATIENT
Start: 2017-03-25 | End: 2017-03-26

## 2017-03-24 RX ORDER — ACETAMINOPHEN 325 MG/1
650 TABLET ORAL EVERY 4 HOURS PRN
Status: DISCONTINUED | OUTPATIENT
Start: 2017-03-24 | End: 2017-03-28 | Stop reason: HOSPADM

## 2017-03-24 RX ORDER — HYDROCODONE BITARTRATE AND ACETAMINOPHEN 5; 325 MG/1; MG/1
1 TABLET ORAL EVERY 6 HOURS PRN
Status: DISCONTINUED | OUTPATIENT
Start: 2017-03-24 | End: 2017-03-28 | Stop reason: HOSPADM

## 2017-03-24 RX ORDER — FUROSEMIDE 40 MG/1
40 TABLET ORAL DAILY
Status: DISCONTINUED | OUTPATIENT
Start: 2017-03-25 | End: 2017-03-25

## 2017-03-24 RX ORDER — DILTIAZEM HYDROCHLORIDE 5 MG/ML
15 INJECTION INTRAVENOUS ONCE
Status: COMPLETED | OUTPATIENT
Start: 2017-03-24 | End: 2017-03-24

## 2017-03-24 RX ORDER — BUDESONIDE AND FORMOTEROL FUMARATE DIHYDRATE 160; 4.5 UG/1; UG/1
2 AEROSOL RESPIRATORY (INHALATION)
Status: DISCONTINUED | OUTPATIENT
Start: 2017-03-24 | End: 2017-03-28 | Stop reason: HOSPADM

## 2017-03-24 RX ORDER — SODIUM CHLORIDE 0.9 % (FLUSH) 0.9 %
1-10 SYRINGE (ML) INJECTION AS NEEDED
Status: DISCONTINUED | OUTPATIENT
Start: 2017-03-24 | End: 2017-03-28 | Stop reason: HOSPADM

## 2017-03-24 RX ORDER — SODIUM CHLORIDE 0.9 % (FLUSH) 0.9 %
10 SYRINGE (ML) INJECTION AS NEEDED
Status: DISCONTINUED | OUTPATIENT
Start: 2017-03-24 | End: 2017-03-26 | Stop reason: SDUPTHER

## 2017-03-24 RX ORDER — PRAVASTATIN SODIUM 40 MG
80 TABLET ORAL NIGHTLY
Status: DISCONTINUED | OUTPATIENT
Start: 2017-03-24 | End: 2017-03-28 | Stop reason: HOSPADM

## 2017-03-24 RX ORDER — ONDANSETRON 2 MG/ML
4 INJECTION INTRAMUSCULAR; INTRAVENOUS EVERY 6 HOURS PRN
Status: DISCONTINUED | OUTPATIENT
Start: 2017-03-24 | End: 2017-03-28 | Stop reason: HOSPADM

## 2017-03-24 RX ORDER — SODIUM CHLORIDE 0.9 % (FLUSH) 0.9 %
1-10 SYRINGE (ML) INJECTION AS NEEDED
Status: DISCONTINUED | OUTPATIENT
Start: 2017-03-24 | End: 2017-03-26 | Stop reason: SDUPTHER

## 2017-03-24 RX ORDER — ACETAMINOPHEN 500 MG
500 TABLET ORAL ONCE AS NEEDED
Status: DISCONTINUED | OUTPATIENT
Start: 2017-03-24 | End: 2017-03-28 | Stop reason: HOSPADM

## 2017-03-24 RX ORDER — ALBUTEROL SULFATE 90 UG/1
2 AEROSOL, METERED RESPIRATORY (INHALATION)
Status: DISCONTINUED | OUTPATIENT
Start: 2017-03-24 | End: 2017-03-24 | Stop reason: CLARIF

## 2017-03-24 RX ORDER — SODIUM CHLORIDE 9 MG/ML
75 INJECTION, SOLUTION INTRAVENOUS CONTINUOUS
Status: DISCONTINUED | OUTPATIENT
Start: 2017-03-24 | End: 2017-03-26

## 2017-03-24 RX ORDER — DOCUSATE SODIUM 100 MG/1
300 CAPSULE, LIQUID FILLED ORAL DAILY
Status: DISCONTINUED | OUTPATIENT
Start: 2017-03-24 | End: 2017-03-24 | Stop reason: SDUPTHER

## 2017-03-24 RX ORDER — POTASSIUM CHLORIDE 750 MG/1
10 TABLET, EXTENDED RELEASE ORAL 2 TIMES DAILY
Status: DISCONTINUED | OUTPATIENT
Start: 2017-03-24 | End: 2017-03-28 | Stop reason: HOSPADM

## 2017-03-24 RX ORDER — ALBUTEROL SULFATE 2.5 MG/3ML
2.5 SOLUTION RESPIRATORY (INHALATION)
Status: DISCONTINUED | OUTPATIENT
Start: 2017-03-24 | End: 2017-03-28 | Stop reason: HOSPADM

## 2017-03-24 RX ORDER — DILTIAZEM HYDROCHLORIDE 60 MG/1
60 TABLET, FILM COATED ORAL 2 TIMES DAILY
COMMUNITY
End: 2017-04-24 | Stop reason: SDUPTHER

## 2017-03-24 RX ADMIN — APIXABAN 5 MG: 5 TABLET, FILM COATED ORAL at 21:14

## 2017-03-24 RX ADMIN — SODIUM CHLORIDE 500 ML: 9 INJECTION, SOLUTION INTRAVENOUS at 15:21

## 2017-03-24 RX ADMIN — HYDROCODONE BITARTRATE AND ACETAMINOPHEN 1 TABLET: 5; 325 TABLET ORAL at 23:14

## 2017-03-24 RX ADMIN — PRAVASTATIN SODIUM 80 MG: 40 TABLET ORAL at 21:14

## 2017-03-24 RX ADMIN — DILTIAZEM HYDROCHLORIDE 15 MG: 5 INJECTION INTRAVENOUS at 15:11

## 2017-03-24 RX ADMIN — BUDESONIDE AND FORMOTEROL FUMARATE DIHYDRATE 2 PUFF: 160; 4.5 AEROSOL RESPIRATORY (INHALATION) at 21:34

## 2017-03-24 RX ADMIN — POTASSIUM CHLORIDE 10 MEQ: 750 TABLET, EXTENDED RELEASE ORAL at 21:14

## 2017-03-24 RX ADMIN — ALBUTEROL SULFATE 2.5 MG: 2.5 SOLUTION RESPIRATORY (INHALATION) at 21:32

## 2017-03-24 NOTE — ED PROVIDER NOTES
Subjective   HPI Comments: Recently cardizem was changed from 120 once daily to 60 mg bid 3 days ago - known case of atrial fibrillation .       Patient is a 86 y.o. female presenting with palpitations.   History provided by:  Patient  Palpitations   Palpitations quality:  Irregular  Onset quality:  Sudden  Duration:  1 day  Timing:  Constant  Progression:  Worsening  Chronicity:  New  Context: caffeine and dehydration    Context: not anxiety, not appetite suppressants, not illicit drugs and not nicotine    Relieved by:  Nothing  Worsened by:  Nothing  Ineffective treatments:  None tried  Associated symptoms: chest pressure and dizziness    Associated symptoms: no back pain, no chest pain, no cough, no leg pain, no lower extremity edema, no malaise/fatigue, no nausea, no orthopnea, no PND and no shortness of breath    Risk factors: hx of atrial fibrillation        Review of Systems   Constitutional: Negative.  Negative for malaise/fatigue.   HENT: Negative.    Respiratory: Negative.  Negative for cough and shortness of breath.    Cardiovascular: Positive for palpitations. Negative for chest pain, orthopnea and PND.   Gastrointestinal: Negative.  Negative for nausea.   Musculoskeletal: Negative.  Negative for back pain.   Skin: Negative.    Allergic/Immunologic: Negative.    Neurological: Positive for dizziness.   Hematological: Negative.    Psychiatric/Behavioral: Negative.        Past Medical History:   Diagnosis Date   • Chronic obstructive lung disease 04/26/2016    on oxygen      • Chronic respiratory failure with hypoxia 04/26/2016   • Essential hypertension 09/08/2015   • Hyperlipidemia    • Obesity    • Paroxysmal atrial fibrillation 03/15/2016       No Known Allergies    Past Surgical History:   Procedure Laterality Date   • CATARACT EXTRACTION     • HYSTERECTOMY     • JOINT REPLACEMENT      total knee        Family History   Problem Relation Age of Onset   • Heart disease Other    • Hypertension Other    •  "Lung cancer Other        Social History     Social History   • Marital status:      Spouse name: N/A   • Number of children: N/A   • Years of education: N/A     Social History Main Topics   • Smoking status: Former Smoker   • Smokeless tobacco: Never Used      Comment:  Patient quit smoking more than 30 years ago, she smoked 1pd for 30 years prior to that   • Alcohol use No   • Drug use: No   • Sexual activity: Defer     Other Topics Concern   • None     Social History Narrative           Objective    /64 (BP Location: Left arm, Patient Position: Sitting)  Pulse 84  Temp 98.4 °F (36.9 °C) (Oral)   Resp 18  Ht 61\" (154.9 cm)  Wt 193 lb (87.5 kg)  SpO2 95%  BMI 36.47 kg/m2    Physical Exam   Constitutional: She is oriented to person, place, and time. She appears well-developed and well-nourished.   HENT:   Head: Normocephalic and atraumatic.   Right Ear: External ear normal.   Left Ear: External ear normal.   Nose: Nose normal.   Mouth/Throat: Oropharynx is clear and moist.   Eyes: Conjunctivae and EOM are normal. Pupils are equal, round, and reactive to light.   Neck: Normal range of motion. Neck supple.   Cardiovascular: Normal heart sounds and intact distal pulses.  An irregularly irregular rhythm present.   Pulmonary/Chest: Effort normal. No accessory muscle usage. No respiratory distress. She has decreased breath sounds in the right lower field and the left lower field. She exhibits no tenderness and no deformity.   Abdominal: Soft. Bowel sounds are normal. There is no tenderness.   Musculoskeletal: Normal range of motion.   Neurological: She is alert and oriented to person, place, and time. She has normal reflexes.   Skin: Skin is warm.   Psychiatric: She has a normal mood and affect. Her behavior is normal. Judgment and thought content normal.   Nursing note and vitals reviewed.      ECG 12 Lead    Date/Time: 4/3/2017 3:50 PM  Performed by: NOHEMI DELVALLE  Authorized by: HAROLDO SIERRA " YUMIKO   Interpreted by physician  Rhythm: atrial fibrillation  Rate: tachycardic  QRS axis: normal  Conduction: conduction normal  ST Segments: ST segments normal  T Waves: T waves normal  Other: no other findings  Clinical impression: dysrhythmia - atrial               ED Course  ED Course      Labs Reviewed   TROPONIN (IN-HOUSE) - Abnormal; Notable for the following:        Result Value    Troponin I 0.043 (*)     All other components within normal limits   TROPONIN (IN-HOUSE) - Abnormal; Notable for the following:     Troponin I 0.045 (*)     All other components within normal limits   COMPREHENSIVE METABOLIC PANEL - Abnormal; Notable for the following:     Glucose 162 (*)     BUN 30 (*)     Sodium 133 (*)     Chloride 88 (*)     CO2 34.0 (*)     BUN/Creatinine Ratio 31.3 (*)     All other components within normal limits    Narrative:     The MDRD GFR formula is only valid for adults with stable renal function between ages 18 and 70.   BNP (IN-HOUSE) - Abnormal; Notable for the following:     proBNP 3140.0 (*)     All other components within normal limits   PROTIME-INR - Abnormal; Notable for the following:     Protime 18.9 (*)     INR 1.58 (*)     All other components within normal limits    Narrative:     Therapeutic range for most indications is 2.0-3.0 INR,  or 2.5-3.5 for mechanical heart valves.   CBC WITH AUTO DIFFERENTIAL - Abnormal; Notable for the following:     WBC 12.64 (*)     RBC 3.41 (*)     Hemoglobin 10.9 (*)     Hematocrit 33.0 (*)     RDW 14.7 (*)     RDW-SD 52.1 (*)     Neutrophil % 83.7 (*)     Lymphocyte % 6.3 (*)     Immature Grans % 1.4 (*)     Neutrophils, Absolute 10.58 (*)     Monocytes, Absolute 0.94 (*)     Immature Grans, Absolute 0.18 (*)     All other components within normal limits   BASIC METABOLIC PANEL - Abnormal; Notable for the following:     Glucose 115 (*)     BUN 29 (*)     Sodium 132 (*)     Chloride 89 (*)     CO2 33.0 (*)     BUN/Creatinine Ratio 32.2 (*)     All other  components within normal limits    Narrative:     The MDRD GFR formula is only valid for adults with stable renal function between ages 18 and 70.   CBC WITH AUTO DIFFERENTIAL - Abnormal; Notable for the following:     WBC 12.99 (*)     RBC 3.34 (*)     Hemoglobin 10.7 (*)     Hematocrit 32.5 (*)     RDW-SD 50.0 (*)     Neutrophil % 82.5 (*)     Lymphocyte % 8.0 (*)     Immature Grans % 1.4 (*)     Neutrophils, Absolute 10.72 (*)     Immature Grans, Absolute 0.18 (*)     All other components within normal limits   BASIC METABOLIC PANEL - Abnormal; Notable for the following:     Glucose 144 (*)     BUN 22 (*)     Sodium 134 (*)     BUN/Creatinine Ratio 26.8 (*)     All other components within normal limits    Narrative:     The MDRD GFR formula is only valid for adults with stable renal function between ages 18 and 70.   CBC WITH AUTO DIFFERENTIAL - Abnormal; Notable for the following:     WBC 11.25 (*)     RBC 3.01 (*)     Hemoglobin 9.6 (*)     Hematocrit 29.2 (*)     RDW-SD 51.2 (*)     Neutrophil % 82.2 (*)     Lymphocyte % 7.6 (*)     Immature Grans % 1.5 (*)     Neutrophils, Absolute 9.25 (*)     Immature Grans, Absolute 0.17 (*)     All other components within normal limits   BASIC METABOLIC PANEL - Abnormal; Notable for the following:     Glucose 151 (*)     Calcium 8.2 (*)     All other components within normal limits    Narrative:     The MDRD GFR formula is only valid for adults with stable renal function between ages 18 and 70.   CBC WITH AUTO DIFFERENTIAL - Abnormal; Notable for the following:     WBC 10.71 (*)     RBC 3.07 (*)     Hemoglobin 9.9 (*)     Hematocrit 29.8 (*)     RDW 14.6 (*)     RDW-SD 51.7 (*)     Neutrophil % 83.2 (*)     Lymphocyte % 6.9 (*)     Immature Grans % 1.6 (*)     Neutrophils, Absolute 8.91 (*)     Immature Grans, Absolute 0.17 (*)     All other components within normal limits   BASIC METABOLIC PANEL - Abnormal; Notable for the following:     Glucose 146 (*)     All other  components within normal limits    Narrative:     The MDRD GFR formula is only valid for adults with stable renal function between ages 18 and 70.   CBC WITH AUTO DIFFERENTIAL - Abnormal; Notable for the following:     WBC 11.50 (*)     RBC 3.06 (*)     Hemoglobin 9.6 (*)     Hematocrit 30.1 (*)     MCV 98.4 (*)     RDW 14.8 (*)     RDW-SD 52.6 (*)     Neutrophil % 82.9 (*)     Lymphocyte % 6.4 (*)     Immature Grans % 1.4 (*)     Neutrophils, Absolute 9.53 (*)     Immature Grans, Absolute 0.16 (*)     All other components within normal limits   BASIC METABOLIC PANEL - Abnormal; Notable for the following:     Glucose 144 (*)     Sodium 135 (*)     All other components within normal limits    Narrative:     The MDRD GFR formula is only valid for adults with stable renal function between ages 18 and 70.   CBC WITH AUTO DIFFERENTIAL - Abnormal; Notable for the following:     WBC 10.87 (*)     RBC 3.11 (*)     Hemoglobin 9.8 (*)     Hematocrit 30.9 (*)     MCV 99.4 (*)     RDW 15.1 (*)     RDW-SD 55.1 (*)     Neutrophil % 80.8 (*)     Lymphocyte % 7.2 (*)     Immature Grans % 1.5 (*)     Neutrophils, Absolute 8.79 (*)     Immature Grans, Absolute 0.16 (*)     nRBC 0.2 (*)     All other components within normal limits   CK - Normal   CK MB - Normal   TSH - Normal   MAGNESIUM - Normal   RAINBOW DRAW    Narrative:     The following orders were created for panel order [place] Draw.  Procedure                               Abnormality         Status                     ---------                               -----------         ------                     Light Blue Top[[id number]]                                    Final result               Green Top (Gel)[[id number]]                                   Final result               Lavender Top[[id number]]                                      Final result               Gold Top - SST[[id number]]                                    Final result                 Please view results for  these tests on the individual orders.   CBC AND DIFFERENTIAL    Narrative:     The following orders were created for panel order CBC & Differential.  Procedure                               Abnormality         Status                     ---------                               -----------         ------                     CBC Auto Differential[[id number]]         Abnormal            Final result                 Please view results for these tests on the individual orders.   LIGHT BLUE TOP   GREEN TOP   LAVENDER TOP   GOLD TOP - [facility]   CBC AND DIFFERENTIAL    Narrative:     The following orders were created for panel order CBC & Differential.  Procedure                               Abnormality         Status                     ---------                               -----------         ------                     CBC Auto Differential[[id number]]         Abnormal            Final result                 Please view results for these tests on the individual orders.   CBC AND DIFFERENTIAL    Narrative:     The following orders were created for panel order CBC & Differential.  Procedure                               Abnormality         Status                     ---------                               -----------         ------                     CBC Auto Differential[[id number]]         Abnormal            Final result                 Please view results for these tests on the individual orders.   CBC AND DIFFERENTIAL    Narrative:     The following orders were created for panel order CBC & Differential.  Procedure                               Abnormality         Status                     ---------                               -----------         ------                     CBC Auto Differential[[id number]]         Abnormal            Final result                 Please view results for these tests on the individual orders.   CBC AND DIFFERENTIAL    Narrative:     The following orders were created for panel order CBC &  Differential.  Procedure                               Abnormality         Status                     ---------                               -----------         ------                     CBC Auto Differential[[id number]]         Abnormal            Final result                 Please view results for these tests on the individual orders.   CBC AND DIFFERENTIAL    Narrative:     The following orders were created for panel order CBC & Differential.  Procedure                               Abnormality         Status                     ---------                               -----------         ------                     CBC Auto Differential[[id number]]         Abnormal            Final result                 Please view results for these tests on the individual orders.     Xr Chest 2 View    Result Date: 3/24/2017  Narrative: Patient Name:  ANN MARIE DAVALOS Patient ID:  [id number] Ordering:  NOHEMI DELVALLE Attending:  NOHEMI DELVALLE Referring:  NOHEMI DELVALLE ------------------------------------------------ PROCEDURE: Chest PA and lateral REASON FOR EXAM: Chest pain protocol FINDINGS: Comparison study dated February 15, 2017. . Cardiomegaly. Pulmonary vasculature appears within normal limits. Right upper lung field medial patchy opacity with convex borders. Otherwise lungs clear. Pleural spaces are normal . No acute osseous abnormality.     Impression: 1.  Cardiomegaly without decompensation. 2.  Right upper lung field medial patchy opacity with convex borders. This may represent a focus of round pneumonia versus atelectasis. Recommend follow chest x-ray in 7-10 days to document improvement and/or resolution or CT of the chest to further evaluate. Electronically signed by:  Rodrigo Mead MD  3/24/2017 3:56 PM CDT Workstation: [id number]    Xr Chest 1 View    Result Date: 3/30/2017  Narrative: Patient Name:  ANN MARIE DAVALOS Patient ID:  [id number] Ordering:  EFRA HILL Attending:  EFRA HILL  Referring:  EFRA HILL ------------------------------------------------ PROCEDURE: XR CHEST 1 VIEW INDICATION FOR PROCEDURE:  86 years -old patient presents for evaluation of generalized chest pain. COMPARISON:  March 24, 2017. FINDINGS: XR CHEST 1 view  reveals the lungs are expanded. Cardiac monitoring leads are present. There is interstitial thickening visible throughout both lungs. Cardiac silhouette is moderately enlarged. Vascular calcifications are visible within the thoracic aorta.  There is no radiographic evidence for airspace consolidation, pleural effusion or pneumothorax. Mediastinal silhouette is within normal limits. There are degenerative changes of both shoulders.     Impression: Moderate cardiomegaly and mild pulmonary congestion. Electronically signed by:  Saadia Casey MD  3/30/2017 8:22 PM CDT Workstation: [id number]      cardizem drip initiated.   Need for rate control.   Moderate vascular congestion - iv lasix ordere.d           MDM  Number of Diagnoses or Management Options  Atrial fibrillation with RVR:   Chronic diastolic congestive heart failure:       Final diagnoses:   Atrial fibrillation with RVR   Chronic diastolic congestive heart failure            Tamiko Maldonado MD  04/03/17 [id number]

## 2017-03-24 NOTE — H&P
[facility] Admission      Date of Admission: 3/24/2017      Primary Care Physician: Krista Matos MD      Chief Complaint: palpitations     HPI: Patient presents to the ED with complaints of palpitations that started 3 days ago, getting worse. She complains of feeling fatigued, and having low energy. At times, she feels her heart racing. Denies any chest pain, N/V, SOA, fevers or chills. Patient has a history of Afib, has usually been fairly controlled, however lately has been getting worse.     Past Medical History:  has a past medical history of Chronic obstructive lung disease (04/26/2016); Chronic respiratory failure with hypoxia (04/26/2016); Essential hypertension (09/08/2015); Hyperlipidemia; Obesity; and Paroxysmal atrial fibrillation (03/15/2016).    Past Surgical History:  has a past surgical history that includes Joint replacement; Hysterectomy; and Cataract extraction.    Family History: family history includes Heart disease in her other; Hypertension in her other; Lung cancer in her other.    Social History:  reports that she has quit smoking. She has never used smokeless tobacco. She reports that she does not drink alcohol or use illicit drugs.    Allergies: No Known Allergies    Medications: Scheduled Meds:   Continuous Infusions:  diltiaZEM 10 mg/hr Last Rate: 10 mg/hr (03/24/17 2834)   sodium chloride 125 mL/hr      PRN Meds:.sodium chloride    Review of Systems:  Review of Systems   Constitutional: Negative for appetite change, chills and fever.   HENT: Negative for congestion, ear pain, rhinorrhea, sneezing and sore throat.    Respiratory: Negative for cough and shortness of breath.    Cardiovascular: Positive for palpitations. Negative for chest pain and leg swelling.   Gastrointestinal: Negative for diarrhea, nausea and vomiting.   Endocrine: Negative for polyphagia and polyuria.   Musculoskeletal: Negative for back pain and neck pain.    Skin: Negative for color change and rash.   Neurological: Negative for dizziness, syncope and weakness.   Psychiatric/Behavioral: Negative for agitation, confusion and dysphoric mood.      Otherwise complete ROS is negative except as mentioned above.    Physical Exam:   Temp:  [98.3 °F (36.8 °C)] 98.3 °F (36.8 °C)  Heart Rate:  [] 122  Resp:  [18-22] 22  BP: ()/(58-76) 121/67  Physical Exam   Constitutional: She is oriented to person, place, and time. She appears well-developed and well-nourished.   Cardiovascular: Normal rate, regular rhythm and normal heart sounds.  Exam reveals no gallop and no friction rub.    No murmur heard.  Pulmonary/Chest: Effort normal and breath sounds normal. No respiratory distress. She has no wheezes. She has no rales.   Abdominal: Soft. Bowel sounds are normal. There is no tenderness.   Musculoskeletal: Normal range of motion. She exhibits no edema.   Neurological: She is alert and oriented to person, place, and time.   Skin: Skin is warm and dry.   Psychiatric: She has a normal mood and affect. Her behavior is normal.   Vitals reviewed.        Results Reviewed:  I have personally reviewed current lab, radiology, and data and agree with results.  Lab Results (last 24 hours)     Procedure Component Value Units Date/Time    [place] Draw [[id number]] Collected:  03/24/17 1509    Specimen:  Blood Updated:  03/24/17 1518    Narrative:       The following orders were created for panel order [place] Draw.  Procedure                               Abnormality         Status                     ---------                               -----------         ------                     Light Blue Top[[id number]]                                    In process                 Green Top (Gel)[[id number]]                                   In process                 Lavender Top[[id number]]                                      In process                 Gold Top - SST[[id number]]                                     In process                   Please view results for these tests on the individual orders.    Lavender Top [[id number]] Collected:  03/24/17 1509    Specimen:  Blood Updated:  03/24/17 1518    Gold Top - SST [[id number]] Collected:  03/24/17 1509    Specimen:  Blood Updated:  03/24/17 1518    Light Blue Top [[id number]] Collected:  03/24/17 1509    Specimen:  Blood Updated:  03/24/17 1518    Green Top (Gel) [[id number]] Collected:  03/24/17 1509    Specimen:  Blood Updated:  03/24/17 1518    CBC & Differential [[id number]] Collected:  03/24/17 1509    Specimen:  Blood Updated:  03/24/17 1520    Narrative:       The following orders were created for panel order CBC & Differential.  Procedure                               Abnormality         Status                     ---------                               -----------         ------                     CBC Auto Differential[[id number]]         Abnormal            Final result                 Please view results for these tests on the individual orders.    CBC Auto Differential [[id number]]  (Abnormal) Collected:  03/24/17 1509    Specimen:  Blood Updated:  03/24/17 1520     WBC 12.64 (H) 10*3/mm3      RBC 3.41 (L) 10*6/mm3      Hemoglobin 10.9 (L) g/dL      Hematocrit 33.0 (L) %      MCV 96.8 fL      MCH 32.0 pg      MCHC 33.0 g/dL      RDW 14.7 (H) %      RDW-SD 52.1 (H) fl      MPV 10.2 fL      Platelets 233 10*3/mm3      Neutrophil % 83.7 (H) %      Lymphocyte % 6.3 (L) %      Monocyte % 7.4 %      Eosinophil % 0.9 %      Basophil % 0.3 %      Immature Grans % 1.4 (H) %      Neutrophils, Absolute 10.58 (H) 10*3/mm3      Lymphocytes, Absolute 0.79 10*3/mm3      Monocytes, Absolute 0.94 (H) 10*3/mm3      Eosinophils, Absolute 0.11 10*3/mm3      Basophils, Absolute 0.04 10*3/mm3      Immature Grans, Absolute 0.18 (H) 10*3/mm3     Comprehensive Metabolic Panel [[id number]]  (Abnormal) Collected:  03/24/17 1509    Specimen:  Blood Updated:  03/24/17 1535     Glucose 162  (H) mg/dL      BUN 30 (H) mg/dL      Creatinine 0.96 mg/dL      Sodium 133 (L) mmol/L      Potassium 4.0 mmol/L      Chloride 88 (L) mmol/L      CO2 34.0 (H) mmol/L      Calcium 9.1 mg/dL      Total Protein 6.9 g/dL      Albumin 3.60 g/dL      ALT (SGPT) 39 U/L      AST (SGOT) 34 U/L      Alkaline Phosphatase 83 U/L      Total Bilirubin 0.7 mg/dL      eGFR Non African Amer 55 mL/min/1.73      Globulin 3.3 gm/dL      A/G Ratio 1.1 g/dL      BUN/Creatinine Ratio 31.3 (H)     Anion Gap 11.0 mmol/L     Narrative:       The MDRD GFR formula is only valid for adults with stable renal function between ages 18 and 70.    CK [[id number]]  (Normal) Collected:  03/24/17 1509    Specimen:  Blood Updated:  03/24/17 1535     Creatine Kinase 30 U/L     Protime-INR [[id number]]  (Abnormal) Collected:  03/24/17 1509    Specimen:  Blood Updated:  03/24/17 1536     Protime 18.9 (H) Seconds      INR 1.58 (H)    Narrative:       Therapeutic range for most indications is 2.0-3.0 INR,  or 2.5-3.5 for mechanical heart valves.    Troponin [[id number]]  (Abnormal) Collected:  03/24/17 1509    Specimen:  Blood Updated:  03/24/17 1546     Troponin I 0.043 (H) ng/mL     BNP [[id number]]  (Abnormal) Collected:  03/24/17 1509    Specimen:  Blood Updated:  03/24/17 1546     proBNP 3140.0 (H) pg/mL     CK-MB [[id number]]  (Normal) Collected:  03/24/17 1509    Specimen:  Blood Updated:  03/24/17 1546     CKMB 1.05 ng/mL         Imaging Results (last 24 hours)     Procedure Component Value Units Date/Time    XR Chest 2 View [[id number]] Collected:  03/24/17 1549     Updated:  03/24/17 7417    Narrative:       Patient Name:  ANN MARIE DAVALOS  Patient ID:  [id number]   Ordering:  NOHEMI DELVALLE  Attending:  NOHEMI DELVALLE  Referring:  NOHEMI DELVALLE  ------------------------------------------------      PROCEDURE: Chest PA and lateral    REASON FOR EXAM: Chest pain protocol    FINDINGS: Comparison study dated February 15, 2017. .  Cardiomegaly. Pulmonary  vasculature appears within normal limits.  Right upper lung field medial patchy opacity with convex borders.  Otherwise lungs clear. Pleural spaces are normal . No acute  osseous abnormality.      Impression:       1.  Cardiomegaly without decompensation.  2.  Right upper lung field medial patchy opacity with convex  borders. This may represent a focus of round pneumonia versus  atelectasis. Recommend follow chest x-ray in 7-10 days to  document improvement and/or resolution or CT of the chest to  further evaluate.    Electronically signed by:  Rodrigo Mead MD  3/24/2017 3:56 PM CDT  Workstation: [id number]            Assessment:  1) Atrial fibrillation with RVR   2) HTN  3) Hx of COPD      Plan:  - Started patient on Cardizem drip, will work to titrate to oral medications. Start back on Amiodarone.   - Continue current home medications  - SCDs/TEDs for DVT PPx         Wili Adan MD  03/24/17  4:37 PM

## 2017-03-25 LAB
ANION GAP SERPL CALCULATED.3IONS-SCNC: 8 MMOL/L (ref 5–15)
BASOPHILS # BLD AUTO: 0.03 10*3/MM3 (ref 0–0.2)
BASOPHILS NFR BLD AUTO: 0.3 % (ref 0–2)
BUN BLD-MCNC: 22 MG/DL (ref 7–21)
BUN/CREAT SERPL: 26.8 (ref 7–25)
CALCIUM SPEC-SCNC: 8.4 MG/DL (ref 8.4–10.2)
CHLORIDE SERPL-SCNC: 95 MMOL/L (ref 95–110)
CO2 SERPL-SCNC: 31 MMOL/L (ref 22–31)
CREAT BLD-MCNC: 0.82 MG/DL (ref 0.5–1)
DEPRECATED RDW RBC AUTO: 51.2 FL (ref 36.4–46.3)
EOSINOPHIL # BLD AUTO: 0.14 10*3/MM3 (ref 0–0.7)
EOSINOPHIL NFR BLD AUTO: 1.2 % (ref 0–7)
ERYTHROCYTE [DISTWIDTH] IN BLOOD BY AUTOMATED COUNT: 14.4 % (ref 11.5–14.5)
GFR SERPL CREATININE-BSD FRML MDRD: 66 ML/MIN/1.73 (ref 39–90)
GLUCOSE BLD-MCNC: 144 MG/DL (ref 60–100)
HCT VFR BLD AUTO: 29.2 % (ref 35–45)
HGB BLD-MCNC: 9.6 G/DL (ref 12–15.5)
IMM GRANULOCYTES # BLD: 0.17 10*3/MM3 (ref 0–0.02)
IMM GRANULOCYTES NFR BLD: 1.5 % (ref 0–0.5)
LYMPHOCYTES # BLD AUTO: 0.85 10*3/MM3 (ref 0.6–4.2)
LYMPHOCYTES NFR BLD AUTO: 7.6 % (ref 10–50)
MCH RBC QN AUTO: 31.9 PG (ref 26.5–34)
MCHC RBC AUTO-ENTMCNC: 32.9 G/DL (ref 31.4–36)
MCV RBC AUTO: 97 FL (ref 80–98)
MONOCYTES # BLD AUTO: 0.81 10*3/MM3 (ref 0–0.9)
MONOCYTES NFR BLD AUTO: 7.2 % (ref 0–12)
NEUTROPHILS # BLD AUTO: 9.25 10*3/MM3 (ref 2–8.6)
NEUTROPHILS NFR BLD AUTO: 82.2 % (ref 37–80)
PLATELET # BLD AUTO: 210 10*3/MM3 (ref 150–450)
PMV BLD AUTO: 10.5 FL (ref 8–12)
POTASSIUM BLD-SCNC: 4.3 MMOL/L (ref 3.5–5.1)
RBC # BLD AUTO: 3.01 10*6/MM3 (ref 3.77–5.16)
SODIUM BLD-SCNC: 134 MMOL/L (ref 137–145)
WBC NRBC COR # BLD: 11.25 10*3/MM3 (ref 3.2–9.8)

## 2017-03-25 PROCEDURE — 94799 UNLISTED PULMONARY SVC/PX: CPT

## 2017-03-25 PROCEDURE — 85025 COMPLETE CBC W/AUTO DIFF WBC: CPT | Performed by: FAMILY MEDICINE

## 2017-03-25 PROCEDURE — 80048 BASIC METABOLIC PNL TOTAL CA: CPT | Performed by: FAMILY MEDICINE

## 2017-03-25 PROCEDURE — 25010000002 DIGOXIN PER 500 MCG: Performed by: INTERNAL MEDICINE

## 2017-03-25 PROCEDURE — 94760 N-INVAS EAR/PLS OXIMETRY 1: CPT

## 2017-03-25 RX ORDER — FUROSEMIDE 20 MG/1
20 TABLET ORAL DAILY
Status: DISCONTINUED | OUTPATIENT
Start: 2017-03-26 | End: 2017-03-28 | Stop reason: HOSPADM

## 2017-03-25 RX ORDER — DIGOXIN 0.25 MG/ML
125 INJECTION INTRAMUSCULAR; INTRAVENOUS 4 TIMES DAILY
Status: COMPLETED | OUTPATIENT
Start: 2017-03-25 | End: 2017-03-26

## 2017-03-25 RX ADMIN — DOCUSATE SODIUM 100 MG: 100 CAPSULE, LIQUID FILLED ORAL at 17:35

## 2017-03-25 RX ADMIN — AMIODARONE HYDROCHLORIDE 200 MG: 200 TABLET ORAL at 08:34

## 2017-03-25 RX ADMIN — ALBUTEROL SULFATE 2.5 MG: 2.5 SOLUTION RESPIRATORY (INHALATION) at 07:00

## 2017-03-25 RX ADMIN — POTASSIUM CHLORIDE 10 MEQ: 750 TABLET, EXTENDED RELEASE ORAL at 08:34

## 2017-03-25 RX ADMIN — ALBUTEROL SULFATE 2.5 MG: 2.5 SOLUTION RESPIRATORY (INHALATION) at 19:16

## 2017-03-25 RX ADMIN — APIXABAN 5 MG: 5 TABLET, FILM COATED ORAL at 20:13

## 2017-03-25 RX ADMIN — BUDESONIDE AND FORMOTEROL FUMARATE DIHYDRATE 2 PUFF: 160; 4.5 AEROSOL RESPIRATORY (INHALATION) at 11:06

## 2017-03-25 RX ADMIN — DILTIAZEM HYDROCHLORIDE 10 MG/HR: 5 INJECTION INTRAVENOUS at 02:14

## 2017-03-25 RX ADMIN — FUROSEMIDE 40 MG: 40 TABLET ORAL at 08:34

## 2017-03-25 RX ADMIN — POTASSIUM CHLORIDE 10 MEQ: 750 TABLET, EXTENDED RELEASE ORAL at 17:35

## 2017-03-25 RX ADMIN — DIGOXIN 125 MCG: 0.25 INJECTION INTRAMUSCULAR; INTRAVENOUS at 20:14

## 2017-03-25 RX ADMIN — BUDESONIDE AND FORMOTEROL FUMARATE DIHYDRATE 2 PUFF: 160; 4.5 AEROSOL RESPIRATORY (INHALATION) at 19:17

## 2017-03-25 RX ADMIN — SODIUM CHLORIDE 125 ML/HR: 900 INJECTION, SOLUTION INTRAVENOUS at 02:15

## 2017-03-25 RX ADMIN — SODIUM CHLORIDE 125 ML/HR: 900 INJECTION, SOLUTION INTRAVENOUS at 10:42

## 2017-03-25 RX ADMIN — PRAVASTATIN SODIUM 80 MG: 40 TABLET ORAL at 20:13

## 2017-03-25 RX ADMIN — DILTIAZEM HYDROCHLORIDE 10 MG/HR: 5 INJECTION INTRAVENOUS at 14:30

## 2017-03-25 RX ADMIN — ALBUTEROL SULFATE 2.5 MG: 2.5 SOLUTION RESPIRATORY (INHALATION) at 15:14

## 2017-03-25 RX ADMIN — SODIUM CHLORIDE 75 ML/HR: 900 INJECTION, SOLUTION INTRAVENOUS at 20:22

## 2017-03-25 RX ADMIN — APIXABAN 5 MG: 5 TABLET, FILM COATED ORAL at 08:34

## 2017-03-25 RX ADMIN — ALBUTEROL SULFATE 2.5 MG: 2.5 SOLUTION RESPIRATORY (INHALATION) at 11:03

## 2017-03-25 RX ADMIN — ACETAMINOPHEN 650 MG: 325 TABLET ORAL at 22:21

## 2017-03-25 NOTE — PLAN OF CARE
Problem: Patient Care Overview (Adult)  Goal: Plan of Care Review  Outcome: Ongoing (interventions implemented as appropriate)    03/25/17 1398   Coping/Psychosocial Response Interventions   Plan Of Care Reviewed With patient   Patient Care Overview   Progress no change       Goal: Adult Individualization and Mutuality  Outcome: Ongoing (interventions implemented as appropriate)  Goal: Discharge Needs Assessment  Outcome: Ongoing (interventions implemented as appropriate)    Problem: Arrhythmia/Dysrhythmia (Symptomatic) (Adult)  Goal: Signs and Symptoms of Listed Potential Problems Will be Absent or Manageable (Arrhythmia/Dysrhythmia)  Outcome: Ongoing (interventions implemented as appropriate)

## 2017-03-25 NOTE — PLAN OF CARE
Problem: Patient Care Overview (Adult)  Goal: Plan of Care Review  Outcome: Ongoing (interventions implemented as appropriate)    03/25/17 0617   Coping/Psychosocial Response Interventions   Plan Of Care Reviewed With patient   Patient Care Overview   Progress progress towards functional goals is fair   Outcome Evaluation   Outcome Summary/Follow up Plan afib 90s/100s, cardizem gtt.       Goal: Adult Individualization and Mutuality  Outcome: Ongoing (interventions implemented as appropriate)  Goal: Discharge Needs Assessment  Outcome: Ongoing (interventions implemented as appropriate)    Problem: Arrhythmia/Dysrhythmia (Symptomatic) (Adult)  Goal: Signs and Symptoms of Listed Potential Problems Will be Absent or Manageable (Arrhythmia/Dysrhythmia)  Outcome: Ongoing (interventions implemented as appropriate)

## 2017-03-25 NOTE — PROGRESS NOTES
Progress Note  Daren Wu MD  Hospitalist     LOS: 1 day   Patient Care Team:  Krista Matos MD as PCP - General    Chief Complaint: Palpitations    Subjective     Interval History:     Patient Complaints: dyspnea / palpitations    History taken from: patient    Medication Review:   Current Facility-Administered Medications   Medication Dose Route Frequency Provider Last Rate Last Dose   • acetaminophen (TYLENOL) tablet 500 mg  500 mg Oral Once PRN Wili Adan MD       • acetaminophen (TYLENOL) tablet 650 mg  650 mg Oral Q4H PRN Wili Adan MD       • albuterol (PROVENTIL) nebulizer solution 0.083% 2.5 mg/3mL  2.5 mg Nebulization 4x Daily - RT Wili Adan MD   2.5 mg at 03/25/17 1103   • amiodarone (PACERONE) tablet 200 mg  200 mg Oral Daily Wili Adan MD   200 mg at 03/25/17 0834   • apixaban (ELIQUIS) tablet 5 mg  5 mg Oral Q12H Wili Adan MD   5 mg at 03/25/17 0834   • budesonide-formoterol (SYMBICORT) 160-4.5 MCG/ACT inhaler 2 puff  2 puff Inhalation BID - RT Wili Adan MD   2 puff at 03/25/17 1106   • diltiaZEM (CARDIAZEM) infusion  5-15 mg/hr Intravenous Titrated Wili Adan MD 10 mL/hr at 03/25/17 0214 10 mg/hr at 03/25/17 0214   • docusate sodium (COLACE) capsule 100 mg  100 mg Oral BID Wili Adan MD       • furosemide (LASIX) tablet 40 mg  40 mg Oral Daily Wili Adan MD   40 mg at 03/25/17 0834   • HYDROcodone-acetaminophen (NORCO) 5-325 MG per tablet 1 tablet  1 tablet Oral Q6H PRN Wili Adan MD   1 tablet at 03/24/17 2314   • ondansetron (ZOFRAN) injection 4 mg  4 mg Intravenous Q6H PRN Wili Adan MD       • potassium chloride (K-DUR,KLOR-CON) CR tablet 10 mEq  10 mEq Oral BID Wili Adan MD   10 mEq at 03/25/17 4949   • pravastatin (PRAVACHOL) tablet 80 mg  80 mg Oral Nightly Wili Adan MD   80 mg at 03/24/17 3778   • sodium chloride 0.9 % flush 1-10 mL  1-10 mL Intravenous PRN Wili Montesinos  MD Antionette       • sodium chloride 0.9 % flush 1-10 mL  1-10 mL Intravenous PRN Wili Adan MD       • sodium chloride 0.9 % flush 10 mL  10 mL Intravenous PRN Tamiko Maldonado MD       • sodium chloride 0.9 % infusion  125 mL/hr Intravenous Continuous Tamiko Maldonado  mL/hr at 03/25/17 1042 125 mL/hr at 03/25/17 1042       Review of Systems:   Review of Systems   Constitutional: Positive for fatigue.   Respiratory: Positive for cough, shortness of breath and wheezing.    Cardiovascular: Positive for palpitations and leg swelling. Negative for chest pain.   Gastrointestinal: Negative for abdominal distention, abdominal pain, constipation and diarrhea.   Genitourinary: Negative for dysuria and urgency.   Musculoskeletal: Negative for neck pain.   Neurological: Positive for weakness and light-headedness.   Psychiatric/Behavioral: Negative for agitation and behavioral problems.   All other systems reviewed and are negative.      Objective     Vital Signs  Temp:  [96.7 °F (35.9 °C)-98.5 °F (36.9 °C)] 98.5 °F (36.9 °C)  Heart Rate:  [] 110  Resp:  [18-22] 20  BP: ()/(58-90) 91/67    Physical Exam:  Physical Exam   Constitutional: She is oriented to person, place, and time. She appears well-developed and well-nourished.   HENT:   Head: Normocephalic and atraumatic.   Eyes: EOM are normal.   Neck: Neck supple.   Cardiovascular: An irregularly irregular rhythm present. Tachycardia present.    Pulmonary/Chest: Effort normal. No respiratory distress. She has wheezes. She has no rales.   Abdominal: Soft. Bowel sounds are normal.   Musculoskeletal: Normal range of motion. She exhibits no edema or tenderness.   Neurological: She is alert and oriented to person, place, and time.   Skin: Skin is warm and dry.   Psychiatric: She has a normal mood and affect. Her behavior is normal.   Vitals reviewed.       Results Review:    Lab Results (last 24 hours)     Procedure Component Value Units Date/Time     CBC & Differential [[id number]] Collected:  03/24/17 1509    Specimen:  Blood Updated:  03/24/17 1520    Narrative:       The following orders were created for panel order CBC & Differential.  Procedure                               Abnormality         Status                     ---------                               -----------         ------                     CBC Auto Differential[[id number]]         Abnormal            Final result                 Please view results for these tests on the individual orders.    CBC Auto Differential [[id number]]  (Abnormal) Collected:  03/24/17 1509    Specimen:  Blood Updated:  03/24/17 1520     WBC 12.64 (H) 10*3/mm3      RBC 3.41 (L) 10*6/mm3      Hemoglobin 10.9 (L) g/dL      Hematocrit 33.0 (L) %      MCV 96.8 fL      MCH 32.0 pg      MCHC 33.0 g/dL      RDW 14.7 (H) %      RDW-SD 52.1 (H) fl      MPV 10.2 fL      Platelets 233 10*3/mm3      Neutrophil % 83.7 (H) %      Lymphocyte % 6.3 (L) %      Monocyte % 7.4 %      Eosinophil % 0.9 %      Basophil % 0.3 %      Immature Grans % 1.4 (H) %      Neutrophils, Absolute 10.58 (H) 10*3/mm3      Lymphocytes, Absolute 0.79 10*3/mm3      Monocytes, Absolute 0.94 (H) 10*3/mm3      Eosinophils, Absolute 0.11 10*3/mm3      Basophils, Absolute 0.04 10*3/mm3      Immature Grans, Absolute 0.18 (H) 10*3/mm3     Comprehensive Metabolic Panel [[id number]]  (Abnormal) Collected:  03/24/17 1509    Specimen:  Blood Updated:  03/24/17 1535     Glucose 162 (H) mg/dL      BUN 30 (H) mg/dL      Creatinine 0.96 mg/dL      Sodium 133 (L) mmol/L      Potassium 4.0 mmol/L      Chloride 88 (L) mmol/L      CO2 34.0 (H) mmol/L      Calcium 9.1 mg/dL      Total Protein 6.9 g/dL      Albumin 3.60 g/dL      ALT (SGPT) 39 U/L      AST (SGOT) 34 U/L      Alkaline Phosphatase 83 U/L      Total Bilirubin 0.7 mg/dL      eGFR Non African Amer 55 mL/min/1.73      Globulin 3.3 gm/dL      A/G Ratio 1.1 g/dL      BUN/Creatinine Ratio 31.3 (H)     Anion Gap 11.0 mmol/L      Narrative:       The MDRD GFR formula is only valid for adults with stable renal function between ages 18 and 70.    CK [[id number]]  (Normal) Collected:  03/24/17 1509    Specimen:  Blood Updated:  03/24/17 1535     Creatine Kinase 30 U/L     Protime-INR [[id number]]  (Abnormal) Collected:  03/24/17 1509    Specimen:  Blood Updated:  03/24/17 1536     Protime 18.9 (H) Seconds      INR 1.58 (H)    Narrative:       Therapeutic range for most indications is 2.0-3.0 INR,  or 2.5-3.5 for mechanical heart valves.    Troponin [[id number]]  (Abnormal) Collected:  03/24/17 1509    Specimen:  Blood Updated:  03/24/17 1546     Troponin I 0.043 (H) ng/mL     BNP [[id number]]  (Abnormal) Collected:  03/24/17 1509    Specimen:  Blood Updated:  03/24/17 1546     proBNP 3140.0 (H) pg/mL     CK-MB [[id number]]  (Normal) Collected:  03/24/17 1509    Specimen:  Blood Updated:  03/24/17 1546     CKMB 1.05 ng/mL     Basic Metabolic Panel [[id number]]  (Abnormal) Collected:  03/24/17 1737    Specimen:  Blood Updated:  03/24/17 1958     Glucose 115 (H) mg/dL      BUN 29 (H) mg/dL      Creatinine 0.90 mg/dL      Sodium 132 (L) mmol/L      Potassium 4.0 mmol/L      Chloride 89 (L) mmol/L      CO2 33.0 (H) mmol/L      Calcium 8.8 mg/dL      eGFR Non African Amer 59 mL/min/1.73      BUN/Creatinine Ratio 32.2 (H)     Anion Gap 10.0 mmol/L     Narrative:       The MDRD GFR formula is only valid for adults with stable renal function between ages 18 and 70.    [place] Draw [[id number]] Collected:  03/24/17 1509    Specimen:  Blood Updated:  03/24/17 2001    Narrative:       The following orders were created for panel order [place] Draw.  Procedure                               Abnormality         Status                     ---------                               -----------         ------                     Light Blue Top[[id number]]                                    Final result               Green Top (Gel)[[id number]]                                   Final  result               Lavender Top[[id number]]                                      Final result               Gold Top - SST[[id number]]                                    Final result                 Please view results for these tests on the individual orders.    Light Blue Top [[id number]] Collected:  03/24/17 1509    Specimen:  Blood Updated:  03/24/17 2001     Extra Tube hold for add-on      Auto resulted       Green Top (Gel) [[id number]] Collected:  03/24/17 1509    Specimen:  Blood Updated:  03/24/17 2001     Extra Tube Hold for add-ons.      Auto resulted.       Lavender Top [[id number]] Collected:  03/24/17 1509    Specimen:  Blood Updated:  03/24/17 2001     Extra Tube hold for add-on      Auto resulted       Gold Top - SST [[id number]] Collected:  03/24/17 1509    Specimen:  Blood Updated:  03/24/17 2001     Extra Tube Hold for add-ons.      Auto resulted.       Troponin [[id number]]  (Abnormal) Collected:  03/24/17 1737    Specimen:  Blood Updated:  03/24/17 2006     Troponin I 0.045 (H) ng/mL     CBC & Differential [[id number]] Collected:  03/24/17 1947    Specimen:  Blood Updated:  03/24/17 2013    Narrative:       The following orders were created for panel order CBC & Differential.  Procedure                               Abnormality         Status                     ---------                               -----------         ------                     CBC Auto Differential[[id number]]         Abnormal            Final result                 Please view results for these tests on the individual orders.    CBC Auto Differential [[id number]]  (Abnormal) Collected:  03/24/17 1947    Specimen:  Blood Updated:  03/24/17 2013     WBC 12.99 (H) 10*3/mm3      RBC 3.34 (L) 10*6/mm3      Hemoglobin 10.7 (L) g/dL      Hematocrit 32.5 (L) %      MCV 97.3 fL      MCH 32.0 pg      MCHC 32.9 g/dL      RDW 14.1 %      RDW-SD 50.0 (H) fl      MPV 10.6 fL      Platelets 222 10*3/mm3      Neutrophil % 82.5 (H) %      Lymphocyte % 8.0  (L) %      Monocyte % 6.6 %      Eosinophil % 1.1 %      Basophil % 0.4 %      Immature Grans % 1.4 (H) %      Neutrophils, Absolute 10.72 (H) 10*3/mm3      Lymphocytes, Absolute 1.04 10*3/mm3      Monocytes, Absolute 0.86 10*3/mm3      Eosinophils, Absolute 0.14 10*3/mm3      Basophils, Absolute 0.05 10*3/mm3      Immature Grans, Absolute 0.18 (H) 10*3/mm3      nRBC 0.0 /100 WBC     TSH [[id number]]  (Normal) Collected:  03/24/17 1737    Specimen:  Blood Updated:  03/24/17 2106     TSH 4.470 mIU/mL     CBC & Differential [[id number]] Collected:  03/25/17 0547    Specimen:  Blood Updated:  03/25/17 0604    Narrative:       The following orders were created for panel order CBC & Differential.  Procedure                               Abnormality         Status                     ---------                               -----------         ------                     CBC Auto Differential[[id number]]         Abnormal            Final result                 Please view results for these tests on the individual orders.    CBC Auto Differential [[id number]]  (Abnormal) Collected:  03/25/17 0547    Specimen:  Blood Updated:  03/25/17 0604     WBC 11.25 (H) 10*3/mm3      RBC 3.01 (L) 10*6/mm3      Hemoglobin 9.6 (L) g/dL      Hematocrit 29.2 (L) %      MCV 97.0 fL      MCH 31.9 pg      MCHC 32.9 g/dL      RDW 14.4 %      RDW-SD 51.2 (H) fl      MPV 10.5 fL      Platelets 210 10*3/mm3      Neutrophil % 82.2 (H) %      Lymphocyte % 7.6 (L) %      Monocyte % 7.2 %      Eosinophil % 1.2 %      Basophil % 0.3 %      Immature Grans % 1.5 (H) %      Neutrophils, Absolute 9.25 (H) 10*3/mm3      Lymphocytes, Absolute 0.85 10*3/mm3      Monocytes, Absolute 0.81 10*3/mm3      Eosinophils, Absolute 0.14 10*3/mm3      Basophils, Absolute 0.03 10*3/mm3      Immature Grans, Absolute 0.17 (H) 10*3/mm3     Basic Metabolic Panel [[id number]]  (Abnormal) Collected:  03/25/17 0547    Specimen:  Blood Updated:  03/25/17 0611     Glucose 144 (H) mg/dL       BUN 22 (H) mg/dL      Creatinine 0.82 mg/dL      Sodium 134 (L) mmol/L      Potassium 4.3 mmol/L      Chloride 95 mmol/L      CO2 31.0 mmol/L      Calcium 8.4 mg/dL      eGFR Non African Amer 66 mL/min/1.73      BUN/Creatinine Ratio 26.8 (H)     Anion Gap 8.0 mmol/L     Narrative:       The MDRD GFR formula is only valid for adults with stable renal function between ages 18 and 70.          Imaging Results (last 24 hours)     Procedure Component Value Units Date/Time    XR Chest 2 View [[id number]] Collected:  03/24/17 1549     Updated:  03/24/17 1557    Narrative:       Patient Name:  ANN MARIE DAVALOS  Patient ID:  [id number]   Ordering:  NOHEMI DELVALLE  Attending:  NOHEMI DELVALLE  Referring:  NOHEMI DELVALLE  ------------------------------------------------      PROCEDURE: Chest PA and lateral    REASON FOR EXAM: Chest pain protocol    FINDINGS: Comparison study dated February 15, 2017. .  Cardiomegaly. Pulmonary vasculature appears within normal limits.  Right upper lung field medial patchy opacity with convex borders.  Otherwise lungs clear. Pleural spaces are normal . No acute  osseous abnormality.      Impression:       1.  Cardiomegaly without decompensation.  2.  Right upper lung field medial patchy opacity with convex  borders. This may represent a focus of round pneumonia versus  atelectasis. Recommend follow chest x-ray in 7-10 days to  document improvement and/or resolution or CT of the chest to  further evaluate.    Electronically signed by:  Rodrigo Mead MD  3/24/2017 3:56 PM CDT  Workstation: [id number]          Assessment/Plan     Principal Problem:    Atrial fibrillation with RVR  Active Problems:    Hypertension    Chronic obstructive pulmonary disease with acute exacerbation    Continue with Cardizem seth Wu MD  03/25/17  12:32 PM

## 2017-03-26 LAB
ANION GAP SERPL CALCULATED.3IONS-SCNC: 10 MMOL/L (ref 5–15)
BASOPHILS # BLD AUTO: 0.04 10*3/MM3 (ref 0–0.2)
BASOPHILS NFR BLD AUTO: 0.4 % (ref 0–2)
BUN BLD-MCNC: 17 MG/DL (ref 7–21)
BUN/CREAT SERPL: 22.4 (ref 7–25)
CALCIUM SPEC-SCNC: 8.2 MG/DL (ref 8.4–10.2)
CHLORIDE SERPL-SCNC: 97 MMOL/L (ref 95–110)
CO2 SERPL-SCNC: 30 MMOL/L (ref 22–31)
CREAT BLD-MCNC: 0.76 MG/DL (ref 0.5–1)
DEPRECATED RDW RBC AUTO: 51.7 FL (ref 36.4–46.3)
EOSINOPHIL # BLD AUTO: 0.23 10*3/MM3 (ref 0–0.7)
EOSINOPHIL NFR BLD AUTO: 2.1 % (ref 0–7)
ERYTHROCYTE [DISTWIDTH] IN BLOOD BY AUTOMATED COUNT: 14.6 % (ref 11.5–14.5)
GFR SERPL CREATININE-BSD FRML MDRD: 72 ML/MIN/1.73 (ref 60–90)
GLUCOSE BLD-MCNC: 151 MG/DL (ref 60–100)
HCT VFR BLD AUTO: 29.8 % (ref 35–45)
HGB BLD-MCNC: 9.9 G/DL (ref 12–15.5)
IMM GRANULOCYTES # BLD: 0.17 10*3/MM3 (ref 0–0.02)
IMM GRANULOCYTES NFR BLD: 1.6 % (ref 0–0.5)
LYMPHOCYTES # BLD AUTO: 0.74 10*3/MM3 (ref 0.6–4.2)
LYMPHOCYTES NFR BLD AUTO: 6.9 % (ref 10–50)
MAGNESIUM SERPL-MCNC: 1.7 MG/DL (ref 1.6–2.3)
MCH RBC QN AUTO: 32.2 PG (ref 26.5–34)
MCHC RBC AUTO-ENTMCNC: 33.2 G/DL (ref 31.4–36)
MCV RBC AUTO: 97.1 FL (ref 80–98)
MONOCYTES # BLD AUTO: 0.62 10*3/MM3 (ref 0–0.9)
MONOCYTES NFR BLD AUTO: 5.8 % (ref 0–12)
NEUTROPHILS # BLD AUTO: 8.91 10*3/MM3 (ref 2–8.6)
NEUTROPHILS NFR BLD AUTO: 83.2 % (ref 37–80)
PLATELET # BLD AUTO: 215 10*3/MM3 (ref 150–450)
PMV BLD AUTO: 10.4 FL (ref 8–12)
POTASSIUM BLD-SCNC: 3.9 MMOL/L (ref 3.5–5.1)
RBC # BLD AUTO: 3.07 10*6/MM3 (ref 3.77–5.16)
SODIUM BLD-SCNC: 137 MMOL/L (ref 137–145)
WBC NRBC COR # BLD: 10.71 10*3/MM3 (ref 3.2–9.8)

## 2017-03-26 PROCEDURE — 25010000002 FUROSEMIDE PER 20 MG

## 2017-03-26 PROCEDURE — 94799 UNLISTED PULMONARY SVC/PX: CPT

## 2017-03-26 PROCEDURE — 99233 SBSQ HOSP IP/OBS HIGH 50: CPT | Performed by: INTERNAL MEDICINE

## 2017-03-26 PROCEDURE — 80048 BASIC METABOLIC PNL TOTAL CA: CPT | Performed by: FAMILY MEDICINE

## 2017-03-26 PROCEDURE — 25010000002 DIGOXIN PER 500 MCG: Performed by: INTERNAL MEDICINE

## 2017-03-26 PROCEDURE — 94760 N-INVAS EAR/PLS OXIMETRY 1: CPT

## 2017-03-26 PROCEDURE — 83735 ASSAY OF MAGNESIUM: CPT | Performed by: INTERNAL MEDICINE

## 2017-03-26 PROCEDURE — 25010000002 ONDANSETRON PER 1 MG: Performed by: FAMILY MEDICINE

## 2017-03-26 PROCEDURE — 25010000002 AMIODARONE IN DEXTROSE 5% 360 MG/200ML SOLUTION: Performed by: INTERNAL MEDICINE

## 2017-03-26 PROCEDURE — 85025 COMPLETE CBC W/AUTO DIFF WBC: CPT | Performed by: FAMILY MEDICINE

## 2017-03-26 PROCEDURE — 25010000002 AMIODARONE IN DEXTROSE 5% 150 MG/100ML SOLUTION: Performed by: INTERNAL MEDICINE

## 2017-03-26 RX ORDER — FUROSEMIDE 10 MG/ML
20 INJECTION INTRAMUSCULAR; INTRAVENOUS ONCE
Status: COMPLETED | OUTPATIENT
Start: 2017-03-26 | End: 2017-03-26

## 2017-03-26 RX ORDER — FUROSEMIDE 10 MG/ML
INJECTION INTRAMUSCULAR; INTRAVENOUS
Status: COMPLETED
Start: 2017-03-26 | End: 2017-03-26

## 2017-03-26 RX ADMIN — HYDROCODONE BITARTRATE AND ACETAMINOPHEN 1 TABLET: 5; 325 TABLET ORAL at 20:35

## 2017-03-26 RX ADMIN — DIGOXIN 125 MCG: 0.25 INJECTION INTRAMUSCULAR; INTRAVENOUS at 17:30

## 2017-03-26 RX ADMIN — BUDESONIDE AND FORMOTEROL FUMARATE DIHYDRATE 2 PUFF: 160; 4.5 AEROSOL RESPIRATORY (INHALATION) at 19:49

## 2017-03-26 RX ADMIN — FUROSEMIDE 20 MG: 20 TABLET ORAL at 08:54

## 2017-03-26 RX ADMIN — APIXABAN 5 MG: 5 TABLET, FILM COATED ORAL at 08:54

## 2017-03-26 RX ADMIN — DIGOXIN 125 MCG: 0.25 INJECTION INTRAMUSCULAR; INTRAVENOUS at 12:00

## 2017-03-26 RX ADMIN — POTASSIUM CHLORIDE 10 MEQ: 750 TABLET, EXTENDED RELEASE ORAL at 17:30

## 2017-03-26 RX ADMIN — PRAVASTATIN SODIUM 80 MG: 40 TABLET ORAL at 20:34

## 2017-03-26 RX ADMIN — ALBUTEROL SULFATE 2.5 MG: 2.5 SOLUTION RESPIRATORY (INHALATION) at 14:40

## 2017-03-26 RX ADMIN — DIGOXIN 125 MCG: 0.25 INJECTION INTRAMUSCULAR; INTRAVENOUS at 08:54

## 2017-03-26 RX ADMIN — POTASSIUM CHLORIDE 10 MEQ: 750 TABLET, EXTENDED RELEASE ORAL at 08:54

## 2017-03-26 RX ADMIN — APIXABAN 5 MG: 5 TABLET, FILM COATED ORAL at 20:35

## 2017-03-26 RX ADMIN — SODIUM CHLORIDE 75 ML/HR: 900 INJECTION, SOLUTION INTRAVENOUS at 09:05

## 2017-03-26 RX ADMIN — ALBUTEROL SULFATE 2.5 MG: 2.5 SOLUTION RESPIRATORY (INHALATION) at 10:44

## 2017-03-26 RX ADMIN — FUROSEMIDE 20 MG: 10 INJECTION, SOLUTION INTRAMUSCULAR; INTRAVENOUS at 22:47

## 2017-03-26 RX ADMIN — ALBUTEROL SULFATE 2.5 MG: 2.5 SOLUTION RESPIRATORY (INHALATION) at 07:29

## 2017-03-26 RX ADMIN — AMIODARONE HYDROCHLORIDE 1 MG/MIN: 1.8 INJECTION, SOLUTION INTRAVENOUS at 13:09

## 2017-03-26 RX ADMIN — BUDESONIDE AND FORMOTEROL FUMARATE DIHYDRATE 2 PUFF: 160; 4.5 AEROSOL RESPIRATORY (INHALATION) at 08:36

## 2017-03-26 RX ADMIN — ALBUTEROL SULFATE 2.5 MG: 2.5 SOLUTION RESPIRATORY (INHALATION) at 19:49

## 2017-03-26 RX ADMIN — AMIODARONE HYDROCHLORIDE 150 MG: 1.5 INJECTION, SOLUTION INTRAVENOUS at 13:09

## 2017-03-26 RX ADMIN — AMIODARONE HYDROCHLORIDE 200 MG: 200 TABLET ORAL at 08:54

## 2017-03-26 RX ADMIN — AMIODARONE HYDROCHLORIDE 1 MG/MIN: 1.8 INJECTION, SOLUTION INTRAVENOUS at 16:26

## 2017-03-26 RX ADMIN — AMIODARONE HYDROCHLORIDE 0.5 MG/MIN: 1.8 INJECTION, SOLUTION INTRAVENOUS at 22:09

## 2017-03-26 RX ADMIN — ONDANSETRON 4 MG: 2 INJECTION INTRAMUSCULAR; INTRAVENOUS at 20:33

## 2017-03-26 NOTE — PLAN OF CARE
Problem: Patient Care Overview (Adult)  Goal: Plan of Care Review  Outcome: Ongoing (interventions implemented as appropriate)    03/26/17 0321   Coping/Psychosocial Response Interventions   Plan Of Care Reviewed With patient   Patient Care Overview   Progress no change       Goal: Adult Individualization and Mutuality  Outcome: Ongoing (interventions implemented as appropriate)  Goal: Discharge Needs Assessment  Outcome: Ongoing (interventions implemented as appropriate)    Problem: Arrhythmia/Dysrhythmia (Symptomatic) (Adult)  Goal: Signs and Symptoms of Listed Potential Problems Will be Absent or Manageable (Arrhythmia/Dysrhythmia)  Outcome: Ongoing (interventions implemented as appropriate)

## 2017-03-26 NOTE — PROGRESS NOTES
Progress Note  Daren Wu MD  Hospitalist     LOS: 2 days   Patient Care Team:  Krista Matos MD as PCP - General    Chief Complaint: Palpitations    Subjective     Interval History:     Patient Complaints: dyspnea / palpitations. Some better at present    History taken from: patient    Medication Review:   Current Facility-Administered Medications   Medication Dose Route Frequency Provider Last Rate Last Dose   • acetaminophen (TYLENOL) tablet 500 mg  500 mg Oral Once PRN Wili Adan MD       • acetaminophen (TYLENOL) tablet 650 mg  650 mg Oral Q4H PRN Wili Adan MD   650 mg at 03/25/17 2221   • albuterol (PROVENTIL) nebulizer solution 0.083% 2.5 mg/3mL  2.5 mg Nebulization 4x Daily - RT Wili Adan MD   2.5 mg at 03/26/17 1044   • amiodarone in dextrose 5% (NEXTERONE) 360 MG/200ML infusion  1 mg/min Intravenous Continuous Arnold Landin MD       • amiodarone in dextrose 5% (NEXTERONE) 360 MG/200ML infusion  0.5 mg/min Intravenous Continuous Arnold Landin MD       • amiodarone in dextrose 5% (NEXTERONE) infusion 150mg/100mL  150 mg Intravenous Once Arnold Landin MD       • apixaban (ELIQUIS) tablet 5 mg  5 mg Oral Q12H Wili Adan MD   5 mg at 03/26/17 0854   • budesonide-formoterol (SYMBICORT) 160-4.5 MCG/ACT inhaler 2 puff  2 puff Inhalation BID - RT Wili Adan MD   2 puff at 03/26/17 0836   • digoxin (LANOXIN) injection 125 mcg  125 mcg Intravenous 4x Daily Daren Wu MD   125 mcg at 03/26/17 0854   • docusate sodium (COLACE) capsule 100 mg  100 mg Oral BID Wili Adan MD   100 mg at 03/25/17 1735   • furosemide (LASIX) tablet 20 mg  20 mg Oral Daily Daren Wu MD   20 mg at 03/26/17 0854   • HYDROcodone-acetaminophen (NORCO) 5-325 MG per tablet 1 tablet  1 tablet Oral Q6H PRN Wili Adan MD   1 tablet at 03/24/17 1642   • ondansetron (ZOFRAN) injection 4 mg  4 mg Intravenous Q6H PRN Wili Adan MD       • potassium chloride  (K-DUR,KLOR-CON) CR tablet 10 mEq  10 mEq Oral BID Wili Adan MD   10 mEq at 03/26/17 0854   • pravastatin (PRAVACHOL) tablet 80 mg  80 mg Oral Nightly Wili Adan MD   80 mg at 03/25/17 2013   • sodium chloride 0.9 % flush 1-10 mL  1-10 mL Intravenous PRN Wili Adan MD       • sodium chloride 0.9 % infusion  75 mL/hr Intravenous Continuous Daren uW MD 75 mL/hr at 03/26/17 0905 75 mL/hr at 03/26/17 0905       Review of Systems:   Review of Systems   Constitutional: Positive for fatigue.   Respiratory: Positive for cough, shortness of breath and wheezing.    Cardiovascular: Positive for palpitations and leg swelling. Negative for chest pain.   Gastrointestinal: Negative for abdominal distention, abdominal pain, constipation and diarrhea.   Genitourinary: Negative for dysuria and urgency.   Musculoskeletal: Negative for neck pain.   Neurological: Positive for weakness and light-headedness.   Psychiatric/Behavioral: Negative for agitation and behavioral problems.   All other systems reviewed and are negative.      Objective     Vital Signs  Temp:  [97.2 °F (36.2 °C)-98.7 °F (37.1 °C)] 97.2 °F (36.2 °C)  Heart Rate:  [103-133] 114  Resp:  [20] 20  BP: ()/(59-91) 116/83    Physical Exam:  Physical Exam   Constitutional: She is oriented to person, place, and time. She appears well-developed and well-nourished.   HENT:   Head: Normocephalic and atraumatic.   Eyes: EOM are normal.   Neck: Neck supple.   Cardiovascular: An irregularly irregular rhythm present. Tachycardia present.    Pulmonary/Chest: Effort normal. No respiratory distress. She has wheezes. She has no rales.   Abdominal: Soft. Bowel sounds are normal.   Musculoskeletal: Normal range of motion. She exhibits no edema or tenderness.   Neurological: She is alert and oriented to person, place, and time.   Skin: Skin is warm and dry.   Psychiatric: She has a normal mood and affect. Her behavior is normal.   Vitals  reviewed.       Results Review:    Lab Results (last 24 hours)     Procedure Component Value Units Date/Time    CBC & Differential [[id number]] Collected:  03/26/17 0528    Specimen:  Blood Updated:  03/26/17 0607    Narrative:       The following orders were created for panel order CBC & Differential.  Procedure                               Abnormality         Status                     ---------                               -----------         ------                     CBC Auto Differential[[id number]]         Abnormal            Final result                 Please view results for these tests on the individual orders.    CBC Auto Differential [[id number]]  (Abnormal) Collected:  03/26/17 0528    Specimen:  Blood Updated:  03/26/17 0607     WBC 10.71 (H) 10*3/mm3      RBC 3.07 (L) 10*6/mm3      Hemoglobin 9.9 (L) g/dL      Hematocrit 29.8 (L) %      MCV 97.1 fL      MCH 32.2 pg      MCHC 33.2 g/dL      RDW 14.6 (H) %      RDW-SD 51.7 (H) fl      MPV 10.4 fL      Platelets 215 10*3/mm3      Neutrophil % 83.2 (H) %      Lymphocyte % 6.9 (L) %      Monocyte % 5.8 %      Eosinophil % 2.1 %      Basophil % 0.4 %      Immature Grans % 1.6 (H) %      Neutrophils, Absolute 8.91 (H) 10*3/mm3      Lymphocytes, Absolute 0.74 10*3/mm3      Monocytes, Absolute 0.62 10*3/mm3      Eosinophils, Absolute 0.23 10*3/mm3      Basophils, Absolute 0.04 10*3/mm3      Immature Grans, Absolute 0.17 (H) 10*3/mm3     Basic Metabolic Panel [[id number]]  (Abnormal) Collected:  03/26/17 0528    Specimen:  Blood Updated:  03/26/17 0615     Glucose 151 (H) mg/dL      BUN 17 mg/dL      Creatinine 0.76 mg/dL      Sodium 137 mmol/L      Potassium 3.9 mmol/L      Chloride 97 mmol/L      CO2 30.0 mmol/L      Calcium 8.2 (L) mg/dL      eGFR Non African Amer 72 mL/min/1.73      BUN/Creatinine Ratio 22.4     Anion Gap 10.0 mmol/L     Narrative:       The MDRD GFR formula is only valid for adults with stable renal function between ages 18 and 70.           Imaging Results (last 24 hours)     Procedure Component Value Units Date/Time    XR Chest 2 View [[id number]] Collected:  03/24/17 1549     Updated:  03/24/17 1557    Narrative:       Patient Name:  ANN MARIE DAVALOS  Patient ID:  [id number]   Ordering:  NOHEMI DELVALLE  Attending:  NOHEMI DELVALLE  Referring:  NOHEMI DELVALLE  ------------------------------------------------      PROCEDURE: Chest PA and lateral    REASON FOR EXAM: Chest pain protocol    FINDINGS: Comparison study dated February 15, 2017. .  Cardiomegaly. Pulmonary vasculature appears within normal limits.  Right upper lung field medial patchy opacity with convex borders.  Otherwise lungs clear. Pleural spaces are normal . No acute  osseous abnormality.      Impression:       1.  Cardiomegaly without decompensation.  2.  Right upper lung field medial patchy opacity with convex  borders. This may represent a focus of round pneumonia versus  atelectasis. Recommend follow chest x-ray in 7-10 days to  document improvement and/or resolution or CT of the chest to  further evaluate.    Electronically signed by:  Rodrigo Mead MD  3/24/2017 3:56 PM CDT  Workstation: [id number]          Assessment/Plan     Principal Problem:    Atrial fibrillation with RVR    Active Problems:    Hypertension    Chronic obstructive pulmonary disease with acute exacerbation    Currently on IV Amiodarone    Daren Wu MD  03/26/17  12:35 PM

## 2017-03-26 NOTE — PLAN OF CARE
Problem: Patient Care Overview (Adult)  Goal: Plan of Care Review  Outcome: Ongoing (interventions implemented as appropriate)    03/26/17 1335   Coping/Psychosocial Response Interventions   Plan Of Care Reviewed With patient;daughter   Patient Care Overview   Progress improving       Goal: Adult Individualization and Mutuality  Outcome: Ongoing (interventions implemented as appropriate)  Goal: Discharge Needs Assessment  Outcome: Ongoing (interventions implemented as appropriate)    Problem: Arrhythmia/Dysrhythmia (Symptomatic) (Adult)  Goal: Signs and Symptoms of Listed Potential Problems Will be Absent or Manageable (Arrhythmia/Dysrhythmia)  Outcome: Ongoing (interventions implemented as appropriate)

## 2017-03-26 NOTE — CONSULTS
Cardiology at [facility]  Cardiovascular Consultation Note      Norma Harkins  317/1  [id number]  1/5/1931    DATE OF ADMISSION: 3/24/2017  DATE OF CONSULTATION:  3/26/2017    Krista Matos MD  Treatment Team:   Attending Provider: Wili Adan MD  Consulting Physician: Wili Adan MD  Consulting Physician: Arnold Landin MD  Admitting Provider: Wili Adan MD    Chief Complaint   Patient presents with   • Atrial Fibrillation   • Rapid Heart Rate       Chief complaint/ Reason for Consultation: Atrial fibrillation with rapid ventricular response    86 years old patient with a past medical history significant for hypertension, hypertensive disease, normal left ventricular systolic function and previous echocardiographic study with a mild mitral and tricuspid regurgitation and moderate biatrial enlargement with history of COPD he the patient is known to Dr. Victorino Marroquin, on chronic oral intake ablation along with oral amiodarone's.  She admitted for evaluations of palpitation and fluttering noted to be in atrial fibrillation with a rapid ventricle response.  The heart rate up to 140-1 50 bpm with underlying right bundle branch block morphology.  The patient denies orthopnea, PND, chest pain.  She had baseline shortness of breath probably secondary to underlying lung disease.  She was started on IV amiodarone along with continuation of oral amiodarone.  The patient still in atrial fibrillation with a heart rate up to 1:30 to 1 40 bpm.  Had normal potassium level and will arrange the magnesium level.      History of Present Illness    Past Medical History:   Diagnosis Date   • Chronic obstructive lung disease 04/26/2016    on oxygen      • Chronic respiratory failure with hypoxia 04/26/2016   • Essential hypertension 09/08/2015   • Hyperlipidemia    • Obesity    • Paroxysmal atrial fibrillation 03/15/2016       Past Surgical History:   Procedure Laterality Date   • CATARACT  EXTRACTION     • HYSTERECTOMY     • JOINT REPLACEMENT      total knee        No current facility-administered medications on file prior to encounter.      Current Outpatient Prescriptions on File Prior to Encounter   Medication Sig Dispense Refill   • amiodarone (PACERONE) 200 MG tablet Take 1 tablet by mouth Daily. 30 tablet 6   • apixaban (ELIQUIS) 5 MG tablet tablet Take 1 tablet by mouth Every 12 (Twelve) Hours. 60 tablet 6   • cefuroxime (CEFTIN) 500 MG tablet Take 1 tablet by mouth 2 (Two) Times a Day. 14 tablet 0   • docusate sodium (COLACE) 100 MG capsule Take 300 mg by mouth Daily.     • furosemide (LASIX) 40 MG tablet Take 1 tablet by mouth Daily. 30 tablet 5   • potassium chloride (K-DUR,KLOR-CON) 10 MEQ CR tablet Take 1 tablet by mouth 2 (Two) Times a Day. 60 tablet 5   • pravastatin (PRAVACHOL) 80 MG tablet Take 1 tablet by mouth Every Night. 30 tablet 0   • SYMBICORT 160-4.5 MCG/ACT inhaler inhale 2 puffs by mouth twice a day 30.6 inhaler 2   • acetaminophen (TYLENOL) 500 MG tablet Take 500 mg by mouth 1 (one) time as needed for mild pain (1-3).     • albuterol (ACCUNEB) 0.63 MG/3ML nebulizer solution Take 3 mL by nebulization Every 6 (Six) Hours As Needed for wheezing. 129 vial 1   • albuterol (PROVENTIL HFA;VENTOLIN HFA) 108 (90 BASE) MCG/ACT inhaler Inhale 2 puffs 4 (Four) Times a Day. 1 inhaler 1   • HYDROcodone-acetaminophen (NORCO) 5-325 MG per tablet Take 1 tablet by mouth Every 6 (Six) Hours As Needed for moderate pain (4-6). 30 tablet 0   • ipratropium-albuterol (DUO-NEB) 0.5-2.5 mg/mL nebulizer inhale contents of 1 vial TWO TO FOUR TIMES DAILY IF NEEDED  0       Social History     Social History   • Marital status:      Spouse name: N/A   • Number of children: N/A   • Years of education: N/A     Occupational History   • Not on file.     Social History Main Topics   • Smoking status: Former Smoker   • Smokeless tobacco: Never Used      Comment:  Patient quit smoking more than 30 years  "ago, she smoked 1pd for 30 years prior to that   • Alcohol use No   • Drug use: No   • Sexual activity: Defer     Other Topics Concern   • Not on file     Social History Narrative   • No narrative on file           REVIEW OF SYSTEMS:   Review of Systems   Constitution: Negative for diaphoresis and weakness.   HENT: Negative for congestion and headaches.    Cardiovascular: Positive for palpitations. Negative for near-syncope, orthopnea and paroxysmal nocturnal dyspnea.   Respiratory: Positive for shortness of breath. Negative for cough.    Endocrine: Negative for polyphagia and polyuria.   Hematologic/Lymphatic: Does not bruise/bleed easily.   Skin: Negative for color change, flushing and rash.   Musculoskeletal: Positive for arthritis. Negative for falls and muscle weakness.   Gastrointestinal: Negative for anorexia, change in bowel habit, bowel incontinence and heartburn.   Genitourinary: Negative for bladder incontinence, dysuria, hematuria and nocturia.   Neurological: Negative for dizziness, focal weakness, numbness and tremors.   Psychiatric/Behavioral: Negative for depression and hallucinations.         Objective:     Vitals:    03/26/17 0746 03/26/17 0810 03/26/17 0945 03/26/17 1048   BP: 127/74  116/83    BP Location: Left arm  Left arm    Patient Position: Lying  Lying    Pulse: 110 105 116 114   Resp: 20      Temp: 97.2 °F (36.2 °C)      TempSrc: Temporal Artery       SpO2: 97%   96%   Weight:       Height:         Body mass index is 34.98 kg/(m^2).  Flowsheet Rows         First Filed Value    Admission Height  61\" (154.9 cm) Documented at 03/24/2017 1411    Admission Weight  185 lb (83.9 kg) Documented at 03/24/2017 1411          Intake/Output Summary (Last 24 hours) at 03/26/17 1152  Last data filed at 03/26/17 0905   Gross per 24 hour   Intake             3640 ml   Output                0 ml   Net             3640 ml         Physical Exam   Physical Exam   Constitutional: She appears well-developed and " well-nourished.   HENT:   Head: Normocephalic and atraumatic.   Eyes: Conjunctivae are normal. Pupils are equal, round, and reactive to light.   Neck: Normal range of motion. Neck supple.   Cardiovascular: Intact distal pulses.  Exam reveals no gallop.    Murmur heard.  Irregular rate and rhythm with a rapid ventricular response   Pulmonary/Chest: Effort normal and breath sounds normal. She has no rales.   Abdominal: Soft. Bowel sounds are normal.   Musculoskeletal: Normal range of motion. She exhibits no edema or tenderness.   Neurological: She is alert. No cranial nerve deficit.   Skin: Skin is warm and dry.   Psychiatric: She has a normal mood and affect.       Radiology Review    XR Chest 2 View   Final Result   1.  Cardiomegaly without decompensation.   2.  Right upper lung field medial patchy opacity with convex   borders. This may represent a focus of round pneumonia versus   atelectasis. Recommend follow chest x-ray in 7-10 days to   document improvement and/or resolution or CT of the chest to   further evaluate.      Electronically signed by:  Rodrigo Mead MD  3/24/2017 3:56 PM CDT   Workstation: [id number]          Lab Results:  Lab Results (last 24 hours)     Procedure Component Value Units Date/Time    CBC & Differential [[id number]] Collected:  03/26/17 0528    Specimen:  Blood Updated:  03/26/17 0607    Narrative:       The following orders were created for panel order CBC & Differential.  Procedure                               Abnormality         Status                     ---------                               -----------         ------                     CBC Auto Differential[[id number]]         Abnormal            Final result                 Please view results for these tests on the individual orders.    CBC Auto Differential [[id number]]  (Abnormal) Collected:  03/26/17 0528    Specimen:  Blood Updated:  03/26/17 0607     WBC 10.71 (H) 10*3/mm3      RBC 3.07 (L) 10*6/mm3      Hemoglobin 9.9 (L)  g/dL      Hematocrit 29.8 (L) %      MCV 97.1 fL      MCH 32.2 pg      MCHC 33.2 g/dL      RDW 14.6 (H) %      RDW-SD 51.7 (H) fl      MPV 10.4 fL      Platelets 215 10*3/mm3      Neutrophil % 83.2 (H) %      Lymphocyte % 6.9 (L) %      Monocyte % 5.8 %      Eosinophil % 2.1 %      Basophil % 0.4 %      Immature Grans % 1.6 (H) %      Neutrophils, Absolute 8.91 (H) 10*3/mm3      Lymphocytes, Absolute 0.74 10*3/mm3      Monocytes, Absolute 0.62 10*3/mm3      Eosinophils, Absolute 0.23 10*3/mm3      Basophils, Absolute 0.04 10*3/mm3      Immature Grans, Absolute 0.17 (H) 10*3/mm3     Basic Metabolic Panel [[id number]]  (Abnormal) Collected:  03/26/17 0528    Specimen:  Blood Updated:  03/26/17 0615     Glucose 151 (H) mg/dL      BUN 17 mg/dL      Creatinine 0.76 mg/dL      Sodium 137 mmol/L      Potassium 3.9 mmol/L      Chloride 97 mmol/L      CO2 30.0 mmol/L      Calcium 8.2 (L) mg/dL      eGFR Non African Amer 72 mL/min/1.73      BUN/Creatinine Ratio 22.4     Anion Gap 10.0 mmol/L     Narrative:       The MDRD GFR formula is only valid for adults with stable renal function between ages 18 and 70.          I personally viewed and interpreted the patient's EKG/Telemetry data      Assessment/Plan:     #1 paroxysmal atrial fibrillations #2 palpitations #3 moderate biatrial enlargement with a mild mitral and tricuspid regurgitation #4 hypertension with hypertensive heart disease #5 diabetes #6 COPD    86 years old patient appropriate for age known to Dr. Victorino Marroquin with history of paroxysmal atrial fibrillations maintain sinus rhythm with the help of amiodarone.  The patient. wpalpitation presented to the ER noted to be in atrial fibrillation with rapid ventricle response.  The patient to start an IV Cardizem.  I will go ahead and discontinue Cardizem and start the patient on IV amiodarone 150 mg bolus then started at 1 mg/m for 6 hour than 0.5 mg/m for next 24-36 hours  Recommend to continue oral anticoagulations agreed  with the diabetes management.  I will arrange magnesium level today and if the patient stays in atrial fibrillation chemical cardioversion / electrical can be contemplated tomorrow.          Thank you for allowing me to participate in the care of Norma Harkins. Feel free to contact me directly with any further questions or concerns.    Arnold Landin MD  03/26/17  11:52 AM.      EMR Dragon/Transcription disclaimer:   Much of this encounter note is an electronic transcription/translation of spoken language to printed text. The electronic translation of spoken language may permit erroneous, or at times, nonsensical words or phrases to be inadvertently transcribed; Although I have reviewed the note for such errors, some may still exist.

## 2017-03-27 ENCOUNTER — HOSPITAL ENCOUNTER (INPATIENT)
Dept: CARDIOLOGY | Facility: HOSPITAL | Age: 82
Discharge: HOME | End: 2017-03-27
Attending: INTERNAL MEDICINE

## 2017-03-27 ENCOUNTER — ANESTHESIA EVENT (OUTPATIENT)
Dept: CARDIOLOGY | Facility: HOSPITAL | Age: 82
End: 2017-03-27

## 2017-03-27 ENCOUNTER — TRANSCRIBE ORDERS (OUTPATIENT)
Dept: CARDIOLOGY | Facility: HOSPITAL | Age: 82
End: 2017-03-27

## 2017-03-27 ENCOUNTER — ANESTHESIA (OUTPATIENT)
Dept: CARDIOLOGY | Facility: HOSPITAL | Age: 82
End: 2017-03-27

## 2017-03-27 DIAGNOSIS — I48.91 ATRIAL FIBRILLATION, UNSPECIFIED TYPE (HCC): ICD-10-CM

## 2017-03-27 DIAGNOSIS — I48.91 ATRIAL FIBRILLATION, UNSPECIFIED TYPE (HCC): Primary | ICD-10-CM

## 2017-03-27 LAB
ANION GAP SERPL CALCULATED.3IONS-SCNC: 11 MMOL/L (ref 5–15)
BASOPHILS # BLD AUTO: 0.03 10*3/MM3 (ref 0–0.2)
BASOPHILS NFR BLD AUTO: 0.3 % (ref 0–2)
BUN BLD-MCNC: 18 MG/DL (ref 7–21)
BUN/CREAT SERPL: 22 (ref 7–25)
CALCIUM SPEC-SCNC: 8.5 MG/DL (ref 8.4–10.2)
CHLORIDE SERPL-SCNC: 96 MMOL/L (ref 95–110)
CO2 SERPL-SCNC: 31 MMOL/L (ref 22–31)
CREAT BLD-MCNC: 0.82 MG/DL (ref 0.5–1)
DEPRECATED RDW RBC AUTO: 52.6 FL (ref 36.4–46.3)
EOSINOPHIL # BLD AUTO: 0.29 10*3/MM3 (ref 0–0.7)
EOSINOPHIL NFR BLD AUTO: 2.5 % (ref 0–7)
ERYTHROCYTE [DISTWIDTH] IN BLOOD BY AUTOMATED COUNT: 14.8 % (ref 11.5–14.5)
GFR SERPL CREATININE-BSD FRML MDRD: 66 ML/MIN/1.73 (ref 39–90)
GLUCOSE BLD-MCNC: 146 MG/DL (ref 60–100)
HCT VFR BLD AUTO: 30.1 % (ref 35–45)
HGB BLD-MCNC: 9.6 G/DL (ref 12–15.5)
IMM GRANULOCYTES # BLD: 0.16 10*3/MM3 (ref 0–0.02)
IMM GRANULOCYTES NFR BLD: 1.4 % (ref 0–0.5)
LYMPHOCYTES # BLD AUTO: 0.74 10*3/MM3 (ref 0.6–4.2)
LYMPHOCYTES NFR BLD AUTO: 6.4 % (ref 10–50)
MCH RBC QN AUTO: 31.4 PG (ref 26.5–34)
MCHC RBC AUTO-ENTMCNC: 31.9 G/DL (ref 31.4–36)
MCV RBC AUTO: 98.4 FL (ref 80–98)
MONOCYTES # BLD AUTO: 0.75 10*3/MM3 (ref 0–0.9)
MONOCYTES NFR BLD AUTO: 6.5 % (ref 0–12)
NEUTROPHILS # BLD AUTO: 9.53 10*3/MM3 (ref 2–8.6)
NEUTROPHILS NFR BLD AUTO: 82.9 % (ref 37–80)
PLATELET # BLD AUTO: 228 10*3/MM3 (ref 150–450)
PMV BLD AUTO: 10.6 FL (ref 8–12)
POTASSIUM BLD-SCNC: 4 MMOL/L (ref 3.5–5.1)
RBC # BLD AUTO: 3.06 10*6/MM3 (ref 3.77–5.16)
SODIUM BLD-SCNC: 138 MMOL/L (ref 137–145)
WBC NRBC COR # BLD: 11.5 10*3/MM3 (ref 3.2–9.8)

## 2017-03-27 PROCEDURE — 85025 COMPLETE CBC W/AUTO DIFF WBC: CPT | Performed by: FAMILY MEDICINE

## 2017-03-27 PROCEDURE — 93005 ELECTROCARDIOGRAM TRACING: CPT | Performed by: INTERNAL MEDICINE

## 2017-03-27 PROCEDURE — 92960 CARDIOVERSION ELECTRIC EXT: CPT

## 2017-03-27 PROCEDURE — 94760 N-INVAS EAR/PLS OXIMETRY 1: CPT

## 2017-03-27 PROCEDURE — 25010000002 PROPOFOL 10 MG/ML EMULSION: Performed by: NURSE ANESTHETIST, CERTIFIED REGISTERED

## 2017-03-27 PROCEDURE — 99232 SBSQ HOSP IP/OBS MODERATE 35: CPT | Performed by: INTERNAL MEDICINE

## 2017-03-27 PROCEDURE — 5A2204Z RESTORATION OF CARDIAC RHYTHM, SINGLE: ICD-10-PCS | Performed by: INTERNAL MEDICINE

## 2017-03-27 PROCEDURE — 92960 CARDIOVERSION ELECTRIC EXT: CPT | Performed by: INTERNAL MEDICINE

## 2017-03-27 PROCEDURE — 94799 UNLISTED PULMONARY SVC/PX: CPT

## 2017-03-27 PROCEDURE — 25010000002 MIDAZOLAM PER 1 MG: Performed by: NURSE ANESTHETIST, CERTIFIED REGISTERED

## 2017-03-27 PROCEDURE — 80048 BASIC METABOLIC PNL TOTAL CA: CPT | Performed by: FAMILY MEDICINE

## 2017-03-27 RX ORDER — MIDAZOLAM HYDROCHLORIDE 1 MG/ML
INJECTION INTRAMUSCULAR; INTRAVENOUS AS NEEDED
Status: DISCONTINUED | OUTPATIENT
Start: 2017-03-27 | End: 2017-03-27 | Stop reason: SURG

## 2017-03-27 RX ORDER — PROPOFOL 10 MG/ML
VIAL (ML) INTRAVENOUS AS NEEDED
Status: DISCONTINUED | OUTPATIENT
Start: 2017-03-27 | End: 2017-03-27 | Stop reason: SURG

## 2017-03-27 RX ORDER — AMIODARONE HYDROCHLORIDE 200 MG/1
200 TABLET ORAL
Status: DISCONTINUED | OUTPATIENT
Start: 2017-03-27 | End: 2017-03-28 | Stop reason: HOSPADM

## 2017-03-27 RX ORDER — SODIUM CHLORIDE 9 MG/ML
INJECTION, SOLUTION INTRAVENOUS CONTINUOUS PRN
Status: DISCONTINUED | OUTPATIENT
Start: 2017-03-27 | End: 2017-03-27 | Stop reason: SURG

## 2017-03-27 RX ADMIN — PRAVASTATIN SODIUM 80 MG: 40 TABLET ORAL at 20:34

## 2017-03-27 RX ADMIN — AMIODARONE HYDROCHLORIDE 200 MG: 200 TABLET ORAL at 12:31

## 2017-03-27 RX ADMIN — SODIUM CHLORIDE: 900 INJECTION, SOLUTION INTRAVENOUS at 10:43

## 2017-03-27 RX ADMIN — FUROSEMIDE 20 MG: 20 TABLET ORAL at 12:32

## 2017-03-27 RX ADMIN — DOCUSATE SODIUM 100 MG: 100 CAPSULE, LIQUID FILLED ORAL at 12:32

## 2017-03-27 RX ADMIN — DOCUSATE SODIUM 100 MG: 100 CAPSULE, LIQUID FILLED ORAL at 17:54

## 2017-03-27 RX ADMIN — BUDESONIDE AND FORMOTEROL FUMARATE DIHYDRATE 2 PUFF: 160; 4.5 AEROSOL RESPIRATORY (INHALATION) at 07:38

## 2017-03-27 RX ADMIN — BUDESONIDE AND FORMOTEROL FUMARATE DIHYDRATE 2 PUFF: 160; 4.5 AEROSOL RESPIRATORY (INHALATION) at 19:48

## 2017-03-27 RX ADMIN — APIXABAN 5 MG: 5 TABLET, FILM COATED ORAL at 20:34

## 2017-03-27 RX ADMIN — PROPOFOL 50 MG: 10 INJECTION, EMULSION INTRAVENOUS at 10:55

## 2017-03-27 RX ADMIN — APIXABAN 5 MG: 5 TABLET, FILM COATED ORAL at 12:32

## 2017-03-27 RX ADMIN — POTASSIUM CHLORIDE 10 MEQ: 750 TABLET, EXTENDED RELEASE ORAL at 12:32

## 2017-03-27 RX ADMIN — MIDAZOLAM 2 MG: 1 INJECTION INTRAMUSCULAR; INTRAVENOUS at 10:49

## 2017-03-27 RX ADMIN — POTASSIUM CHLORIDE 10 MEQ: 750 TABLET, EXTENDED RELEASE ORAL at 17:54

## 2017-03-27 RX ADMIN — ALBUTEROL SULFATE 2.5 MG: 2.5 SOLUTION RESPIRATORY (INHALATION) at 19:48

## 2017-03-27 RX ADMIN — ALBUTEROL SULFATE 2.5 MG: 2.5 SOLUTION RESPIRATORY (INHALATION) at 14:12

## 2017-03-27 NOTE — ADDENDUM NOTE
Addendum  created 03/27/17 1116 by Lavern Grey MD    Anesthesia Attestations filed, Anesthesia Staff edited

## 2017-03-27 NOTE — PROGRESS NOTES
Progress Note  Daren Wu MD  Hospitalist     LOS: 3 days   Patient Care Team:  Krista Matos MD as PCP - General    Chief Complaint: Palpitations    Subjective     Interval History:     Patient Complaints: dyspnea / palpitations. Some better at present, remains in atrial fibrillation    History taken from: patient    Medication Review:   Current Facility-Administered Medications   Medication Dose Route Frequency Provider Last Rate Last Dose   • acetaminophen (TYLENOL) tablet 500 mg  500 mg Oral Once PRN Wili Adan MD       • acetaminophen (TYLENOL) tablet 650 mg  650 mg Oral Q4H PRN Wili Adan MD   650 mg at 03/25/17 2221   • albuterol (PROVENTIL) nebulizer solution 0.083% 2.5 mg/3mL  2.5 mg Nebulization 4x Daily - RT Wili Adan MD   Stopped at 03/27/17 0738   • amiodarone in dextrose 5% (NEXTERONE) 360 MG/200ML infusion  0.5 mg/min Intravenous Continuous Arnold Landin MD 16.67 mL/hr at 03/26/17 2209 0.5 mg/min at 03/26/17 2209   • apixaban (ELIQUIS) tablet 5 mg  5 mg Oral Q12H Wili Adan MD   5 mg at 03/26/17 2035   • budesonide-formoterol (SYMBICORT) 160-4.5 MCG/ACT inhaler 2 puff  2 puff Inhalation BID - RT Wili Adan MD   2 puff at 03/27/17 0738   • docusate sodium (COLACE) capsule 100 mg  100 mg Oral BID Wili Adan MD   100 mg at 03/25/17 1735   • furosemide (LASIX) tablet 20 mg  20 mg Oral Daily Daren Wu MD   20 mg at 03/26/17 0854   • HYDROcodone-acetaminophen (NORCO) 5-325 MG per tablet 1 tablet  1 tablet Oral Q6H PRN Wili Adan MD   1 tablet at 03/26/17 2035   • ondansetron (ZOFRAN) injection 4 mg  4 mg Intravenous Q6H PRN Wili Adan MD   4 mg at 03/26/17 2033   • potassium chloride (K-DUR,KLOR-CON) CR tablet 10 mEq  10 mEq Oral BID Wili Adan MD   10 mEq at 03/26/17 1730   • pravastatin (PRAVACHOL) tablet 80 mg  80 mg Oral Nightly Wili Adan MD   80 mg at 03/26/17 2034   • sodium chloride 0.9 % flush  1-10 mL  1-10 mL Intravenous PRN Wili Adan MD           Review of Systems:   Review of Systems   Constitutional: Positive for fatigue.   Respiratory: Positive for cough, shortness of breath and wheezing.    Cardiovascular: Positive for palpitations and leg swelling. Negative for chest pain.   Gastrointestinal: Negative for abdominal distention, abdominal pain, constipation and diarrhea.   Genitourinary: Negative for dysuria and urgency.   Musculoskeletal: Negative for neck pain.   Neurological: Positive for weakness and light-headedness.   Psychiatric/Behavioral: Negative for agitation and behavioral problems.   All other systems reviewed and are negative.      Objective     Vital Signs  Temp:  [96.4 °F (35.8 °C)-98.5 °F (36.9 °C)] 96.4 °F (35.8 °C)  Heart Rate:  [100-140] 120  Resp:  [20-24] 20  BP: (109-121)/(78-82) 109/82    Physical Exam:  Physical Exam   Constitutional: She is oriented to person, place, and time. She appears well-developed and well-nourished.   HENT:   Head: Normocephalic and atraumatic.   Eyes: EOM are normal.   Neck: Neck supple.   Cardiovascular: An irregularly irregular rhythm present. Tachycardia present.    Pulmonary/Chest: Effort normal. No respiratory distress. She has wheezes. She has no rales.   Abdominal: Soft. Bowel sounds are normal.   Musculoskeletal: Normal range of motion. She exhibits no edema or tenderness.   Neurological: She is alert and oriented to person, place, and time.   Skin: Skin is warm and dry.   Psychiatric: She has a normal mood and affect. Her behavior is normal.   Vitals reviewed.       Results Review:    Lab Results (last 24 hours)     Procedure Component Value Units Date/Time    Magnesium [[id number]]  (Normal) Collected:  03/26/17 1303    Specimen:  Blood Updated:  03/26/17 1338     Magnesium 1.7 mg/dL     CBC & Differential [[id number]] Collected:  03/27/17 0541    Specimen:  Blood Updated:  03/27/17 0721    Narrative:       The following orders were  created for panel order CBC & Differential.  Procedure                               Abnormality         Status                     ---------                               -----------         ------                     CBC Auto Differential[[id number]]         Abnormal            Final result                 Please view results for these tests on the individual orders.    CBC Auto Differential [[id number]]  (Abnormal) Collected:  03/27/17 0541    Specimen:  Blood Updated:  03/27/17 0721     WBC 11.50 (H) 10*3/mm3      RBC 3.06 (L) 10*6/mm3      Hemoglobin 9.6 (L) g/dL      Hematocrit 30.1 (L) %      MCV 98.4 (H) fL      MCH 31.4 pg      MCHC 31.9 g/dL      RDW 14.8 (H) %      RDW-SD 52.6 (H) fl      MPV 10.6 fL      Platelets 228 10*3/mm3      Neutrophil % 82.9 (H) %      Lymphocyte % 6.4 (L) %      Monocyte % 6.5 %      Eosinophil % 2.5 %      Basophil % 0.3 %      Immature Grans % 1.4 (H) %      Neutrophils, Absolute 9.53 (H) 10*3/mm3      Lymphocytes, Absolute 0.74 10*3/mm3      Monocytes, Absolute 0.75 10*3/mm3      Eosinophils, Absolute 0.29 10*3/mm3      Basophils, Absolute 0.03 10*3/mm3      Immature Grans, Absolute 0.16 (H) 10*3/mm3     Basic Metabolic Panel [[id number]]  (Abnormal) Collected:  03/27/17 0541    Specimen:  Blood Updated:  03/27/17 0741     Glucose 146 (H) mg/dL      BUN 18 mg/dL      Creatinine 0.82 mg/dL      Sodium 138 mmol/L      Potassium 4.0 mmol/L      Chloride 96 mmol/L      CO2 31.0 mmol/L      Calcium 8.5 mg/dL      eGFR Non African Amer 66 mL/min/1.73      BUN/Creatinine Ratio 22.0     Anion Gap 11.0 mmol/L     Narrative:       The MDRD GFR formula is only valid for adults with stable renal function between ages 18 and 70.          Imaging Results (last 24 hours)     Procedure Component Value Units Date/Time    XR Chest 2 View [[id number]] Collected:  03/24/17 1549     Updated:  03/24/17 1087    Narrative:       Patient Name:  ANN MARIE DAVALOS  Patient ID:  [id number]   Ordering:  NOHEMI LUNA  MOOK  Attending:  NOHEMI DELVALLE  Referring:  NOHEMI DELVALLE  ------------------------------------------------      PROCEDURE: Chest PA and lateral    REASON FOR EXAM: Chest pain protocol    FINDINGS: Comparison study dated February 15, 2017. .  Cardiomegaly. Pulmonary vasculature appears within normal limits.  Right upper lung field medial patchy opacity with convex borders.  Otherwise lungs clear. Pleural spaces are normal . No acute  osseous abnormality.      Impression:       1.  Cardiomegaly without decompensation.  2.  Right upper lung field medial patchy opacity with convex  borders. This may represent a focus of round pneumonia versus  atelectasis. Recommend follow chest x-ray in 7-10 days to  document improvement and/or resolution or CT of the chest to  further evaluate.    Electronically signed by:  Rodrigo Mead MD  3/24/2017 3:56 PM CDT  Workstation: [id number]          Assessment/Plan     Principal Problem:    Atrial fibrillation with RVR    Active Problems:    Hypertension    Chronic obstructive pulmonary disease with acute exacerbation    Currently on IV Amiodarone. Scheduled for cardioversion later today.    Daren Wu MD  03/27/17  10:06 AM

## 2017-03-27 NOTE — ANESTHESIA POSTPROCEDURE EVALUATION
Patient: Norma Harkins    Procedure Summary     Date Anesthesia Start Anesthesia Stop Room / Location    03/27/17 1049 [facility] CATH LAB       Procedure Diagnosis Scheduled Providers Provider    [facility] Atrial fibrillation, unspecified type  (AFIB)  Estefania Roth CRNA          Anesthesia Type: MAC  Last vitals  BP      Temp      Pulse     Resp      SpO2        Post Anesthesia Care and Evaluation    Patient location during evaluation: bedside  Patient participation: complete - patient participated  Level of consciousness: awake and awake and alert  Pain management: adequate  Airway patency: patent  Anesthetic complications: No anesthetic complications  PONV Status: none  Cardiovascular status: acceptable  Respiratory status: acceptable  Hydration status: acceptable

## 2017-03-27 NOTE — PROGRESS NOTES
LOS: 3 days   Patient Care Team:  Krista Matos MD as PCP - General    Chief Complaint:  Palpitations still in atrial fibrillation    86 years old patient with a past medical history of for paroxysmal atrial fibrillation and mild mitral and tricuspid regurgitation with moderate biatrial enlargement.  Patient admitted with a symptomatic atrial fibrillation.  Started on IV amiodarone and suggested after giving IV bolus.  The patient still in atrial fibrillation with rapid ventricle response with the symptom of for mild shortness of breath and palpitations.  Patient already on oral anticoagulations       Review of Systems:   ROS  Constitution: Negative for diaphoresis and weakness.   HENT: Negative for congestion and headaches.   Cardiovascular: Positive for palpitations. Negative for near-syncope, orthopnea and paroxysmal nocturnal dyspnea.   Respiratory: Negative for cough. ,dyspnea better  Endocrine: Negative for polyphagia and polyuria.   Hematologic/Lymphatic: Does not bruise/bleed easily.   Skin: Negative for color change, flushing and rash.   Musculoskeletal: Positive for arthritis. Negative for falls and muscle weakness.   Gastrointestinal: Negative for anorexia, change in bowel habit, bowel incontinence and heartburn.   Genitourinary: Negative for bladder incontinence, dysuria, hematuria and nocturia.   Neurological: Negative for dizziness, focal weakness, numbness and tremors.   Psychiatric/Behavioral: Negative for depression and hallucinations.  Objective     Current Facility-Administered Medications   Medication Dose Route Frequency Provider Last Rate Last Dose   • acetaminophen (TYLENOL) tablet 500 mg  500 mg Oral Once PRN Wili Adan MD       • acetaminophen (TYLENOL) tablet 650 mg  650 mg Oral Q4H PRN Wili Adan MD   650 mg at 03/25/17 2221   • albuterol (PROVENTIL) nebulizer solution 0.083% 2.5 mg/3mL  2.5 mg Nebulization 4x Daily - RT Wili Adan MD   Stopped at 03/27/17  "0738   • amiodarone in dextrose 5% (NEXTERONE) 360 MG/200ML infusion  0.5 mg/min Intravenous Continuous Arnold Landin MD 16.67 mL/hr at 03/26/17 2209 0.5 mg/min at 03/26/17 2209   • apixaban (ELIQUIS) tablet 5 mg  5 mg Oral Q12H Wili Adan MD   5 mg at 03/26/17 2035   • budesonide-formoterol (SYMBICORT) 160-4.5 MCG/ACT inhaler 2 puff  2 puff Inhalation BID - RT Wili Adan MD   2 puff at 03/27/17 0738   • docusate sodium (COLACE) capsule 100 mg  100 mg Oral BID Wili Adan MD   100 mg at 03/25/17 1735   • furosemide (LASIX) tablet 20 mg  20 mg Oral Daily Daren Wu MD   20 mg at 03/26/17 0854   • HYDROcodone-acetaminophen (NORCO) 5-325 MG per tablet 1 tablet  1 tablet Oral Q6H PRN Wili Adan MD   1 tablet at 03/26/17 2035   • ondansetron (ZOFRAN) injection 4 mg  4 mg Intravenous Q6H PRN Wlii Adan MD   4 mg at 03/26/17 2033   • potassium chloride (K-DUR,KLOR-CON) CR tablet 10 mEq  10 mEq Oral BID Wili Adan MD   10 mEq at 03/26/17 1730   • pravastatin (PRAVACHOL) tablet 80 mg  80 mg Oral Nightly Wili Adan MD   80 mg at 03/26/17 2034   • sodium chloride 0.9 % flush 1-10 mL  1-10 mL Intravenous PRN Wili Adan MD           Vital Sign Min/Max for last 24 hours  Temp  Min: 96.4 °F (35.8 °C)  Max: 98.5 °F (36.9 °C)   BP  Min: 109/82  Max: 121/80   Pulse  Min: 100  Max: 140   Resp  Min: 20  Max: 24   SpO2  Min: 92 %  Max: 97 %   Flow (L/min)  Min: 3  Max: 3.5   Weight  Min: 193 lb (87.5 kg)  Max: 193 lb (87.5 kg)     Flowsheet Rows         First Filed Value    Admission Height  61\" (154.9 cm) Documented at 03/24/2017 1411    Admission Weight  185 lb (83.9 kg) Documented at 03/24/2017 1411            Intake/Output Summary (Last 24 hours) at 03/27/17 0839  Last data filed at 03/27/17 0330   Gross per 24 hour   Intake          3181.25 ml   Output                0 ml   Net          3181.25 ml       Physical Exam:     General Appearance:    Alert, " cooperative, in no acute distress   Lungs:     Clear to auscultation,respirations regular, even and                  unlabored    Heart:    IRRegular rhythm and normal rate, normal S1 and S2, no            murmur, no gallop, no rub, no click   Chest Wall:    No abnormalities observed   Abdomen:     Normal bowel sounds, no masses, no organomegaly, soft        non-tender, non-distended, no guarding, no rebound                tenderness   Extremities:   Moves all extremities well, no edema, no cyanosis, no             redness   Pulses:   Pulses palpable and equal bilaterally   Skin:   No bleeding, bruising or rash        Results Review:   I reviewed the patient's new clinical results.  Lab Results   Component Value Date    WBC 11.50 (H) 03/27/2017    HGB 9.6 (L) 03/27/2017    HCT 30.1 (L) 03/27/2017    MCV 98.4 (H) 03/27/2017     03/27/2017     Lab Results   Component Value Date    GLUCOSE 146 (H) 03/27/2017    BUN 18 03/27/2017    CREATININE 0.82 03/27/2017    EGFRIFNONA 66 03/27/2017    BCR 22.0 03/27/2017    CO2 31.0 03/27/2017    CALCIUM 8.5 03/27/2017    ALBUMIN 3.60 03/24/2017    LABIL2 1.1 03/24/2017    AST 34 03/24/2017    ALT 39 03/24/2017     Lab Results   Component Value Date    TSH 4.470 03/24/2017     Lab Results   Component Value Date    INR 1.58 (H) 03/24/2017    INR 1.04 02/15/2017    INR 1.0 01/20/2017    PROTIME 18.9 (H) 03/24/2017    PROTIME 13.6 02/15/2017    PROTIME 13.5 01/20/2017     Lab Results   Component Value Date    PTT 31.4 01/20/2017       EKG:     Telemetry:    Ejection Fraction  No results found for: EF    Echo EF Estimated  No results found for: ECHOEFEST      Present on Admission:  • Atrial fibrillation with RVR  • Chronic obstructive pulmonary disease with acute exacerbation  • Hypertension    Assessment/Plan     #1 paroxysmal atrial fibrillations #2 palpitations #3 moderate biatrial enlargement with a mild mitral and tricuspid regurgitation #4 hypertension with hypertensive  heart disease #5 diabetes     Clinically there is no sign of any acute cardiac decompensation based on the clinical history physical finding.  Patient still in atrial fibrillation with a rapid ventricular response in spite of oral IV amiodarone's.(Procedure risk regarding the electrical cardioversion discussed with the patient.  Risk included but not limited to stroke arrhythmia understand willing to proceed forward.  The patient tended to scheduled to undergo electrical cardioversion with anesthesia close to and time.  At present I will continue IV amiodarone continue oral anticoagulations..  The patient will be nothing by mouth except meds.  Arnold Landin MD  03/27/17  8:39 AM      EMR Dragon/Transcription disclaimer:   Much of this encounter note is an electronic transcription/translation of spoken language to printed text. The electronic translation of spoken language may permit erroneous, or at times, nonsensical words or phrases to be inadvertently transcribed; Although I have reviewed the note for such errors, some may still exist.

## 2017-03-27 NOTE — PLAN OF CARE
Problem: Patient Care Overview (Adult)  Goal: Plan of Care Review  Outcome: Ongoing (interventions implemented as appropriate)    03/27/17 1359   Coping/Psychosocial Response Interventions   Plan Of Care Reviewed With patient   Patient Care Overview   Progress improving   Outcome Evaluation   Outcome Summary/Follow up Plan Pt states that she hopes to go home tomorrow       Goal: Adult Individualization and Mutuality  Outcome: Ongoing (interventions implemented as appropriate)  Goal: Discharge Needs Assessment  Outcome: Ongoing (interventions implemented as appropriate)    Problem: Arrhythmia/Dysrhythmia (Symptomatic) (Adult)  Goal: Signs and Symptoms of Listed Potential Problems Will be Absent or Manageable (Arrhythmia/Dysrhythmia)  Outcome: Ongoing (interventions implemented as appropriate)

## 2017-03-27 NOTE — DISCHARGE PLACEMENT REQUEST
"Ann Marie Harkins (86 y.o. Female)     Date of Birth Social Security Number Address Home Phone MRN    01/05/1931  [address] [phone] [id number]    Evangel[employer] Marital Status          Sikhism        Admission Date Admission Type Admitting Provider Attending Provider Department, Room/Bed    3/24/17 Emergency Wili Adan MD Shahidi, Wili Montesinos MD [facility] STEPDOWN UNIT, 317/1    Discharge Date Discharge Disposition Discharge Destination                      Attending Provider: Wili Adan MD     Allergies:  No Known Allergies    Isolation:  None   Infection:  None   Code Status:  Conditional    Ht:  61\" (154.9 cm)   Wt:  193 lb (87.5 kg)    Admission Cmt:  None   Principal Problem:  Atrial fibrillation with RVR [I48.91]                 Active Insurance as of 3/24/2017     Primary Coverage     Payor Plan Insurance Group Employer/Plan Group    MEDICARE MEDICARE A & B      Payor Plan Address Payor Plan Phone Number Effective From Effective To    [address] [phone] 1/1/1996     [place] [address]       Subscriber Name Subscriber Birth Date Member ID       ANN MARIE HARKINS 1/5/1931 [id number]           Secondary Coverage     Payor Plan Insurance Group Employer/Plan Group    AAR MED SUPP AAR HEALTH CARE OPTIONS      Payor Plan Address Payor Plan Phone Number Effective From Effective To    [facility] [phone] 1/1/2016     [address]       [place] [address]       Subscriber Name Subscriber Birth Date Member ID       ANN MARIE HARKINS 1/5/1931 [id number]                 Emergency Contacts      (Rel.) Home Phone Work Phone Mobile Phone    Dang Harry (Daughter) -- [phone]              "

## 2017-03-27 NOTE — ANESTHESIA PREPROCEDURE EVALUATION
Anesthesia Evaluation     Patient summary reviewed      Airway   Mallampati: II  no difficulty expected  Dental    (+) lower dentures and upper dentures    Pulmonary - normal exam   (+) COPD,   Cardiovascular     ECG reviewed  PT is on anticoagulation therapy  Rhythm: irregular    (+) hypertension, dysrhythmias Atrial Fib,       Neuro/Psych  GI/Hepatic/Renal/Endo    (+) obesity,      Musculoskeletal     Abdominal    Substance History      OB/GYN          Other                                  Anesthesia Plan    ASA 3     MAC     Anesthetic plan and risks discussed with patient.

## 2017-03-27 NOTE — PLAN OF CARE
Problem: Patient Care Overview (Adult)  Goal: Plan of Care Review  Outcome: Ongoing (interventions implemented as appropriate)    03/27/17 0609   Coping/Psychosocial Response Interventions   Plan Of Care Reviewed With patient   Patient Care Overview   Progress progress towards functional goals is fair   Outcome Evaluation   Outcome Summary/Follow up Plan afib 110s/120s, amidodarone gtt, cardioversion today.       Goal: Adult Individualization and Mutuality  Outcome: Ongoing (interventions implemented as appropriate)  Goal: Discharge Needs Assessment  Outcome: Ongoing (interventions implemented as appropriate)    Problem: Arrhythmia/Dysrhythmia (Symptomatic) (Adult)  Goal: Signs and Symptoms of Listed Potential Problems Will be Absent or Manageable (Arrhythmia/Dysrhythmia)  Outcome: Ongoing (interventions implemented as appropriate)

## 2017-03-27 NOTE — PROCEDURES
Procedures date: 8/27/2017    Name of procedure: Electrical cardioversion of symptomatic atrial fibrillation    Procedure performed: Electrical cardioversion with Synchronized 200 J after obtaining adequate conscious sedation to normal sinus rhythm    Descriptions:  86 years old patient known to Dr. Victorino Marroquin with history of paroxysmal atrial fibrillation on oral anticoagulations.  Patient admitted with the symptom of palpitation associated shortness breath and found to be in atrial fibrillation with rapid ventricular response.  Patient remained in atrial fibrillation in spite of IV amiodarone.  Procedure risk and benefits regarding the electrical cardioversion discussed with the patient.  Risk included but not limited to arrhythmia and stroke.  Understand willing to proceed forward.  Patient brought to cardiac catheter lab in fasting condition.  She was appropriately usual fashion.  IV conscious station documented by anesthesia was given.  After obtaining the adequate conscious sedation 200 synchronized joules delivered patient converted to sinus rhythm.  Through the procedure patient heart rate blood pressure saturation monitor there is no immediate complications stable upon discharge recovery.

## 2017-03-28 VITALS
RESPIRATION RATE: 18 BRPM | WEIGHT: 193 LBS | HEART RATE: 84 BPM | TEMPERATURE: 98.4 F | HEIGHT: 61 IN | DIASTOLIC BLOOD PRESSURE: 64 MMHG | SYSTOLIC BLOOD PRESSURE: 112 MMHG | BODY MASS INDEX: 36.44 KG/M2 | OXYGEN SATURATION: 95 %

## 2017-03-28 PROBLEM — J96.11 CHRONIC RESPIRATORY FAILURE WITH HYPOXIA (HCC): Chronic | Status: ACTIVE | Noted: 2017-03-28

## 2017-03-28 LAB
ANION GAP SERPL CALCULATED.3IONS-SCNC: 8 MMOL/L (ref 5–15)
BASOPHILS # BLD AUTO: 0.05 10*3/MM3 (ref 0–0.2)
BASOPHILS NFR BLD AUTO: 0.5 % (ref 0–2)
BUN BLD-MCNC: 19 MG/DL (ref 7–21)
BUN/CREAT SERPL: 21.6 (ref 7–25)
CALCIUM SPEC-SCNC: 8.7 MG/DL (ref 8.4–10.2)
CHLORIDE SERPL-SCNC: 97 MMOL/L (ref 95–110)
CO2 SERPL-SCNC: 30 MMOL/L (ref 22–31)
CREAT BLD-MCNC: 0.88 MG/DL (ref 0.5–1)
DEPRECATED RDW RBC AUTO: 55.1 FL (ref 36.4–46.3)
EOSINOPHIL # BLD AUTO: 0.39 10*3/MM3 (ref 0–0.7)
EOSINOPHIL NFR BLD AUTO: 3.6 % (ref 0–7)
ERYTHROCYTE [DISTWIDTH] IN BLOOD BY AUTOMATED COUNT: 15.1 % (ref 11.5–14.5)
GFR SERPL CREATININE-BSD FRML MDRD: 61 ML/MIN/1.73 (ref 39–90)
GLUCOSE BLD-MCNC: 144 MG/DL (ref 60–100)
HCT VFR BLD AUTO: 30.9 % (ref 35–45)
HGB BLD-MCNC: 9.8 G/DL (ref 12–15.5)
IMM GRANULOCYTES # BLD: 0.16 10*3/MM3 (ref 0–0.02)
IMM GRANULOCYTES NFR BLD: 1.5 % (ref 0–0.5)
LYMPHOCYTES # BLD AUTO: 0.78 10*3/MM3 (ref 0.6–4.2)
LYMPHOCYTES NFR BLD AUTO: 7.2 % (ref 10–50)
MCH RBC QN AUTO: 31.5 PG (ref 26.5–34)
MCHC RBC AUTO-ENTMCNC: 31.7 G/DL (ref 31.4–36)
MCV RBC AUTO: 99.4 FL (ref 80–98)
MONOCYTES # BLD AUTO: 0.7 10*3/MM3 (ref 0–0.9)
MONOCYTES NFR BLD AUTO: 6.4 % (ref 0–12)
NEUTROPHILS # BLD AUTO: 8.79 10*3/MM3 (ref 2–8.6)
NEUTROPHILS NFR BLD AUTO: 80.8 % (ref 37–80)
NRBC BLD MANUAL-RTO: 0.2 /100 WBC (ref 0–0)
PLATELET # BLD AUTO: 239 10*3/MM3 (ref 150–450)
PMV BLD AUTO: 10.1 FL (ref 8–12)
POTASSIUM BLD-SCNC: 4.5 MMOL/L (ref 3.5–5.1)
RBC # BLD AUTO: 3.11 10*6/MM3 (ref 3.77–5.16)
SODIUM BLD-SCNC: 135 MMOL/L (ref 137–145)
WBC NRBC COR # BLD: 10.87 10*3/MM3 (ref 3.2–9.8)

## 2017-03-28 PROCEDURE — G8987 SELF CARE CURRENT STATUS: HCPCS

## 2017-03-28 PROCEDURE — 94760 N-INVAS EAR/PLS OXIMETRY 1: CPT

## 2017-03-28 PROCEDURE — 97535 SELF CARE MNGMENT TRAINING: CPT

## 2017-03-28 PROCEDURE — 97165 OT EVAL LOW COMPLEX 30 MIN: CPT

## 2017-03-28 PROCEDURE — 85025 COMPLETE CBC W/AUTO DIFF WBC: CPT | Performed by: FAMILY MEDICINE

## 2017-03-28 PROCEDURE — 80048 BASIC METABOLIC PNL TOTAL CA: CPT | Performed by: FAMILY MEDICINE

## 2017-03-28 PROCEDURE — 97162 PT EVAL MOD COMPLEX 30 MIN: CPT | Performed by: PHYSICAL THERAPIST

## 2017-03-28 PROCEDURE — 94799 UNLISTED PULMONARY SVC/PX: CPT

## 2017-03-28 PROCEDURE — G8988 SELF CARE GOAL STATUS: HCPCS

## 2017-03-28 PROCEDURE — 99232 SBSQ HOSP IP/OBS MODERATE 35: CPT | Performed by: INTERNAL MEDICINE

## 2017-03-28 PROCEDURE — G8979 MOBILITY GOAL STATUS: HCPCS | Performed by: PHYSICAL THERAPIST

## 2017-03-28 PROCEDURE — G8978 MOBILITY CURRENT STATUS: HCPCS | Performed by: PHYSICAL THERAPIST

## 2017-03-28 PROCEDURE — 97530 THERAPEUTIC ACTIVITIES: CPT | Performed by: PHYSICAL THERAPIST

## 2017-03-28 RX ORDER — HYDROCODONE BITARTRATE AND ACETAMINOPHEN 5; 325 MG/1; MG/1
1 TABLET ORAL EVERY 4 HOURS PRN
Qty: 30 TABLET | Refills: 0 | Status: SHIPPED | OUTPATIENT
Start: 2017-03-28 | End: 2017-04-24

## 2017-03-28 RX ADMIN — DOCUSATE SODIUM 100 MG: 100 CAPSULE, LIQUID FILLED ORAL at 08:38

## 2017-03-28 RX ADMIN — ALBUTEROL SULFATE 2.5 MG: 2.5 SOLUTION RESPIRATORY (INHALATION) at 11:19

## 2017-03-28 RX ADMIN — POTASSIUM CHLORIDE 10 MEQ: 750 TABLET, EXTENDED RELEASE ORAL at 08:38

## 2017-03-28 RX ADMIN — BUDESONIDE AND FORMOTEROL FUMARATE DIHYDRATE 2 PUFF: 160; 4.5 AEROSOL RESPIRATORY (INHALATION) at 11:25

## 2017-03-28 RX ADMIN — AMIODARONE HYDROCHLORIDE 200 MG: 200 TABLET ORAL at 08:38

## 2017-03-28 RX ADMIN — APIXABAN 5 MG: 5 TABLET, FILM COATED ORAL at 08:38

## 2017-03-28 NOTE — PROGRESS NOTES
Acute Care - Occupational Therapy Initial Evaluation  [facility]     Patient Name: Norma Harkins  : 1931  MRN: [id number]  Today's Date: 3/28/2017     Date of Referral to OT:  (3/27/2017)  Referring Physician: MADELIN Wu    Admit Date: 3/24/2017       ICD-10-CM ICD-9-CM   1. Atrial fibrillation with RVR I48.91 427.31   2. Chronic diastolic congestive heart failure I50.32 428.32     428.0     Patient Active Problem List   Diagnosis   • Hypertension   • Chronic obstructive pulmonary disease with acute exacerbation   • Acute on chronic respiratory failure with hypoxia   • Low back pain   • Hyperlipidemia   • Atrial fibrillation with RVR     Past Medical History:   Diagnosis Date   • Chronic obstructive lung disease 2016    on oxygen      • Chronic respiratory failure with hypoxia 2016   • Essential hypertension 2015   • Hyperlipidemia    • Obesity    • Paroxysmal atrial fibrillation 03/15/2016     Past Surgical History:   Procedure Laterality Date   • CATARACT EXTRACTION     • HYSTERECTOMY     • JOINT REPLACEMENT      total knee           OT ASSESSMENT FLOWSHEET (last 72 hours)      OT Evaluation       17 0800 17 0754 17 0511 17 1230 17 0730    Rehab Evaluation    Document Type  evaluation  -RM       Subjective Information  no complaints;agree to therapy  -RM       Patient Effort, Rehab Treatment  good  -RM       General Information    Patient Profile Review  yes  -RM       Referring Physician  MADELIN Wu  -RM       General Observations  --   Sitting up in chair eating breakfast; cooperative  -RM       Precautions/Limitations  oxygen therapy device and L/min   Pt reports   -RM       Prior Level of Function  independent:  -RM       Equipment Currently Used at Home  oxygen;walker, rolling  -RM walker, standard;oxygen  -EL      Plans/Goals Discussed With  patient  -RM       Barriers to Rehab  none identified  -RM       Living Environment    Lives With   alone  -RM       Living Arrangements  house  -RM       Home Accessibility  bed and bath on same level;stairs to enter home  -RM       Number of Stairs to Enter Home  --   1  -RM       Stair Railings at Home  none  -RM       Transportation Available  car  -RM       Clinical Impression    Date of Referral to OT  --   3/27/2017  -RM       OT Diagnosis  impaired mobility & ADLs  -RM       Impairments Found (describe specific impairments)  aerobic capacity/endurance;muscle performance  -RM       Criteria for Skilled Therapeutic Interventions Met  yes  -RM       Rehab Potential  good, to achieve stated therapy goals  -RM       Therapy Frequency  --   3-14x/wk  -RM       Functional Level Prior    Ambulation  0-->independent  -RM       Toileting  0-->independent  -RM       Bathing  0-->independent  -RM       Dressing  0-->independent  -RM       Eating  0-->independent  -RM       Communication  0-->understands/communicates without difficulty  -RM       Vital Signs    Pre Systolic BP Rehab  114  -RM       Pre Treatment Diastolic BP  68  -RM       Post Systolic BP Rehab  104  -RM       Post Treatment Diastolic BP  59  -RM       Pretreatment Heart Rate (beats/min)  92  -RM       Posttreatment Heart Rate (beats/min)  85  -RM       Pre SpO2 (%)  94  -RM       O2 Delivery Pre Treatment  supplemental O2  -RM       Intra SpO2 (%)  --   3.0L nasal cannula  -RM       Post SpO2 (%)  95  -RM       O2 Delivery Post Treatment  supplemental O2   3.0L nasal cannula  -RM       Pain Assessment    Pain Assessment  No/denies pain  -RM       Vision Assessment/Intervention    Visual Impairment  WFL  -RM       Cognitive Assessment/Intervention    Current Cognitive/Communication Assessment  functional  -RM       Orientation Status  oriented x 4  -RM       Follows Commands/Answers Questions  100% of the time  -RM       Personal Safety  WNL/WFL  -RM       ROM (Range of Motion)    General ROM  no range of motion deficits identified  -RM       MMT  (Manual Muscle Testing)    General MMT Assessment  upper extremity strength deficits identified  -RM       General MMT Assessment Detail  --   R sh 3/5; L sh 3-/5.  Other grossly 4/5  -RM       Upper Extremity    Upper Ext Manual Muscle Testing  left shoulder strength deficit;right shoulder strength deficit  -RM       Muscle Tone Assessment    Muscle Tone Assessment Bilateral Upper Extremities;Bilateral Lower Extremities  -BM   Bilateral Upper Extremities;Bilateral Lower Extremities  -LY Bilateral Upper Extremities;Bilateral Lower Extremities  -LY    Bilateral Upper Extremities Muscle Tone Assessment mildly decreased tone  -BM   mildly decreased tone  -LY mildly decreased tone  -LY    Bilateral Lower Extremities Muscle Tone Assessment mildly decreased tone  -BM   mildly decreased tone  -LY mildly decreased tone  -LY    Transfer Assessment/Treatment    Transfers, Sit-Stand War  supervision required  -RM       Transfers, Stand-Sit War  supervision required  -RM       Sensory Assessment/Intervention    Light Touch  --  -RM       LUE Light Touch  WNL  -RM       RUE Light Touch  WNL  -RM       General Therapy Interventions    Planned Therapy Interventions  activity intolerance;ADL retraining;home exercise program;strengthening  -RM       Positioning and Restraints    Pre-Treatment Position  sitting in chair/recliner  -RM       Post Treatment Position  chair  -RM       In Chair  encouraged to call for assist;sitting  -RM         03/27/17 0330 03/26/17 2027 03/26/17 1335 03/26/17 1330 03/26/17 0756    General Information    Equipment Currently Used at Home   walker, standard;oxygen  -MM      Muscle Tone Assessment    Muscle Tone Assessment Bilateral Upper Extremities;Bilateral Lower Extremities  -AY Bilateral Upper Extremities;Bilateral Lower Extremities  -AY  Bilateral Upper Extremities;Bilateral Lower Extremities  -MM Bilateral Upper Extremities;Bilateral Lower Extremities  -MM    Bilateral Upper  Extremities Muscle Tone Assessment mildly decreased tone  -AY mildly decreased tone  -AY  mildly decreased tone  -MM mildly decreased tone  -MM    Bilateral Lower Extremities Muscle Tone Assessment mildly decreased tone  -AY mildly decreased tone  -AY  mildly decreased tone  -MM mildly decreased tone  -MM      03/26/17 0302 03/25/17 2002 03/25/17 1136          Muscle Tone Assessment    Muscle Tone Assessment   Bilateral Upper Extremities;Bilateral Lower Extremities  -MM      Bilateral Upper Extremities Muscle Tone Assessment mildly decreased tone  -PW mildly decreased tone  -PW mildly decreased tone  -MM      Bilateral Lower Extremities Muscle Tone Assessment mildly decreased tone  -PW mildly decreased tone  -PW mildly decreased tone  -MM        User Key  (r) = Recorded By, (t) = Taken By, (c) = Cosigned By    Initials Name Effective Dates    BM Michelle Casey RN 10/17/16 -     MM Ethan Casey RN 10/17/16 -     LY Padma Sharma RN 10/17/16 -     AY Gail Leroy RN 10/17/16 -     MARY JO Alves RN 10/17/16 -     PW Melvina Ni RN 10/17/16 -     RM Denise Estes OT 03/22/17 -            Occupational Therapy Education     Title: PT OT SLP Therapies (Active)     Topic: Occupational Therapy (Active)     Point: ADL training (Active)    Description: Instruct learner(s) on proper safety adaptation and remediation techniques during self care or transfers.   Instruct in proper use of assistive devices.    Learning Progress Summary    Learner Readiness Method Response Comment Documented by Status   Patient Acceptance E NR  RM 03/28/17 0933 Active               Point: Home exercise program (Active)    Description: Instruct learner(s) on appropriate technique for monitoring, assisting and/or progressing therapeutic exercises/activities.    Learning Progress Summary    Learner Readiness Method Response Comment Documented by Status   Patient Acceptance E NR  RM 03/28/17 0933 Active               Point:  Precautions (Active)    Description: Instruct learner(s) on prescribed precautions during self-care and functional transfers.    Learning Progress Summary    Learner Readiness Method Response Comment Documented by Status   Patient Acceptance E NR   03/28/17 0933 Active                      User Key     Initials Effective Dates Name Provider Type Discipline     03/22/17 -  Denise Estes OT Occupational Therapist OT                  OT Recommendation and Plan  Planned Therapy Interventions: activity intolerance, ADL retraining, home exercise program, strengthening  Therapy Frequency:  (3-14x/wk)  Plan of Care Review  Plan Of Care Reviewed With: patient  Outcome Summary/Follow up Plan: Initial OT evaluation completed.  Pt presents with ADL, strength, and functional mobility deficits.  Pt may benefit from skilled OT intervention with strengthening and facilitation of ADL skills/mobility.  Pt plans to discharge to a SNF.            OT Goals       03/28/17 0754          Transfer Training OT LTG    Transfer Training OT LTG, Date Established 03/28/17  -RM      Transfer Training OT LTG, Time to Achieve by discharge  -RM      Transfer Training OT LTG, Activity Type all transfers  -RM      Transfer Training OT LTG, [place] Level conditional independence  -RM      Strength OT LTG    Strength Goal OT LTG, Date Established 03/28/17  -RM      Strength Goal OT LTG, Time to Achieve by discharge  -RM      Strength Goal OT LTG, Measure to Achieve ADL performance  -RM      Grooming OT STG    Grooming Goal OT STG, Date Established 03/28/17  -RM      Grooming Goal OT STG, Time to Achieve 2 - 3 days  -RM      Grooming Goal OT STG, Activity Type face washing, haircare, oral care  -RM      Grooming Goal OT STG, [place] Level conditional independence  -RM      ADL OT LTG    ADL OT LTG, Date Established 03/28/17  -RM      ADL OT LTG, Time to Achieve by discharge  -RM      ADL OT LTG, Activity Type ADL skills  -RM      ADL OT  LTG, [place] Level modified independent  -RM        User Key  (r) = Recorded By, (t) = Taken By, (c) = Cosigned By    Initials Name Provider Type    LIZABETH Estes OT Occupational Therapist                Outcome Measures       03/28/17 0754          How much help from another is currently needed...    Putting on and taking off regular lower body clothing? 3  -RM      Bathing (including washing, rinsing, and drying) 3  -RM      Toileting (which includes using toilet bed pan or urinal) 3  -RM      Putting on and taking off regular upper body clothing 3  -RM      Taking care of personal grooming (such as brushing teeth) 3  -RM      Eating meals 4  -RM      Score 19  -RM      Functional Assessment    Outcome Measure Options AM-PAC 6 Clicks Daily Activity (OT)  -RM        User Key  (r) = Recorded By, (t) = Taken By, (c) = Cosigned By    Initials Name Provider Type    LIZABETH Estes OT Occupational Therapist          Time Calculation:   OT Start Time: 0754  OT Stop Time: 0829  OT Time Calculation (min): 35 min    Therapy Charges for Today     Code Description Service Date Service Provider Modifiers Qty    83028384947  OT SELFCARE CURRENT 3/28/2017 Denise Estes OT GO, CK 1    [id number]  OT SELFCARE PROJECTED 3/28/2017 Denise Estes OT GO, CI 1    [id number]  OT EVAL LOW COMPLEXITY 1 3/28/2017 Denise Estes OT GO 1    [id number]  OT SELF CARE/MGMT/TRAIN EA 15 MIN 3/28/2017 Denise Estes OT GO 1          OT G-codes  OT Professional Judgement Used?: Yes  OT Functional Scales Options: AM-PAC 6 Clicks Daily Activity (OT)  Score: 19  Functional Limitation: Self care  Self Care Current Status (): At least 40 percent but less than 60 percent impaired, limited or restricted  Self Care Goal Status (): At least 1 percent but less than 20 percent impaired, limited or restricted    Denise Estes OT  3/28/2017

## 2017-03-28 NOTE — PLAN OF CARE
Problem: Patient Care Overview (Adult)  Goal: Plan of Care Review  Outcome: Ongoing (interventions implemented as appropriate)    03/28/17 1002   Outcome Evaluation   Outcome Summary/Follow up Plan PT evaluation completed. Pt demonstrates an overall decrease in functional strength & endurance which is limiting her ability to perform functional mobility & transfers at this time. Pt ambulate 40', 50', & 20' with RW & SBA/CGA, with standing rest breaks in between. O2 sat remained at 93% or above on 3L/min of supplemental O2. Pt able to perform all transfers with SBA. Pt may benefit from skilled PT services to improve overall independence in functional mobility. Pt plans to go to a SNF upon discharge for further rehab before returning home.         Problem: Inpatient Physical Therapy  Goal: Transfer Training Goal 1 LTG- PT  Outcome: Ongoing (interventions implemented as appropriate)    03/28/17 1002   Transfer Training PT LTG   Transfer Training PT LTG, Date Established 03/28/17   Transfer Training PT LTG, Time to Achieve by discharge   Transfer Training PT LTG, Activity Type bed to chair /chair to bed;sit to stand/stand to sit   Transfer Training PT LTG, [place] Level conditional independence   Transfer Training PT LTG, Assist Device walker, rolling       Goal: Gait Training Goal LTG- PT  Outcome: Ongoing (interventions implemented as appropriate)    03/28/17 1002   Gait Training PT LTG   Gait Training Goal PT LTG, Date Established 03/28/17   Gait Training Goal PT LTG, Time to Achieve by discharge   Gait Training Goal PT LTG, [place] Level conditional independence   Gait Training Goal PT LTG, Assist Device walker, rolling   Gait Training Goal PT LTG, Distance to Achieve 150 feet       Goal: Stair Training Goal LTG- PT  Outcome: Ongoing (interventions implemented as appropriate)    03/28/17 1002   Stair Training PT LTG   Stair Training Goal PT LTG, Date Established 03/28/17   Stair Training Goal PT LTG, Time  to Achieve by discharge   Stair Training Goal PT LTG, Number of Steps 1 curb step   Stair Training Goal PT LTG, [place] Level conditional independence   Stair Training Goal PT LTG, Assist Device walker, rolling

## 2017-03-28 NOTE — PROGRESS NOTES
Continued Stay Note  [facility]     Patient Name: Norma Harkins  MRN: [id number]  Today's Date: 3/28/2017    Admit Date: 3/24/2017          Discharge Plan       03/28/17 0759    Case Management/Social Work Plan    Plan Short Term Rehab [facility]    Patient/Family In Agreement With Plan yes    Additional Comments CM received phone call at 4:45pm yesterday from Kim at [facility]. Per Kim, they will accept pt after pt works with therapy.               Discharge Codes     None        Expected Discharge Date and Time     Expected Discharge Date Expected Discharge Time    Mar 30, 2017             BARBARA Meza

## 2017-03-28 NOTE — PROGRESS NOTES
Acute Care - Physical Therapy Initial Evaluation  [facility]     Patient Name: Norma Harkins  : 1931  MRN: [id number]  Today's Date: 3/28/2017   Onset of Illness/Injury or Date of Surgery Date: 17  Date of Referral to PT: 17  Referring Physician: Dr. Wu      Admit Date: 3/24/2017     Visit Dx:    ICD-10-CM ICD-9-CM   1. Atrial fibrillation with RVR I48.91 427.31   2. Chronic diastolic congestive heart failure I50.32 428.32     428.0   3. Impaired mobility and ADLs Z74.09 799.89   4. Decreased functional mobility and endurance Z74.09 780.99     Patient Active Problem List   Diagnosis   • Hypertension   • Chronic obstructive pulmonary disease with acute exacerbation   • Acute on chronic respiratory failure with hypoxia   • Low back pain   • Hyperlipidemia   • Atrial fibrillation with RVR   • Chronic respiratory failure with hypoxia     Past Medical History:   Diagnosis Date   • Chronic obstructive lung disease 2016    on oxygen      • Chronic respiratory failure with hypoxia 2016   • Essential hypertension 2015   • Hyperlipidemia    • Obesity    • Paroxysmal atrial fibrillation 03/15/2016     Past Surgical History:   Procedure Laterality Date   • CATARACT EXTRACTION     • HYSTERECTOMY     • JOINT REPLACEMENT      total knee           PT ASSESSMENT (last 72 hours)      PT Evaluation       17 1002 17 0800    Rehab Evaluation    Document Type evaluation  -KG     Subjective Information agree to therapy;no complaints  -KG     Patient Effort, Rehab Treatment good  -KG     General Information    Patient Profile Review yes  -KG     Onset of Illness/Injury or Date of Surgery Date 17  -KG     Referring Physician Dr. Wu  -KG     General Observations Pt exiting bathroom upon arrival. Pt very pleasant & cooperative.  -KG     Pertinent History Of Current Problem Admitted with palpitations, fatigue, & decreased energy. Pt had electrical cardioversion of her  symptomic A-fib on 3/27/17.  -KG     Precautions/Limitations oxygen therapy device and L/min   O2 3L/min 24/7  -KG     Prior Level of Function independent:;all household mobility;community mobility;ADL's   Daughter does grocery shopping; cleaning lady 1x/2 weeks  -KG     Equipment Currently Used at Home oxygen;walker, rolling   Rollator  -KG     Plans/Goals Discussed With patient  -KG     Risks Reviewed patient:;change in vital signs  -KG     Benefits Reviewed patient:;improve function;increase independence;increase strength;increase balance  -KG     Barriers to Rehab --   Co-morbidities  -KG     Living Environment    Lives With alone  -KG     Living Arrangements house  -KG     Home Accessibility stairs to enter home;bed and bath on same level  -KG     Number of Stairs to Enter Home 1  -KG     Stair Railings at Home none  -KG     Clinical Impression    Date of Referral to PT 03/27/17  -KG     PT Diagnosis Decreased functional strength & endurance, A-fib  -KG     Prognosis Good  -KG     Functional Level At Time Of Evaluation SBA for transfers, SBA/CGA for ambulation with RW  -KG     Patient/Family Goals Statement Improve strength  -KG     Criteria for Skilled Therapeutic Interventions Met yes;treatment indicated  -KG     Pathology/Pathophysiology Noted (Describe Specifically for Each System) musculoskeletal;pulmonary;cardiovascular  -KG     Impairments Found (describe specific impairments) aerobic capacity/endurance;gait, locomotion, and balance  -KG     Rehab Potential good, to achieve stated therapy goals  -KG     Predicted Duration of Therapy Intervention (days/wks) Until discharge or all therapy goals met  -KG     Vital Signs    Pre Systolic BP Rehab 130  -KG     Pre Treatment Diastolic BP 71  -KG     Pretreatment Heart Rate (beats/min) 82  -KG     Posttreatment Heart Rate (beats/min) 89  -KG     Pre SpO2 (%) 99  -KG     O2 Delivery Pre Treatment supplemental O2   3 L/min nasal canula  -KG     Intra SpO2 (%) 93   -KG     O2 Delivery Intra Treatment supplemental O2  -KG     Post SpO2 (%) 95  -KG     O2 Delivery Post Treatment supplemental O2  -KG     Pre Patient Position Sitting  -KG     Intra Patient Position Standing  -KG     Post Patient Position Sitting  -KG     Pain Assessment    Pain Assessment No/denies pain  -KG     Vision Assessment/Intervention    Visual Impairment WFL  -KG     Cognitive Assessment/Intervention    Current Cognitive/Communication Assessment functional  -KG     Orientation Status oriented x 4  -KG     Follows Commands/Answers Questions 100% of the time;able to follow single-step instructions  -KG     Personal Safety good awareness, safety precautions  -KG     Personal Safety Interventions gait belt;nonskid shoes/slippers when out of bed;supervised activity  -KG     ROM (Range of Motion)    General ROM no range of motion deficits identified  -KG     MMT (Manual Muscle Testing)    General MMT Assessment lower extremity strength deficits identified  -KG     Lower Extremity    Lower Ext Manual Muscle Testing Detail Bilateral hip flex/abd 4/5, bilateral knee flex/ext 4/5, bilateral ankle DF 4/5  -KG     Muscle Tone Assessment    Muscle Tone Assessment  Bilateral Upper Extremities;Bilateral Lower Extremities  -BM    Bilateral Upper Extremities Muscle Tone Assessment  mildly decreased tone  -BM    Bilateral Lower Extremities Muscle Tone Assessment  mildly decreased tone  -BM    Bed Mobility, Assessment/Treatment    Bed Mob, Supine to Sit, [place] not tested  -KG     Transfer Assessment/Treatment    Transfers, Sit-Stand [place] supervision required  -KG     Transfers, Stand-Sit [place] supervision required  -KG     Transfer, Safety Issues step length decreased  -KG     Transfer, Impairments strength decreased  -KG     Gait Assessment/Treatment    Gait, [place] Level supervision required;contact guard assist  -KG     Gait, Assistive Device rolling walker  -KG     Gait, Distance (Feet) 40    40', 50', 20': standing rest break in between  -KG     Gait, Gait Pattern Analysis swing-through gait  -KG     Gait, Gait Deviations jose j decreased;decreased heel strike;forward flexed posture;step length decreased  -KG     Gait, Safety Issues supplemental O2;step length decreased  -KG     Gait, Impairments strength decreased;impaired balance   Decreased endurance/dyspnea  -KG     Sensory Assessment/Intervention    Light Touch LLE;RLE  -KG     LLE Light Touch WNL  -KG     RLE Light Touch WNL  -KG     Positioning and Restraints    Pre-Treatment Position sitting in chair/recliner  -KG     Post Treatment Position chair  -KG     In Chair notified nsg;sitting  -KG       03/28/17 0754 03/28/17 0511    Rehab Evaluation    Document Type evaluation  -RM     Subjective Information no complaints;agree to therapy  -RM     Patient Effort, Rehab Treatment good  -RM     General Information    Patient Profile Review yes  -RM     Referring Physician MADELIN Wu  -RM     General Observations --   Sitting up in chair eating breakfast; cooperative  -RM     Precautions/Limitations oxygen therapy device and L/min   Pt reports 02 24/7  -RM     Prior Level of Function independent:  -RM     Equipment Currently Used at Home oxygen;walker, rolling  -RM walker, standard;oxygen  -EL    Plans/Goals Discussed With patient  -RM     Barriers to Rehab none identified  -RM     Living Environment    Lives With alone  -RM     Living Arrangements house  -RM     Home Accessibility bed and bath on same level;stairs to enter home  -RM     Number of Stairs to Enter Home --   1  -RM     Stair Railings at Home none  -RM     Transportation Available car  -RM     Vital Signs    Pre Systolic BP Rehab 114  -RM     Pre Treatment Diastolic BP 68  -RM     Post Systolic BP Rehab 104  -RM     Post Treatment Diastolic BP 59  -RM     Pretreatment Heart Rate (beats/min) 92  -RM     Posttreatment Heart Rate (beats/min) 85  -RM     Pre SpO2 (%) 94  -RM     O2 Delivery  Pre Treatment supplemental O2  -RM     Intra SpO2 (%) --   3.0L nasal cannula  -RM     Post SpO2 (%) 95  -RM     O2 Delivery Post Treatment supplemental O2   3.0L nasal cannula  -RM     Pain Assessment    Pain Assessment No/denies pain  -RM     Vision Assessment/Intervention    Visual Impairment WFL  -RM     Cognitive Assessment/Intervention    Current Cognitive/Communication Assessment functional  -RM     Orientation Status oriented x 4  -RM     Follows Commands/Answers Questions 100% of the time  -RM     Personal Safety WNL/WFL  -RM     ROM (Range of Motion)    General ROM no range of motion deficits identified  -RM     MMT (Manual Muscle Testing)    General MMT Assessment upper extremity strength deficits identified  -RM     General MMT Assessment Detail --   R sh 3/5; L sh 3-/5.  Other grossly 4/5  -RM     Upper Extremity    Upper Ext Manual Muscle Testing left shoulder strength deficit;right shoulder strength deficit  -RM     Transfer Assessment/Treatment    Transfers, Sit-Stand [place] supervision required  -RM     Transfers, Stand-Sit [place] supervision required  -RM     Sensory Assessment/Intervention    Light Touch --  -RM     LUE Light Touch WNL  -RM     RUE Light Touch WNL  -RM     Positioning and Restraints    Pre-Treatment Position sitting in chair/recliner  -RM     Post Treatment Position chair  -RM     In Chair encouraged to call for assist;sitting  -RM       03/27/17 1230 03/27/17 0730    Muscle Tone Assessment    Muscle Tone Assessment Bilateral Upper Extremities;Bilateral Lower Extremities  -LY Bilateral Upper Extremities;Bilateral Lower Extremities  -LY    Bilateral Upper Extremities Muscle Tone Assessment mildly decreased tone  -LY mildly decreased tone  -LY    Bilateral Lower Extremities Muscle Tone Assessment mildly decreased tone  -LY mildly decreased tone  -LY      03/27/17 0330 03/26/17 2027    Muscle Tone Assessment    Muscle Tone Assessment Bilateral Upper Extremities;Bilateral  Lower Extremities  -AY Bilateral Upper Extremities;Bilateral Lower Extremities  -AY    Bilateral Upper Extremities Muscle Tone Assessment mildly decreased tone  -AY mildly decreased tone  -AY    Bilateral Lower Extremities Muscle Tone Assessment mildly decreased tone  -AY mildly decreased tone  -AY      03/26/17 1335 03/26/17 1330    General Information    Equipment Currently Used at Home walker, standard;oxygen  -MM     Muscle Tone Assessment    Muscle Tone Assessment  Bilateral Upper Extremities;Bilateral Lower Extremities  -MM    Bilateral Upper Extremities Muscle Tone Assessment  mildly decreased tone  -MM    Bilateral Lower Extremities Muscle Tone Assessment  mildly decreased tone  -MM      03/26/17 0756 03/26/17 0302    Muscle Tone Assessment    Muscle Tone Assessment Bilateral Upper Extremities;Bilateral Lower Extremities  -MM     Bilateral Upper Extremities Muscle Tone Assessment mildly decreased tone  -MM mildly decreased tone  -PW    Bilateral Lower Extremities Muscle Tone Assessment mildly decreased tone  -MM mildly decreased tone  -PW      03/25/17 2002       Muscle Tone Assessment    Bilateral Upper Extremities Muscle Tone Assessment mildly decreased tone  -PW     Bilateral Lower Extremities Muscle Tone Assessment mildly decreased tone  -PW       User Key  (r) = Recorded By, (t) = Taken By, (c) = Cosigned By    Initials Name Provider Type    KG Ivy Gabriel, PT Physical Therapist    BM Michelle Casey, RN Registered Nurse    MM Ethan Casey, RN Registered Nurse    LY Padma Sharma, RN Registered Nurse    AY Gail Leroy, RN Registered Nurse    MARY JO Alves, RN Registered Nurse    TOMAS Ni, RN Registered Nurse    RM Denise Estes, OT Occupational Therapist          Physical Therapy Education     Title: PT OT SLP Therapies (Active)     Topic: Physical Therapy (Active)     Point: Mobility training (Active)    Learning Progress Summary    Learner Readiness Method Response  Comment Documented by Status   Patient Acceptance ETB NR  KG 03/28/17 1002 Active               Point: Precautions (Active)    Learning Progress Summary    Learner Readiness Method Response Comment Documented by Status   Patient Acceptance ETB NR  KG 03/28/17 1002 Active                      User Key     Initials Effective Dates Name Provider Type Discipline    KG 10/17/16 -  Ivy Gabriel, PT Physical Therapist PT                PT Recommendation and Plan  Anticipated Discharge Disposition: skilled nursing facility  Planned Therapy Interventions: balance training, gait training, home exercise program, neuromuscular re-education, patient/family education, strengthening, transfer training  PT Frequency: other (see comments) (5-14 times/wk)  Plan of Care Review  Outcome Summary/Follow up Plan: PT evaluation completed. Pt demonstrates an overall decrease in functional strength & endurance which is limiting her ability to perform functional mobility & transfers at this time. Pt ambulate 40', 50', & 20' with RW & SBA/CGA, with standing rest breaks in between. O2 sat remained at 93% or above on 3L/min of supplemental O2. Pt able to perform all transfers with SBA. Pt may benefit from skilled PT services to improve overall independence in functional mobility. Pt plans to go to a SNF upon discharge for further rehab before returning home.          IP PT Goals       03/28/17 1002          Transfer Training PT LTG    Transfer Training PT LTG, Date Established 03/28/17  -KG      Transfer Training PT LTG, Time to Achieve by discharge  -KG      Transfer Training PT LTG, Activity Type bed to chair /chair to bed;sit to stand/stand to sit  -KG      Transfer Training PT LTG, [place] Level conditional independence  -KG      Transfer Training PT LTG, Assist Device walker, rolling  -KG      Gait Training PT LTG    Gait Training Goal PT LTG, Date Established 03/28/17  -KG      Gait Training Goal PT LTG, Time to Achieve by  discharge  -KG      Gait Training Goal PT LTG, [place] Level conditional independence  -KG      Gait Training Goal PT LTG, Assist Device walker, rolling  -KG      Gait Training Goal PT LTG, Distance to Achieve 150 feet  -KG      Stair Training PT LTG    Stair Training Goal PT LTG, Date Established 03/28/17  -KG      Stair Training Goal PT LTG, Time to Achieve by discharge  -KG      Stair Training Goal PT LTG, Number of Steps 1 curb step  -KG      Stair Training Goal PT LTG, [place] Level conditional independence  -KG      Stair Training Goal PT LTG, Assist Device walker, rolling  -KG        User Key  (r) = Recorded By, (t) = Taken By, (c) = Cosigned By    Initials Name Provider Type    KG Ivy Gabriel, PT Physical Therapist                Outcome Measures       03/28/17 1002 03/28/17 0754       How much help from another person do you currently need...    Turning from your back to your side while in flat bed without using bedrails? 4  -KG      Moving from lying on back to sitting on the side of a flat bed without bedrails? 4  -KG      Moving to and from a bed to a chair (including a wheelchair)? 3  -KG      Standing up from a chair using your arms (e.g., wheelchair, bedside chair)? 3  -KG      Climbing 3-5 steps with a railing? 3  -KG      To walk in hospital room? 3  -KG      AM-PAC 6 Clicks Score 20  -KG      How much help from another is currently needed...    Putting on and taking off regular lower body clothing?  3  -RM     Bathing (including washing, rinsing, and drying)  3  -RM     Toileting (which includes using toilet bed pan or urinal)  3  -RM     Putting on and taking off regular upper body clothing  3  -RM     Taking care of personal grooming (such as brushing teeth)  3  -RM     Eating meals  4  -RM     Score  19  -RM     Functional Assessment    Outcome Measure Options AM-PAC 6 Clicks Basic Mobility (PT)  -KG AM-PAC 6 Clicks Daily Activity (OT)  -RM       User Key  (r) = Recorded By, (t) =  Taken By, (c) = Cosigned By    Initials Name Provider Type    KG Ivy Gabriel PT Physical Therapist    RM Denise Estes, OT Occupational Therapist           Time Calculation:         PT Charges       03/28/17 1002          Time Calculation    Start Time 1002  -KG      Stop Time 1026  -KG      Time Calculation (min) 24 min  -KG      PT Received On 03/28/17  -KG      PT Goal Re-Cert Due Date 04/10/17  -KG      Time Calculation- PT    Total Timed Code Minutes- PT 10 minute(s)  -KG        User Key  (r) = Recorded By, (t) = Taken By, (c) = Cosigned By    Initials Name Provider Type    FAZAL Gabriel PT Physical Therapist          Therapy Charges for Today     Code Description Service Date Service Provider Modifiers Qty    98511464304 HC PT MOBILITY CURRENT 3/28/2017 Ivy Gabriel, PT GP, CJ 1    83910052298 HC PT MOBILITY PROJECTED 3/28/2017 Ivy Gabriel, PT GP, CI 1    10534579173 HC PT EVAL MOD COMPLEXITY 1 3/28/2017 Ivy Gabriel, PT GP 1    48961981720 HC PT THERAPEUTIC ACT EA 15 MIN 3/28/2017 Ivy Gabriel, PT GP 1          PT G-Codes  PT Professional Judgement Used?: Yes  Outcome Measure Options: AM-PAC 6 Clicks Basic Mobility (PT)  Score: 20  Functional Limitation: Mobility: Walking and moving around  Mobility: Walking and Moving Around Current Status (): At least 20 percent but less than 40 percent impaired, limited or restricted  Mobility: Walking and Moving Around Goal Status (): At least 1 percent but less than 20 percent impaired, limited or restricted      Ivy Gabriel PT  3/28/2017

## 2017-03-28 NOTE — PLAN OF CARE
Problem: Inpatient Physical Therapy  Goal: Transfer Training Goal 1 LTG- PT  Outcome: Unable to achieve outcome(s) by discharge Date Met:  03/28/17 03/28/17 1002 03/28/17 1618   Transfer Training PT LTG   Transfer Training PT LTG, Date Established 03/28/17 --    Transfer Training PT LTG, Time to Achieve by discharge --    Transfer Training PT LTG, Activity Type bed to chair /chair to bed;sit to stand/stand to sit --    Transfer Training PT LTG, [place] Level conditional independence --    Transfer Training PT LTG, Assist Device walker, rolling --    Transfer Training PT LTG, Date Goal Reviewed --  03/28/17   Transfer Training PT LTG, Outcome --  goal not met   Transfer Training PT LTG, Reason Goal Not Met --  discharged from facility       Goal: Gait Training Goal LTG- PT  Outcome: Unable to achieve outcome(s) by discharge Date Met:  03/28/17 03/28/17 1002 03/28/17 1618   Gait Training PT LTG   Gait Training Goal PT LTG, Date Established 03/28/17 --    Gait Training Goal PT LTG, Time to Achieve by discharge --    Gait Training Goal PT LTG, [place] Level conditional independence --    Gait Training Goal PT LTG, Assist Device walker, rolling --    Gait Training Goal PT LTG, Distance to Achieve 150 feet --    Gait Training Goal PT LTG, Date Goal Reviewed --  03/28/17   Gait Training Goal PT LTG, Outcome --  goal not met   Gait Training Goal PT LTG, Reason Goal Not Met --  discharged from facility       Goal: Stair Training Goal LTG- PT    03/28/17 1002 03/28/17 1618   Stair Training PT LTG   Stair Training Goal PT LTG, Date Established 03/28/17 --    Stair Training Goal PT LTG, Time to Achieve by discharge --    Stair Training Goal PT LTG, Number of Steps 1 curb step --    Stair Training Goal PT LTG, [place] Level conditional independence --    Stair Training Goal PT LTG, Assist Device walker, rolling --    Stair Training Goal PT LTG, Date Goal Reviewed --  03/28/17   Stair Training Goal PT LTG,  Outcome --  goal not met   Stair Training Goal PT LTG, Reason Goal Not Met --  discharged from facility

## 2017-03-28 NOTE — PROGRESS NOTES
LOS: 4 days   Patient Care Team:  Krista Matos MD as PCP - General    Chief Complaint:         Review of Systems:   ROS  Constitution: Negative for diaphoresis and weakness.   HENT: Negative for congestion and headaches.   Cardiovascular:Negative for near-syncope, orthopnea and paroxysmal nocturnal dyspnea.   Respiratory: Negative for cough. ,dyspnea better  Endocrine: Negative for polyphagia and polyuria.   Hematologic/Lymphatic: Does not bruise/bleed easily.   Skin: Negative for color change, flushing and rash.   Musculoskeletal: Positive for arthritis. Negative for falls and muscle weakness.   Gastrointestinal: Negative for anorexia, change in bowel habit, bowel incontinence and heartburn.   Genitourinary: Negative for bladder incontinence, dysuria, hematuria and nocturia.   Neurological: Negative for dizziness, focal weakness, numbness and tremors.   Psychiatric/Behavioral: Negative for depression and hallucinations.  Objective     Current Facility-Administered Medications   Medication Dose Route Frequency Provider Last Rate Last Dose   • acetaminophen (TYLENOL) tablet 500 mg  500 mg Oral Once PRN Wili Adan MD       • acetaminophen (TYLENOL) tablet 650 mg  650 mg Oral Q4H PRN Wili Adan MD   650 mg at 03/25/17 2221   • albuterol (PROVENTIL) nebulizer solution 0.083% 2.5 mg/3mL  2.5 mg Nebulization 4x Daily - RT Wili Adan MD   2.5 mg at 03/27/17 1948   • amiodarone (PACERONE) tablet 200 mg  200 mg Oral Q24H Arnold Landin MD   200 mg at 03/28/17 0838   • apixaban (ELIQUIS) tablet 5 mg  5 mg Oral Q12H Wili Adan MD   5 mg at 03/28/17 0838   • budesonide-formoterol (SYMBICORT) 160-4.5 MCG/ACT inhaler 2 puff  2 puff Inhalation BID - RT Wili Adan MD   2 puff at 03/27/17 1948   • docusate sodium (COLACE) capsule 100 mg  100 mg Oral BID Wili Adan MD   100 mg at 03/28/17 0838   • furosemide (LASIX) tablet 20 mg  20 mg Oral Daily Daren Wu MD   20 mg at  "03/27/17 1232   • HYDROcodone-acetaminophen (NORCO) 5-325 MG per tablet 1 tablet  1 tablet Oral Q6H PRN Wili Adan MD   1 tablet at 03/26/17 2035   • ondansetron (ZOFRAN) injection 4 mg  4 mg Intravenous Q6H PRN Wili Adan MD   4 mg at 03/26/17 2033   • potassium chloride (K-DUR,KLOR-CON) CR tablet 10 mEq  10 mEq Oral BID Wili Adan MD   10 mEq at 03/28/17 0838   • pravastatin (PRAVACHOL) tablet 80 mg  80 mg Oral Nightly Wili Adan MD   80 mg at 03/27/17 2034   • sodium chloride 0.9 % flush 1-10 mL  1-10 mL Intravenous PRN Wili Adan MD           Vital Sign Min/Max for last 24 hours  Temp  Min: 97.6 °F (36.4 °C)  Max: 98.7 °F (37.1 °C)   BP  Min: 91/57  Max: 125/69   Pulse  Min: 75  Max: 94   Resp  Min: 18  Max: 20   SpO2  Min: 92 %  Max: 98 %   Flow (L/min)  Min: 3  Max: 3   No Data Recorded     Flowsheet Rows         First Filed Value    Admission Height  61\" (154.9 cm) Documented at 03/24/2017 1411    Admission Weight  185 lb (83.9 kg) Documented at 03/24/2017 1411            Intake/Output Summary (Last 24 hours) at 03/28/17 1037  Last data filed at 03/27/17 1057   Gross per 24 hour   Intake              200 ml   Output                0 ml   Net              200 ml       Physical Exam:     General Appearance:    Alert, cooperative, in no acute distress   Lungs:     Clear to auscultation,respirations regular, even and                  unlabored    Heart:    Regular rhythm and normal rate, normal S1 and S2, no            murmur, no gallop, no rub, no click   Chest Wall:    No abnormalities observed   Abdomen:     Normal bowel sounds, no masses, no organomegaly, soft        non-tender, non-distended, no guarding, no rebound                tenderness   Extremities:   Moves all extremities well, no edema, no cyanosis, no             redness   Pulses:   Pulses palpable and equal bilaterally   Skin:   No bleeding, bruising or rash        Results Review:   I reviewed the " patient's new clinical results.  Lab Results   Component Value Date    WBC 10.87 (H) 03/28/2017    HGB 9.8 (L) 03/28/2017    HCT 30.9 (L) 03/28/2017    MCV 99.4 (H) 03/28/2017     03/28/2017     Lab Results   Component Value Date    GLUCOSE 144 (H) 03/28/2017    BUN 19 03/28/2017    CREATININE 0.88 03/28/2017    EGFRIFNONA 61 03/28/2017    BCR 21.6 03/28/2017    CO2 30.0 03/28/2017    CALCIUM 8.7 03/28/2017    ALBUMIN 3.60 03/24/2017    LABIL2 1.1 03/24/2017    AST 34 03/24/2017    ALT 39 03/24/2017     Lab Results   Component Value Date    TSH 4.470 03/24/2017     Lab Results   Component Value Date    INR 1.58 (H) 03/24/2017    INR 1.04 02/15/2017    INR 1.0 01/20/2017    PROTIME 18.9 (H) 03/24/2017    PROTIME 13.6 02/15/2017    PROTIME 13.5 01/20/2017     Lab Results   Component Value Date    PTT 31.4 01/20/2017       EKG:     Telemetry:    Ejection Fraction  No results found for: EF    Echo EF Estimated  No results found for: ECHOEFEST      Present on Admission:  • Atrial fibrillation with RVR  • Chronic obstructive pulmonary disease with acute exacerbation  • Hypertension    Assessment/Plan   #1 paroxysmal atrial fibrillations S/P cardioversion #2 palpitations #3 moderate biatrial enlargement  #4 hypertension with hypertensive heart disease #5 diabetes     86 years old patient with history of for paroxysmal atrial fibrillation admitted for evaluations of palpitation and tachycardia.  The patient underwent electrical cardioversion with synchronized 200 J to sinus rhythm.  She is maintaining sinus rhythm.  She is back on oral amiodarone.  She will continue oral anticoagulations.  Chair and doing well from cardiac point of view.  Her shortness breath get better.  She is to known to be to rehabilitation given the history of underlying lung disease.  Agree to continue amiodarone 200 mg a day, and Eliquis  5 mg by mouth twice a day, pravastatin for hyperlipidemia.  We will see on when necessary  basis.    Arnold Landin MD  03/28/17  10:37 AM      EMR Dragon/Transcription disclaimer:   Much of this encounter note is an electronic transcription/translation of spoken language to printed text. The electronic translation of spoken language may permit erroneous, or at times, nonsensical words or phrases to be inadvertently transcribed; Although I have reviewed the note for such errors, some may still exist.

## 2017-03-28 NOTE — PLAN OF CARE
Problem: Patient Care Overview (Adult)  Goal: Plan of Care Review  Outcome: Ongoing (interventions implemented as appropriate)    03/28/17 0754   Coping/Psychosocial Response Interventions   Plan Of Care Reviewed With patient   Outcome Evaluation   Outcome Summary/Follow up Plan Initial OT evaluation completed. Pt presents with ADL, strength, and functional mobility deficits. Pt may benefit from skilled OT intervention with strengthening and facilitation of ADL skills/mobility. Pt plans to discharge to a SNF.          Problem: Inpatient Occupational Therapy  Goal: Transfer Training Goal 1 LTG- OT  Outcome: Ongoing (interventions implemented as appropriate)    03/28/17 0754   Transfer Training OT LTG   Transfer Training OT LTG, Date Established 03/28/17   Transfer Training OT LTG, Time to Achieve by discharge   Transfer Training OT LTG, Activity Type all transfers   Transfer Training OT LTG, [place] Level conditional independence       Goal: Strength Goal LTG- OT  Outcome: Ongoing (interventions implemented as appropriate)    03/28/17 0754   Strength OT LTG   Strength Goal OT LTG, Date Established 03/28/17   Strength Goal OT LTG, Time to Achieve by discharge   Strength Goal OT LTG, Measure to Achieve ADL performance       Goal: Grooming Goal STG- OT  Outcome: Ongoing (interventions implemented as appropriate)    03/28/17 0754   Grooming OT STG   Grooming Goal OT STG, Date Established 03/28/17   Grooming Goal OT STG, Time to Achieve 2 - 3 days   Grooming Goal OT STG, Activity Type face washing, haircare, oral care   Grooming Goal OT STG, [place] Level conditional independence       Goal: ADL Goal LTG- OT  Outcome: Ongoing (interventions implemented as appropriate)    03/28/17 0754   ADL OT LTG   ADL OT LTG, Date Established 03/28/17   ADL OT LTG, Time to Achieve by discharge   ADL OT LTG, Activity Type ADL skills   ADL OT LTG, [place] Level modified independent

## 2017-03-28 NOTE — THERAPY DISCHARGE NOTE
Acute Care - Physical Therapy Discharge Summary  [facility]       Patient Name: Norma Harkins  : 1931  MRN: [id number]    Today's Date: 3/28/2017  Onset of Illness/Injury or Date of Surgery Date: 17    Date of Referral to PT: 17  Referring Physician: Dr. Wu      Admit Date: 3/24/2017      PT Recommendation and Plan    Visit Dx:    ICD-10-CM ICD-9-CM   1. Atrial fibrillation with RVR I48.91 427.31   2. Chronic diastolic congestive heart failure I50.32 428.32     428.0   3. Impaired mobility and ADLs Z74.09 799.89   4. Decreased functional mobility and endurance Z74.09 780.99             Outcome Measures       17 1002 17 0754       How much help from another person do you currently need...    Turning from your back to your side while in flat bed without using bedrails? 4  -KG      Moving from lying on back to sitting on the side of a flat bed without bedrails? 4  -KG      Moving to and from a bed to a chair (including a wheelchair)? 3  -KG      Standing up from a chair using your arms (e.g., wheelchair, bedside chair)? 3  -KG      Climbing 3-5 steps with a railing? 3  -KG      To walk in hospital room? 3  -KG      AM-PAC 6 Clicks Score 20  -KG      How much help from another is currently needed...    Putting on and taking off regular lower body clothing?  3  -RM     Bathing (including washing, rinsing, and drying)  3  -RM     Toileting (which includes using toilet bed pan or urinal)  3  -RM     Putting on and taking off regular upper body clothing  3  -RM     Taking care of personal grooming (such as brushing teeth)  3  -RM     Eating meals  4  -RM     Score  19  -RM     Functional Assessment    Outcome Measure Options AM-PAC 6 Clicks Basic Mobility (PT)  -KG AM-PAC 6 Clicks Daily Activity (OT)  -RM       User Key  (r) = Recorded By, (t) = Taken By, (c) = Cosigned By    Initials Name Provider Type    KG Ivy Gabriel, PT Physical Therapist    RM Denise Estes, OT  Occupational Therapist                PT Charges       03/28/17 1002          Time Calculation    Start Time 1002  -KG      Stop Time 1026  -KG      Time Calculation (min) 24 min  -KG      PT Received On 03/28/17  -KG      PT Goal Re-Cert Due Date 04/10/17  -KG      Time Calculation- PT    Total Timed Code Minutes- PT 10 minute(s)  -KG        User Key  (r) = Recorded By, (t) = Taken By, (c) = Cosigned By    Initials Name Provider Type    KG Ivy Gabriel, PT Physical Therapist                  IP PT Goals       03/28/17 1618 03/28/17 1002       Transfer Training PT LTG    Transfer Training PT LTG, Date Established  03/28/17  -KG     Transfer Training PT LTG, Time to Achieve  by discharge  -KG     Transfer Training PT LTG, Activity Type  bed to chair /chair to bed;sit to stand/stand to sit  -KG     Transfer Training PT LTG, [place] Level  conditional independence  -KG     Transfer Training PT LTG, Assist Device  walker, rolling  -KG     Transfer Training PT  LTG, Date Goal Reviewed 03/28/17  -CB      Transfer Training PT LTG, Outcome goal not met  -CB      Transfer Training PT LTG, Reason Goal Not Met discharged from facility  -CB      Gait Training PT LTG    Gait Training Goal PT LTG, Date Established  03/28/17  -KG     Gait Training Goal PT LTG, Time to Achieve  by discharge  -KG     Gait Training Goal PT LTG, [place] Level  conditional independence  -KG     Gait Training Goal PT LTG, Assist Device  walker, rolling  -KG     Gait Training Goal PT LTG, Distance to Achieve  150 feet  -KG     Gait Training Goal PT LTG, Date Goal Reviewed 03/28/17  -CB      Gait Training Goal PT LTG, Outcome goal not met  -CB      Gait Training Goal PT LTG, Reason Goal Not Met discharged from facility  -CB      Stair Training PT LTG    Stair Training Goal PT LTG, Date Established  03/28/17  -KG     Stair Training Goal PT LTG, Time to Achieve  by discharge  -KG     Stair Training Goal PT LTG, Number of Steps  1 curb step   -KG     Stair Training Goal PT LTG, [place] Level  conditional independence  -KG     Stair Training Goal PT LTG, Assist Device  walker, rolling  -KG     Stair Training Goal PT LTG, Date Goal Reviewed 03/28/17  -CB      Stair Training Goal PT LTG, Outcome goal not met  -CB      Stair Training Goal PT LTG, Reason Goal Not Met discharged from facility  -CB        User Key  (r) = Recorded By, (t) = Taken By, (c) = Cosigned By    Initials Name Provider Type    CB Delmy Hughes, PT Physical Therapist    KG Ivy Gabriel, PT Physical Therapist              PT Discharge Summary  Reason for Discharge: Discharge from facility, Per MD order  Outcomes Achieved: Refer to plan of care for updates on goals achieved  Discharge Destination: SNF      Delmy Hughes, PT   3/28/2017

## 2017-03-28 NOTE — PLAN OF CARE
Problem: Patient Care Overview (Adult)  Goal: Plan of Care Review  Outcome: Ongoing (interventions implemented as appropriate)    03/28/17 0511   Coping/Psychosocial Response Interventions   Plan Of Care Reviewed With patient   Patient Care Overview   Progress improving   Outcome Evaluation   Outcome Summary/Follow up Plan patient ready to go somewhere for rehab        Goal: Adult Individualization and Mutuality  Outcome: Ongoing (interventions implemented as appropriate)  Goal: Discharge Needs Assessment  Outcome: Ongoing (interventions implemented as appropriate)    03/28/17 0511   Discharge Needs Assessment   Concerns To Be Addressed basic needs concerns   Discharge Disposition skilled nursing facility  (for PT/OT rehab to home)   Current Health   Anticipated Changes Related to Illness inability to care for someone else   Self-Care   Equipment Currently Used at Home walker, standard;oxygen         Problem: Arrhythmia/Dysrhythmia (Symptomatic) (Adult)  Goal: Signs and Symptoms of Listed Potential Problems Will be Absent or Manageable (Arrhythmia/Dysrhythmia)  Outcome: Ongoing (interventions implemented as appropriate)    03/28/17 0511   Arrhythmia/Dysrhythmia (Symptomatic)   Problems Assessed (Arrhythmia/Dysrhythmia) all   Problems Present (Arrhythmia/Dysrhythmia) none

## 2017-03-29 ENCOUNTER — LAB REQUISITION (OUTPATIENT)
Dept: LAB | Facility: HOSPITAL | Age: 82
End: 2017-03-29

## 2017-03-29 DIAGNOSIS — N39.0 URINARY TRACT INFECTION: ICD-10-CM

## 2017-03-29 DIAGNOSIS — I10 ESSENTIAL (PRIMARY) HYPERTENSION: ICD-10-CM

## 2017-03-29 DIAGNOSIS — E03.9 HYPOTHYROIDISM: ICD-10-CM

## 2017-03-29 DIAGNOSIS — E55.9 VITAMIN D DEFICIENCY: ICD-10-CM

## 2017-03-29 DIAGNOSIS — I50.9 HEART FAILURE (HCC): ICD-10-CM

## 2017-03-29 LAB
25(OH)D3 SERPL-MCNC: <12.8 NG/ML (ref 30–100)
ALBUMIN SERPL-MCNC: 3.3 G/DL (ref 3.4–4.8)
ALBUMIN/GLOB SERPL: 1 G/DL (ref 1.1–1.8)
ALP SERPL-CCNC: 113 U/L (ref 38–126)
ALT SERPL W P-5'-P-CCNC: 53 U/L (ref 9–52)
ANION GAP SERPL CALCULATED.3IONS-SCNC: 11 MMOL/L (ref 5–15)
AST SERPL-CCNC: 47 U/L (ref 14–36)
BASOPHILS # BLD AUTO: 0.04 10*3/MM3 (ref 0–0.2)
BASOPHILS NFR BLD AUTO: 0.4 % (ref 0–2)
BILIRUB SERPL-MCNC: 0.6 MG/DL (ref 0.2–1.3)
BILIRUB UR QL STRIP: NEGATIVE
BUN BLD-MCNC: 20 MG/DL (ref 7–21)
BUN/CREAT SERPL: 24.7 (ref 7–25)
CALCIUM SPEC-SCNC: 8.5 MG/DL (ref 8.4–10.2)
CHLORIDE SERPL-SCNC: 89 MMOL/L (ref 95–110)
CLARITY UR: CLEAR
CO2 SERPL-SCNC: 33 MMOL/L (ref 22–31)
COLOR UR: ABNORMAL
CREAT BLD-MCNC: 0.81 MG/DL (ref 0.5–1)
DEPRECATED RDW RBC AUTO: 49.5 FL (ref 36.4–46.3)
EOSINOPHIL # BLD AUTO: 0.51 10*3/MM3 (ref 0–0.7)
EOSINOPHIL NFR BLD AUTO: 5.3 % (ref 0–7)
ERYTHROCYTE [DISTWIDTH] IN BLOOD BY AUTOMATED COUNT: 14.2 % (ref 11.5–14.5)
GFR SERPL CREATININE-BSD FRML MDRD: 67 ML/MIN/1.73 (ref 39–90)
GLOBULIN UR ELPH-MCNC: 3.2 GM/DL (ref 2.3–3.5)
GLUCOSE BLD-MCNC: 130 MG/DL (ref 60–100)
GLUCOSE UR STRIP-MCNC: NEGATIVE MG/DL
HCT VFR BLD AUTO: 27.8 % (ref 35–45)
HGB BLD-MCNC: 9.1 G/DL (ref 12–15.5)
HGB UR QL STRIP.AUTO: NEGATIVE
IMM GRANULOCYTES # BLD: 0.11 10*3/MM3 (ref 0–0.02)
IMM GRANULOCYTES NFR BLD: 1.1 % (ref 0–0.5)
KETONES UR QL STRIP: NEGATIVE
LEUKOCYTE ESTERASE UR QL STRIP.AUTO: NEGATIVE
LYMPHOCYTES # BLD AUTO: 0.83 10*3/MM3 (ref 0.6–4.2)
LYMPHOCYTES NFR BLD AUTO: 8.6 % (ref 10–50)
MCH RBC QN AUTO: 31.4 PG (ref 26.5–34)
MCHC RBC AUTO-ENTMCNC: 32.7 G/DL (ref 31.4–36)
MCV RBC AUTO: 95.9 FL (ref 80–98)
MONOCYTES # BLD AUTO: 0.52 10*3/MM3 (ref 0–0.9)
MONOCYTES NFR BLD AUTO: 5.4 % (ref 0–12)
NEUTROPHILS # BLD AUTO: 7.59 10*3/MM3 (ref 2–8.6)
NEUTROPHILS NFR BLD AUTO: 79.2 % (ref 37–80)
NITRITE UR QL STRIP: NEGATIVE
NT-PROBNP SERPL-MCNC: 4090 PG/ML (ref 0–1800)
PH UR STRIP.AUTO: 6.5 [PH] (ref 5–9)
PLATELET # BLD AUTO: 249 10*3/MM3 (ref 150–450)
PMV BLD AUTO: 10.3 FL (ref 8–12)
POTASSIUM BLD-SCNC: 3.3 MMOL/L (ref 3.5–5.1)
PROT SERPL-MCNC: 6.5 G/DL (ref 6.3–8.6)
PROT UR QL STRIP: NEGATIVE
RBC # BLD AUTO: 2.9 10*6/MM3 (ref 3.77–5.16)
SODIUM BLD-SCNC: 133 MMOL/L (ref 137–145)
SP GR UR STRIP: 1.01 (ref 1–1.03)
T4 FREE SERPL-MCNC: 1.4 NG/DL (ref 0.78–2.19)
TSH SERPL DL<=0.05 MIU/L-ACNC: 4.54 MIU/ML (ref 0.46–4.68)
UROBILINOGEN UR QL STRIP: ABNORMAL
WBC NRBC COR # BLD: 9.6 10*3/MM3 (ref 3.2–9.8)

## 2017-03-29 NOTE — PLAN OF CARE
Problem: Inpatient Occupational Therapy  Goal: Transfer Training Goal 1 LTG- OT  Outcome: Unable to achieve outcome(s) by discharge Date Met:  03/29/17 03/28/17 0754 03/29/17 0843   Transfer Training OT LTG   Transfer Training OT LTG, Date Established 03/28/17 --    Transfer Training OT LTG, Time to Achieve by discharge --    Transfer Training OT LTG, Activity Type all transfers --    Transfer Training OT LTG, [place] Level conditional independence --    Transfer Training OT LTG, Date Goal Reviewed --  03/29/17   Transfer Training OT LTG, Outcome --  goal not met   Transfer Training OT LTG, Reason Goal Not Met --  discharged from facility       Goal: Strength Goal LTG- OT  Outcome: Unable to achieve outcome(s) by discharge Date Met:  03/29/17 03/28/17 0754 03/29/17 0843   Strength OT LTG   Strength Goal OT LTG, Date Established 03/28/17 --    Strength Goal OT LTG, Time to Achieve by discharge --    Strength Goal OT LTG, Measure to Achieve ADL performance --    Strength Goal OT LTG, Date Goal Reviewed --  03/29/17   Strength Goal OT LTG, Outcome --  goal not met   Strength Goal OT LTG, Reason Goal Not Met --  discharged from facility       Goal: Grooming Goal STG- OT  Outcome: Unable to achieve outcome(s) by discharge Date Met:  03/29/17 03/28/17 0754 03/29/17 0843   Grooming OT STG   Grooming Goal OT STG, Date Established 03/28/17 --    Grooming Goal OT STG, Time to Achieve 2 - 3 days --    Grooming Goal OT STG, Activity Type face washing, haircare, oral care --    Grooming Goal OT STG, [place] Level conditional independence --    Grooming Goal OT STG, Date Goal Reviewed --  03/29/17   Grooming Goal OT STG, Outcome --  goal not met   Grooming Goal OT STG, Reason Goal Not Met --  discharged from facility       Goal: ADL Goal LTG- OT  Outcome: Unable to achieve outcome(s) by discharge Date Met:  03/29/17 03/28/17 0754 03/29/17 0843   ADL OT LTG   ADL OT LTG, Date Established 03/28/17 --    ADL OT  LTG, Time to Achieve by discharge --    ADL OT LTG, Activity Type ADL skills --    ADL OT LTG, [place] Level modified independent --    ADL OT LTG, Date Goal Reviewed --  03/29/17   ADL OT LTG, Outcome --  goal not met   ADL OT LTG, Reason Goal Not Met --  discharged from facility

## 2017-03-29 NOTE — THERAPY DISCHARGE NOTE
Acute Care - Occupational Therapy Discharge Summary  [facility]     Patient Name: Norma Harkins  : 1931  MRN: [id number]    Today's Date: 3/29/2017  Onset of Illness/Injury or Date of Surgery Date: 17    Date of Referral to OT:  (3/27/2017)  Referring Physician: Dr. Wu      Admit Date: 3/24/2017        OT Recommendation and Plan    Visit Dx:    ICD-10-CM ICD-9-CM   1. Atrial fibrillation with RVR I48.91 427.31   2. Chronic diastolic congestive heart failure I50.32 428.32     428.0   3. Impaired mobility and ADLs Z74.09 799.89   4. Decreased functional mobility and endurance Z74.09 780.99                     OT Goals       17 0843 17 0754       Transfer Training OT LTG    Transfer Training OT LTG, Date Established  17  -RM     Transfer Training OT LTG, Time to Achieve  by discharge  -RM     Transfer Training OT LTG, Activity Type  all transfers  -RM     Transfer Training OT LTG, [place] Level  conditional independence  -RM     Transfer Training OT LTG, Date Goal Reviewed 17  -RW      Transfer Training OT LTG, Outcome goal not met  -RW      Transfer Training OT LTG, Reason Goal Not Met discharged from facility  -RW      Strength OT LTG    Strength Goal OT LTG, Date Established  17  -RM     Strength Goal OT LTG, Time to Achieve  by discharge  -RM     Strength Goal OT LTG, Measure to Achieve  ADL performance  -RM     Strength Goal OT LTG, Date Goal Reviewed 17  -RW      Strength Goal OT LTG, Outcome goal not met  -RW      Strength Goal OT LTG, Reason Goal Not Met discharged from facility  -RW      Grooming OT STG    Grooming Goal OT STG, Date Established  17  -RM     Grooming Goal OT STG, Time to Achieve  2 - 3 days  -RM     Grooming Goal OT STG, Activity Type  face washing, haircare, oral care  -RM     Grooming Goal OT STG, [place] Level  conditional independence  -RM     Grooming Goal OT STG, Date Goal Reviewed 17  -RW      Grooming Goal  OT STG, Outcome goal not met  -RW      Grooming Goal OT STG, Reason Goal Not Met discharged from facility  -RW      ADL OT LTG    ADL OT LTG, Date Established  03/28/17  -RM     ADL OT LTG, Time to Achieve  by discharge  -RM     ADL OT LTG, Activity Type  ADL skills  -RM     ADL OT LTG, [place] Level  modified independent  -RM     ADL OT LTG, Date Goal Reviewed 03/29/17  -RW      ADL OT LTG, Outcome goal not met  -RW      ADL OT LTG, Reason Goal Not Met discharged from facility  -RW        User Key  (r) = Recorded By, (t) = Taken By, (c) = Cosigned By    Initials Name Provider Type    RW Dayanna Cook, OTR/L Occupational Therapist    RM Denise Estes, OT Occupational Therapist                Outcome Measures       03/28/17 1002 03/28/17 0754       How much help from another person do you currently need...    Turning from your back to your side while in flat bed without using bedrails? 4  -KG      Moving from lying on back to sitting on the side of a flat bed without bedrails? 4  -KG      Moving to and from a bed to a chair (including a wheelchair)? 3  -KG      Standing up from a chair using your arms (e.g., wheelchair, bedside chair)? 3  -KG      Climbing 3-5 steps with a railing? 3  -KG      To walk in hospital room? 3  -KG      AM-PAC 6 Clicks Score 20  -KG      How much help from another is currently needed...    Putting on and taking off regular lower body clothing?  3  -RM     Bathing (including washing, rinsing, and drying)  3  -RM     Toileting (which includes using toilet bed pan or urinal)  3  -RM     Putting on and taking off regular upper body clothing  3  -RM     Taking care of personal grooming (such as brushing teeth)  3  -RM     Eating meals  4  -RM     Score  19  -RM     Functional Assessment    Outcome Measure Options AM-PAC 6 Clicks Basic Mobility (PT)  -KG AM-PAC 6 Clicks Daily Activity (OT)  -RM       User Key  (r) = Recorded By, (t) = Taken By, (c) = Cosigned By    Initials Name  Provider Type    KG Ivy Gabriel, PT Physical Therapist    RM Denise Estes, OT Occupational Therapist              OT Discharge Summary  Reason for Discharge: Discharge from facility  Outcomes Achieved: Discharge from facility occurred on same date as evluation  Discharge Destination: SNF      Dayanna Cook, OTR/L  3/29/2017

## 2017-03-30 ENCOUNTER — HOSPITAL ENCOUNTER (INPATIENT)
Facility: HOSPITAL | Age: 82
LOS: 4 days | Discharge: NURSING, REHAB OR LONG-TERM CARE | End: 2017-04-03
Attending: EMERGENCY MEDICINE | Admitting: HOSPITALIST

## 2017-03-30 ENCOUNTER — APPOINTMENT (OUTPATIENT)
Dept: GENERAL RADIOLOGY | Facility: HOSPITAL | Age: 82
End: 2017-03-30

## 2017-03-30 DIAGNOSIS — Z74.09 IMPAIRED MOBILITY AND ADLS: ICD-10-CM

## 2017-03-30 DIAGNOSIS — R26.9 ABNORMALITY OF GAIT AND MOBILITY: ICD-10-CM

## 2017-03-30 DIAGNOSIS — M62.81 MUSCLE WEAKNESS (GENERALIZED): ICD-10-CM

## 2017-03-30 DIAGNOSIS — I50.23 ACUTE ON CHRONIC SYSTOLIC CONGESTIVE HEART FAILURE (HCC): Primary | ICD-10-CM

## 2017-03-30 DIAGNOSIS — I50.9 DYSPNEA DUE TO CONGESTIVE HEART FAILURE (HCC): ICD-10-CM

## 2017-03-30 DIAGNOSIS — R60.0 LOCALIZED EDEMA: ICD-10-CM

## 2017-03-30 DIAGNOSIS — Z78.9 IMPAIRED MOBILITY AND ADLS: ICD-10-CM

## 2017-03-30 LAB
ALBUMIN SERPL-MCNC: 3.2 G/DL (ref 3.4–4.8)
ALBUMIN/GLOB SERPL: 0.9 G/DL (ref 1.1–1.8)
ALP SERPL-CCNC: 102 U/L (ref 38–126)
ALT SERPL W P-5'-P-CCNC: 48 U/L (ref 9–52)
ANION GAP SERPL CALCULATED.3IONS-SCNC: 11 MMOL/L (ref 5–15)
ANISOCYTOSIS BLD QL: NORMAL
AST SERPL-CCNC: 31 U/L (ref 14–36)
BASOPHILS # BLD AUTO: 0.05 10*3/MM3 (ref 0–0.2)
BASOPHILS NFR BLD AUTO: 0.5 % (ref 0–2)
BILIRUB SERPL-MCNC: 0.6 MG/DL (ref 0.2–1.3)
BUN BLD-MCNC: 15 MG/DL (ref 7–21)
BUN/CREAT SERPL: 19.2 (ref 7–25)
CALCIUM SPEC-SCNC: 8.6 MG/DL (ref 8.4–10.2)
CHLORIDE SERPL-SCNC: 91 MMOL/L (ref 95–110)
CO2 SERPL-SCNC: 33 MMOL/L (ref 22–31)
CREAT BLD-MCNC: 0.78 MG/DL (ref 0.5–1)
DEPRECATED RDW RBC AUTO: 50.5 FL (ref 36.4–46.3)
EOSINOPHIL # BLD AUTO: 0.73 10*3/MM3 (ref 0–0.7)
EOSINOPHIL NFR BLD AUTO: 7.1 % (ref 0–7)
ERYTHROCYTE [DISTWIDTH] IN BLOOD BY AUTOMATED COUNT: 14.5 % (ref 11.5–14.5)
GFR SERPL CREATININE-BSD FRML MDRD: 70 ML/MIN/1.73 (ref 39–90)
GLOBULIN UR ELPH-MCNC: 3.4 GM/DL (ref 2.3–3.5)
GLUCOSE BLD-MCNC: 206 MG/DL (ref 60–100)
HCT VFR BLD AUTO: 28.7 % (ref 35–45)
HGB BLD-MCNC: 9.5 G/DL (ref 12–15.5)
IMM GRANULOCYTES # BLD: 0.2 10*3/MM3 (ref 0–0.02)
IMM GRANULOCYTES NFR BLD: 2 % (ref 0–0.5)
LYMPHOCYTES # BLD AUTO: 0.63 10*3/MM3 (ref 0.6–4.2)
LYMPHOCYTES NFR BLD AUTO: 6.2 % (ref 10–50)
MCH RBC QN AUTO: 31.7 PG (ref 26.5–34)
MCHC RBC AUTO-ENTMCNC: 33.1 G/DL (ref 31.4–36)
MCV RBC AUTO: 95.7 FL (ref 80–98)
MONOCYTES # BLD AUTO: 0.87 10*3/MM3 (ref 0–0.9)
MONOCYTES NFR BLD AUTO: 8.5 % (ref 0–12)
NEUTROPHILS # BLD AUTO: 7.76 10*3/MM3 (ref 2–8.6)
NEUTROPHILS NFR BLD AUTO: 75.7 % (ref 37–80)
NT-PROBNP SERPL-MCNC: 2470 PG/ML (ref 0–1800)
PLAT MORPH BLD: NORMAL
PLATELET # BLD AUTO: 264 10*3/MM3 (ref 150–450)
PMV BLD AUTO: 9.8 FL (ref 8–12)
POTASSIUM BLD-SCNC: 3.9 MMOL/L (ref 3.5–5.1)
PROT SERPL-MCNC: 6.6 G/DL (ref 6.3–8.6)
RBC # BLD AUTO: 3 10*6/MM3 (ref 3.77–5.16)
SODIUM BLD-SCNC: 135 MMOL/L (ref 137–145)
TROPONIN I SERPL-MCNC: 0.03 NG/ML
TROPONIN I SERPL-MCNC: 0.03 NG/ML
WBC MORPH BLD: NORMAL
WBC NRBC COR # BLD: 10.24 10*3/MM3 (ref 3.2–9.8)

## 2017-03-30 PROCEDURE — 71010 HC CHEST PA OR AP: CPT

## 2017-03-30 PROCEDURE — 84484 ASSAY OF TROPONIN QUANT: CPT | Performed by: EMERGENCY MEDICINE

## 2017-03-30 PROCEDURE — 85007 BL SMEAR W/DIFF WBC COUNT: CPT | Performed by: EMERGENCY MEDICINE

## 2017-03-30 PROCEDURE — 80053 COMPREHEN METABOLIC PANEL: CPT | Performed by: EMERGENCY MEDICINE

## 2017-03-30 PROCEDURE — 99285 EMERGENCY DEPT VISIT HI MDM: CPT

## 2017-03-30 PROCEDURE — 93005 ELECTROCARDIOGRAM TRACING: CPT | Performed by: EMERGENCY MEDICINE

## 2017-03-30 PROCEDURE — 83880 ASSAY OF NATRIURETIC PEPTIDE: CPT | Performed by: EMERGENCY MEDICINE

## 2017-03-30 PROCEDURE — 25010000002 FUROSEMIDE PER 20 MG: Performed by: EMERGENCY MEDICINE

## 2017-03-30 PROCEDURE — 93010 ELECTROCARDIOGRAM REPORT: CPT | Performed by: INTERNAL MEDICINE

## 2017-03-30 PROCEDURE — 87081 CULTURE SCREEN ONLY: CPT | Performed by: HOSPITALIST

## 2017-03-30 PROCEDURE — 85025 COMPLETE CBC W/AUTO DIFF WBC: CPT | Performed by: EMERGENCY MEDICINE

## 2017-03-30 RX ORDER — SODIUM CHLORIDE 0.9 % (FLUSH) 0.9 %
10 SYRINGE (ML) INJECTION AS NEEDED
Status: DISCONTINUED | OUTPATIENT
Start: 2017-03-30 | End: 2017-04-03 | Stop reason: HOSPADM

## 2017-03-30 RX ORDER — SODIUM CHLORIDE 9 MG/ML
75 INJECTION, SOLUTION INTRAVENOUS CONTINUOUS
Status: DISCONTINUED | OUTPATIENT
Start: 2017-03-30 | End: 2017-03-31

## 2017-03-30 RX ORDER — FUROSEMIDE 10 MG/ML
40 INJECTION INTRAMUSCULAR; INTRAVENOUS ONCE
Status: COMPLETED | OUTPATIENT
Start: 2017-03-30 | End: 2017-03-30

## 2017-03-30 RX ADMIN — SODIUM CHLORIDE 75 ML/HR: 9 INJECTION, SOLUTION INTRAVENOUS at 21:15

## 2017-03-30 RX ADMIN — FUROSEMIDE 40 MG: 10 INJECTION, SOLUTION INTRAMUSCULAR; INTRAVENOUS at 21:15

## 2017-03-31 LAB
ALBUMIN SERPL-MCNC: 3.1 G/DL (ref 3.4–4.8)
ALBUMIN/GLOB SERPL: 1 G/DL (ref 1.1–1.8)
ALP SERPL-CCNC: 100 U/L (ref 38–126)
ALT SERPL W P-5'-P-CCNC: 46 U/L (ref 9–52)
ANION GAP SERPL CALCULATED.3IONS-SCNC: 12 MMOL/L (ref 5–15)
AST SERPL-CCNC: 24 U/L (ref 14–36)
BACTERIA SPEC AEROBE CULT: NORMAL
BASOPHILS # BLD AUTO: 0.08 10*3/MM3 (ref 0–0.2)
BASOPHILS NFR BLD AUTO: 0.8 % (ref 0–2)
BILIRUB SERPL-MCNC: 0.7 MG/DL (ref 0.2–1.3)
BUN BLD-MCNC: 16 MG/DL (ref 7–21)
BUN/CREAT SERPL: 22.9 (ref 7–25)
CALCIUM SPEC-SCNC: 8.6 MG/DL (ref 8.4–10.2)
CHLORIDE SERPL-SCNC: 88 MMOL/L (ref 95–110)
CO2 SERPL-SCNC: 38 MMOL/L (ref 22–31)
CRE SCREEN PCR: NOT DETECTED
CREAT BLD-MCNC: 0.7 MG/DL (ref 0.5–1)
DEPRECATED RDW RBC AUTO: 51.4 FL (ref 36.4–46.3)
EOSINOPHIL # BLD AUTO: 0.99 10*3/MM3 (ref 0–0.7)
EOSINOPHIL NFR BLD AUTO: 9.5 % (ref 0–7)
ERYTHROCYTE [DISTWIDTH] IN BLOOD BY AUTOMATED COUNT: 14.4 % (ref 11.5–14.5)
GFR SERPL CREATININE-BSD FRML MDRD: 79 ML/MIN/1.73 (ref 60–90)
GLOBULIN UR ELPH-MCNC: 3.1 GM/DL (ref 2.3–3.5)
GLUCOSE BLD-MCNC: 126 MG/DL (ref 60–100)
HCT VFR BLD AUTO: 31.6 % (ref 35–45)
HGB BLD-MCNC: 10.3 G/DL (ref 12–15.5)
HOLD SPECIMEN: NORMAL
HOLD SPECIMEN: NORMAL
IMM GRANULOCYTES # BLD: 0.16 10*3/MM3 (ref 0–0.02)
IMM GRANULOCYTES NFR BLD: 1.5 % (ref 0–0.5)
IMP STRAIN: NORMAL
KPC STRAIN: NORMAL
LYMPHOCYTES # BLD AUTO: 0.52 10*3/MM3 (ref 0.6–4.2)
LYMPHOCYTES NFR BLD AUTO: 5 % (ref 10–50)
MAGNESIUM SERPL-MCNC: 2 MG/DL (ref 1.6–2.3)
MCH RBC QN AUTO: 31.6 PG (ref 26.5–34)
MCHC RBC AUTO-ENTMCNC: 32.6 G/DL (ref 31.4–36)
MCV RBC AUTO: 96.9 FL (ref 80–98)
MONOCYTES # BLD AUTO: 0.88 10*3/MM3 (ref 0–0.9)
MONOCYTES NFR BLD AUTO: 8.5 % (ref 0–12)
NDM STRAIN: NORMAL
NEUTROPHILS # BLD AUTO: 7.76 10*3/MM3 (ref 2–8.6)
NEUTROPHILS NFR BLD AUTO: 74.7 % (ref 37–80)
OXA 48 STRAIN: NORMAL
PLATELET # BLD AUTO: 285 10*3/MM3 (ref 150–450)
PMV BLD AUTO: 10 FL (ref 8–12)
POTASSIUM BLD-SCNC: 4 MMOL/L (ref 3.5–5.1)
PROT SERPL-MCNC: 6.2 G/DL (ref 6.3–8.6)
RBC # BLD AUTO: 3.26 10*6/MM3 (ref 3.77–5.16)
SODIUM BLD-SCNC: 138 MMOL/L (ref 137–145)
TROPONIN I SERPL-MCNC: 0.03 NG/ML
VIM STRAIN: NORMAL
WBC NRBC COR # BLD: 10.39 10*3/MM3 (ref 3.2–9.8)
WHOLE BLOOD HOLD SPECIMEN: NORMAL
WHOLE BLOOD HOLD SPECIMEN: NORMAL

## 2017-03-31 PROCEDURE — 85025 COMPLETE CBC W/AUTO DIFF WBC: CPT | Performed by: INTERNAL MEDICINE

## 2017-03-31 PROCEDURE — 94760 N-INVAS EAR/PLS OXIMETRY 1: CPT

## 2017-03-31 PROCEDURE — 84484 ASSAY OF TROPONIN QUANT: CPT | Performed by: HOSPITALIST

## 2017-03-31 PROCEDURE — 94640 AIRWAY INHALATION TREATMENT: CPT

## 2017-03-31 PROCEDURE — 80053 COMPREHEN METABOLIC PANEL: CPT | Performed by: INTERNAL MEDICINE

## 2017-03-31 PROCEDURE — 94799 UNLISTED PULMONARY SVC/PX: CPT

## 2017-03-31 PROCEDURE — 25010000002 FUROSEMIDE PER 20 MG: Performed by: HOSPITALIST

## 2017-03-31 PROCEDURE — 83735 ASSAY OF MAGNESIUM: CPT | Performed by: INTERNAL MEDICINE

## 2017-03-31 RX ORDER — ALBUTEROL SULFATE 2.5 MG/3ML
0.63 SOLUTION RESPIRATORY (INHALATION) EVERY 6 HOURS PRN
Status: DISCONTINUED | OUTPATIENT
Start: 2017-03-31 | End: 2017-04-01 | Stop reason: SDUPTHER

## 2017-03-31 RX ORDER — ACETAMINOPHEN 500 MG
500 TABLET ORAL ONCE AS NEEDED
Status: DISCONTINUED | OUTPATIENT
Start: 2017-03-31 | End: 2017-04-03 | Stop reason: HOSPADM

## 2017-03-31 RX ORDER — BUDESONIDE AND FORMOTEROL FUMARATE DIHYDRATE 160; 4.5 UG/1; UG/1
2 AEROSOL RESPIRATORY (INHALATION)
Status: DISCONTINUED | OUTPATIENT
Start: 2017-03-31 | End: 2017-04-03 | Stop reason: HOSPADM

## 2017-03-31 RX ORDER — ASPIRIN 81 MG/1
81 TABLET, CHEWABLE ORAL DAILY
Status: DISCONTINUED | OUTPATIENT
Start: 2017-03-31 | End: 2017-04-03 | Stop reason: HOSPADM

## 2017-03-31 RX ORDER — IPRATROPIUM BROMIDE AND ALBUTEROL SULFATE 2.5; .5 MG/3ML; MG/3ML
3 SOLUTION RESPIRATORY (INHALATION)
Status: DISCONTINUED | OUTPATIENT
Start: 2017-03-31 | End: 2017-04-01

## 2017-03-31 RX ORDER — FUROSEMIDE 40 MG/1
40 TABLET ORAL DAILY
Status: DISCONTINUED | OUTPATIENT
Start: 2017-03-31 | End: 2017-03-31

## 2017-03-31 RX ORDER — HYDROCODONE BITARTRATE AND ACETAMINOPHEN 5; 325 MG/1; MG/1
1 TABLET ORAL EVERY 4 HOURS PRN
Status: DISCONTINUED | OUTPATIENT
Start: 2017-03-31 | End: 2017-04-03 | Stop reason: HOSPADM

## 2017-03-31 RX ORDER — FUROSEMIDE 10 MG/ML
40 INJECTION INTRAMUSCULAR; INTRAVENOUS 2 TIMES DAILY
Status: DISCONTINUED | OUTPATIENT
Start: 2017-03-31 | End: 2017-04-03 | Stop reason: HOSPADM

## 2017-03-31 RX ORDER — POTASSIUM CHLORIDE 750 MG/1
10 TABLET, EXTENDED RELEASE ORAL 2 TIMES DAILY
Status: DISCONTINUED | OUTPATIENT
Start: 2017-03-31 | End: 2017-04-03 | Stop reason: HOSPADM

## 2017-03-31 RX ORDER — DILTIAZEM HYDROCHLORIDE 60 MG/1
60 TABLET, FILM COATED ORAL 2 TIMES DAILY
Status: DISCONTINUED | OUTPATIENT
Start: 2017-03-31 | End: 2017-04-03 | Stop reason: HOSPADM

## 2017-03-31 RX ORDER — ALBUTEROL SULFATE 2.5 MG/3ML
2.5 SOLUTION RESPIRATORY (INHALATION) EVERY 6 HOURS PRN
Status: DISCONTINUED | OUTPATIENT
Start: 2017-03-31 | End: 2017-03-31 | Stop reason: SDUPTHER

## 2017-03-31 RX ORDER — PRAVASTATIN SODIUM 40 MG
80 TABLET ORAL NIGHTLY
Status: DISCONTINUED | OUTPATIENT
Start: 2017-03-31 | End: 2017-04-03 | Stop reason: HOSPADM

## 2017-03-31 RX ORDER — AMIODARONE HYDROCHLORIDE 200 MG/1
200 TABLET ORAL DAILY
Status: DISCONTINUED | OUTPATIENT
Start: 2017-03-31 | End: 2017-04-03 | Stop reason: HOSPADM

## 2017-03-31 RX ORDER — SODIUM CHLORIDE 0.9 % (FLUSH) 0.9 %
1-10 SYRINGE (ML) INJECTION AS NEEDED
Status: DISCONTINUED | OUTPATIENT
Start: 2017-03-31 | End: 2017-04-03 | Stop reason: HOSPADM

## 2017-03-31 RX ORDER — DOCUSATE SODIUM 100 MG/1
300 CAPSULE, LIQUID FILLED ORAL DAILY
Status: DISCONTINUED | OUTPATIENT
Start: 2017-03-31 | End: 2017-04-03 | Stop reason: HOSPADM

## 2017-03-31 RX ADMIN — PRAVASTATIN SODIUM 80 MG: 40 TABLET ORAL at 04:27

## 2017-03-31 RX ADMIN — BUDESONIDE AND FORMOTEROL FUMARATE DIHYDRATE 2 PUFF: 160; 4.5 AEROSOL RESPIRATORY (INHALATION) at 07:05

## 2017-03-31 RX ADMIN — PRAVASTATIN SODIUM 80 MG: 40 TABLET ORAL at 22:23

## 2017-03-31 RX ADMIN — IPRATROPIUM BROMIDE AND ALBUTEROL SULFATE 3 ML: .5; 3 SOLUTION RESPIRATORY (INHALATION) at 23:35

## 2017-03-31 RX ADMIN — APIXABAN 5 MG: 5 TABLET, FILM COATED ORAL at 22:23

## 2017-03-31 RX ADMIN — APIXABAN 5 MG: 5 TABLET, FILM COATED ORAL at 04:27

## 2017-03-31 RX ADMIN — ASPIRIN 81 MG 81 MG: 81 TABLET ORAL at 10:03

## 2017-03-31 RX ADMIN — APIXABAN 5 MG: 5 TABLET, FILM COATED ORAL at 10:04

## 2017-03-31 RX ADMIN — AMIODARONE HYDROCHLORIDE 200 MG: 200 TABLET ORAL at 10:04

## 2017-03-31 RX ADMIN — DOCUSATE SODIUM 300 MG: 100 CAPSULE, LIQUID FILLED ORAL at 10:04

## 2017-03-31 RX ADMIN — BUDESONIDE AND FORMOTEROL FUMARATE DIHYDRATE 2 PUFF: 160; 4.5 AEROSOL RESPIRATORY (INHALATION) at 19:04

## 2017-03-31 RX ADMIN — DILTIAZEM HYDROCHLORIDE 60 MG: 60 TABLET, FILM COATED ORAL at 10:04

## 2017-03-31 RX ADMIN — IPRATROPIUM BROMIDE AND ALBUTEROL SULFATE 3 ML: .5; 3 SOLUTION RESPIRATORY (INHALATION) at 18:58

## 2017-03-31 RX ADMIN — POTASSIUM CHLORIDE 10 MEQ: 750 TABLET, EXTENDED RELEASE ORAL at 10:04

## 2017-03-31 RX ADMIN — FUROSEMIDE 40 MG: 10 INJECTION, SOLUTION INTRAMUSCULAR; INTRAVENOUS at 10:05

## 2017-03-31 RX ADMIN — POTASSIUM CHLORIDE 10 MEQ: 750 TABLET, EXTENDED RELEASE ORAL at 18:36

## 2017-04-01 LAB
ALBUMIN SERPL-MCNC: 3.4 G/DL (ref 3.4–4.8)
ALBUMIN/GLOB SERPL: 1 G/DL (ref 1.1–1.8)
ALP SERPL-CCNC: 107 U/L (ref 38–126)
ALT SERPL W P-5'-P-CCNC: 41 U/L (ref 9–52)
ANION GAP SERPL CALCULATED.3IONS-SCNC: 10 MMOL/L (ref 5–15)
AST SERPL-CCNC: 26 U/L (ref 14–36)
BASOPHILS # BLD AUTO: 0.07 10*3/MM3 (ref 0–0.2)
BASOPHILS NFR BLD AUTO: 0.7 % (ref 0–2)
BILIRUB SERPL-MCNC: 0.9 MG/DL (ref 0.2–1.3)
BUN BLD-MCNC: 15 MG/DL (ref 7–21)
BUN/CREAT SERPL: 20 (ref 7–25)
CALCIUM SPEC-SCNC: 9 MG/DL (ref 8.4–10.2)
CHLORIDE SERPL-SCNC: 92 MMOL/L (ref 95–110)
CO2 SERPL-SCNC: 35 MMOL/L (ref 22–31)
CREAT BLD-MCNC: 0.75 MG/DL (ref 0.5–1)
DEPRECATED RDW RBC AUTO: 53.2 FL (ref 36.4–46.3)
EOSINOPHIL # BLD AUTO: 1.01 10*3/MM3 (ref 0–0.7)
EOSINOPHIL NFR BLD AUTO: 9.7 % (ref 0–7)
ERYTHROCYTE [DISTWIDTH] IN BLOOD BY AUTOMATED COUNT: 14.9 % (ref 11.5–14.5)
GFR SERPL CREATININE-BSD FRML MDRD: 73 ML/MIN/1.73 (ref 39–90)
GLOBULIN UR ELPH-MCNC: 3.5 GM/DL (ref 2.3–3.5)
GLUCOSE BLD-MCNC: 134 MG/DL (ref 60–100)
HCT VFR BLD AUTO: 32 % (ref 35–45)
HGB BLD-MCNC: 10.2 G/DL (ref 12–15.5)
IMM GRANULOCYTES # BLD: 0.18 10*3/MM3 (ref 0–0.02)
IMM GRANULOCYTES NFR BLD: 1.7 % (ref 0–0.5)
LYMPHOCYTES # BLD AUTO: 0.59 10*3/MM3 (ref 0.6–4.2)
LYMPHOCYTES NFR BLD AUTO: 5.7 % (ref 10–50)
MAGNESIUM SERPL-MCNC: 2.1 MG/DL (ref 1.6–2.3)
MCH RBC QN AUTO: 30.8 PG (ref 26.5–34)
MCHC RBC AUTO-ENTMCNC: 31.9 G/DL (ref 31.4–36)
MCV RBC AUTO: 96.7 FL (ref 80–98)
MONOCYTES # BLD AUTO: 0.88 10*3/MM3 (ref 0–0.9)
MONOCYTES NFR BLD AUTO: 8.5 % (ref 0–12)
NEUTROPHILS # BLD AUTO: 7.68 10*3/MM3 (ref 2–8.6)
NEUTROPHILS NFR BLD AUTO: 73.7 % (ref 37–80)
NRBC BLD MANUAL-RTO: 0 /100 WBC (ref 0–0)
PLATELET # BLD AUTO: 289 10*3/MM3 (ref 150–450)
PMV BLD AUTO: 9.5 FL (ref 8–12)
POTASSIUM BLD-SCNC: 3.6 MMOL/L (ref 3.5–5.1)
PROT SERPL-MCNC: 6.9 G/DL (ref 6.3–8.6)
RBC # BLD AUTO: 3.31 10*6/MM3 (ref 3.77–5.16)
SODIUM BLD-SCNC: 137 MMOL/L (ref 137–145)
WBC NRBC COR # BLD: 10.41 10*3/MM3 (ref 3.2–9.8)

## 2017-04-01 PROCEDURE — 25010000002 FUROSEMIDE PER 20 MG: Performed by: HOSPITALIST

## 2017-04-01 PROCEDURE — 94799 UNLISTED PULMONARY SVC/PX: CPT

## 2017-04-01 PROCEDURE — G8988 SELF CARE GOAL STATUS: HCPCS

## 2017-04-01 PROCEDURE — 80053 COMPREHEN METABOLIC PANEL: CPT | Performed by: INTERNAL MEDICINE

## 2017-04-01 PROCEDURE — 97116 GAIT TRAINING THERAPY: CPT | Performed by: PHYSICAL THERAPIST

## 2017-04-01 PROCEDURE — G8979 MOBILITY GOAL STATUS: HCPCS | Performed by: PHYSICAL THERAPIST

## 2017-04-01 PROCEDURE — 97162 PT EVAL MOD COMPLEX 30 MIN: CPT | Performed by: PHYSICAL THERAPIST

## 2017-04-01 PROCEDURE — 83735 ASSAY OF MAGNESIUM: CPT | Performed by: INTERNAL MEDICINE

## 2017-04-01 PROCEDURE — 85025 COMPLETE CBC W/AUTO DIFF WBC: CPT | Performed by: INTERNAL MEDICINE

## 2017-04-01 PROCEDURE — 97530 THERAPEUTIC ACTIVITIES: CPT | Performed by: PHYSICAL THERAPIST

## 2017-04-01 PROCEDURE — G8987 SELF CARE CURRENT STATUS: HCPCS

## 2017-04-01 PROCEDURE — 97166 OT EVAL MOD COMPLEX 45 MIN: CPT

## 2017-04-01 PROCEDURE — G8978 MOBILITY CURRENT STATUS: HCPCS | Performed by: PHYSICAL THERAPIST

## 2017-04-01 RX ORDER — ALBUTEROL SULFATE 2.5 MG/3ML
2.5 SOLUTION RESPIRATORY (INHALATION) EVERY 6 HOURS PRN
Status: DISCONTINUED | OUTPATIENT
Start: 2017-04-01 | End: 2017-04-03 | Stop reason: HOSPADM

## 2017-04-01 RX ORDER — IPRATROPIUM BROMIDE AND ALBUTEROL SULFATE 2.5; .5 MG/3ML; MG/3ML
3 SOLUTION RESPIRATORY (INHALATION) EVERY 4 HOURS PRN
Status: DISCONTINUED | OUTPATIENT
Start: 2017-04-01 | End: 2017-04-03 | Stop reason: HOSPADM

## 2017-04-01 RX ADMIN — APIXABAN 5 MG: 5 TABLET, FILM COATED ORAL at 20:25

## 2017-04-01 RX ADMIN — FUROSEMIDE 40 MG: 10 INJECTION, SOLUTION INTRAMUSCULAR; INTRAVENOUS at 08:47

## 2017-04-01 RX ADMIN — FUROSEMIDE 40 MG: 10 INJECTION, SOLUTION INTRAMUSCULAR; INTRAVENOUS at 18:39

## 2017-04-01 RX ADMIN — APIXABAN 5 MG: 5 TABLET, FILM COATED ORAL at 08:48

## 2017-04-01 RX ADMIN — PRAVASTATIN SODIUM 80 MG: 40 TABLET ORAL at 20:25

## 2017-04-01 RX ADMIN — ASPIRIN 81 MG 81 MG: 81 TABLET ORAL at 08:48

## 2017-04-01 RX ADMIN — DILTIAZEM HYDROCHLORIDE 60 MG: 60 TABLET, FILM COATED ORAL at 08:48

## 2017-04-01 RX ADMIN — POTASSIUM CHLORIDE 10 MEQ: 750 TABLET, EXTENDED RELEASE ORAL at 18:39

## 2017-04-01 RX ADMIN — POTASSIUM CHLORIDE 10 MEQ: 750 TABLET, EXTENDED RELEASE ORAL at 09:21

## 2017-04-01 RX ADMIN — BUDESONIDE AND FORMOTEROL FUMARATE DIHYDRATE 2 PUFF: 160; 4.5 AEROSOL RESPIRATORY (INHALATION) at 07:21

## 2017-04-01 RX ADMIN — IPRATROPIUM BROMIDE AND ALBUTEROL SULFATE 3 ML: 2.5; .5 SOLUTION RESPIRATORY (INHALATION) at 18:50

## 2017-04-01 RX ADMIN — DILTIAZEM HYDROCHLORIDE 60 MG: 60 TABLET, FILM COATED ORAL at 18:39

## 2017-04-01 RX ADMIN — BUDESONIDE AND FORMOTEROL FUMARATE DIHYDRATE 2 PUFF: 160; 4.5 AEROSOL RESPIRATORY (INHALATION) at 19:43

## 2017-04-01 RX ADMIN — AMIODARONE HYDROCHLORIDE 200 MG: 200 TABLET ORAL at 08:48

## 2017-04-01 RX ADMIN — DOCUSATE SODIUM 300 MG: 100 CAPSULE, LIQUID FILLED ORAL at 08:47

## 2017-04-01 NOTE — PLAN OF CARE
Problem: Patient Care Overview (Adult)  Goal: Plan of Care Review  Outcome: Ongoing (interventions implemented as appropriate)    04/01/17 1320   Coping/Psychosocial Response Interventions   Plan Of Care Reviewed With patient   Outcome Evaluation   Outcome Summary/Follow up Plan PT evaluation completed on this date. Pt ambulated x 3 gait cycles with O2 sat dropping as low as 79% with 120 feet of ambulation. It takes approximately 5-6 minutes for O2 to increase to the low 90's. Pt will benefit from skilled PT to improve mobility and safety prior to d/c. Depending on progress while in hospital, pt may need 24/7 care due to low sat drop with activity or HH services upon d/c.       Goal: Discharge Needs Assessment  Outcome: Ongoing (interventions implemented as appropriate)    04/01/17 1320   Discharge Needs Assessment   Discharge Facility/Level Of Care Needs (May need 24/7 care 2* to O2 sat drop or HH services upon d/c)   Living Environment   Transportation Available car   Self-Care   Equipment Currently Used at Home other (see comments);oxygen;walker, standard;bath bench  (Uses rollator mainly - keeps O2 on the seat; Sleeps in chair)         Problem: Inpatient Physical Therapy  Goal: Transfer Training Goal 1 LTG- PT  Outcome: Ongoing (interventions implemented as appropriate)    04/01/17 1320   Transfer Training PT LTG   Transfer Training PT LTG, Date Established 04/01/17   Transfer Training PT LTG, Time to Achieve by discharge   Transfer Training PT LTG, Activity Type bed to chair /chair to bed   Transfer Training PT LTG, [place] Level conditional independence   Transfer Training PT LTG, Assist Device (AAD)   Transfer Training PT LTG, Outcome goal ongoing       Goal: Gait Training Goal STG- PT  Outcome: Ongoing (interventions implemented as appropriate)    04/01/17 1320   Gait Training PT STG   Gait Training Goal PT STG, Date Established 04/01/17   Gait Training Goal PT STG, Time to Achieve by discharge   Gait  Training Goal PT STG, [place] Level supervision required   Gait Training Goal PT STG, Assist Device (AAD)   Gait Training Goal PT STG, Distance to Achieve 75 feet while completing PLB   Gait Training Goal PT STG, Outcome goal ongoing       Goal: Gait Training Goal LTG- PT  Outcome: Ongoing (interventions implemented as appropriate)    04/01/17 1320   Gait Training PT LTG   Gait Training Goal PT LTG, Date Established 04/01/17   Gait Training Goal PT LTG, Time to Achieve by discharge   Gait Training Goal PT LTG, [place] Level supervision required   Gait Training Goal PT LTG, Assist Device (AAD)   Gait Training Goal PT LTG, Distance to Achieve 150 feet with O2 sat at 88% or higher   Gait Training Goal PT LTG, Outcome goal ongoing       Goal: Stair Training Goal LTG- PT  Outcome: Ongoing (interventions implemented as appropriate)    04/01/17 1320   Stair Training PT LTG   Stair Training Goal PT LTG, Date Established 04/01/17   Stair Training Goal PT LTG, Time to Achieve by discharge   Stair Training Goal PT LTG, Number of Steps 1   Stair Training Goal PT LTG, [place] Level contact guard assist   Stair Training Goal PT LTG, Assist Device (AAD)   Stair Training Goal PT LTG, Outcome goal ongoing

## 2017-04-01 NOTE — PLAN OF CARE
Problem: Patient Care Overview (Adult)  Goal: Plan of Care Review  Outcome: Ongoing (interventions implemented as appropriate)    04/01/17 1415   Coping/Psychosocial Response Interventions   Plan Of Care Reviewed With patient   Outcome Evaluation   Outcome Summary/Follow up Plan Initial OT evaluation completed. Pt stood with SBA but required CGA to perform toileting in standing position due to decreased safety. She would benefit from skilled OT services to increase strength & independence prior to discharge.          Problem: Inpatient Occupational Therapy  Goal: Transfer Training Goal 1 STG- OT  Outcome: Ongoing (interventions implemented as appropriate)    04/01/17 1415   Transfer Training OT STG   Transfer Training OT STG, Date Established 04/01/17   Transfer Training OT STG, Time to Achieve 4 days   Transfer Training OT STG, Activity Type toilet   Transfer Training OT STG, [place] Level supervision required       Goal: Dymanic Standing Balance Goal STG- OT  Outcome: Ongoing (interventions implemented as appropriate)    04/01/17 1415   Dynamic Standing Balance OT STG   Dynamic Standing Balance OT STG, Date Established 04/01/17   Dynamic Standing Balance OT STG, Time to Achieve 4 days   Dynamic Standing Balance OT STG, [place] Level supervision required   Dynamic Standing Balance OT STG, Assist Device assistive Device       Goal: ADL Goal LTG- OT  Outcome: Ongoing (interventions implemented as appropriate)    04/01/17 1415   ADL OT LTG   ADL OT LTG, Date Established 04/01/17   ADL OT LTG, Time to Achieve by discharge   ADL OT LTG, Activity Type ADL skills   ADL OT LTG, [place] Level standby assist       Goal: Functional Mobility Goal LTG- OT  Outcome: Ongoing (interventions implemented as appropriate)    04/01/17 1415   Functional Mobility OT LTG   Functional Mobility Goal OT LTG, Date Established 04/01/17   Functional Mobility Goal OT LTG, Time to Achieve by discharge   Functional Mobility Goal OT  LTG, [place] Level supervision   Functional Mobility Goal OT LTG, Assist Device rolling walker   Functional Mobility Goal OT LTG, Distance to Achieve to the bathroom

## 2017-04-01 NOTE — PROGRESS NOTES
Acute Care - Physical Therapy Initial Evaluation  [facility]     Patient Name: Norma Harkins  : 1931  MRN: [id number]  Today's Date: 2017   Onset of Illness/Injury or Date of Surgery Date: 17  Date of Referral to PT: 17  Referring Physician: Dr. RANDY Maldonado      Admit Date: 3/30/2017     Visit Dx:    ICD-10-CM ICD-9-CM   1. Acute on chronic systolic congestive heart failure I50.23 428.23     428.0   2. Localized edema R60.0 782.3   3. Dyspnea due to congestive heart failure I50.9 786.09     428.0   4. Impaired mobility and ADLs Z74.09 799.89   5. Abnormality of gait and mobility R26.9 781.2   6. Muscle weakness (generalized) M62.81 728.87     Patient Active Problem List   Diagnosis   • Hypertension   • Chronic obstructive pulmonary disease with acute exacerbation   • Acute on chronic respiratory failure with hypoxia   • Low back pain   • Hyperlipidemia   • Atrial fibrillation with RVR   • Chronic respiratory failure with hypoxia   • Acute on chronic systolic congestive heart failure   • Paroxysmal atrial fibrillation   • Essential hypertension   • Chronic obstructive lung disease     Past Medical History:   Diagnosis Date   • Chronic obstructive lung disease 2016    on oxygen      • Chronic respiratory failure with hypoxia 2016   • Essential hypertension 2015   • Hyperlipidemia    • Obesity    • Paroxysmal atrial fibrillation 03/15/2016     Past Surgical History:   Procedure Laterality Date   • CATARACT EXTRACTION     • HYSTERECTOMY     • JOINT REPLACEMENT      total knee           PT ASSESSMENT (last 72 hours)      PT Evaluation       17 1320 17 1009    Rehab Evaluation    Document Type evaluation  -LM evaluation  -RW    Subjective Information agree to therapy;complains of;dyspnea  -LM agree to therapy  -RW    Patient Effort, Rehab Treatment good  -LM good  -RW    Symptoms Noted During/After Treatment fatigue  -LM none  -RW    General Information    Patient  Profile Review yes  -LM yes  -RW    Onset of Illness/Injury or Date of Surgery Date 03/30/17  -LM     Referring Physician Dr. RANDY Maldonado  -LM Dr. RANDY Maldonado  -RW    General Observations Pt sitting on toilet upon entering room.  Pt in squatting position trying to attempt to wipe self.  Pt agreeable to PT assisting.  -LM     Precautions/Limitations fall precautions;oxygen therapy device and L/min  -LM fall precautions;oxygen therapy device and L/min  -RW    Prior Level of Function independent:;gait;ADL's;cooking;cleaning   Pt hires someone to come in and clean.  -LM independent:;ADL's;transfer;gait;all household mobility;cooking;cleaning;driving   daughter does grocery shopping  -RW    Equipment Currently Used at Home other (see comments);oxygen;walker, standard;bath bench   Uses rollator mainly - keeps O2 on the seat; Sleeps in chair  -LM bath bench;walker, standard;other (see comments);oxygen;cane, straight   rollator  -RW    Plans/Goals Discussed With patient  -LM patient  -RW    Risks Reviewed patient:;change in vital signs  -LM patient:;LOB;change in vital signs  -RW    Benefits Reviewed patient:;improve function;increase independence;increase strength;increase balance  -LM patient:;increase independence  -RW    Barriers to Rehab none identified  -LM none identified  -RW    Living Environment    Lives With alone  -LM alone  -RW    Living Arrangements house  -LM house  -RW    Home Accessibility stairs to enter home;grab bars present (bathtub)  -LM stairs to enter home;tub/shower is not walk in  -RW    Number of Stairs to Enter Home 1  -LM 1  -RW    Stair Railings at Home none  -LM none  -RW    Type of Financial/Environmental Concern  none  -RW    Transportation Available car  -LM     Clinical Impression    Date of Referral to PT 03/31/17  -LM     PT Diagnosis Abnormality of gait and mobility; Muscle Weakness  -LM     Criteria for Skilled Therapeutic Interventions Met yes;treatment indicated  -LM      Pathology/Pathophysiology Noted (Describe Specifically for Each System) musculoskeletal;pulmonary  -LM     Impairments Found (describe specific impairments) aerobic capacity/endurance;gait, locomotion, and balance;muscle performance  -LM     Functional Limitations in Following Categories (Describe Specific Limitations) self-care;home management  -LM     Rehab Potential good, to achieve stated therapy goals  -LM     Predicted Duration of Therapy Intervention (days/wks) Until discharge or all goals met  -LM     Vital Signs    Pre Systolic BP Rehab 119  -  -RW    Pre Treatment Diastolic BP 59  -LM 56  -RW    Post Systolic BP Rehab 105  -LM     Post Treatment Diastolic BP 65  -LM     Pretreatment Heart Rate (beats/min) 65  -LM 74  -RW    Posttreatment Heart Rate (beats/min) 75  -LM 77  -RW    Pre SpO2 (%) 94  -LM 93  -RW    O2 Delivery Pre Treatment supplemental O2   3L  -LM supplemental O2  -RW    Intra SpO2 (%) 81   Post 100 feet of ambulation  -LM     O2 Delivery Intra Treatment supplemental O2   3L  -LM     Post SpO2 (%) 94  -LM 96  -RW    O2 Delivery Post Treatment supplemental O2   3L  -LM supplemental O2  -RW    Pre Patient Position Sitting  -LM Sitting  -RW    Intra Patient Position Sitting  -LM     Post Patient Position Sitting  -LM Sitting  -RW    Pain Assessment    Pain Assessment No/denies pain  -LM No/denies pain  -RW    Vision Assessment/Intervention    Visual Impairment WFL with corrective lenses   Reading  -LM WFL  -RW    Cognitive Assessment/Intervention    Current Cognitive/Communication Assessment functional  -LM functional  -RW    Orientation Status oriented x 4  -LM oriented x 4  -RW    Follows Commands/Answers Questions 100% of the time  -% of the time  -RW    Personal Safety mild impairment;decreased awareness, need for safety  -LM decreased awareness, need for assist;decreased awareness, need for safety   stood too quickly for OT to apply gait belt  -RW    Personal Safety  Interventions fall prevention program maintained;gait belt;muscle strengthening facilitated;nonskid shoes/slippers when out of bed  -LM nonskid shoes/slippers when out of bed;supervised activity  -RW    ROM (Range of Motion)    General ROM no range of motion deficits identified  -LM no range of motion deficits identified  -RW    MMT (Manual Muscle Testing)    General MMT Assessment lower extremity strength deficits identified  -LM upper extremity strength deficits identified  -RW    General MMT Assessment Detail LLE - Hip flex - 4/5; Knee flex - 4/5; Knee ext - 4+/5; Ankle DF - 4/5; RLE - Hip flex - 4-/5; Knee flex - 4/5; Knee ext - 4+/5; Ankle DF - 4/5  -LM RUE: shoulder 3+/5; elbow/wrist/ 4-/5; LUE: shoulder 3/5; elbow/wrist/ 4-/5  -RW    Bed Mobility, Assessment/Treatment    Bed Mob, Supine to Sit, [place] not tested  -LM     Bed Mob, Sit to Supine, [place] not tested  -LM     Transfer Assessment/Treatment    Transfers, Sit-Stand [place] stand by assist  -LM stand by assist  -RW    Transfers, Stand-Sit [place] stand by assist  -LM stand by assist  -RW    Transfer, Safety Issues  impulsivity  -RW    Transfer, Impairments strength decreased  -LM strength decreased;impaired balance  -RW    Gait Assessment/Treatment    Gait, [place] Level contact guard assist;stand by assist  -LM     Gait, Assistive Device rolling walker  -LM     Gait, Distance (Feet) --   1st cycle - 120 ft; 2nd cycle - 100 ft; 3rd cycle - 100 ft  -LM     Gait, Gait Deviations jose j decreased;forward flexed posture;step length decreased  -LM     Gait, Safety Issues supplemental O2  -LM     Gait, Impairments strength decreased  -LM     Gait, Comment Post 120 feet of gait O2 dropped to 79%.  Took approximately 5-6 minutes of PLB for O2 to reach 92%.  2nd gait cycle was 100 feet and O2 dropped to 81% - again, it took approximately 5-6 minutes to reach 90.  Last 100 foot walk back to room O2 dropped to 84%.  PT had  patient really focus on PLB during gait - pt likes to take shallow breaths.  PT demonstrated proper technique.  -LM     Sensory Assessment/Intervention    Light Touch LLE;RLE  -LM LUE;RUE  -RW    LUE Light Touch  WNL  -RW    RUE Light Touch  WNL  -RW    LLE Light Touch WNL  -LM     RLE Light Touch WNL  -LM     Positioning and Restraints    Pre-Treatment Position bathroom  -LM sitting in chair/recliner  -RW    Post Treatment Position chair  -LM chair  -RW    In Chair call light within reach;encouraged to call for assist  -LM reclined;call light within reach;encouraged to call for assist  -RW      03/31/17 1024 03/30/17 2300    General Information    Equipment Currently Used at Home  oxygen;walker, rolling  -MARIA ESTHER    Living Environment    Lives With facility resident   [facility] alone  -    Living Arrangements residential facility   [facility] house  -    Home Accessibility  stairs to enter home;bed and bath on same level  -    Stair Railings at Home  none  -    Type of Financial/Environmental Concern  none  -    Transportation Available  car  -      User Key  (r) = Recorded By, (t) = Taken By, (c) = Cosigned By    Initials Name Provider Type    LM Effie Joseph, PT Physical Therapist    RW Dayanna Cook, OTR/L Occupational Therapist    MARIA ESTHER Gonsalez, RN Registered Nurse    BARBARA Bui, BARBARA           Physical Therapy Education     Title: PT OT SLP Therapies (Active)     Topic: Physical Therapy (Active)     Point: Mobility training (Active)    Learning Progress Summary    Learner Readiness Method Response Comment Documented by Status   Patient Acceptance E NR Pt educated on safety with mobility and PLB with all activity.  Pt requires constant cueing and demonstration for PLB technique.  04/01/17 1530 Active               Point: Precautions (Active)    Learning Progress Summary    Learner Readiness Method Response Comment Documented by Status   Patient Acceptance E NR Pt educated  on safety with mobility and PLB with all activity.  Pt requires constant cueing and demonstration for PLB technique. LM 04/01/17 1530 Active                      User Key     Initials Effective Dates Name Provider Type Discipline     06/15/16 -  Effie Joseph, PT Physical Therapist PT                PT Recommendation and Plan  Anticipated Discharge Disposition: home with home health, home with 24/7 care  Planned Therapy Interventions: balance training, bed mobility training, gait training, home exercise program, neuromuscular re-education, patient/family education, postural re-education, strengthening, stretching, transfer training  PT Frequency: other (see comments) (5-14 times/wk)  Plan of Care Review  Plan Of Care Reviewed With: patient  Outcome Summary/Follow up Plan: PT evaluation completed on this date.  Pt ambulated x 3 gait cycles with O2 sat dropping as low as 79% with 120 feet of ambulation.  It takes approximately 5-6 minutes for O2 to increase to the low 90's.  Pt will benefit from skilled PT to improve mobility and safety prior to d/c.  Depending on progress while in hospital, pt may need 24/7 care due to low sat drop with activity or  services upon d/c.          IP PT Goals       04/01/17 1320          Transfer Training PT LTG    Transfer Training PT LTG, Date Established 04/01/17  -LM      Transfer Training PT LTG, Time to Achieve by discharge  -LM      Transfer Training PT LTG, Activity Type bed to chair /chair to bed  -LM      Transfer Training PT LTG, [place] Level conditional independence  -LM      Transfer Training PT LTG, Assist Device --   AAD  -LM      Transfer Training PT LTG, Outcome goal ongoing  -LM      Gait Training PT STG    Gait Training Goal PT STG, Date Established 04/01/17  -LM      Gait Training Goal PT STG, Time to Achieve by discharge  -LM      Gait Training Goal PT STG, [place] Level supervision required  -LM      Gait Training Goal PT STG, Assist Device --   AAD   -LM      Gait Training Goal PT STG, Distance to Achieve 75 feet while completing PLB  -LM      Gait Training Goal PT STG, Outcome goal ongoing  -LM      Gait Training PT LTG    Gait Training Goal PT LTG, Date Established 04/01/17  -LM      Gait Training Goal PT LTG, Time to Achieve by discharge  -LM      Gait Training Goal PT LTG, [place] Level supervision required  -LM      Gait Training Goal PT LTG, Assist Device --   AAD  -LM      Gait Training Goal PT LTG, Distance to Achieve 150 feet with O2 sat at 88% or higher  -LM      Gait Training Goal PT LTG, Outcome goal ongoing  -LM      Stair Training PT LTG    Stair Training Goal PT LTG, Date Established 04/01/17  -LM      Stair Training Goal PT LTG, Time to Achieve by discharge  -LM      Stair Training Goal PT LTG, Number of Steps 1  -LM      Stair Training Goal PT LTG, [place] Level contact guard assist  -LM      Stair Training Goal PT LTG, Assist Device --   AAD  -LM      Stair Training Goal PT LTG, Outcome goal ongoing  -LM        User Key  (r) = Recorded By, (t) = Taken By, (c) = Cosigned By    Initials Name Provider Type    JOANN Joseph, PT Physical Therapist                Outcome Measures       04/01/17 1320 04/01/17 1009       How much help from another person do you currently need...    Turning from your back to your side while in flat bed without using bedrails? 3  -LM      Moving from lying on back to sitting on the side of a flat bed without bedrails? 3  -LM      Moving to and from a bed to a chair (including a wheelchair)? 3  -LM      Standing up from a chair using your arms (e.g., wheelchair, bedside chair)? 3  -LM      Climbing 3-5 steps with a railing? 3  -LM      To walk in hospital room? 3  -LM      AM-PAC 6 Clicks Score 18  -LM      How much help from another is currently needed...    Putting on and taking off regular lower body clothing?  2  -RW     Bathing (including washing, rinsing, and drying)  3  -RW     Toileting (which includes  using toilet bed pan or urinal)  3  -RW     Putting on and taking off regular upper body clothing  3  -RW     Taking care of personal grooming (such as brushing teeth)  3  -RW     Eating meals  3  -RW     Score  17  -RW     Functional Assessment    Outcome Measure Options AM-PAC 6 Clicks Basic Mobility (PT)  -LM AM-PAC 6 Clicks Daily Activity (OT)  -RW       User Key  (r) = Recorded By, (t) = Taken By, (c) = Cosigned By    Initials Name Provider Type    LM Effie Joseph PT Physical Therapist    RW Dayanna Cook, OTR/L Occupational Therapist           Time Calculation:         PT Charges       04/01/17 1320          Time Calculation    Start Time 1320  -LM      Stop Time 1406  -LM      Time Calculation (min) 46 min  -LM      PT Received On 04/01/17  -LM      PT Goal Re-Cert Due Date 04/14/17  -LM      Time Calculation- PT    Total Timed Code Minutes- PT 31 minute(s)  -LM        User Key  (r) = Recorded By, (t) = Taken By, (c) = Cosigned By    Initials Name Provider Type    JOANN Joseph PT Physical Therapist          Therapy Charges for Today     Code Description Service Date Service Provider Modifiers Qty    67518267418 HC PT MOBILITY CURRENT 4/1/2017 Effie Joseph PT GP, CK 1    24510220264 HC PT MOBILITY PROJECTED 4/1/2017 Effie Joseph PT GP, CJ 1    06243336316 HC PT EVAL MOD COMPLEXITY 1 4/1/2017 Effie Joseph PT GP 1    21730865798 HC GAIT TRAINING EA 15 MIN 4/1/2017 Effie Joseph PT GP 1    27115825556 HC PT THERAPEUTIC ACT EA 15 MIN 4/1/2017 Effie Joseph PT GP 1          PT G-Codes  Outcome Measure Options: AM-PAC 6 Clicks Basic Mobility (PT)  Score: 18  Functional Limitation: Mobility: Walking and moving around  Mobility: Walking and Moving Around Current Status (): At least 40 percent but less than 60 percent impaired, limited or restricted  Mobility: Walking and Moving Around Goal Status (): At least 20 percent but less than 40 percent impaired, limited or restricted      Effie Joseph  PT  4/1/2017

## 2017-04-01 NOTE — PROGRESS NOTES
Acute Care - Occupational Therapy Initial Evaluation  [facility]     Patient Name: Norma Harkins  : 1931  MRN: [id number]  Today's Date: 2017     Date of Referral to OT: 17  Referring Physician: Dr. RANDY Maldonado    Admit Date: 3/30/2017       ICD-10-CM ICD-9-CM   1. Acute on chronic systolic congestive heart failure I50.23 428.23     428.0   2. Localized edema R60.0 782.3   3. Dyspnea due to congestive heart failure I50.9 786.09     428.0   4. Impaired mobility and ADLs Z74.09 799.89     Patient Active Problem List   Diagnosis   • Hypertension   • Chronic obstructive pulmonary disease with acute exacerbation   • Acute on chronic respiratory failure with hypoxia   • Low back pain   • Hyperlipidemia   • Atrial fibrillation with RVR   • Chronic respiratory failure with hypoxia   • Acute on chronic systolic congestive heart failure   • Paroxysmal atrial fibrillation   • Essential hypertension   • Chronic obstructive lung disease     Past Medical History:   Diagnosis Date   • Chronic obstructive lung disease 2016    on oxygen      • Chronic respiratory failure with hypoxia 2016   • Essential hypertension 2015   • Hyperlipidemia    • Obesity    • Paroxysmal atrial fibrillation 03/15/2016     Past Surgical History:   Procedure Laterality Date   • CATARACT EXTRACTION     • HYSTERECTOMY     • JOINT REPLACEMENT      total knee           OT ASSESSMENT FLOWSHEET (last 72 hours)      OT Evaluation       17 1320 17 1009 17 1024 17 2300       Rehab Evaluation    Document Type evaluation  -LM evaluation  -RW       Subjective Information agree to therapy;complains of;dyspnea  -LM agree to therapy  -RW       Patient Effort, Rehab Treatment  good  -RW       Symptoms Noted During/After Treatment  none  -RW       General Information    Patient Profile Review yes  -LM yes  -RW       Referring Physician Dr. RANDY Maldonado  -LM Dr. RANDY Maldonado  -RW       General Observations Pt sitting on  toilet upon entering room.  Pt in squatting position trying to attempt to wipe self.  Pt agreeable to PT assisting.  -LM        Precautions/Limitations  fall precautions;oxygen therapy device and L/min  -RW       Prior Level of Function independent:;gait;ADL's;cooking;cleaning   Pt hires someone to come in and clean.  -LM independent:;ADL's;transfer;gait;all household mobility;cooking;cleaning;driving   daughter does grocery shopping  -RW       Equipment Currently Used at Home other (see comments);oxygen;walker, standard;bath bench   Uses rollator mainly - keeps O2 on the seat; Sleeps in chair  - bath bench;walker, standard;other (see comments);oxygen;cane, straight   rollator  -RW  oxygen;walker, rolling  -     Plans/Goals Discussed With patient  -LM patient  -RW       Risks Reviewed patient:;change in vital signs  -LM patient:;LOB;change in vital signs  -RW       Benefits Reviewed patient:;improve function;increase independence;increase strength;increase balance  - patient:;increase independence  -RW       Barriers to Rehab none identified  -LM none identified  -RW       Living Environment    Lives With alone  - alone  -RW facility resident   [facility] alone  -     Living Arrangements house  - house  - residential facility   [facility]  -     Home Accessibility stairs to enter home;grab bars present (bathtub)  - stairs to enter home;tub/shower is not walk in  -RW  stairs to enter home;bed and bath on same level  -     Number of Stairs to Enter Home 1  - 1  -RW       Stair Railings at Home none  -LM none  -RW  none  -MARIA ESTHER     Type of Financial/Environmental Concern  none  -RW  none  -MARIA ESTHER     Transportation Available car  -   car  -MARIA ESTHER     Clinical Impression    Date of Referral to OT  03/31/17  -RW       OT Diagnosis  impaired mobility and ADLs  -RW       Impairments Found (describe specific impairments)  aerobic capacity/endurance;gait, locomotion, and balance;muscle performance   ADLs,  functional mobility/transfers, safety, ax tolerance  -RW       Patient/Family Goals Statement  home with daughter  -RW       Criteria for Skilled Therapeutic Interventions Met  yes;treatment indicated  -RW       Rehab Potential  good, to achieve stated therapy goals  -RW       Therapy Frequency  other (see comments)   3-14 times/wk  -RW       Predicted Duration of Therapy Intervention (days/wks)  until discharge  -RW       Anticipated Discharge Disposition  home with /7 care;home with home health;skilled nursing facility  -RW       Functional Level Prior    Ambulation    0-->independent  -MARIA ESTHER     Transferring    0-->independent  -MARIA ESTHER     Toileting    0-->independent  -MARIA ESTHER     Bathing    0-->independent  -MARIA ESTHER     Dressing    0-->independent  -MARIA ESTHER     Eating    0-->independent  -MARIA ESTHER     Communication    0-->understands/communicates without difficulty  -MARIA ESTHER     Swallowing    0-->swallows foods/liquids without difficulty  -MARIA ESTHER     Vital Signs    Pre Systolic BP Rehab 119  -  -RW       Pre Treatment Diastolic BP 59  -LM 56  -RW       Post Systolic BP Rehab 105  -LM        Post Treatment Diastolic BP 65  -LM        Pretreatment Heart Rate (beats/min) 65  -LM 74  -RW       Posttreatment Heart Rate (beats/min) 75  -LM 77  -RW       Pre SpO2 (%) 94  -LM 93  -RW       O2 Delivery Pre Treatment supplemental O2   3L  -LM supplemental O2  -RW       Post SpO2 (%) 94  -LM 96  -RW       O2 Delivery Post Treatment supplemental O2  -LM supplemental O2  -RW       Pre Patient Position Sitting  -LM Sitting  -RW       Post Patient Position Sitting  -LM Sitting  -RW       Pain Assessment    Pain Assessment No/denies pain  -LM No/denies pain  -RW       Vision Assessment/Intervention    Visual Impairment WFL with corrective lenses   Reading  -LM WFL  -RW       Cognitive Assessment/Intervention    Current Cognitive/Communication Assessment functional  -LM functional  -RW       Orientation Status oriented x 4  -LM oriented x 4  -RW        Follows Commands/Answers Questions 100% of the time  -% of the time  -RW       Personal Safety  decreased awareness, need for assist;decreased awareness, need for safety   stood too quickly for OT to apply gait belt  -RW       Personal Safety Interventions  nonskid shoes/slippers when out of bed;supervised activity  -RW       ROM (Range of Motion)    General ROM no range of motion deficits identified  -LM no range of motion deficits identified  -RW       MMT (Manual Muscle Testing)    General MMT Assessment lower extremity strength deficits identified  -LM upper extremity strength deficits identified  -RW       General MMT Assessment Detail LLE - Hip flex - 4/5; Knee flex - 4/5; Knee ext - 4+/5; Ankle DF - 4/5; RLE - Hip flex - 4-/5; Knee flex - 4/5; Knee ext - 4+/5; Ankle DF - 4/5  -LM RUE: shoulder 3+/5; elbow/wrist/ 4-/5; LUE: shoulder 3/5; elbow/wrist/ 4-/5  -RW       Transfer Assessment/Treatment    Transfers, Sit-Stand [place]  stand by assist  -RW       Transfers, Stand-Sit [place]  stand by assist  -RW       Transfer, Safety Issues  impulsivity  -RW       Transfer, Impairments  strength decreased;impaired balance  -RW       Toileting Assessment/Training    Toileting Assess/Train, Position  standing  -RW       Toileting Assess/Train, Indepen Level  contact guard assist  -RW       Toileting Assess/Train, Impairments  strength decreased;impaired balance  -RW       Toileting Assess/Train, Comment  Pt incontinent of urine during eval, quickly stood to perform arsen-care before OTR could apply gait belt. She required CGA to perform bladder hygiene standing by bed using bed for unilateral UE support.  -RW       Sensory Assessment/Intervention    Light Touch LLE;RLE  -LM LUE;RUE  -RW       LUE Light Touch  WNL  -RW       RUE Light Touch  WNL  -RW       LLE Light Touch WNL  -LM        RLE Light Touch WNL  -LM        General Therapy Interventions    Planned Therapy Interventions  activity  intolerance;adaptive equipment training;ADL retraining;balance training;bed mobility training;energy conservation;home exercise program;strengthening;transfer training  -RW       Positioning and Restraints    Pre-Treatment Position  sitting in chair/recliner  -RW       Post Treatment Position  chair  -RW       In Chair  reclined;call light within reach;encouraged to call for assist  -RW         User Key  (r) = Recorded By, (t) = Taken By, (c) = Cosigned By    Initials Name Effective Dates    LM Effie Joseph, PT 06/15/16 -     RW Dayanna Cook OTR/L 10/17/16 -     MARIA ESTHER Gonsalez, RN 10/17/16 -     BARBARA Long 01/09/17 -            Occupational Therapy Education     Title: PT OT SLP Therapies (Active)     Topic: Occupational Therapy (Active)     Point: Precautions (Active)    Description: Instruct learner(s) on prescribed precautions during self-care and functional transfers.    Learning Progress Summary    Learner Readiness Method Response Comment Documented by Status   Patient Acceptance E NR fall precautions  04/01/17 1414 Active                      User Key     Initials Effective Dates Name Provider Type Discipline     10/17/16 -  Dayanna Cook OTR/L Occupational Therapist OT                  OT Recommendation and Plan  Anticipated Discharge Disposition: home with 24/7 care, home with home health, skilled nursing facility  Planned Therapy Interventions: activity intolerance, adaptive equipment training, ADL retraining, balance training, bed mobility training, energy conservation, home exercise program, strengthening, transfer training  Therapy Frequency: other (see comments) (3-14 times/wk)  Plan of Care Review  Plan Of Care Reviewed With: patient  Outcome Summary/Follow up Plan: Initial OT evaluation completed. Pt stood with SBA but required CGA to perform toileting in standing position due to decreased safety. She would benefit from skilled OT services to increase strength &  independence prior to discharge.           OT Goals       04/01/17 1415          Transfer Training OT STG    Transfer Training OT STG, Date Established 04/01/17  -RW      Transfer Training OT STG, Time to Achieve 4 days  -RW      Transfer Training OT STG, Activity Type toilet  -RW      Transfer Training OT STG, [place] Level supervision required  -RW      Dynamic Standing Balance OT STG    Dynamic Standing Balance OT STG, Date Established 04/01/17  -RW      Dynamic Standing Balance OT STG, Time to Achieve 4 days  -RW      Dynamic Standing Balance OT STG, [place] Level supervision required  -RW      Dynamic Standing Balance OT STG, Assist Device assistive Device  -RW      ADL OT LTG    ADL OT LTG, Date Established 04/01/17  -RW      ADL OT LTG, Time to Achieve by discharge  -RW      ADL OT LTG, Activity Type ADL skills  -RW      ADL OT LTG, [place] Level standby assist  -RW      Functional Mobility OT LTG    Functional Mobility Goal OT LTG, Date Established 04/01/17  -RW      Functional Mobility Goal OT LTG, Time to Achieve by discharge  -RW      Functional Mobility Goal OT LTG, [place] Level supervision  -RW      Functional Mobility Goal OT LTG, Assist Device rolling walker  -RW      Functional Mobility Goal OT LTG, Distance to Achieve to the bathroom  -RW        User Key  (r) = Recorded By, (t) = Taken By, (c) = Cosigned By    Initials Name Provider Type    MONSERRAT Cook OTR/L Occupational Therapist                Outcome Measures       04/01/17 1009          How much help from another is currently needed...    Putting on and taking off regular lower body clothing? 2  -RW      Bathing (including washing, rinsing, and drying) 3  -RW      Toileting (which includes using toilet bed pan or urinal) 3  -RW      Putting on and taking off regular upper body clothing 3  -RW      Taking care of personal grooming (such as brushing teeth) 3  -RW      Eating meals 3  -RW      Score 17  -RW       Functional Assessment    Outcome Measure Options AM-PAC 6 Clicks Daily Activity (OT)  -        User Key  (r) = Recorded By, (t) = Taken By, (c) = Cosigned By    Initials Name Provider Type    RW Dayanna Cook, OTR/L Occupational Therapist          Time Calculation:   OT Start Time: 1009  OT Stop Time: 1034  OT Time Calculation (min): 25 min    Therapy Charges for Today     Code Description Service Date Service Provider Modifiers Qty    [id number]  OT SELFCARE CURRENT 4/1/2017 Dayanna Cook, OTR/L GO, CK 1    [id number]  OT SELFCARE PROJECTED 4/1/2017 Dayanna Cook, OTR/L GO, CJ 1    [id number]  OT EVAL MOD COMPLEXITY 2 4/1/2017 Dayanna Cook, OTR/L GO 1          OT G-codes  OT Professional Judgement Used?: Yes  OT Functional Scales Options: AM-PAC 6 Clicks Daily Activity (OT)  Score: 17  Functional Limitation: Self care  Self Care Current Status (): At least 40 percent but less than 60 percent impaired, limited or restricted  Self Care Goal Status (): At least 20 percent but less than 40 percent impaired, limited or restricted    Dayanna Cook, OTR/L  4/1/2017

## 2017-04-01 NOTE — PROGRESS NOTES
[facility] Medicine Services  INPATIENT PROGRESS NOTE     LOS: 2 days   Patient Care Team:  Krista Matos MD as PCP - General    Chief Complaint:  Chronic respiratory failure with hypoxia      Subjective     Interval History:     Patient Complaints: Patient seen and  Examined. She denies any complaints.    History taken from: Patient.    Review of Systems:    Review of Systems   Respiratory: Positive for shortness of breath.    Cardiovascular: Positive for leg swelling. Negative for chest pain.   Gastrointestinal: Negative for abdominal distention, abdominal pain, constipation and diarrhea.   Genitourinary: Negative for dysuria and urgency.   Musculoskeletal: Negative for neck pain.   Neurological: Positive for weakness.   Psychiatric/Behavioral: Negative for agitation and behavioral problems.   All other systems reviewed and are negative.        Objective     Vital Signs  Temp:  [97 °F (36.1 °C)-97.5 °F (36.4 °C)] 97.5 °F (36.4 °C)  Heart Rate:  [] 89  Resp:  [20-24] 20  BP: ()/(54-86) 132/86    Physical Exam:   Physical Exam   Constitutional: She is oriented to person, place, and time. She appears well-developed and well-nourished.   HENT:   Head: Normocephalic and atraumatic.   Eyes: EOM are normal.   Neck: Neck supple.   Cardiovascular: Normal rate.  An irregularly irregular rhythm present.   Pulmonary/Chest: Effort normal. No respiratory distress. She has rales.   Abdominal: Soft. Bowel sounds are normal.   Musculoskeletal: Normal range of motion. She exhibits no edema or tenderness.   Neurological: She is alert and oriented to person, place, and time.   Skin: Skin is warm and dry.   Psychiatric: She has a normal mood and affect. Her behavior is normal.   Vitals reviewed.         Results Review:       Results from last 7 days  Lab Units 04/01/17  0529 03/31/17  1358 03/30/17  1936 03/29/17  1447 03/28/17  0526 03/27/17  0541 03/26/17  0528   SODIUM  mmol/L 137 138 135* 133* 135* 138 137   POTASSIUM mmol/L 3.6 4.0 3.9 3.3* 4.5 4.0 3.9   CHLORIDE mmol/L 92* 88* 91* 89* 97 96 97   TOTAL CO2 mmol/L 35.0* 38.0* 33.0* 33.0* 30.0 31.0 30.0   BUN mg/dL 15 16 15 20 19 18 17   CREATININE mg/dL 0.75 0.70 0.78 0.81 0.88 0.82 0.76   GLUCOSE mg/dL 134* 126* 206* 130* 144* 146* 151*   CALCIUM mg/dL 9.0 8.6 8.6 8.5 8.7 8.5 8.2*   BILIRUBIN mg/dL 0.9 0.7 0.6 0.6  --   --   --    ALK PHOS U/L 107 100 102 113  --   --   --    ALT (SGPT) U/L 41 46 48 53*  --   --   --    AST (SGOT) U/L 26 24 31 47*  --   --   --          Results from last 7 days  Lab Units 04/01/17  0529 03/31/17  1358 03/26/17  1303   MAGNESIUM mg/dL 2.1 2.0 1.7         Results from last 7 days  Lab Units 04/01/17  0529 03/31/17  1358 03/30/17  1936 03/29/17  1447 03/28/17  0527 03/27/17  0541 03/26/17  0528   WBC 10*3/mm3 10.41* 10.39* 10.24* 9.60 10.87* 11.50* 10.71*   HEMOGLOBIN g/dL 10.2* 10.3* 9.5* 9.1* 9.8* 9.6* 9.9*   HEMATOCRIT % 32.0* 31.6* 28.7* 27.8* 30.9* 30.1* 29.8*   PLATELETS 10*3/mm3 289 285 264 249 239 228 215       Lab Results   Component Value Date    CKTOTAL 30 03/24/2017    CKMB 1.05 03/24/2017    TROPONINI 0.029 03/31/2017       CO2   Date Value Ref Range Status   04/01/2017 35.0 (H) 22.0 - 31.0 mmol/L Final              Imaging Results (last 7 days)     Procedure Component Value Units Date/Time    XR Chest 1 View [[id number]] Collected:  03/30/17 2019     Updated:  03/30/17 2023    Narrative:       Patient Name:  ANN MARIE DAVALOS  Patient ID:  [id number]   Ordering:  EFRA HILL  Attending:  EFRA HILL  Referring:  EFRA HILL  ------------------------------------------------  PROCEDURE: XR CHEST 1 VIEW    INDICATION FOR PROCEDURE:  86 years -old patient presents for  evaluation of generalized chest pain.    COMPARISON:  March 24, 2017.    FINDINGS: XR CHEST 1 view  reveals the lungs are expanded.  Cardiac monitoring leads are present. There is  interstitial  thickening visible throughout both lungs. Cardiac silhouette is  moderately enlarged. Vascular calcifications are visible within  the thoracic aorta.      There is no radiographic evidence for airspace consolidation,  pleural effusion or pneumothorax. Mediastinal silhouette is  within normal limits.     There are degenerative changes of both shoulders.       Impression:       Moderate cardiomegaly and mild pulmonary congestion.    Electronically signed by:  Saadia Casey MD  3/30/2017 8:22 PM CDT  Work[facility]                           Urine Culture   Date Value Ref Range Status   03/29/2017 No growth at 24 hours  Final           Medication Review:   Current Facility-Administered Medications   Medication Dose Route Frequency Provider Last Rate Last Dose   • acetaminophen (TYLENOL) tablet 500 mg  500 mg Oral Once PRN Merlin Avila MD       • albuterol (PROVENTIL) nebulizer solution 0.083% 2.5 mg/3mL  0.63 mg Nebulization Q6H PRN Merlin Avila MD       • amiodarone (PACERONE) tablet 200 mg  200 mg Oral Daily Merlin Avila MD   200 mg at 04/01/17 0848   • apixaban (ELIQUIS) tablet 5 mg  5 mg Oral Q12H Merlin Avila MD   5 mg at 04/01/17 0848   • aspirin chewable tablet 81 mg  81 mg Oral Daily Merlin Avila MD   81 mg at 04/01/17 0848   • budesonide-formoterol (SYMBICORT) 160-4.5 MCG/ACT inhaler 2 puff  2 puff Inhalation BID - RT Merlin Avila MD   2 puff at 04/01/17 0721   • diltiaZEM (CARDIZEM) tablet 60 mg  60 mg Oral BID Merlin Avila MD   60 mg at 04/01/17 0848   • docusate sodium (COLACE) capsule 300 mg  300 mg Oral Daily Merlin Avila MD   300 mg at 04/01/17 0847   • furosemide (LASIX) injection 40 mg  40 mg Intravenous BID Merlin Avila MD   40 mg at 04/01/17 0847   • HYDROcodone-acetaminophen (NORCO) 5-325 MG per tablet 1 tablet  1 tablet Oral Q4H PRN Merlin Avila MD       • ipratropium-albuterol (DUO-NEB) nebulizer solution 3 mL  3 mL Nebulization Q4H -  RT Alfredo Maldonado MD   3 mL at 03/31/17 2335   • potassium chloride (K-DUR,KLOR-CON) CR tablet 10 mEq  10 mEq Oral BID Merlin Avila MD   10 mEq at 04/01/17 0921   • pravastatin (PRAVACHOL) tablet 80 mg  80 mg Oral Nightly Merlin Avila MD   80 mg at 03/31/17 2223   • sodium chloride 0.9 % flush 1-10 mL  1-10 mL Intravenous PRN Merlin Avila MD       • sodium chloride 0.9 % flush 10 mL  10 mL Intravenous PRN Victor M Carranza MD             Assessment/Plan     Principal Problem:    Chronic respiratory failure with hypoxia  Active Problems:    Hyperlipidemia    Atrial fibrillation with RVR    Acute on chronic systolic congestive heart failure    Paroxysmal atrial fibrillation    Essential hypertension    Chronic obstructive lung disease          -Continue with diuretics.  -PT OT to Continue.  -DVT and GI prophylaxis in place.  -Continue present management.  -Continue monitoring patient hospital setting and treat patient as course dictates.  -Discharge planning in progress.      Alfredo Malodnado MD  04/01/17      EMR Dragon/Transcription disclaimer:   Much of this encounter note is an electronic transcription/translation of spoken language to printed text. The electronic translation of spoken language may permit erroneous, or at times, nonsensical words or phrases to be inadvertently transcribed; Although I have reviewed the note for such errors, some may still exist.

## 2017-04-02 LAB
ALBUMIN SERPL-MCNC: 3.4 G/DL (ref 3.4–4.8)
ALBUMIN/GLOB SERPL: 1 G/DL (ref 1.1–1.8)
ALP SERPL-CCNC: 100 U/L (ref 38–126)
ALT SERPL W P-5'-P-CCNC: 42 U/L (ref 9–52)
ANION GAP SERPL CALCULATED.3IONS-SCNC: 11 MMOL/L (ref 5–15)
AST SERPL-CCNC: 29 U/L (ref 14–36)
BASOPHILS # BLD AUTO: 0.05 10*3/MM3 (ref 0–0.2)
BASOPHILS NFR BLD AUTO: 0.6 % (ref 0–2)
BILIRUB SERPL-MCNC: 0.8 MG/DL (ref 0.2–1.3)
BUN BLD-MCNC: 16 MG/DL (ref 7–21)
BUN/CREAT SERPL: 22.2 (ref 7–25)
CALCIUM SPEC-SCNC: 9 MG/DL (ref 8.4–10.2)
CHLORIDE SERPL-SCNC: 91 MMOL/L (ref 95–110)
CO2 SERPL-SCNC: 35 MMOL/L (ref 22–31)
CREAT BLD-MCNC: 0.72 MG/DL (ref 0.5–1)
DEPRECATED RDW RBC AUTO: 49.8 FL (ref 36.4–46.3)
EOSINOPHIL # BLD AUTO: 1.19 10*3/MM3 (ref 0–0.7)
EOSINOPHIL NFR BLD AUTO: 13.1 % (ref 0–7)
ERYTHROCYTE [DISTWIDTH] IN BLOOD BY AUTOMATED COUNT: 14.4 % (ref 11.5–14.5)
GFR SERPL CREATININE-BSD FRML MDRD: 77 ML/MIN/1.73 (ref 39–90)
GLOBULIN UR ELPH-MCNC: 3.5 GM/DL (ref 2.3–3.5)
GLUCOSE BLD-MCNC: 121 MG/DL (ref 60–100)
HCT VFR BLD AUTO: 31.2 % (ref 35–45)
HGB BLD-MCNC: 10.3 G/DL (ref 12–15.5)
IMM GRANULOCYTES # BLD: 0.15 10*3/MM3 (ref 0–0.02)
IMM GRANULOCYTES NFR BLD: 1.7 % (ref 0–0.5)
LYMPHOCYTES # BLD AUTO: 0.65 10*3/MM3 (ref 0.6–4.2)
LYMPHOCYTES NFR BLD AUTO: 7.2 % (ref 10–50)
MAGNESIUM SERPL-MCNC: 2.2 MG/DL (ref 1.6–2.3)
MCH RBC QN AUTO: 31.2 PG (ref 26.5–34)
MCHC RBC AUTO-ENTMCNC: 33 G/DL (ref 31.4–36)
MCV RBC AUTO: 94.5 FL (ref 80–98)
MONOCYTES # BLD AUTO: 0.77 10*3/MM3 (ref 0–0.9)
MONOCYTES NFR BLD AUTO: 8.5 % (ref 0–12)
NEUTROPHILS # BLD AUTO: 6.28 10*3/MM3 (ref 2–8.6)
NEUTROPHILS NFR BLD AUTO: 68.9 % (ref 37–80)
PLATELET # BLD AUTO: 283 10*3/MM3 (ref 150–450)
PMV BLD AUTO: 10 FL (ref 8–12)
POTASSIUM BLD-SCNC: 3.5 MMOL/L (ref 3.5–5.1)
PROT SERPL-MCNC: 6.9 G/DL (ref 6.3–8.6)
RBC # BLD AUTO: 3.3 10*6/MM3 (ref 3.77–5.16)
SODIUM BLD-SCNC: 137 MMOL/L (ref 137–145)
WBC NRBC COR # BLD: 9.09 10*3/MM3 (ref 3.2–9.8)

## 2017-04-02 PROCEDURE — 83735 ASSAY OF MAGNESIUM: CPT | Performed by: INTERNAL MEDICINE

## 2017-04-02 PROCEDURE — 80053 COMPREHEN METABOLIC PANEL: CPT | Performed by: INTERNAL MEDICINE

## 2017-04-02 PROCEDURE — 94799 UNLISTED PULMONARY SVC/PX: CPT

## 2017-04-02 PROCEDURE — 97530 THERAPEUTIC ACTIVITIES: CPT

## 2017-04-02 PROCEDURE — 97535 SELF CARE MNGMENT TRAINING: CPT

## 2017-04-02 PROCEDURE — 85025 COMPLETE CBC W/AUTO DIFF WBC: CPT | Performed by: INTERNAL MEDICINE

## 2017-04-02 PROCEDURE — 25010000002 FUROSEMIDE PER 20 MG: Performed by: HOSPITALIST

## 2017-04-02 PROCEDURE — 97110 THERAPEUTIC EXERCISES: CPT

## 2017-04-02 PROCEDURE — 97116 GAIT TRAINING THERAPY: CPT

## 2017-04-02 PROCEDURE — 97150 GROUP THERAPEUTIC PROCEDURES: CPT

## 2017-04-02 RX ADMIN — ASPIRIN 81 MG 81 MG: 81 TABLET ORAL at 08:53

## 2017-04-02 RX ADMIN — APIXABAN 5 MG: 5 TABLET, FILM COATED ORAL at 08:53

## 2017-04-02 RX ADMIN — DILTIAZEM HYDROCHLORIDE 60 MG: 60 TABLET, FILM COATED ORAL at 08:53

## 2017-04-02 RX ADMIN — BUDESONIDE AND FORMOTEROL FUMARATE DIHYDRATE 2 PUFF: 160; 4.5 AEROSOL RESPIRATORY (INHALATION) at 06:05

## 2017-04-02 RX ADMIN — PRAVASTATIN SODIUM 80 MG: 40 TABLET ORAL at 20:58

## 2017-04-02 RX ADMIN — ALBUTEROL SULFATE 2.5 MG: 2.5 SOLUTION RESPIRATORY (INHALATION) at 17:22

## 2017-04-02 RX ADMIN — APIXABAN 5 MG: 5 TABLET, FILM COATED ORAL at 20:57

## 2017-04-02 RX ADMIN — FUROSEMIDE 40 MG: 10 INJECTION, SOLUTION INTRAMUSCULAR; INTRAVENOUS at 17:19

## 2017-04-02 RX ADMIN — POTASSIUM CHLORIDE 10 MEQ: 750 TABLET, EXTENDED RELEASE ORAL at 08:53

## 2017-04-02 RX ADMIN — AMIODARONE HYDROCHLORIDE 200 MG: 200 TABLET ORAL at 08:53

## 2017-04-02 RX ADMIN — FUROSEMIDE 40 MG: 10 INJECTION, SOLUTION INTRAMUSCULAR; INTRAVENOUS at 08:53

## 2017-04-02 RX ADMIN — POTASSIUM CHLORIDE 10 MEQ: 750 TABLET, EXTENDED RELEASE ORAL at 17:19

## 2017-04-02 RX ADMIN — BUDESONIDE AND FORMOTEROL FUMARATE DIHYDRATE 2 PUFF: 160; 4.5 AEROSOL RESPIRATORY (INHALATION) at 20:47

## 2017-04-02 RX ADMIN — DILTIAZEM HYDROCHLORIDE 60 MG: 60 TABLET, FILM COATED ORAL at 17:19

## 2017-04-02 NOTE — PROGRESS NOTES
Acute Care - Physical Therapy Treatment Note  [facility]     Patient Name: Norma Harkins  : 1931  MRN: [id number]  Today's Date: 2017  Onset of Illness/Injury or Date of Surgery Date: 17  Date of Referral to PT: 17  Referring Physician: Dr. RANDY Maldonado    Admit Date: 3/30/2017    Visit Dx:    ICD-10-CM ICD-9-CM   1. Acute on chronic systolic congestive heart failure I50.23 428.23     428.0   2. Localized edema R60.0 782.3   3. Dyspnea due to congestive heart failure I50.9 786.09     428.0   4. Impaired mobility and ADLs Z74.09 799.89   5. Abnormality of gait and mobility R26.9 781.2   6. Muscle weakness (generalized) M62.81 728.87     Patient Active Problem List   Diagnosis   • Hypertension   • Chronic obstructive pulmonary disease with acute exacerbation   • Acute on chronic respiratory failure with hypoxia   • Low back pain   • Hyperlipidemia   • Atrial fibrillation with RVR   • Chronic respiratory failure with hypoxia   • Acute on chronic systolic congestive heart failure   • Paroxysmal atrial fibrillation   • Essential hypertension   • Chronic obstructive lung disease               Adult Rehabilitation Note       17 1415 17 1345       Rehab Assessment/Intervention    Discipline physical therapy assistant  -AM occupational therapy assistant  -LM     Document Type therapy note (daily note)  -AM therapy note (daily note)  -LM     Subjective Information agree to therapy;no complaints  -AM agree to therapy  -LM     Patient Effort, Rehab Treatment good  -AM      Symptoms Noted During/After Treatment fatigue;shortness of breath  -AM      Precautions/Limitations oxygen therapy device and L/min  -AM      Equipment Issued to Patient gait belt  -AM      Recorded by [AM] Emerson Campoverde PTA [LM] PATRICK Magdaleno/L     Vital Signs    Pre Systolic BP Rehab 102  -AM      Pre Treatment Diastolic BP 56  -AM      Post Systolic BP Rehab 108  -AM      Post Treatment Diastolic BP 66  -AM       Pretreatment Heart Rate (beats/min) 76  -AM      Posttreatment Heart Rate (beats/min) 82  -AM      Pre SpO2 (%) 97  -AM      O2 Delivery Pre Treatment supplemental O2   3L  -AM      Intra SpO2 (%) 84   3L and ambulating  -AM      O2 Delivery Intra Treatment supplemental O2  -AM      Post SpO2 (%) 97  -AM      O2 Delivery Post Treatment supplemental O2   3L  -AM      Pre Patient Position Sitting  -AM      Intra Patient Position Standing  -AM      Post Patient Position Sitting  -AM      Recorded by [AM] Emerson Campoverde PTA      Pain Assessment    Pain Assessment No/denies pain  -AM      Recorded by [AM] Emerson Campoverde PTA      Cognitive Assessment/Intervention    Current Cognitive/Communication Assessment functional  -AM      Orientation Status oriented x 4  -AM      Follows Commands/Answers Questions 100% of the time  -AM      Personal Safety Interventions gait belt;nonskid shoes/slippers when out of bed;supervised activity  -AM      Recorded by [AM] Emerson Campoverde PTA      ROM (Range of Motion)    General ROM no range of motion deficits identified  -AM      Recorded by [AM] Emerson Campoverde PTA      Bed Mobility, Assessment/Treatment    Bed Mobility, Roll Left, [place] not tested  -AM      Bed Mobility, Roll Right, [place] not tested  -AM      Bed Mobility, Scoot/Bridge, [place] not tested  -AM      Bed Mob, Supine to Sit, [place] not tested  -AM      Bed Mob, Sit to Supine, [place] not tested  -AM      Bed Mob, Sidelying to Sit, [place] not tested  -AM      Bed Mob, Sit to Sidelying, [place] not tested  -AM      Recorded by [AM] Emerson Campoverde PTA      Transfer Assessment/Treatment    Transfers, Bed-Chair [place] stand by assist  -AM      Transfers, Chair-Bed [place] stand by assist  -AM      Transfers, Bed-Chair-Bed, Assist Device rolling walker  -AM      Transfers, Sit-Stand [place] stand by assist  -AM      Transfers, Stand-Sit [place] stand by  assist  -AM      Transfers, Sit-Stand-Sit, Assist Device rolling walker  -AM      Toilet Transfer, [place] not tested  -AM      Walk-In Shower Transfer, [place] not tested  -AM      Bathtub Transfer, [place] not tested  -AM      Transfer, Maintain Weight Bearing Status able to maintain weight bearing status  -AM      Transfer, Safety Issues impulsivity  -AM      Transfer, Impairments strength decreased  -AM      Recorded by [AM] Emerson Campoverde PTA      Gait Assessment/Treatment    Gait, [place] Level stand by assist  -AM      Gait, Assistive Device rolling walker  -AM      Gait, Distance (Feet) 160  -AM      Gait, Gait Pattern Analysis swing-through gait  -AM      Gait, Gait Deviations jose j decreased  -AM      Gait, Maintain Weight Bearing Status able to maintain weight bearing status  -AM      Gait, Safety Issues step length decreased;supplemental O2  -AM      Gait, Impairments strength decreased  -AM      Recorded by [AM] Emerson Campoverde PTA      Stairs Assessment/Treatment    Stairs, [place] Level not tested  -AM      Recorded by [AM] Emerson Campoverde PTA      Positioning and Restraints    Pre-Treatment Position sitting in chair/recliner  -AM sitting in chair/recliner  -LM     Post Treatment Position chair  -AM wheelchair  -LM     In Chair reclined;call light within reach;encouraged to call for assist;legs elevated  -AM      Recorded by [AM] Emerson Campoverde PTA [LM] PATRICK Magdaleno/L       User Key  (r) = Recorded By, (t) = Taken By, (c) = Cosigned By    Initials Name Effective Dates    AM Emerson Campoverde PTA 10/17/16 -     LM BRANDYN Magdaleno 10/17/16 -                 IP PT Goals       04/02/17 1415 04/01/17 1320       Transfer Training PT LTG    Transfer Training PT LTG, Date Established  04/01/17  -LM     Transfer Training PT LTG, Time to Achieve  by discharge  -LM     Transfer Training PT LTG, Activity Type  bed to chair /chair to bed  -LM     Transfer  Training PT LTG, [place] Level  conditional independence  -LM     Transfer Training PT LTG, Assist Device  --   AAD  -LM     Transfer Training PT  LTG, Date Goal Reviewed 04/02/17  -AM      Transfer Training PT LTG, Outcome goal not met  -AM goal ongoing  -LM     Gait Training PT STG    Gait Training Goal PT STG, Date Established  04/01/17  -LM     Gait Training Goal PT STG, Time to Achieve  by discharge  -LM     Gait Training Goal PT STG, [place] Level  supervision required  -LM     Gait Training Goal PT STG, Assist Device  --   AAD  -LM     Gait Training Goal PT STG, Distance to Achieve  75 feet while completing PLB  -LM     Gait Training Goal PT STG, Date Goal Reviewed 04/02/17  -AM      Gait Training Goal PT STG, Outcome goal met  -AM goal ongoing  -LM     Gait Training PT LTG    Gait Training Goal PT LTG, Date Established  04/01/17  -LM     Gait Training Goal PT LTG, Time to Achieve  by discharge  -LM     Gait Training Goal PT LTG, [place] Level  supervision required  -LM     Gait Training Goal PT LTG, Assist Device  --   AAD  -LM     Gait Training Goal PT LTG, Distance to Achieve  150 feet with O2 sat at 88% or higher  -LM     Gait Training Goal PT LTG, Date Goal Reviewed 04/02/17  -AM      Gait Training Goal PT LTG, Outcome goal not met  -AM goal ongoing  -LM     Stair Training PT LTG    Stair Training Goal PT LTG, Date Established  04/01/17  -LM     Stair Training Goal PT LTG, Time to Achieve  by discharge  -LM     Stair Training Goal PT LTG, Number of Steps  1  -LM     Stair Training Goal PT LTG, [place] Level  contact guard assist  -LM     Stair Training Goal PT LTG, Assist Device  --   AAD  -LM     Stair Training Goal PT LTG, Date Goal Reviewed 04/02/17  -AM      Stair Training Goal PT LTG, Outcome goal not met  -AM goal ongoing  -LM       User Key  (r) = Recorded By, (t) = Taken By, (c) = Cosigned By    Initials Name Provider Type    JOANN Joseph, PT Physical Therapist    CADEN Norman  TORSTEN Campoverde PTA Physical Therapy Assistant          Physical Therapy Education     Title: PT OT SLP Therapies (Active)     Topic: Physical Therapy (Active)     Point: Mobility training (Active)    Learning Progress Summary    Learner Readiness Method Response Comment Documented by Status   Patient Acceptance E NR Pt educated on safety with mobility and PLB with all activity.  Pt requires constant cueing and demonstration for PLB technique.  04/01/17 1530 Active               Point: Precautions (Active)    Learning Progress Summary    Learner Readiness Method Response Comment Documented by Status   Patient Acceptance E NR Pt educated on safety with mobility and PLB with all activity.  Pt requires constant cueing and demonstration for PLB technique.  04/01/17 1530 Active                      User Key     Initials Effective Dates Name Provider Type Discipline     06/15/16 -  Effie Joseph PT Physical Therapist PT                    PT Recommendation and Plan  Anticipated Discharge Disposition: skilled nursing facility  Planned Therapy Interventions: balance training, bed mobility training, gait training, home exercise program, neuromuscular re-education, patient/family education, postural re-education, strengthening, stretching, transfer training  PT Frequency: other (see comments) (5-14 times/wk)  Plan of Care Review  Plan Of Care Reviewed With: patient  Progress: progress toward functional goals as expected  Outcome Summary/Follow up Plan: Pt met 1 PT goal this tx. Pt able to complete sit-stand-sit SBA. Pt able to amb 160 ft w/RW SBA w/o2 at 3L - o2 dropped to 84%. At end of tx - pt in recliner w/LE elevated, o2 back to 94% and call light within reach. Pt would benefit from SNF placement once discharged from this facility.           Outcome Measures       04/02/17 1415 04/01/17 1320 04/01/17 1009    How much help from another person do you currently need...    Turning from your back to your side while in flat bed  without using bedrails? 3  -AM 3  -LM     Moving from lying on back to sitting on the side of a flat bed without bedrails? 3  -AM 3  -LM     Moving to and from a bed to a chair (including a wheelchair)? 3  -AM 3  -LM     Standing up from a chair using your arms (e.g., wheelchair, bedside chair)? 3  -AM 3  -LM     Climbing 3-5 steps with a railing? 1  -AM 3  -LM     To walk in hospital room? 3  -AM 3  -LM     AM-PAC 6 Clicks Score 16  -AM 18  -LM     How much help from another is currently needed...    Putting on and taking off regular lower body clothing?   2  -RW    Bathing (including washing, rinsing, and drying)   3  -RW    Toileting (which includes using toilet bed pan or urinal)   3  -RW    Putting on and taking off regular upper body clothing   3  -RW    Taking care of personal grooming (such as brushing teeth)   3  -RW    Eating meals   3  -RW    Score   17  -RW    Functional Assessment    Outcome Measure Options AM-PAC 6 Clicks Basic Mobility (PT)  -AM AM-PAC 6 Clicks Basic Mobility (PT)  -LM AM-PAC 6 Clicks Daily Activity (OT)  -RW      User Key  (r) = Recorded By, (t) = Taken By, (c) = Cosigned By    Initials Name Provider Type    LM Effie Joseph, PT Physical Therapist    MONSERRAT Cook, OTR/L Occupational Therapist    AM Emerson Campoverde PTA Physical Therapy Assistant           Time Calculation:         PT Charges       04/02/17 1415          Time Calculation    Start Time 1415  -AM      Stop Time 1440  -AM      Time Calculation (min) 25 min  -AM      PT Received On 04/02/17  -AM      PT - Next Appointment 04/03/17  -AM      Time Calculation- PT    Total Timed Code Minutes- PT 25 minute(s)  -AM        User Key  (r) = Recorded By, (t) = Taken By, (c) = Cosigned By    Initials Name Provider Type    CADEN Campoverde PTA Physical Therapy Assistant          Therapy Charges for Today     Code Description Service Date Service Provider Modifiers Qty    42294547367 HC GAIT TRAINING EA 15 MIN 4/2/2017  Emerson Campoverde, PTA GP 1    [id number]  PT THER PROC GROUP 4/2/2017 Emerson Campoverde, PTA GP 1          PT G-Codes  Outcome Measure Options: AM-PAC 6 Clicks Basic Mobility (PT)  Score: 18  Functional Limitation: Mobility: Walking and moving around  Mobility: Walking and Moving Around Current Status (): At least 40 percent but less than 60 percent impaired, limited or restricted  Mobility: Walking and Moving Around Goal Status (): At least 20 percent but less than 40 percent impaired, limited or restricted    Emerson Campoverde, PTA  4/2/2017

## 2017-04-02 NOTE — PLAN OF CARE
Problem: Patient Care Overview (Adult)  Goal: Plan of Care Review  Outcome: Ongoing (interventions implemented as appropriate)    04/02/17 1447   Coping/Psychosocial Response Interventions   Plan Of Care Reviewed With patient   Patient Care Overview   Progress improving   Outcome Evaluation   Outcome Summary/Follow up Plan pt working well with OT and perf well with funct transfer         Problem: Inpatient Occupational Therapy  Goal: Transfer Training Goal 1 STG- OT  Outcome: Ongoing (interventions implemented as appropriate)    04/02/17 1447   Transfer Training OT STG   Transfer Training OT STG, Date Goal Reviewed 04/02/17   Transfer Training OT STG, Outcome goal ongoing       Goal: Dymanic Standing Balance Goal STG- OT  Outcome: Ongoing (interventions implemented as appropriate)    04/02/17 1447   Dynamic Standing Balance OT STG   Dynamic Standing Balance OT STG, Date Goal Reviewed 04/02/17   Dynamic Standing Balance OT STG, Outcome goal ongoing       Goal: ADL Goal LTG- OT  Outcome: Ongoing (interventions implemented as appropriate)    04/02/17 1447   ADL OT LTG   ADL OT LTG, Date Goal Reviewed 04/02/17   ADL OT LTG, Outcome goal ongoing       Goal: Functional Mobility Goal LTG- OT  Outcome: Ongoing (interventions implemented as appropriate)    04/02/17 1447   Functional Mobility OT LTG   Functional Mobility Goal OT LTG, Date Goal Reviewed 04/02/17   Functional Mobility Goal OT LTG, Outcome goal ongoing

## 2017-04-02 NOTE — PROGRESS NOTES
Inpatient Rehabilitation - Occupational Therapy Treatment Note  [facility]     Patient Name: Norma Harkins  : 1931  MRN: [id number]  Today's Date: 2017  Onset of Illness/Injury or Date of Surgery Date: 17  Date of Referral to OT: 17  Referring Physician: Dr. RANDY Maldonado      Admit Date: 3/30/2017    Visit Dx:     ICD-10-CM ICD-9-CM   1. Acute on chronic systolic congestive heart failure I50.23 428.23     428.0   2. Localized edema R60.0 782.3   3. Dyspnea due to congestive heart failure I50.9 786.09     428.0   4. Impaired mobility and ADLs Z74.09 799.89   5. Abnormality of gait and mobility R26.9 781.2   6. Muscle weakness (generalized) M62.81 728.87     Patient Active Problem List   Diagnosis   • Hypertension   • Chronic obstructive pulmonary disease with acute exacerbation   • Acute on chronic respiratory failure with hypoxia   • Low back pain   • Hyperlipidemia   • Atrial fibrillation with RVR   • Chronic respiratory failure with hypoxia   • Acute on chronic systolic congestive heart failure   • Paroxysmal atrial fibrillation   • Essential hypertension   • Chronic obstructive lung disease             Adult Rehabilitation Note       17 1415 17 1400 17 1345    Rehab Assessment/Intervention    Discipline physical therapy assistant  -AM  occupational therapy assistant  -LM    Document Type therapy note (daily note)  -AM  therapy note (daily note)  -LM    Subjective Information agree to therapy;no complaints  -AM  agree to therapy  -LM    Patient Effort, Rehab Treatment good  -AM      Symptoms Noted During/After Treatment fatigue;shortness of breath  -AM      Precautions/Limitations oxygen therapy device and L/min  -AM      Equipment Issued to Patient gait belt  -AM      Recorded by [AM] Emerson Campoverde PTA  [LM] PATRICK Magdaleno/L    Vital Signs    Pre Systolic BP Rehab 102  -AM      Pre Treatment Diastolic BP 56  -AM      Post Systolic BP Rehab 108  -AM      Post  Treatment Diastolic BP 66  -AM      Pretreatment Heart Rate (beats/min) 76  -AM      Posttreatment Heart Rate (beats/min) 82  -AM      Pre SpO2 (%) 97  -AM      O2 Delivery Pre Treatment supplemental O2   3L  -AM      Intra SpO2 (%) 84   3L and ambulating  -AM      O2 Delivery Intra Treatment supplemental O2  -AM      Post SpO2 (%) 97  -AM      O2 Delivery Post Treatment supplemental O2   3L  -AM      Pre Patient Position Sitting  -AM      Intra Patient Position Standing  -AM      Post Patient Position Sitting  -AM      Recorded by [AM] Emerson Campoverde PTA      Pain Assessment    Pain Assessment No/denies pain  -AM  No/denies pain  -LM    Recorded by [AM] Emerson Campoverde PTA  [LM] Neha Mccray, NGUYEN/L    Cognitive Assessment/Intervention    Current Cognitive/Communication Assessment functional  -AM      Orientation Status oriented x 4  -AM      Follows Commands/Answers Questions 100% of the time  -AM      Personal Safety Interventions gait belt;nonskid shoes/slippers when out of bed;supervised activity  -AM      Recorded by [AM] Emerson Campoverde PTA      ROM (Range of Motion)    General ROM no range of motion deficits identified  -AM      Recorded by [AM] Emerson Campoverde PTA      Bed Mobility, Assessment/Treatment    Bed Mobility, Roll Left, [place] not tested  -AM      Bed Mobility, Roll Right, [place] not tested  -AM      Bed Mobility, Scoot/Bridge, [place] not tested  -AM      Bed Mob, Supine to Sit, [place] not tested  -AM      Bed Mob, Sit to Supine, [place] not tested  -AM      Bed Mob, Sidelying to Sit, [place] not tested  -AM      Bed Mob, Sit to Sidelying, [place] not tested  -AM      Recorded by [AM] Emerson Campoverde PTA      Transfer Assessment/Treatment    Transfers, Bed-Chair [place] stand by assist  -AM      Transfers, Chair-Bed [place] stand by assist  -AM      Transfers, Bed-Chair-Bed, Assist Device rolling walker  -AM      Transfers, Sit-Stand  [place] stand by assist  -AM conditional independence   sit to stand xep  -LM conditional independence   sit stand x 10 reps   -LM    Transfers, Stand-Sit [place] stand by assist  -AM conditional independence  -LM conditional independence  -LM    Transfers, Sit-Stand-Sit, Assist Device rolling walker  -AM      Toilet Transfer, [place] not tested  -AM  conditional independence  -LM    Toilet Transfer, Assistive Device   rolling walker  -LM    Walk-In Shower Transfer, [place] not tested  -AM      Bathtub Transfer, [place] not tested  -AM      Transfer, Maintain Weight Bearing Status able to maintain weight bearing status  -AM      Transfer, Safety Issues impulsivity  -AM      Transfer, Impairments strength decreased  -AM      Recorded by [AM] Emerson Campoverde PTA [LM] PATRICK Magdaleno/L [LM] PATRICK Magdaleno/L    Gait Assessment/Treatment    Gait, [place] Level stand by assist  -AM      Gait, Assistive Device rolling walker  -AM      Gait, Distance (Feet) 160  -AM      Gait, Gait Pattern Analysis swing-through gait  -AM      Gait, Gait Deviations jose j decreased  -AM      Gait, Maintain Weight Bearing Status able to maintain weight bearing status  -AM      Gait, Safety Issues step length decreased;supplemental O2  -AM      Gait, Impairments strength decreased  -AM      Recorded by [AM] Emerson Campoverde PTA      Stairs Assessment/Treatment    Stairs, [place] Level not tested  -AM      Recorded by [AM] Emerson Campoverde PTA      Lower Body Dressing Assessment/Training    LB Dressing Assess/Train, [place]   supervision required;conditional independence   socks  -LM    Recorded by   [LM] PATRICK Magdaleno/L    Toileting Assessment/Training    Toileting Assess/Train, [place] Level   conditional independence  -LM    Recorded by   [LM] PATRICK Magdaleno/L    Positioning and Restraints    Pre-Treatment Position sitting in chair/recliner  -AM  sitting in  chair/recliner  -LM    Post Treatment Position chair  -AM  wheelchair  -LM    In Chair reclined;call light within reach;encouraged to call for assist;legs elevated  -AM      Recorded by [AM] Emerson Campoverde PTA  [LM] LALITHA MagdalenoA/L      User Key  (r) = Recorded By, (t) = Taken By, (c) = Cosigned By    Initials Name Effective Dates    AM Emerson Campoverde PTA 10/17/16 -     LM BRANDYN Magdaleno 10/17/16 -                 OT Goals       04/02/17 1447 04/01/17 1415       Transfer Training OT STG    Transfer Training OT STG, Date Established  04/01/17  -RW     Transfer Training OT STG, Time to Achieve  4 days  -RW     Transfer Training OT STG, Activity Type  toilet  -RW     Transfer Training OT STG, [place] Level  supervision required  -RW     Transfer Training OT STG, Date Goal Reviewed 04/02/17  -LM      Transfer Training OT STG, Outcome goal ongoing  -LM      Dynamic Standing Balance OT STG    Dynamic Standing Balance OT STG, Date Established  04/01/17  -RW     Dynamic Standing Balance OT STG, Time to Achieve  4 days  -RW     Dynamic Standing Balance OT STG, [place] Level  supervision required  -RW     Dynamic Standing Balance OT STG, Assist Device  assistive Device  -RW     Dynamic Standing Balance OT STG, Date Goal Reviewed 04/02/17  -LM      Dynamic Standing Balance OT STG, Outcome goal ongoing  -LM      ADL OT LTG    ADL OT LTG, Date Established  04/01/17  -RW     ADL OT LTG, Time to Achieve  by discharge  -RW     ADL OT LTG, Activity Type  ADL skills  -RW     ADL OT LTG, [place] Level  standby assist  -RW     ADL OT LTG, Date Goal Reviewed 04/02/17  -LM      ADL OT LTG, Outcome goal ongoing  -LM      Functional Mobility OT LTG    Functional Mobility Goal OT LTG, Date Established  04/01/17  -RW     Functional Mobility Goal OT LTG, Time to Achieve  by discharge  -RW     Functional Mobility Goal OT LTG, [place] Level  supervision  -RW     Functional Mobility Goal OT LTG,  Assist Device  rolling walker  -RW     Functional Mobility Goal OT LTG, Distance to Achieve  to the bathroom  -RW     Functional Mobility Goal OT LTG, Date Goal Reviewed 04/02/17  -LM      Functional Mobility Goal OT LTG, Outcome goal ongoing  -LM        User Key  (r) = Recorded By, (t) = Taken By, (c) = Cosigned By    Initials Name Provider Type     Dayanna Cook, OTR/L Occupational Therapist    PATRICK Vazquez/L Occupational Therapy Assistant          Occupational Therapy Education     Title: PT OT SLP Therapies (Active)     Topic: Occupational Therapy (Active)     Point: Precautions (Active)    Description: Instruct learner(s) on prescribed precautions during self-care and functional transfers.    Learning Progress Summary    Learner Readiness Method Response Comment Documented by Status   Patient Acceptance E NR fall precautions  04/01/17 1414 Active                      User Key     Initials Effective Dates Name Provider Type Discipline     10/17/16 -  Dayanna Cook, OTR/L Occupational Therapist OT                  OT Recommendation and Plan  Anticipated Discharge Disposition: home with 24/7 care, home with home health, skilled nursing facility  Planned Therapy Interventions: activity intolerance, adaptive equipment training, ADL retraining, balance training, bed mobility training, energy conservation, home exercise program, strengthening, transfer training  Therapy Frequency: other (see comments) (3-14 times/wk)  Plan of Care Review  Plan Of Care Reviewed With: patient  Progress: improving  Outcome Summary/Follow up Plan: pt working well with OT and perf well with funct transfer        Outcome Measures       04/02/17 1415 04/01/17 1320 04/01/17 1009    How much help from another person do you currently need...    Turning from your back to your side while in flat bed without using bedrails? 3  -AM 3  -LM     Moving from lying on back to sitting on the side of a flat bed without  bedrails? 3  -AM 3  -LM     Moving to and from a bed to a chair (including a wheelchair)? 3  -AM 3  -LM     Standing up from a chair using your arms (e.g., wheelchair, bedside chair)? 3  -AM 3  -LM     Climbing 3-5 steps with a railing? 1  -AM 3  -LM     To walk in hospital room? 3  -AM 3  -LM     AM-PAC 6 Clicks Score 16  -AM 18  -LM     How much help from another is currently needed...    Putting on and taking off regular lower body clothing?   2  -RW    Bathing (including washing, rinsing, and drying)   3  -RW    Toileting (which includes using toilet bed pan or urinal)   3  -RW    Putting on and taking off regular upper body clothing   3  -RW    Taking care of personal grooming (such as brushing teeth)   3  -RW    Eating meals   3  -RW    Score   17  -RW    Functional Assessment    Outcome Measure Options AM-PAC 6 Clicks Basic Mobility (PT)  -AM AM-PAC 6 Clicks Basic Mobility (PT)  -LM AM-PAC 6 Clicks Daily Activity (OT)  -RW      User Key  (r) = Recorded By, (t) = Taken By, (c) = Cosigned By    Initials Name Provider Type    LM Effie Joseph, PT Physical Therapist    RW Dayanna Cook, OTR/L Occupational Therapist    AM Emerson Campoverde PTA Physical Therapy Assistant           Time Calculation:         Time Calculation- OT       04/02/17 1450          Time Calculation- OT    OT Start Time 1345  -LM      OT Stop Time 1415  -LM      OT Time Calculation (min) 30 min  -LM      Total Timed Code Minutes- OT 30 minute(s)  -LM        User Key  (r) = Recorded By, (t) = Taken By, (c) = Cosigned By    Initials Name Provider Type    LM PATRICK Magdaleno/L Occupational Therapy Assistant           Therapy Charges for Today     Code Description Service Date Service Provider Modifiers Qty    49672001066 HC OT THERAPEUTIC ACT EA 15 MIN 4/2/2017 PATRICK Magdaleno/L GO 1    50663772869 HC OT SELF CARE/MGMT/TRAIN EA 15 MIN 4/2/2017 PATRICK Magdaleno/L GO 1          OT G-codes  OT Professional Judgement Used?:  Yes  OT Functional Scales Options: AM-PAC 6 Clicks Daily Activity (OT)  Score: 17  Functional Limitation: Self care  Self Care Current Status (): At least 40 percent but less than 60 percent impaired, limited or restricted  Self Care Goal Status (): At least 20 percent but less than 40 percent impaired, limited or restricted    PATRICK Magdaleno/ROBINSON  4/2/2017

## 2017-04-02 NOTE — PLAN OF CARE
Problem: Patient Care Overview (Adult)  Goal: Plan of Care Review  Outcome: Ongoing (interventions implemented as appropriate)    04/02/17 1415   Coping/Psychosocial Response Interventions   Plan Of Care Reviewed With patient   Patient Care Overview   Progress progress toward functional goals as expected   Outcome Evaluation   Outcome Summary/Follow up Plan Pt met 1 PT goal this tx. Pt able to complete sit-stand-sit SBA. Pt able to amb 160 ft w/RW SBA w/o2 at 3L - o2 dropped to 84%. At end of tx - pt in recliner w/LE elevated, o2 back to 94% and call light within reach. Pt would benefit from SNF placement once discharged from this facility.        Goal: Discharge Needs Assessment  Outcome: Ongoing (interventions implemented as appropriate)    04/02/17 1415   Discharge Needs Assessment   Concerns To Be Addressed discharge planning concerns   Readmission Within The Last 30 Days current reason for admission unrelated to previous admission   Equipment Needed After Discharge none   Discharge Facility/Level Of Care Needs nursing facility, skilled   Current Discharge Risk physical impairment   Current Health   Anticipated Changes Related to Illness inability to care for self   Self-Care   Equipment Currently Used at Home oxygen;walker, standard;bath bench;other (see comments)  (Rollator)   Living Environment   Transportation Available family or friend will provide         Problem: Inpatient Physical Therapy  Goal: Transfer Training Goal 1 LTG- PT  Outcome: Ongoing (interventions implemented as appropriate)    04/01/17 1320 04/02/17 1415   Transfer Training PT LTG   Transfer Training PT LTG, Date Established 04/01/17 --    Transfer Training PT LTG, Time to Achieve by discharge --    Transfer Training PT LTG, Activity Type bed to chair /chair to bed --    Transfer Training PT LTG, [place] Level conditional independence --    Transfer Training PT LTG, Assist Device (AAD) --    Transfer Training PT LTG, Date Goal Reviewed  --  04/02/17   Transfer Training PT LTG, Outcome --  goal not met       Goal: Gait Training Goal STG- PT  Outcome: Outcome(s) achieved Date Met:  04/02/17 04/01/17 1320 04/02/17 1415   Gait Training PT STG   Gait Training Goal PT STG, Date Established 04/01/17 --    Gait Training Goal PT STG, Time to Achieve by discharge --    Gait Training Goal PT STG, [place] Level supervision required --    Gait Training Goal PT STG, Assist Device (AAD) --    Gait Training Goal PT STG, Distance to Achieve 75 feet while completing PLB --    Gait Training Goal PT STG, Date Goal Reviewed --  04/02/17   Gait Training Goal PT STG, Outcome --  goal met       Goal: Gait Training Goal LTG- PT  Outcome: Ongoing (interventions implemented as appropriate)    04/01/17 1320 04/02/17 1415   Gait Training PT LTG   Gait Training Goal PT LTG, Date Established 04/01/17 --    Gait Training Goal PT LTG, Time to Achieve by discharge --    Gait Training Goal PT LTG, [place] Level supervision required --    Gait Training Goal PT LTG, Assist Device (AAD) --    Gait Training Goal PT LTG, Distance to Achieve 150 feet with O2 sat at 88% or higher --    Gait Training Goal PT LTG, Date Goal Reviewed --  04/02/17   Gait Training Goal PT LTG, Outcome --  goal not met       Goal: Stair Training Goal LTG- PT  Outcome: Ongoing (interventions implemented as appropriate)    04/01/17 1320 04/02/17 1415   Stair Training PT LTG   Stair Training Goal PT LTG, Date Established 04/01/17 --    Stair Training Goal PT LTG, Time to Achieve by discharge --    Stair Training Goal PT LTG, Number of Steps 1 --    Stair Training Goal PT LTG, [place] Level contact guard assist --    Stair Training Goal PT LTG, Assist Device (AAD) --    Stair Training Goal PT LTG, Date Goal Reviewed --  04/02/17   Stair Training Goal PT LTG, Outcome --  goal not met

## 2017-04-02 NOTE — PLAN OF CARE
Problem: Patient Care Overview (Adult)  Goal: Plan of Care Review  Outcome: Ongoing (interventions implemented as appropriate)    Problem: Fluid Volume Deficit (Adult)  Goal: Comfort/Well Being  Outcome: Ongoing (interventions implemented as appropriate)

## 2017-04-02 NOTE — PROGRESS NOTES
[facility] Medicine Services  INPATIENT PROGRESS NOTE     LOS: 3 days   Patient Care Team:  Krista Matos MD as PCP - General  Krista Matos MD as MSSP ACO Attributed - PLEASE DO NOT REMOVE    Chief Complaint:  Chronic respiratory failure with hypoxia      Subjective     Interval History:     Patient Complaints: Patient seen and examined.  Patient's complaints of weakness.  Patient states she is significantly improved breathing.    History taken from: Patient.    Review of Systems:    Review of Systems   Respiratory: Positive for shortness of breath.    Cardiovascular: Positive for leg swelling. Negative for chest pain.   Gastrointestinal: Negative for abdominal distention, abdominal pain, constipation and diarrhea.   Genitourinary: Negative for dysuria and urgency.   Musculoskeletal: Negative for neck pain.   Neurological: Positive for weakness.   Psychiatric/Behavioral: Negative for agitation and behavioral problems.   All other systems reviewed and are negative.        Objective     Vital Signs  Temp:  [96.9 °F (36.1 °C)-97.7 °F (36.5 °C)] 96.9 °F (36.1 °C)  Heart Rate:  [65-83] 77  Resp:  [20] 20  BP: (105-129)/(58-64) 105/58    Physical Exam:   Physical Exam   Constitutional: She is oriented to person, place, and time. She appears well-developed and well-nourished.   HENT:   Head: Normocephalic and atraumatic.   Eyes: EOM are normal.   Neck: Neck supple.   Cardiovascular: Normal rate.  An irregularly irregular rhythm present.   Pulmonary/Chest: Effort normal. No respiratory distress. She has rales.   Abdominal: Soft. Bowel sounds are normal.   Musculoskeletal: Normal range of motion. She exhibits no edema or tenderness.   Neurological: She is alert and oriented to person, place, and time.   Skin: Skin is warm and dry.   Psychiatric: She has a normal mood and affect. Her behavior is normal.   Vitals reviewed.         Results Review:       Results from last 7  days  Lab Units 04/02/17  0545 04/01/17  0529 03/31/17  1358 03/30/17 1936 03/29/17  1447 03/28/17  0526 03/27/17  0541   SODIUM mmol/L 137 137 138 135* 133* 135* 138   POTASSIUM mmol/L 3.5 3.6 4.0 3.9 3.3* 4.5 4.0   CHLORIDE mmol/L 91* 92* 88* 91* 89* 97 96   TOTAL CO2 mmol/L 35.0* 35.0* 38.0* 33.0* 33.0* 30.0 31.0   BUN mg/dL 16 15 16 15 20 19 18   CREATININE mg/dL 0.72 0.75 0.70 0.78 0.81 0.88 0.82   GLUCOSE mg/dL 121* 134* 126* 206* 130* 144* 146*   CALCIUM mg/dL 9.0 9.0 8.6 8.6 8.5 8.7 8.5   BILIRUBIN mg/dL 0.8 0.9 0.7 0.6 0.6  --   --    ALK PHOS U/L 100 107 100 102 113  --   --    ALT (SGPT) U/L 42 41 46 48 53*  --   --    AST (SGOT) U/L 29 26 24 31 47*  --   --          Results from last 7 days  Lab Units 04/02/17  0545 04/01/17  0529 03/31/17  1358 03/26/17  1303   MAGNESIUM mg/dL 2.2 2.1 2.0 1.7         Results from last 7 days  Lab Units 04/02/17  0545 04/01/17  0529 03/31/17 1358 03/30/17 1936 03/29/17  1447 03/28/17  0527 03/27/17  0541   WBC 10*3/mm3 9.09 10.41* 10.39* 10.24* 9.60 10.87* 11.50*   HEMOGLOBIN g/dL 10.3* 10.2* 10.3* 9.5* 9.1* 9.8* 9.6*   HEMATOCRIT % 31.2* 32.0* 31.6* 28.7* 27.8* 30.9* 30.1*   PLATELETS 10*3/mm3 283 289 285 264 249 239 228       Lab Results   Component Value Date    CKTOTAL 30 03/24/2017    CKMB 1.05 03/24/2017    TROPONINI 0.029 03/31/2017       CO2   Date Value Ref Range Status   04/02/2017 35.0 (H) 22.0 - 31.0 mmol/L Final              Imaging Results (last 7 days)     Procedure Component Value Units Date/Time    XR Chest 1 View [[id number]] Collected:  03/30/17 2019     Updated:  03/30/17 2023    Narrative:       Patient Name:  ANN MARIE DAVALOS  Patient ID:  [id number]   Ordering:  EFRA HILL  Attending:  EFRA HILL  Referring:  EFRA HILL  ------------------------------------------------  PROCEDURE: XR CHEST 1 VIEW    INDICATION FOR PROCEDURE:  86 years -old patient presents for  evaluation of generalized chest pain.    COMPARISON:   March 24, 2017.    FINDINGS: XR CHEST 1 view  reveals the lungs are expanded.  Cardiac monitoring leads are present. There is interstitial  thickening visible throughout both lungs. Cardiac silhouette is  moderately enlarged. Vascular calcifications are visible within  the thoracic aorta.      There is no radiographic evidence for airspace consolidation,  pleural effusion or pneumothorax. Mediastinal silhouette is  within normal limits.     There are degenerative changes of both shoulders.       Impression:       Moderate cardiomegaly and mild pulmonary congestion.    Electronically signed by:  Saadia Casey MD  3/30/2017 8:22 PM CDT  Workstation: [email or web address]                           Urine Culture   Date Value Ref Range Status   03/29/2017 No growth at 24 hours  Final           Medication Review:   Current Facility-Administered Medications   Medication Dose Route Frequency Provider Last Rate Last Dose   • acetaminophen (TYLENOL) tablet 500 mg  500 mg Oral Once PRN Merlin Avila MD       • albuterol (PROVENTIL) nebulizer solution 0.083% 2.5 mg/3mL  2.5 mg Nebulization Q6H PRN Alfredo Maldonado MD       • amiodarone (PACERONE) tablet 200 mg  200 mg Oral Daily Merlin Avila MD   200 mg at 04/02/17 0853   • apixaban (ELIQUIS) tablet 5 mg  5 mg Oral Q12H Merlin Avila MD   5 mg at 04/02/17 0853   • aspirin chewable tablet 81 mg  81 mg Oral Daily Merlin Avila MD   81 mg at 04/02/17 0853   • budesonide-formoterol (SYMBICORT) 160-4.5 MCG/ACT inhaler 2 puff  2 puff Inhalation BID - RT Mrelin Avila MD   2 puff at 04/02/17 0605   • diltiaZEM (CARDIZEM) tablet 60 mg  60 mg Oral BID Merlin Avila MD   60 mg at 04/02/17 0853   • docusate sodium (COLACE) capsule 300 mg  300 mg Oral Daily Merlin Avila MD   300 mg at 04/01/17 0847   • furosemide (LASIX) injection 40 mg  40 mg Intravenous BID Merlin Avila MD   40 mg at 04/02/17 0853   • HYDROcodone-acetaminophen (NORCO) 5-325 MG per tablet 1 tablet   1 tablet Oral Q4H PRN Merlin Avila MD       • ipratropium-albuterol (DUO-NEB) nebulizer solution 3 mL  3 mL Nebulization Q4H PRN Alfredo Maldonado MD   3 mL at 04/01/17 1850   • potassium chloride (K-DUR,KLOR-CON) CR tablet 10 mEq  10 mEq Oral BID Merlin Avila MD   10 mEq at 04/02/17 0853   • pravastatin (PRAVACHOL) tablet 80 mg  80 mg Oral Nightly Merlin Avila MD   80 mg at 04/01/17 2025   • sodium chloride 0.9 % flush 1-10 mL  1-10 mL Intravenous PRN Merlin Avila MD       • sodium chloride 0.9 % flush 10 mL  10 mL Intravenous PRN Victor M Carranza MD             Assessment/Plan     Principal Problem:    Chronic respiratory failure with hypoxia  Active Problems:    Hyperlipidemia    Atrial fibrillation with RVR    Acute on chronic systolic congestive heart failure    Paroxysmal atrial fibrillation    Essential hypertension    Chronic obstructive lung disease          -Continue with diuretics.  -PT OT to continue treatment.  -Discharge planning in progress.  -Placement in progress.  -DVT and GI prophylaxis in place.  -We'll continue monitoring patient hospital setting and treat patient as course dictates.      Alfredo Maldonado MD  04/02/17      EMR Dragon/Transcription disclaimer:   Much of this encounter note is an electronic transcription/translation of spoken language to printed text. The electronic translation of spoken language may permit erroneous, or at times, nonsensical words or phrases to be inadvertently transcribed; Although I have reviewed the note for such errors, some may still exist.

## 2017-04-03 VITALS
OXYGEN SATURATION: 95 % | HEART RATE: 87 BPM | RESPIRATION RATE: 20 BRPM | WEIGHT: 181.1 LBS | HEIGHT: 61 IN | TEMPERATURE: 97.4 F | BODY MASS INDEX: 34.19 KG/M2 | DIASTOLIC BLOOD PRESSURE: 59 MMHG | SYSTOLIC BLOOD PRESSURE: 115 MMHG

## 2017-04-03 LAB
ALBUMIN SERPL-MCNC: 3.2 G/DL (ref 3.4–4.8)
ALBUMIN/GLOB SERPL: 1 G/DL (ref 1.1–1.8)
ALP SERPL-CCNC: 91 U/L (ref 38–126)
ALT SERPL W P-5'-P-CCNC: 36 U/L (ref 9–52)
ANION GAP SERPL CALCULATED.3IONS-SCNC: 9 MMOL/L (ref 5–15)
AST SERPL-CCNC: 21 U/L (ref 14–36)
BASOPHILS # BLD AUTO: 0.08 10*3/MM3 (ref 0–0.2)
BASOPHILS NFR BLD AUTO: 0.9 % (ref 0–2)
BILIRUB SERPL-MCNC: 0.7 MG/DL (ref 0.2–1.3)
BUN BLD-MCNC: 20 MG/DL (ref 7–21)
BUN/CREAT SERPL: 24.1 (ref 7–25)
CALCIUM SPEC-SCNC: 9 MG/DL (ref 8.4–10.2)
CHLORIDE SERPL-SCNC: 91 MMOL/L (ref 95–110)
CO2 SERPL-SCNC: 35 MMOL/L (ref 22–31)
CREAT BLD-MCNC: 0.83 MG/DL (ref 0.5–1)
DEPRECATED RDW RBC AUTO: 50 FL (ref 36.4–46.3)
EOSINOPHIL # BLD AUTO: 1.32 10*3/MM3 (ref 0–0.7)
EOSINOPHIL NFR BLD AUTO: 14.6 % (ref 0–7)
ERYTHROCYTE [DISTWIDTH] IN BLOOD BY AUTOMATED COUNT: 14.5 % (ref 11.5–14.5)
GFR SERPL CREATININE-BSD FRML MDRD: 65 ML/MIN/1.73 (ref 39–90)
GLOBULIN UR ELPH-MCNC: 3.3 GM/DL (ref 2.3–3.5)
GLUCOSE BLD-MCNC: 132 MG/DL (ref 60–100)
HCT VFR BLD AUTO: 30.8 % (ref 35–45)
HGB BLD-MCNC: 10 G/DL (ref 12–15.5)
IMM GRANULOCYTES # BLD: 0.15 10*3/MM3 (ref 0–0.02)
IMM GRANULOCYTES NFR BLD: 1.7 % (ref 0–0.5)
LYMPHOCYTES # BLD AUTO: 0.74 10*3/MM3 (ref 0.6–4.2)
LYMPHOCYTES NFR BLD AUTO: 8.2 % (ref 10–50)
MAGNESIUM SERPL-MCNC: 2.1 MG/DL (ref 1.6–2.3)
MCH RBC QN AUTO: 30.8 PG (ref 26.5–34)
MCHC RBC AUTO-ENTMCNC: 32.5 G/DL (ref 31.4–36)
MCV RBC AUTO: 94.8 FL (ref 80–98)
MONOCYTES # BLD AUTO: 0.85 10*3/MM3 (ref 0–0.9)
MONOCYTES NFR BLD AUTO: 9.4 % (ref 0–12)
NEUTROPHILS # BLD AUTO: 5.92 10*3/MM3 (ref 2–8.6)
NEUTROPHILS NFR BLD AUTO: 65.2 % (ref 37–80)
PLATELET # BLD AUTO: 290 10*3/MM3 (ref 150–450)
PMV BLD AUTO: 9.7 FL (ref 8–12)
POTASSIUM BLD-SCNC: 4 MMOL/L (ref 3.5–5.1)
PROT SERPL-MCNC: 6.5 G/DL (ref 6.3–8.6)
RBC # BLD AUTO: 3.25 10*6/MM3 (ref 3.77–5.16)
SODIUM BLD-SCNC: 135 MMOL/L (ref 137–145)
WBC NRBC COR # BLD: 9.06 10*3/MM3 (ref 3.2–9.8)

## 2017-04-03 PROCEDURE — 97535 SELF CARE MNGMENT TRAINING: CPT

## 2017-04-03 PROCEDURE — 93005 ELECTROCARDIOGRAM TRACING: CPT | Performed by: FAMILY MEDICINE

## 2017-04-03 PROCEDURE — 97530 THERAPEUTIC ACTIVITIES: CPT

## 2017-04-03 PROCEDURE — 97110 THERAPEUTIC EXERCISES: CPT

## 2017-04-03 PROCEDURE — 85025 COMPLETE CBC W/AUTO DIFF WBC: CPT | Performed by: INTERNAL MEDICINE

## 2017-04-03 PROCEDURE — 97116 GAIT TRAINING THERAPY: CPT

## 2017-04-03 PROCEDURE — 83735 ASSAY OF MAGNESIUM: CPT | Performed by: INTERNAL MEDICINE

## 2017-04-03 PROCEDURE — 94760 N-INVAS EAR/PLS OXIMETRY 1: CPT

## 2017-04-03 PROCEDURE — 80053 COMPREHEN METABOLIC PANEL: CPT | Performed by: INTERNAL MEDICINE

## 2017-04-03 PROCEDURE — 94799 UNLISTED PULMONARY SVC/PX: CPT

## 2017-04-03 RX ADMIN — AMIODARONE HYDROCHLORIDE 200 MG: 200 TABLET ORAL at 09:30

## 2017-04-03 RX ADMIN — APIXABAN 5 MG: 5 TABLET, FILM COATED ORAL at 09:30

## 2017-04-03 RX ADMIN — ALBUTEROL SULFATE 2.5 MG: 2.5 SOLUTION RESPIRATORY (INHALATION) at 13:13

## 2017-04-03 RX ADMIN — POTASSIUM CHLORIDE 10 MEQ: 750 TABLET, EXTENDED RELEASE ORAL at 09:30

## 2017-04-03 RX ADMIN — BUDESONIDE AND FORMOTEROL FUMARATE DIHYDRATE 2 PUFF: 160; 4.5 AEROSOL RESPIRATORY (INHALATION) at 08:00

## 2017-04-03 RX ADMIN — ASPIRIN 81 MG 81 MG: 81 TABLET ORAL at 09:31

## 2017-04-03 NOTE — THERAPY DISCHARGE NOTE
Acute Care - Physical Therapy Discharge Summary  [facility]       Patient Name: Norma Harkisn  : 1931  MRN: [id number]    Today's Date: 4/3/2017  Onset of Illness/Injury or Date of Surgery Date: 17    Date of Referral to PT: 17  Referring Physician: Dr. RANDY Maldonado      Admit Date: 3/30/2017      PT Recommendation and Plan    Visit Dx:    ICD-10-CM ICD-9-CM   1. Acute on chronic systolic congestive heart failure I50.23 428.23     428.0   2. Localized edema R60.0 782.3   3. Dyspnea due to congestive heart failure I50.9 786.09     428.0   4. Impaired mobility and ADLs Z74.09 799.89   5. Abnormality of gait and mobility R26.9 781.2   6. Muscle weakness (generalized) M62.81 728.87             Outcome Measures       17 1035 17 0929 17 1415    How much help from another person do you currently need...    Turning from your back to your side while in flat bed without using bedrails?  3  -LN 3  -AM    Moving from lying on back to sitting on the side of a flat bed without bedrails?  3  -LN 3  -AM    Moving to and from a bed to a chair (including a wheelchair)?  3  -LN 3  -AM    Standing up from a chair using your arms (e.g., wheelchair, bedside chair)?  4  -LN 3  -AM    Climbing 3-5 steps with a railing?  2  -LN 1  -AM    To walk in hospital room?  3  -LN 3  -AM    AM-PAC 6 Clicks Score  18  -LN 16  -AM    How much help from another is currently needed...    Putting on and taking off regular lower body clothing? 4  -KD      Bathing (including washing, rinsing, and drying) 4  -KD      Toileting (which includes using toilet bed pan or urinal) 3  -KD      Putting on and taking off regular upper body clothing 4  -KD      Taking care of personal grooming (such as brushing teeth) 4  -KD      Eating meals 4  -KD      Score 23  -KD      Functional Assessment    Outcome Measure Options  AM-PAC 6 Clicks Basic Mobility (PT)  -LN AM-PAC 6 Clicks Basic Mobility (PT)  -AM      17 1320 17  1009       How much help from another person do you currently need...    Turning from your back to your side while in flat bed without using bedrails? 3  -LM      Moving from lying on back to sitting on the side of a flat bed without bedrails? 3  -LM      Moving to and from a bed to a chair (including a wheelchair)? 3  -LM      Standing up from a chair using your arms (e.g., wheelchair, bedside chair)? 3  -LM      Climbing 3-5 steps with a railing? 3  -LM      To walk in hospital room? 3  -LM      AM-PAC 6 Clicks Score 18  -LM      How much help from another is currently needed...    Putting on and taking off regular lower body clothing?  2  -RW     Bathing (including washing, rinsing, and drying)  3  -RW     Toileting (which includes using toilet bed pan or urinal)  3  -RW     Putting on and taking off regular upper body clothing  3  -RW     Taking care of personal grooming (such as brushing teeth)  3  -RW     Eating meals  3  -RW     Score  17  -RW     Functional Assessment    Outcome Measure Options AM-PAC 6 Clicks Basic Mobility (PT)  -LM AM-PAC 6 Clicks Daily Activity (OT)  -RW       User Key  (r) = Recorded By, (t) = Taken By, (c) = Cosigned By    Initials Name Provider Type    JOANN Joseph, PT Physical Therapist    MONSERRAT Cook, OTR/L Occupational Therapist    AM Emerson Campoverde, LYRIC Physical Therapy Assistant    COSME Lawrence PTA Physical Therapy Assistant    SEGUNDO Castañeda, NGUYEN/L Occupational Therapy Assistant                PT Charges       04/03/17 0929          Time Calculation    Start Time 0929  -LN      Stop Time 1015  -LN      Time Calculation (min) 46 min  -LN      PT Received On 04/03/17  -LN      Time Calculation- PT    Total Timed Code Minutes- PT 46 minute(s)  -LN        User Key  (r) = Recorded By, (t) = Taken By, (c) = Cosigned By    Initials Name Provider Type    COSME Lawrence PTA Physical Therapy Assistant                  IP PT Goals       04/03/17 1616 04/03/17 0929  04/02/17 1415    Transfer Training PT LTG    Transfer Training PT  LTG, Date Goal Reviewed (P)  04/03/17  -CB 04/03/17  -LN 04/02/17  -AM    Transfer Training PT LTG, Outcome (P)  goal not met  -CB goal not met  -LN goal not met  -AM    Transfer Training PT LTG, Reason Goal Not Met (P)  discharged from facility  -CB progress slower than expected  -LN     Gait Training PT STG    Gait Training Goal PT STG, Date Goal Reviewed (P)  04/03/17  -CB  04/02/17  -AM    Gait Training Goal PT STG, Outcome (P)  goal met  -CB  goal met  -AM    Gait Training PT LTG    Gait Training Goal PT LTG, Date Goal Reviewed (P)  04/03/17  -CB 04/03/17  -LN 04/02/17  -AM    Gait Training Goal PT LTG, Outcome (P)  goal met  -CB goal met  -LN goal not met  -AM    Stair Training PT LTG    Stair Training Goal PT LTG, Date Established  04/03/17  -LN     Stair Training Goal PT LTG, Date Goal Reviewed (P)  04/03/17  -CB 04/03/17  -LN 04/02/17  -AM    Stair Training Goal PT LTG, Outcome (P)  goal not met  -CB goal not met  -LN goal not met  -AM    Stair Training Goal PT LTG, Reason Goal Not Met (P)  discharged from facility  -CB progress slower than expected  -LN       04/01/17 1320          Transfer Training PT LTG    Transfer Training PT LTG, Date Established 04/01/17  -LM      Transfer Training PT LTG, Time to Achieve by discharge  -LM      Transfer Training PT LTG, Activity Type bed to chair /chair to bed  -LM      Transfer Training PT LTG, Petroleum Level conditional independence  -LM      Transfer Training PT LTG, Assist Device --   AAD  -LM      Transfer Training PT LTG, Outcome goal ongoing  -LM      Gait Training PT STG    Gait Training Goal PT STG, Date Established 04/01/17  -LM      Gait Training Goal PT STG, Time to Achieve by discharge  -LM      Gait Training Goal PT STG, Petroleum Level supervision required  -LM      Gait Training Goal PT STG, Assist Device --   AAD  -LM      Gait Training Goal PT STG, Distance to Achieve 75 feet  while completing PLB  -LM      Gait Training Goal PT STG, Outcome goal ongoing  -LM      Gait Training PT LTG    Gait Training Goal PT LTG, Date Established 04/01/17  -LM      Gait Training Goal PT LTG, Time to Achieve by discharge  -LM      Gait Training Goal PT LTG, [place] Level supervision required  -LM      Gait Training Goal PT LTG, Assist Device --   AAD  -LM      Gait Training Goal PT LTG, Distance to Achieve 150 feet with O2 sat at 88% or higher  -LM      Gait Training Goal PT LTG, Outcome goal ongoing  -LM      Stair Training PT LTG    Stair Training Goal PT LTG, Date Established 04/01/17  -LM      Stair Training Goal PT LTG, Time to Achieve by discharge  -LM      Stair Training Goal PT LTG, Number of Steps 1  -LM      Stair Training Goal PT LTG, [place] Level contact guard assist  -LM      Stair Training Goal PT LTG, Assist Device --   AAD  -LM      Stair Training Goal PT LTG, Outcome goal ongoing  -LM        User Key  (r) = Recorded By, (t) = Taken By, (c) = Cosigned By    Initials Name Provider Type    LM Effie Joseph, PT Physical Therapist    CB Delmy Hughes, PT Physical Therapist    AM Emerson Campoverde, PTA Physical Therapy Assistant    LN Effie Lawrence, PTA Physical Therapy Assistant              PT Discharge Summary  Reason for Discharge: Discharge from facility, Per MD order  Outcomes Achieved: Patient able to partially acheive established goals  Discharge Destination: [facility]      Delmy Hughes, PT   4/3/2017

## 2017-04-03 NOTE — PROGRESS NOTES
Acute Care - Occupational Therapy Treatment Note  [facility]     Patient Name: Norma Harkins  : 1931  MRN: [id number]  Today's Date: 4/3/2017  Onset of Illness/Injury or Date of Surgery Date: 17  Date of Referral to OT: 17  Referring Physician: Dr. RANDY Maldonado      Admit Date: 3/30/2017    Visit Dx:     ICD-10-CM ICD-9-CM   1. Acute on chronic systolic congestive heart failure I50.23 428.23     428.0   2. Localized edema R60.0 782.3   3. Dyspnea due to congestive heart failure I50.9 786.09     428.0   4. Impaired mobility and ADLs Z74.09 799.89   5. Abnormality of gait and mobility R26.9 781.2   6. Muscle weakness (generalized) M62.81 728.87     Patient Active Problem List   Diagnosis   • Hypertension   • Chronic obstructive pulmonary disease with acute exacerbation   • Acute on chronic respiratory failure with hypoxia   • Low back pain   • Hyperlipidemia   • Atrial fibrillation with RVR   • Chronic respiratory failure with hypoxia   • Acute on chronic systolic congestive heart failure   • Paroxysmal atrial fibrillation   • Essential hypertension   • Chronic obstructive lung disease             Adult Rehabilitation Note       17 1035 17 0929 17 1415    Rehab Assessment/Intervention    Discipline occupational therapy assistant  -KD physical therapy assistant  -LN physical therapy assistant  -AM    Document Type therapy note (daily note)  -KD therapy note (daily note)  -LN therapy note (daily note)  -AM    Subjective Information agree to therapy  -KD agree to therapy;no complaints  -LN agree to therapy;no complaints  -AM    Patient Effort, Rehab Treatment   good  -AM    Symptoms Noted During/After Treatment   fatigue;shortness of breath  -AM    Precautions/Limitations oxygen therapy device and L/min  -KD oxygen therapy device and L/min  -LN oxygen therapy device and L/min  -AM    Equipment Issued to Patient   gait belt  -AM    Recorded by [KD] PATRICK Simpson/ROBINSON [LN] Effie GALO  Howard, PTA [AM] Emerson Campoverde PTA    Vital Signs    Pre Systolic BP Rehab 92  -KD 96  -  -AM    Pre Treatment Diastolic BP 58  -KD 58  -LN 56  -AM    Post Systolic BP Rehab 96  -KD 98  -  -AM    Post Treatment Diastolic BP 56  -KD 60  -LN 66  -AM    Pretreatment Heart Rate (beats/min) 85  -KD 84  -LN 76  -AM    Posttreatment Heart Rate (beats/min) 80  -KD 81  -LN 82  -AM    Pre SpO2 (%) 96  -KD 95  -LN 97  -AM    O2 Delivery Pre Treatment supplemental O2  -KD supplemental O2  -LN supplemental O2   3L  -AM    Intra SpO2 (%)  88   with 1st gt  -LN 84   3L and ambulating  -AM    O2 Delivery Intra Treatment   supplemental O2  -AM    Post SpO2 (%) 95  -KD 96  -LN 97  -AM    O2 Delivery Post Treatment supplemental O2  -KD  supplemental O2   3L  -AM    Pre Patient Position Sitting  -KD Sitting  -LN Sitting  -AM    Intra Patient Position Standing  -KD Standing  -LN Standing  -AM    Post Patient Position Sitting  -KD Sitting  -LN Sitting  -AM    Recorded by [KD] PATRICK Simpson/L [LN] Effie Lawrence, PTA [AM] Emerson Campoverde PTA    Pain Assessment    Pain Assessment 0-10  -KD 0-10  -LN No/denies pain  -AM    Pain Score 0  -KD 0  -LN     Post Pain Score 0  -KD 0  -LN     Recorded by [KD] PATRICK Simpson/ROBINSON [LN] Effie Lawrence, PTA [AM] Emerson Campoverde PTA    Cognitive Assessment/Intervention    Current Cognitive/Communication Assessment functional  -KD  functional  -AM    Orientation Status oriented x 4  -KD oriented x 4  -LN oriented x 4  -AM    Follows Commands/Answers Questions 100% of the time  -KD  100% of the time  -AM    Personal Safety WNL/WFL  -KD      Personal Safety Interventions   gait belt;nonskid shoes/slippers when out of bed;supervised activity  -AM    Recorded by [KD] PATRICK Simpson/ROBINSON [LN] Effie Lawrence, PTA [AM] Emerson Campoverde PTA    ROM (Range of Motion)    General ROM   no range of motion deficits identified  -AM    Recorded by   [AM] Emerson Campoverde PTA    Bed Mobility,  Assessment/Treatment    Bed Mobility, Roll Left, [place]   not tested  -AM    Bed Mobility, Roll Right, [place]   not tested  -AM    Bed Mobility, Scoot/Bridge, [place]   not tested  -AM    Bed Mob, Supine to Sit, [place]   not tested  -AM    Bed Mob, Sit to Supine, [place]   not tested  -AM    Bed Mob, Sidelying to Sit, [place]   not tested  -AM    Bed Mob, Sit to Sidelying, [place]   not tested  -AM    Recorded by   [AM] Emerson Campoverde, PTA    Transfer Assessment/Treatment    Transfers, Bed-Chair [place]   stand by assist  -AM    Transfers, Chair-Bed [place]   stand by assist  -AM    Transfers, Bed-Chair-Bed, Assist Device   rolling walker  -AM    Transfers, Sit-Stand [place] conditional independence  -KD conditional independence;stand by assist  -LN stand by assist  -AM    Transfers, Stand-Sit [place] conditional independence  -KD independent  -LN stand by assist  -AM    Transfers, Sit-Stand-Sit, Assist Device rolling walker  -KD rolling walker  -LN rolling walker  -AM    Toilet Transfer, [place] not tested  -KD  not tested  -AM    Walk-In Shower Transfer, [place] not tested  -KD  not tested  -AM    Bathtub Transfer, [place] not tested  -KD  not tested  -AM    Transfer, Maintain Weight Bearing Status   able to maintain weight bearing status  -AM    Transfer, Safety Issues   impulsivity  -AM    Transfer, Impairments   strength decreased  -AM    Recorded by [KD] PATRICK Simpson/ROBINSON [LN] Effie Lawrence, PTA [AM] Emerson Campoverde PTA    Gait Assessment/Treatment    Gait, [place] Level  conditional independence;supervision required  -LN stand by assist  -AM    Gait, Assistive Device  rolling walker  -LN rolling walker  -AM    Gait, Distance (Feet)  114   sats dropped to 88% with 1st gt but 2nd gt 96% 214'  -  -AM    Gait, Gait Pattern Analysis   swing-through gait  -AM    Gait, Gait Deviations   jose j decreased  -AM    Gait, Maintain  Weight Bearing Status   able to maintain weight bearing status  -AM    Gait, Safety Issues   step length decreased;supplemental O2  -AM    Gait, Impairments   strength decreased  -AM    Recorded by  [LN] Effie Lawrence PTA [AM] Emerson Campoverde PTA    Stairs Assessment/Treatment    Stairs, [place] Level   not tested  -AM    Recorded by   [AM] Emerson Campoverde PTA    Functional Mobility    Functional Mobility- Ind. Level conditional independence  -KD      Functional Mobility- Device rolling walker  -KD      Functional Mobility-Distance (Feet) --   ~5  -KD      Recorded by [KD] LALITHA SimpsonA/L      Upper Body Bathing Assessment/Training    UB Bathing Assess/Train Assistive Device bath mitt  -KD      UB Bathing Assess/Train, Position sitting  -KD      UB Bathing Assess/Train, [place] Level set up required  -KD      UB Bathing Assess/Train, Impairments strength decreased  -KD      Recorded by [KD] Sofia Castañeda NGUYEN/L      Lower Body Bathing Assessment/Training    LB Bathing Assess/Train Assistive Device bath mitt  -KD      LB Bathing Assess/Train, Position sitting  -KD      LB Bathing Assess/Train, [place] Level set up required  -KD      LB Bathing Assess/Train, Impairments strength decreased  -KD      Recorded by [KD] Sofia Castañeda NGUYEN/L      Upper Body Dressing Assessment/Training    UB Dressing Assess/Train, Clothing Type doffing:;donning:;bra;pull over  -KD      UB Dressing Assess/Train, Position sitting  -KD      UB Dressing Assess/Train, [place] set up required  -KD      UB Dressing Assess/Train, Impairments strength decreased  -KD      Recorded by [KD] Sofia Castañeda NGUYEN/L      Lower Body Dressing Assessment/Training    LB Dressing Assess/Train, Clothing Type doffing:;donning:;slipper socks;socks;pants;shoes  -KD      LB Dressing Assess/Train, Position sitting  -KD      LB Dressing Assess/Train, [place] supervision required  -KD      LB Dressing Assess/Train, Impairments  strength decreased  -KD      Recorded by [KD] LALITHA SimpsonA/L      Toileting Assessment/Training    Toileting Assess/Train, Position sitting  -KD      Toileting Assess/Train, [place]ep[place] Level conditional independence  -KD      Toileting Assess/Train, Impairments strength decreased  -KD      Recorded by [KD] LALITHA SimpsonA/L      Grooming Assessment/Training    Grooming Assess/Train, Assistive Device --   brush  -KD      Grooming Assess/Train, Position sitting  -KD      Grooming Assess/Train, [place]ep[place] Level set up required  -KD      Grooming Assess/Train, Impairments strength decreased  -KD      Recorded by [KD] LALITHA SimpsonA/L      Therapy Exercises    Bilateral Lower Extremities  AROM:;sitting;hip flexion;LAQ;hip abduction/adduction;10 reps   arom 30 reps khushi ankle pumps;x 2 sets  -LN     Recorded by  [LN] Effie Lawrence PTA     Positioning and Restraints    Pre-Treatment Position sitting in chair/recliner  -KD  sitting in chair/recliner  -AM    Post Treatment Position chair  -KD chair  -LN chair  -AM    In Chair sitting;call light within reach;encouraged to call for assist;exit alarm on  -KD sitting;call light within reach;encouraged to call for assist   nsg states no need for alarm  -LN reclined;call light within reach;encouraged to call for assist;legs elevated  -AM    Recorded by [KD] PATRICK Simpson/ROBINSON [LN] Effie Lawrence, LYRIC [AM] Emerson Campoverde, LYRIC      04/02/17 1400 04/02/17 1345       Rehab Assessment/Intervention    Discipline  occupational therapy assistant  -LM     Document Type  therapy note (daily note)  -LM     Subjective Information  agree to therapy  -LM     Recorded by  [LM] LALITHA MagdalenoA/L     Pain Assessment    Pain Assessment  No/denies pain  -LM     Recorded by  [LM] LALITHA MagdalenoA/L     Transfer Assessment/Treatment    Transfers, Sit-Stand [place] conditional independence   sit to stand xep  -LM conditional independence   sit stand x 10 reps   -LM      Transfers, Stand-Sit [place] conditional independence  -LM conditional independence  -LM     Toilet Transfer, [place]  conditional independence  -LM     Toilet Transfer, Assistive Device  rolling walker  -LM     Recorded by [LM] Neha Mccray NGUYEN/L [LM] Neha Mccray NGUYEN/L     Lower Body Dressing Assessment/Training    LB Dressing Assess/Train, [place]  supervision required;conditional independence   socks  -LM     Recorded by  [LM] LALITHA MagdalenoA/L     Toileting Assessment/Training    Toileting Assess/Train, [place] Level  conditional independence  -LM     Recorded by  [LM] LALITHA MagdalenoA/L     Positioning and Restraints    Pre-Treatment Position  sitting in chair/recliner  -LM     Post Treatment Position  wheelchair  -LM     Recorded by  [LM] Neha Mccray NGUYEN/L       User Key  (r) = Recorded By, (t) = Taken By, (c) = Cosigned By    Initials Name Effective Dates    AM Emerson Campoverde, PTA 10/17/16 -     LN Effie Lawrence, PTA 10/17/16 -     KD Sofia Castañeda, NGUYEN/L 10/17/16 -     LM Neha Mccray NGUYEN/L 10/17/16 -                 OT Goals       04/03/17 1035 04/02/17 1447 04/01/17 1415    Transfer Training OT STG    Transfer Training OT STG, Date Established   04/01/17  -RW    Transfer Training OT STG, Time to Achieve   4 days  -RW    Transfer Training OT STG, Activity Type   toilet  -RW    Transfer Training OT STG, [place] Level   supervision required  -RW    Transfer Training OT STG, Date Goal Reviewed 04/03/17  -KD 04/02/17  -LM     Transfer Training OT STG, Outcome  goal ongoing  -LM     Dynamic Standing Balance OT STG    Dynamic Standing Balance OT STG, Date Established   04/01/17  -RW    Dynamic Standing Balance OT STG, Time to Achieve   4 days  -RW    Dynamic Standing Balance OT STG, [place] Level   supervision required  -RW    Dynamic Standing Balance OT STG, Assist Device   assistive Device  -RW    Dynamic Standing Balance OT STG, Date  Goal Reviewed 04/03/17  -KD 04/02/17  -LM     Dynamic Standing Balance OT STG, Outcome  goal ongoing  -LM     ADL OT LTG    ADL OT LTG, Date Established   04/01/17  -RW    ADL OT LTG, Time to Achieve   by discharge  -RW    ADL OT LTG, Activity Type   ADL skills  -RW    ADL OT LTG, [place] Level   standby assist  -RW    ADL OT LTG, Date Goal Reviewed 04/03/17  -KD 04/02/17  -LM     ADL OT LTG, Outcome  goal ongoing  -LM     Functional Mobility OT LTG    Functional Mobility Goal OT LTG, Date Established   04/01/17  -RW    Functional Mobility Goal OT LTG, Time to Achieve   by discharge  -RW    Functional Mobility Goal OT LTG, [place] Level   supervision  -RW    Functional Mobility Goal OT LTG, Assist Device   rolling walker  -RW    Functional Mobility Goal OT LTG, Distance to Achieve   to the bathroom  -RW    Functional Mobility Goal OT LTG, Date Goal Reviewed 04/03/17  -KD 04/02/17  -LM     Functional Mobility Goal OT LTG, Outcome  goal ongoing  -LM       User Key  (r) = Recorded By, (t) = Taken By, (c) = Cosigned By    Initials Name Provider Type     Dayanna Cook, OTR/L Occupational Therapist    LALITHA McmanusA/L Occupational Therapy Assistant    PATRICK Vazquez/L Occupational Therapy Assistant          Occupational Therapy Education     Title: PT OT SLP Therapies (Active)     Topic: Occupational Therapy (Active)     Point: Precautions (Active)    Description: Instruct learner(s) on prescribed precautions during self-care and functional transfers.    Learning Progress Summary    Learner Readiness Method Response Comment Documented by Status   Patient Acceptance E NR fall precautions  04/01/17 1414 Active                      User Key     Initials Effective Dates Name Provider Type Discipline     10/17/16 -  Dayanna Cook, OTR/L Occupational Therapist OT                  OT Recommendation and Plan  Anticipated Discharge Disposition: home with 24/7 care, home with home  health, skilled nursing facility  Planned Therapy Interventions: activity intolerance, adaptive equipment training, ADL retraining, balance training, bed mobility training, energy conservation, home exercise program, strengthening, transfer training  Therapy Frequency: other (see comments) (3-14 times/wk)  Plan of Care Review  Outcome Summary/Follow up Plan: Pt tolerated ADL well this Am with SBA  no c/o.         Outcome Measures       04/03/17 1035 04/03/17 0929 04/02/17 1415    How much help from another person do you currently need...    Turning from your back to your side while in flat bed without using bedrails?  3  -LN 3  -AM    Moving from lying on back to sitting on the side of a flat bed without bedrails?  3  -LN 3  -AM    Moving to and from a bed to a chair (including a wheelchair)?  3  -LN 3  -AM    Standing up from a chair using your arms (e.g., wheelchair, bedside chair)?  4  -LN 3  -AM    Climbing 3-5 steps with a railing?  2  -LN 1  -AM    To walk in hospital room?  3  -LN 3  -AM    AM-PAC 6 Clicks Score  18  -LN 16  -AM    How much help from another is currently needed...    Putting on and taking off regular lower body clothing? 4  -KD      Bathing (including washing, rinsing, and drying) 4  -KD      Toileting (which includes using toilet bed pan or urinal) 3  -KD      Putting on and taking off regular upper body clothing 4  -KD      Taking care of personal grooming (such as brushing teeth) 4  -KD      Eating meals 4  -KD      Score 23  -KD      Functional Assessment    Outcome Measure Options  AM-PAC 6 Clicks Basic Mobility (PT)  -LN AM-PAC 6 Clicks Basic Mobility (PT)  -AM      04/01/17 1320 04/01/17 1009       How much help from another person do you currently need...    Turning from your back to your side while in flat bed without using bedrails? 3  -LM      Moving from lying on back to sitting on the side of a flat bed without bedrails? 3  -LM      Moving to and from a bed to a chair (including  a wheelchair)? 3  -LM      Standing up from a chair using your arms (e.g., wheelchair, bedside chair)? 3  -LM      Climbing 3-5 steps with a railing? 3  -LM      To walk in hospital room? 3  -LM      AM-PAC 6 Clicks Score 18  -LM      How much help from another is currently needed...    Putting on and taking off regular lower body clothing?  2  -RW     Bathing (including washing, rinsing, and drying)  3  -RW     Toileting (which includes using toilet bed pan or urinal)  3  -RW     Putting on and taking off regular upper body clothing  3  -RW     Taking care of personal grooming (such as brushing teeth)  3  -RW     Eating meals  3  -RW     Score  17  -RW     Functional Assessment    Outcome Measure Options AM-PAC 6 Clicks Basic Mobility (PT)  -LM AM-PAC 6 Clicks Daily Activity (OT)  -RW       User Key  (r) = Recorded By, (t) = Taken By, (c) = Cosigned By    Initials Name Provider Type    JOANN Joseph, PT Physical Therapist    RW Dayanna Cook, OTR/L Occupational Therapist    AM Emerson Campoverde, PTA Physical Therapy Assistant    LN Effie Lawrence PTA Physical Therapy Assistant    SEGUNDO Castañeda NGUYEN/L Occupational Therapy Assistant           Time Calculation:         Time Calculation- OT       04/03/17 1325 04/03/17 1324       Time Calculation- OT    OT Start Time  1035  -KD     OT Stop Time  1115  -KD     OT Time Calculation (min)  40 min  -KD     OT Received On 04/03/17  -KD        User Key  (r) = Recorded By, (t) = Taken By, (c) = Cosigned By    Initials Name Provider Type    PATRICK Mcmanus/L Occupational Therapy Assistant           Therapy Charges for Today     Code Description Service Date Service Provider Modifiers Qty    [id number]  OT SELF CARE/MGMT/TRAIN EA 15 MIN 4/3/2017 PATRICK Simpson/L GO 3          OT G-codes  OT Professional Judgement Used?: Yes  OT Functional Scales Options: AM-PAC 6 Clicks Daily Activity (OT)  Score: 17  Functional Limitation: Self care  Self Care Current  Status (): At least 40 percent but less than 60 percent impaired, limited or restricted  Self Care Goal Status (): At least 20 percent but less than 40 percent impaired, limited or restricted    PATRICK Simpson/ROBINSON  4/3/2017

## 2017-04-03 NOTE — PLAN OF CARE
Problem: Patient Care Overview (Adult)  Goal: Plan of Care Review  Outcome: Ongoing (interventions implemented as appropriate)    04/03/17 0452 04/03/17 1035   Coping/Psychosocial Response Interventions   Plan Of Care Reviewed With patient --    Patient Care Overview   Progress improving --    Outcome Evaluation   Outcome Summary/Follow up Plan --  Pt tolerated ADL well this Am with SBA no c/o.        Goal: Discharge Needs Assessment  Outcome: Ongoing (interventions implemented as appropriate)    03/31/17 1024 04/02/17 1415   Discharge Needs Assessment   Concerns To Be Addressed --  discharge planning concerns   Readmission Within The Last 30 Days --  current reason for admission unrelated to previous admission   Equipment Needed After Discharge --  none   Discharge Facility/Level Of Care Needs --  nursing facility, skilled   Current Discharge Risk --  physical impairment   Discharge Disposition skilled nursing facility  (NH) --    Current Health   Anticipated Changes Related to Illness --  inability to care for self   Self-Care   Equipment Currently Used at Home --  oxygen;walker, standard;bath bench;other (see comments)  (Rollator)   Living Environment   Transportation Available --  family or friend will provide         Problem: Inpatient Occupational Therapy  Goal: Transfer Training Goal 1 STG- OT  Outcome: Ongoing (interventions implemented as appropriate)    04/01/17 1415 04/02/17 1447 04/03/17 1035   Transfer Training OT STG   Transfer Training OT STG, Date Established 04/01/17 --  --    Transfer Training OT STG, Time to Achieve 4 days --  --    Transfer Training OT STG, Activity Type toilet --  --    Transfer Training OT STG, [place] Level supervision required --  --    Transfer Training OT STG, Date Goal Reviewed --  --  04/03/17   Transfer Training OT STG, Outcome --  goal ongoing --        Goal: [place] Standing Balance Goal STG- OT  Outcome: Ongoing (interventions implemented as appropriate)     04/01/17 1415 04/02/17 1447 04/03/17 1035   Dynamic Standing Balance OT STG   Dynamic Standing Balance OT STG, Date Established 04/01/17 --  --    Dynamic Standing Balance OT STG, Time to Achieve 4 days --  --    Dynamic Standing Balance OT STG, [place] Level supervision required --  --    Dynamic Standing Balance OT STG, Assist Device assistive Device --  --    Dynamic Standing Balance OT STG, Date Goal Reviewed --  --  04/03/17   Dynamic Standing Balance OT STG, Outcome --  goal ongoing --        Goal: ADL Goal LTG- OT  Outcome: Ongoing (interventions implemented as appropriate)    04/01/17 1415 04/02/17 1447 04/03/17 1035   ADL OT LTG   ADL OT LTG, Date Established 04/01/17 --  --    ADL OT LTG, Time to Achieve by discharge --  --    ADL OT LTG, Activity Type ADL skills --  --    ADL OT LTG, [place] Level standby assist --  --    ADL OT LTG, Date Goal Reviewed --  --  04/03/17   ADL OT LTG, Outcome --  goal ongoing --        Goal: Functional Mobility Goal LTG- OT  Outcome: Ongoing (interventions implemented as appropriate)    04/01/17 1415 04/02/17 1447 04/03/17 1035   Functional Mobility OT LTG   Functional Mobility Goal OT LTG, Date Established 04/01/17 --  --    Functional Mobility Goal OT LTG, Time to Achieve by discharge --  --    Functional Mobility Goal OT LTG, [place] Level supervision --  --    Functional Mobility Goal OT LTG, Assist Device rolling walker --  --    Functional Mobility Goal OT LTG, Distance to Achieve to the bathroom --  --    Functional Mobility Goal OT LTG, Date Goal Reviewed --  --  04/03/17   Functional Mobility Goal OT LTG, Outcome --  goal ongoing --

## 2017-04-03 NOTE — PLAN OF CARE
Problem: Patient Care Overview (Adult)  Goal: Plan of Care Review  Outcome: Ongoing (interventions implemented as appropriate)    04/03/17 0452   Coping/Psychosocial Response Interventions   Plan Of Care Reviewed With patient   Patient Care Overview   Progress improving   Outcome Evaluation   Outcome Summary/Follow up Plan pt coninuing to lose weight and improving well       Goal: Adult Individualization and Mutuality  Outcome: Ongoing (interventions implemented as appropriate)    Problem: Fluid Volume Deficit (Adult)  Goal: Identify Related Risk Factors and Signs and Symptoms  Outcome: Ongoing (interventions implemented as appropriate)  Goal: Fluid/Electrolyte Balance  Outcome: Ongoing (interventions implemented as appropriate)  Goal: Comfort/Well Being  Outcome: Ongoing (interventions implemented as appropriate)    Problem: Fluid Volume Excess (Adult,Obstetrics,Pediatric)  Goal: Identify Related Risk Factors and Signs and Symptoms  Outcome: Ongoing (interventions implemented as appropriate)  Goal: Stable Weight  Outcome: Ongoing (interventions implemented as appropriate)  Goal: Balanced Intake/Output  Outcome: Ongoing (interventions implemented as appropriate)

## 2017-04-03 NOTE — PROGRESS NOTES
Acute Care - Physical Therapy Treatment Note  [facility]     Patient Name: Norma Harkins  : 1931  MRN: [id number]  Today's Date: 4/3/2017  Onset of Illness/Injury or Date of Surgery Date: 17  Date of Referral to PT: 17  Referring Physician: Dr. RANDY Maldonado    Admit Date: 3/30/2017    Visit Dx:    ICD-10-CM ICD-9-CM   1. Acute on chronic systolic congestive heart failure I50.23 428.23     428.0   2. Localized edema R60.0 782.3   3. Dyspnea due to congestive heart failure I50.9 786.09     428.0   4. Impaired mobility and ADLs Z74.09 799.89   5. Abnormality of gait and mobility R26.9 781.2   6. Muscle weakness (generalized) M62.81 728.87     Patient Active Problem List   Diagnosis   • Hypertension   • Chronic obstructive pulmonary disease with acute exacerbation   • Acute on chronic respiratory failure with hypoxia   • Low back pain   • Hyperlipidemia   • Atrial fibrillation with RVR   • Chronic respiratory failure with hypoxia   • Acute on chronic systolic congestive heart failure   • Paroxysmal atrial fibrillation   • Essential hypertension   • Chronic obstructive lung disease               Adult Rehabilitation Note       17 0929 17 1415 17 1400    Rehab Assessment/Intervention    Discipline physical therapy assistant  -LN physical therapy assistant  -AM     Document Type therapy note (daily note)  -LN therapy note (daily note)  -AM     Subjective Information agree to therapy;no complaints  -LN agree to therapy;no complaints  -AM     Patient Effort, Rehab Treatment  good  -AM     Symptoms Noted During/After Treatment  fatigue;shortness of breath  -AM     Precautions/Limitations oxygen therapy device and L/min  -LN oxygen therapy device and L/min  -AM     Equipment Issued to Patient  gait belt  -AM     Recorded by [LN] Effie Lawrence PTA [AM] Emerson Campoverde PTA     Vital Signs    Pre Systolic BP Rehab 96  -  -AM     Pre Treatment Diastolic BP 58  -LN 56  -AM     Post  Systolic BP Rehab 98  -  -AM     Post Treatment Diastolic BP 60  -LN 66  -AM     Pretreatment Heart Rate (beats/min) 84  -LN 76  -AM     Posttreatment Heart Rate (beats/min) 81  -LN 82  -AM     Pre SpO2 (%) 95  -LN 97  -AM     O2 Delivery Pre Treatment supplemental O2  -LN supplemental O2   3L  -AM     Intra SpO2 (%) 88   with 1st gt  -LN 84   3L and ambulating  -AM     O2 Delivery Intra Treatment  supplemental O2  -AM     Post SpO2 (%) 96  -LN 97  -AM     O2 Delivery Post Treatment  supplemental O2   3L  -AM     Pre Patient Position Sitting  -LN Sitting  -AM     Intra Patient Position Standing  -LN Standing  -AM     Post Patient Position Sitting  -LN Sitting  -AM     Recorded by [LN] Effie Lawrence PTA [AM] Emerson Campoverde PTA     Pain Assessment    Pain Assessment 0-10  -LN No/denies pain  -AM     Pain Score 0  -LN      Post Pain Score 0  -LN      Recorded by [LN] Effie Lawrence PTA [AM] Emerson Campoverde PTA     Cognitive Assessment/Intervention    Current Cognitive/Communication Assessment  functional  -AM     Orientation Status oriented x 4  -LN oriented x 4  -AM     Follows Commands/Answers Questions  100% of the time  -AM     Personal Safety Interventions  gait belt;nonskid shoes/slippers when out of bed;supervised activity  -AM     Recorded by [LN] Effie Lawrence PTA [AM] Emerson Campoverde PTA     ROM (Range of Motion)    General ROM  no range of motion deficits identified  -AM     Recorded by  [AM] Emerson Campoverde PTA     Bed Mobility, Assessment/Treatment    Bed Mobility, Roll Left, [place]  not tested  -AM     Bed Mobility, Roll Right, [place]  not tested  -AM     Bed Mobility, Scoot/Bridge, [place]  not tested  -AM     Bed Mob, Supine to Sit, [place]  not tested  -AM     Bed Mob, Sit to Supine, [place]  not tested  -AM     Bed Mob, Sidelying to Sit, [place]  not tested  -AM     Bed Mob, Sit to Sidelying, [place]  not tested  -AM     Recorded by  [AM] Emerson Campoverde  PTA     Transfer Assessment/Treatment    Transfers, Bed-Chair [place]  stand by assist  -AM     Transfers, Chair-Bed [place]  stand by assist  -AM     Transfers, Bed-Chair-Bed, Assist Device  rolling walker  -AM     Transfers, Sit-Stand [place] conditional independence;stand by assist  -LN stand by assist  -AM conditional independence   sit to stand xep  -LM    Transfers, Stand-Sit [place] independent  -LN stand by assist  -AM conditional independence  -LM    Transfers, Sit-Stand-Sit, Assist Device rolling walker  -LN rolling walker  -AM     Toilet Transfer, [place]  not tested  -AM     Walk-In Shower Transfer, [place]  not tested  -AM     Bathtub Transfer, [place]  not tested  -AM     Transfer, Maintain Weight Bearing Status  able to maintain weight bearing status  -AM     Transfer, Safety Issues  impulsivity  -AM     Transfer, Impairments  strength decreased  -AM     Recorded by [LN] Effie Lawrence PTA [AM] Emerson Campoverde PTA [LM] LALITHA MagdalenoA/L    Gait Assessment/Treatment    Gait, [place] Level conditional independence;supervision required  -LN stand by assist  -AM     Gait, Assistive Device rolling walker  -LN rolling walker  -AM     Gait, Distance (Feet) 114   sats dropped to 88% with 1st gt but 2nd gt 96% 214'  -  -AM     Gait, Gait Pattern Analysis  swing-through gait  -AM     Gait, Gait Deviations  jose j decreased  -AM     Gait, Maintain Weight Bearing Status  able to maintain weight bearing status  -AM     Gait, Safety Issues  step length decreased;supplemental O2  -AM     Gait, Impairments  strength decreased  -AM     Recorded by [LN] Effie Lawrence PTA [AM] Emerson Campoverde PTA     Stairs Assessment/Treatment    Stairs, [place] Level  not tested  -AM     Recorded by  [AM] Emerson Campoverde PTA     Therapy Exercises    Bilateral Lower Extremities AROM:;sitting;hip flexion;LAQ;hip abduction/adduction;10 reps   arom 30 reps khushi ankle pumps;x 2  sets  -LN      Recorded by [LN] Effie Lawrence, PTA      Positioning and Restraints    Pre-Treatment Position  sitting in chair/recliner  -AM     Post Treatment Position chair  -LN chair  -AM     In Chair sitting;call light within reach;encouraged to call for assist   nsg states no need for alarm  -LN reclined;call light within reach;encouraged to call for assist;legs elevated  -AM     Recorded by [LN] Effie Lawrence, PTA [AM] Emerson Campoverde, PTA       04/02/17 1345          Rehab Assessment/Intervention    Discipline occupational therapy assistant  -LM      Document Type therapy note (daily note)  -LM      Subjective Information agree to therapy  -LM      Recorded by [LM] LALITHA MagdalenoA/L      Pain Assessment    Pain Assessment No/denies pain  -LM      Recorded by [LM] LALITHA MagdalenoA/L      Transfer Assessment/Treatment    Transfers, Sit-Stand [place] conditional independence   sit stand x 10 reps   -LM      Transfers, Stand-Sit [place] conditional independence  -LM      Toilet Transfer, [place] conditional independence  -LM      Toilet Transfer, Assistive Device rolling walker  -LM      Recorded by [LM] LALITHA MagdalenoA/L      Lower Body Dressing Assessment/Training    LB Dressing Assess/Train, [place] supervision required;conditional independence   socks  -LM      Recorded by [LM] LALITHA MagdalenoA/L      Toileting Assessment/Training    Toileting Assess/Train, [place] Level conditional independence  -LM      Recorded by [LM] LALITHA MagdalenoA/L      Positioning and Restraints    Pre-Treatment Position sitting in chair/recliner  -LM      Post Treatment Position wheelchair  -LM      Recorded by [LM] LALITHA MagdalenoA/L        User Key  (r) = Recorded By, (t) = Taken By, (c) = Cosigned By    Initials Name Effective Dates    AM Emerson Campoverde, PTA 10/17/16 -     LN Effie Lawrence, PTA 10/17/16 -     LM PATRICK Magdaleno/ROBINSON 10/17/16 -                 IP  PT Goals       04/03/17 0929 04/02/17 1415 04/01/17 1320    Transfer Training PT LTG    Transfer Training PT LTG, Date Established   04/01/17  -LM    Transfer Training PT LTG, Time to Achieve   by discharge  -LM    Transfer Training PT LTG, Activity Type   bed to chair /chair to bed  -LM    Transfer Training PT LTG, [place] Level   conditional independence  -LM    Transfer Training PT LTG, Assist Device   --   AAD  -LM    Transfer Training PT  LTG, Date Goal Reviewed 04/03/17  -LN 04/02/17  -AM     Transfer Training PT LTG, Outcome goal not met  -LN goal not met  -AM goal ongoing  -LM    Transfer Training PT LTG, Reason Goal Not Met progress slower than expected  -LN      Gait Training PT STG    Gait Training Goal PT STG, Date Established   04/01/17  -LM    Gait Training Goal PT STG, Time to Achieve   by discharge  -LM    Gait Training Goal PT STG, [place] Level   supervision required  -LM    Gait Training Goal PT STG, Assist Device   --   AAD  -LM    Gait Training Goal PT STG, Distance to Achieve   75 feet while completing PLB  -LM    Gait Training Goal PT STG, Date Goal Reviewed  04/02/17  -AM     Gait Training Goal PT STG, Outcome  goal met  -AM goal ongoing  -LM    Gait Training PT LTG    Gait Training Goal PT LTG, Date Established   04/01/17  -LM    Gait Training Goal PT LTG, Time to Achieve   by discharge  -LM    Gait Training Goal PT LTG, [place] Level   supervision required  -LM    Gait Training Goal PT LTG, Assist Device   --   AAD  -LM    Gait Training Goal PT LTG, Distance to Achieve   150 feet with O2 sat at 88% or higher  -LM    Gait Training Goal PT LTG, Date Goal Reviewed 04/03/17  -LN 04/02/17  -AM     Gait Training Goal PT LTG, Outcome goal met  -LN goal not met  -AM goal ongoing  -LM    Stair Training PT LTG    Stair Training Goal PT LTG, Date Established 04/03/17  -LN  04/01/17  -LM    Stair Training Goal PT LTG, Time to Achieve   by discharge  -LM    Stair Training Goal PT LTG,  Number of Steps   1  -LM    Stair Training Goal PT LTG, [place] Level   contact guard assist  -LM    Stair Training Goal PT LTG, Assist Device   --   AAD  -LM    Stair Training Goal PT LTG, Date Goal Reviewed 04/03/17  -LN 04/02/17  -AM     Stair Training Goal PT LTG, Outcome goal not met  -LN goal not met  -AM goal ongoing  -LM    Stair Training Goal PT LTG, Reason Goal Not Met progress slower than expected  -LN        User Key  (r) = Recorded By, (t) = Taken By, (c) = Cosigned By    Initials Name Provider Type    LM Effie Joseph, PT Physical Therapist    AM Emerson Campoverde, LYRIC Physical Therapy Assistant    LN Effie Lawrence PTA Physical Therapy Assistant          Physical Therapy Education     Title: PT OT SLP Therapies (Active)     Topic: Physical Therapy (Active)     Point: Mobility training (Active)    Learning Progress Summary    Learner Readiness Method Response Comment Documented by Status   Patient Acceptance E NR  LN 04/03/17 1256 Active    Acceptance E NR Pt educated on safety with mobility and PLB with all activity.  Pt requires constant cueing and demonstration for PLB technique. LM 04/01/17 1530 Active               Point: Home exercise program (Active)    Learning Progress Summary    Learner Readiness Method Response Comment Documented by Status   Patient Acceptance E NR  LN 04/03/17 1256 Active               Point: Precautions (Active)    Learning Progress Summary    Learner Readiness Method Response Comment Documented by Status   Patient Acceptance E NR  LN 04/03/17 1256 Active    Acceptance E NR Pt educated on safety with mobility and PLB with all activity.  Pt requires constant cueing and demonstration for PLB technique. LM 04/01/17 1530 Active                      User Key     Initials Effective Dates Name Provider Type Discipline    LM 06/15/16 -  Effie Joseph, PT Physical Therapist PT    LN 10/17/16 -  Effie Lawrence PTA Physical Therapy Assistant PT                    PT Recommendation and  Plan  Anticipated Discharge Disposition: skilled nursing facility  Planned Therapy Interventions: balance training, bed mobility training, gait training, home exercise program, neuromuscular re-education, patient/family education, postural re-education, strengthening, stretching, transfer training  PT Frequency: other (see comments) (5-14 times/wk)             Outcome Measures       04/03/17 0929 04/02/17 1415 04/01/17 1320    How much help from another person do you currently need...    Turning from your back to your side while in flat bed without using bedrails? 3  -LN 3  -AM 3  -LM    Moving from lying on back to sitting on the side of a flat bed without bedrails? 3  -LN 3  -AM 3  -LM    Moving to and from a bed to a chair (including a wheelchair)? 3  -LN 3  -AM 3  -LM    Standing up from a chair using your arms (e.g., wheelchair, bedside chair)? 4  -LN 3  -AM 3  -LM    Climbing 3-5 steps with a railing? 2  -LN 1  -AM 3  -LM    To walk in hospital room? 3  -LN 3  -AM 3  -LM    AM-PAC 6 Clicks Score 18  -LN 16  -AM 18  -LM    Functional Assessment    Outcome Measure Options AM-PAC 6 Clicks Basic Mobility (PT)  -LN AM-PAC 6 Clicks Basic Mobility (PT)  -AM AM-PAC 6 Clicks Basic Mobility (PT)  -LM      04/01/17 1009          How much help from another is currently needed...    Putting on and taking off regular lower body clothing? 2  -RW      Bathing (including washing, rinsing, and drying) 3  -RW      Toileting (which includes using toilet bed pan or urinal) 3  -RW      Putting on and taking off regular upper body clothing 3  -RW      Taking care of personal grooming (such as brushing teeth) 3  -RW      Eating meals 3  -RW      Score 17  -RW      Functional Assessment    Outcome Measure Options AM-PAC 6 Clicks Daily Activity (OT)  -RW        User Key  (r) = Recorded By, (t) = Taken By, (c) = Cosigned By    Initials Name Provider Type    JOANN Joseph, PT Physical Therapist    MONSERRAT Cook, OTR/L  Occupational Therapist    CADEN Campoverde PTA Physical Therapy Assistant    COSME Lawrence PTA Physical Therapy Assistant           Time Calculation:         PT Charges       04/03/17 0929          Time Calculation    Start Time 0929  -LN      Stop Time 1015  -LN      Time Calculation (min) 46 min  -LN      PT Received On 04/03/17  -LN      Time Calculation- PT    Total Timed Code Minutes- PT 46 minute(s)  -LN        User Key  (r) = Recorded By, (t) = Taken By, (c) = Cosigned By    Initials Name Provider Type    COSME Lawrence PTA Physical Therapy Assistant          Therapy Charges for Today     Code Description Service Date Service Provider Modifiers Qty    91341722847 HC PT THER PROC EA 15 MIN 4/3/2017 Effie Lawrence, PTA GP 1    96847951267 HC GAIT TRAINING EA 15 MIN 4/3/2017 Effie Lawrence, PTA GP 1    86885164233 HC PT THERAPEUTIC ACT EA 15 MIN 4/3/2017 Effie Lawrence, PTA GP 1          PT G-Codes  Outcome Measure Options: AM-PAC 6 Clicks Basic Mobility (PT)  Score: 18  Functional Limitation: Mobility: Walking and moving around  Mobility: Walking and Moving Around Current Status (): At least 40 percent but less than 60 percent impaired, limited or restricted  Mobility: Walking and Moving Around Goal Status (): At least 20 percent but less than 40 percent impaired, limited or restricted    Effie Lawrence PTA  4/3/2017

## 2017-04-03 NOTE — PLAN OF CARE
Problem: Inpatient Physical Therapy  Goal: Transfer Training Goal 1 LTG- PT  Outcome: Ongoing (interventions implemented as appropriate)    04/01/17 1320 04/03/17 0929   Transfer Training PT LTG   Transfer Training PT LTG, Date Established 04/01/17 --    Transfer Training PT LTG, Time to Achieve by discharge --    Transfer Training PT LTG, Activity Type bed to chair /chair to bed --    Transfer Training PT LTG, [place] Level conditional independence --    Transfer Training PT LTG, Assist Device (AAD) --    Transfer Training PT LTG, Date Goal Reviewed --  04/03/17   Transfer Training PT LTG, Outcome --  goal not met   Transfer Training PT LTG, Reason Goal Not Met      04/01/17 1320 04/03/17 0929   Transfer Training PT LTG   Transfer Training PT LTG, Date Established 04/01/17 --    Transfer Training PT LTG, Time to Achieve by discharge --    Transfer Training PT LTG, Activity Type bed to chair /chair to bed --    Transfer Training PT LTG, [place] Level conditional independence --    Transfer Training PT LTG, Assist Device (AAD) --    Transfer Training PT LTG, Date Goal Reviewed --  04/03/17   Transfer Training PT LTG, Outcome --  goal not met   Transfer Training PT LTG, Reason Goal Not Met --  progress slower than expected   --  progress slower than expected       Goal: Gait Training Goal LTG- PT  Outcome: Ongoing (interventions implemented as appropriate)    04/01/17 1320 04/03/17 0929   Gait Training PT LTG   Gait Training Goal PT LTG, Date Established 04/01/17 --    Gait Training Goal PT LTG, Time to Achieve by discharge --    Gait Training Goal PT LTG, [place] Level supervision required --    Gait Training Goal PT LTG, Assist Device (AAD) --    Gait Training Goal PT LTG, Distance to Achieve 150 feet with O2 sat at 88% or higher --    Gait Training Goal PT LTG, Date Goal Reviewed --  04/03/17   Gait Training Goal PT LTG, Outcome --  goal met       Goal: Stair Training Goal LTG- PT  Outcome: Ongoing  (interventions implemented as appropriate)    04/01/17 1320 04/03/17 0929   Stair Training PT LTG   Stair Training Goal PT LTG, Date Established --  04/03/17   Stair Training Goal PT LTG, Time to Achieve by discharge --    Stair Training Goal PT LTG, Number of Steps 1 --    Stair Training Goal PT LTG, [place] Level contact guard assist --    Stair Training Goal PT LTG, Assist Device (AAD) --    Stair Training Goal PT LTG, Date Goal Reviewed --  04/03/17   Stair Training Goal PT LTG, Outcome --  goal not met   Stair Training Goal PT LTG, Reason Goal Not Met --  progress slower than expected

## 2017-04-03 NOTE — PLAN OF CARE
Problem: Inpatient Occupational Therapy  Goal: Transfer Training Goal 1 STG- OT  Outcome: Unable to achieve outcome(s) by discharge Date Met:  04/03/17 04/01/17 1415 04/03/17 1603   Transfer Training OT STG   Transfer Training OT STG, Date Established 04/01/17 --    Transfer Training OT STG, Time to Achieve 4 days --    Transfer Training OT STG, Activity Type toilet --    Transfer Training OT STG, [place] Level supervision required --    Transfer Training OT STG, Date Goal Reviewed --  04/03/17   Transfer Training OT STG, Outcome --  goal not met   Transfer Training OT STG, Reason Goal Not Met --  discharged from facility       Goal: Dymanic Standing Balance Goal STG- OT  Outcome: Unable to achieve outcome(s) by discharge Date Met:  04/03/17 04/01/17 1415 04/03/17 1603   Dynamic Standing Balance OT STG   Dynamic Standing Balance OT STG, Date Established 04/01/17 --    Dynamic Standing Balance OT STG, Time to Achieve 4 days --    Dynamic Standing Balance OT STG, [place] Level supervision required --    Dynamic Standing Balance OT STG, Assist Device assistive Device --    Dynamic Standing Balance OT STG, Date Goal Reviewed --  04/03/17   Dynamic Standing Balance OT STG, Outcome --  goal not met   Dynamic Standing Balance OT STG, Reason Goal Not Met --  discharged from facility       Goal: ADL Goal LTG- OT  Outcome: Unable to achieve outcome(s) by discharge Date Met:  04/03/17 04/01/17 1415 04/03/17 1603   ADL OT LTG   ADL OT LTG, Date Established 04/01/17 --    ADL OT LTG, Time to Achieve by discharge --    ADL OT LTG, Activity Type ADL skills --    ADL OT LTG, [place] Level standby assist --    ADL OT LTG, Outcome --  goal not met   ADL OT LTG, Reason Goal Not Met --  discharged from facility       Goal: Functional Mobility Goal LTG- OT  Outcome: Unable to achieve outcome(s) by discharge Date Met:  04/03/17 04/01/17 1415 04/03/17 1603   Functional Mobility OT LTG   Functional Mobility Goal  OT LTG, Date Established 04/01/17 --    Functional Mobility Goal OT LTG, Time to Achieve by discharge --    Functional Mobility Goal OT LTG, [place] Level supervision --    Functional Mobility Goal OT LTG, Assist Device rolling walker --    Functional Mobility Goal OT LTG, Distance to Achieve to the bathroom --    Functional Mobility Goal OT LTG, Date Goal Reviewed --  04/03/17   Functional Mobility Goal OT LTG, Outcome --  goal not met   Functional Mobility Goal OT LTG, Reason Goal Not Met --  discharged from facility

## 2017-04-03 NOTE — THERAPY DISCHARGE NOTE
Acute Care - Occupational Therapy Discharge Summary  [facility]     Patient Name: Norma Harkins  : 1931  MRN: [id number]    Today's Date: 4/3/2017  Onset of Illness/Injury or Date of Surgery Date: 17    Date of Referral to OT: 17  Referring Physician: Dr. RANDY Maldonado      Admit Date: 3/30/2017        OT Recommendation and Plan    Visit Dx:    ICD-10-CM ICD-9-CM   1. Acute on chronic systolic congestive heart failure I50.23 428.23     428.0   2. Localized edema R60.0 782.3   3. Dyspnea due to congestive heart failure I50.9 786.09     428.0   4. Impaired mobility and ADLs Z74.09 799.89   5. Abnormality of gait and mobility R26.9 781.2   6. Muscle weakness (generalized) M62.81 728.87               Time Calculation- OT       17 1325 17 1324       Time Calculation- OT    OT Start Time  1035  -KD     OT Stop Time  1115  -KD     OT Time Calculation (min)  40 min  -KD     OT Received On 17  -KD        User Key  (r) = Recorded By, (t) = Taken By, (c) = Cosigned By    Initials Name Provider Type     PATRICK Simpson/L Occupational Therapy Assistant                  OT Goals       17 1603 17 1035 17 1447    Transfer Training OT STG    Transfer Training OT STG, Date Goal Reviewed 17  -RM 17  -KD 17  -LM    Transfer Training OT STG, Outcome goal not met  -RM  goal ongoing  -LM    Transfer Training OT STG, Reason Goal Not Met discharged from facility  -RM      Dynamic Standing Balance OT STG    Dynamic Standing Balance OT STG, Date Goal Reviewed 17  -RM 17  -KD 17  -LM    Dynamic Standing Balance OT STG, Outcome goal not met  -RM  goal ongoing  -LM    Dynamic Standing Balance OT STG, Reason Goal Not Met discharged from facility  -RM      ADL OT LTG    ADL OT LTG, Date Goal Reviewed  17  -KD 17  -LM    ADL OT LTG, Outcome goal not met  -RM  goal ongoing  -LM    ADL OT LTG, Reason Goal Not Met discharged from facility   -RM      Functional Mobility OT LTG    Functional Mobility Goal OT LTG, Date Goal Reviewed 04/03/17  -RM 04/03/17  -KD 04/02/17  -LM    Functional Mobility Goal OT LTG, Outcome goal not met  -RM  goal ongoing  -LM    Functional Mobility Goal OT LTG, Reason Goal Not Met discharged from facility  -        04/01/17 1415          Transfer Training OT STG    Transfer Training OT STG, Date Established 04/01/17  -RW      Transfer Training OT STG, Time to Achieve 4 days  -RW      Transfer Training OT STG, Activity Type toilet  -RW      Transfer Training OT STG, [place] Level supervision required  -RW      Dynamic Standing Balance OT STG    Dynamic Standing Balance OT STG, Date Established 04/01/17  -RW      Dynamic Standing Balance OT STG, Time to Achieve 4 days  -RW      Dynamic Standing Balance OT STG, [place] Level supervision required  -RW      Dynamic Standing Balance OT STG, Assist Device assistive Device  -RW      ADL OT LTG    ADL OT LTG, Date Established 04/01/17  -RW      ADL OT LTG, Time to Achieve by discharge  -RW      ADL OT LTG, Activity Type ADL skills  -RW      ADL OT LTG, [place] Level standby assist  -RW      Functional Mobility OT LTG    Functional Mobility Goal OT LTG, Date Established 04/01/17  -RW      Functional Mobility Goal OT LTG, Time to Achieve by discharge  -RW      Functional Mobility Goal OT LTG, [place] Level supervision  -RW      Functional Mobility Goal OT LTG, Assist Device rolling walker  -RW      Functional Mobility Goal OT LTG, Distance to Achieve to the bathroom  -RW        User Key  (r) = Recorded By, (t) = Taken By, (c) = Cosigned By    Initials Name Provider Type    RW Dayanna Cook OTR/L Occupational Therapist    KD Sofia Castañeda NGUYEN/L Occupational Therapy Assistant    JOANN Mccray NGUYEN/L Occupational Therapy Assistant    RM Denise Estes, OT Occupational Therapist                Outcome Measures       04/03/17 1035 04/03/17 0929 04/02/17  1415    How much help from another person do you currently need...    Turning from your back to your side while in flat bed without using bedrails?  3  -LN 3  -AM    Moving from lying on back to sitting on the side of a flat bed without bedrails?  3  -LN 3  -AM    Moving to and from a bed to a chair (including a wheelchair)?  3  -LN 3  -AM    Standing up from a chair using your arms (e.g., wheelchair, bedside chair)?  4  -LN 3  -AM    Climbing 3-5 steps with a railing?  2  -LN 1  -AM    To walk in hospital room?  3  -LN 3  -AM    AM-PAC 6 Clicks Score  18  -LN 16  -AM    How much help from another is currently needed...    Putting on and taking off regular lower body clothing? 4  -KD      Bathing (including washing, rinsing, and drying) 4  -KD      Toileting (which includes using toilet bed pan or urinal) 3  -KD      Putting on and taking off regular upper body clothing 4  -KD      Taking care of personal grooming (such as brushing teeth) 4  -KD      Eating meals 4  -KD      Score 23  -KD      Functional Assessment    Outcome Measure Options  AM-PAC 6 Clicks Basic Mobility (PT)  -LN AM-PAC 6 Clicks Basic Mobility (PT)  -AM      04/01/17 1320 04/01/17 1009       How much help from another person do you currently need...    Turning from your back to your side while in flat bed without using bedrails? 3  -LM      Moving from lying on back to sitting on the side of a flat bed without bedrails? 3  -LM      Moving to and from a bed to a chair (including a wheelchair)? 3  -LM      Standing up from a chair using your arms (e.g., wheelchair, bedside chair)? 3  -LM      Climbing 3-5 steps with a railing? 3  -LM      To walk in hospital room? 3  -LM      AM-PAC 6 Clicks Score 18  -LM      How much help from another is currently needed...    Putting on and taking off regular lower body clothing?  2  -RW     Bathing (including washing, rinsing, and drying)  3  -RW     Toileting (which includes using toilet bed pan or urinal)  3   -RW     Putting on and taking off regular upper body clothing  3  -RW     Taking care of personal grooming (such as brushing teeth)  3  -RW     Eating meals  3  -RW     Score  17  -RW     Functional Assessment    Outcome Measure Options AM-PAC 6 Clicks Basic Mobility (PT)  -LM AM-PAC 6 Clicks Daily Activity (OT)  -RW       User Key  (r) = Recorded By, (t) = Taken By, (c) = Cosigned By    Initials Name Provider Type    JOANN Joseph, PT Physical Therapist    RW Dayanna Cook, OTR/L Occupational Therapist    AM Emerson Campoverde, PTA Physical Therapy Assistant    COSME Lawrence, PTA Physical Therapy Assistant    KD Sofia Castañeda, NGUYEN/L Occupational Therapy Assistant              OT Discharge Summary  Anticipated Discharge Disposition: other (see comments)  Reason for Discharge: Discharge from facility, Per MD order  Outcomes Achieved: Refer to plan of care for updates on goals achieved  Discharge Destination: SNF      Denise Estes OT  4/3/2017

## 2017-04-03 NOTE — PLAN OF CARE
Problem: Inpatient Physical Therapy  Goal: Transfer Training Goal 1 LTG- PT  Outcome: Unable to achieve outcome(s) by discharge Date Met:  04/03/17 04/01/17 1320 04/03/17 1616   Transfer Training PT LTG   Transfer Training PT LTG, Date Established 04/01/17 --    Transfer Training PT LTG, Time to Achieve by discharge --    Transfer Training PT LTG, Activity Type bed to chair /chair to bed --    Transfer Training PT LTG, [place] Level conditional independence --    Transfer Training PT LTG, Assist Device (AAD) --    Transfer Training PT LTG, Date Goal Reviewed --  04/03/17   Transfer Training PT LTG, Outcome --  goal not met   Transfer Training PT LTG, Reason Goal Not Met --  discharged from facility       Goal: Gait Training Goal STG- PT  Outcome: Outcome(s) achieved Date Met:  04/03/17 04/01/17 1320 04/03/17 1616   Gait Training PT STG   Gait Training Goal PT STG, Date Established 04/01/17 --    Gait Training Goal PT STG, Time to Achieve by discharge --    Gait Training Goal PT STG, [place] Level supervision required --    Gait Training Goal PT STG, Assist Device (AAD) --    Gait Training Goal PT STG, Distance to Achieve 75 feet while completing PLB --    Gait Training Goal PT STG, Date Goal Reviewed --  04/03/17   Gait Training Goal PT STG, Outcome --  goal met       Goal: Gait Training Goal LTG- PT  Outcome: Outcome(s) achieved Date Met:  04/03/17 04/01/17 1320 04/03/17 1616   Gait Training PT LTG   Gait Training Goal PT LTG, Date Established 04/01/17 --    Gait Training Goal PT LTG, Time to Achieve by discharge --    Gait Training Goal PT LTG, [place] Level supervision required --    Gait Training Goal PT LTG, Assist Device (AAD) --    Gait Training Goal PT LTG, Distance to Achieve 150 feet with O2 sat at 88% or higher --    Gait Training Goal PT LTG, Date Goal Reviewed --  04/03/17   Gait Training Goal PT LTG, Outcome --  goal met       Goal: Stair Training Goal LTG- PT  Outcome: Unable  to achieve outcome(s) by discharge Date Met:  04/03/17 04/01/17 1320 04/03/17 0929 04/03/17 1616   Stair Training PT LTG   Stair Training Goal PT LTG, Date Established --  04/03/17 --    Stair Training Goal PT LTG, Time to Achieve by discharge --  --    Stair Training Goal PT LTG, Number of Steps 1 --  --    Stair Training Goal PT LTG, [place] Level contact guard assist --  --    Stair Training Goal PT LTG, Assist Device (AAD) --  --    Stair Training Goal PT LTG, Date Goal Reviewed --  --  04/03/17   Stair Training Goal PT LTG, Outcome --  --  goal not met   Stair Training Goal PT LTG, Reason Goal Not Met --  --  discharged from facility

## 2017-04-04 NOTE — DISCHARGE SUMMARY
"    [facility] Medicine Services  DISCHARGE SUMMARY       Date of Admission: 3/30/2017  Date of Discharge:  4/3/2017  Primary Care Physician: Krista Matos MD    Presenting Problem/History of Present Illness:  Localized edema [R60.0]  Acute on chronic systolic congestive heart failure [I50.23]  Dyspnea due to congestive heart failure [I50.9]       Final Discharge Diagnoses:  Chronic respiratory failure with hypoxia  Active Problems:  Hyperlipidemia  Atrial fibrillation with RVR  Acute on chronic systolic congestive heart failure  Paroxysmal atrial fibrillation  Essential hypertension  Chronic obstructive lung disease       Consults:   Consults     Date and Time Order Name Status Description    3/25/2017 1904 Inpatient Consult to Cardiology Completed           Procedures Performed: none              Pertinent Test Results  Chest xray :  Moderate cardiomegaly and mild pulmonary congestion    Chief Complaint on Day of Discharge: none     Hospital Course:Pt is a 86 y.o. female with known history of congestive heart failure presenting to the ER with increasing shortness of breath.  Patient's was recently discharged and she comes back today because of increasing swelling in her extremities as well as a shortness of breath. Her symptoms have been progressively worsening. She states that her Lasix dose was decreased because of hypotension recently. And as a result, she has been putting on more weight and increasing in edema lately.  Her heart failure was treated with diuretics . We did not add any ARB or ACE as she has a tendency t odrop her BP   patient was decondionned  She was sent t o  a NH for more rehab         Condition on Discharge:  Stable     Physical Exam on Discharge:  /59  Pulse 87  Temp 97.4 °F (36.3 °C)  Resp 20  Ht 61\" (154.9 cm)  Wt 181 lb 1.6 oz (82.1 kg)  SpO2 95%  Breastfeeding? No  BMI 34.22 kg/m2  Physical Exam   Constitutional: She is oriented to " person, place, and time. She appears well-developed and well-nourished.  Non-toxic appearance. She does not have a sickly appearance. She does not appear ill. No distress. She is not intubated.   HENT:   Head: Normocephalic and atraumatic.   Right Ear: External ear normal.   Left Ear: External ear normal.   Nose: Nose normal.   Mouth/Throat: Oropharynx is clear and moist. No oropharyngeal exudate.   Eyes: Conjunctivae and EOM are normal. Pupils are equal, round, and reactive to light. Right eye exhibits no discharge and no exudate. Left eye exhibits no discharge and no exudate. Right conjunctiva is not injected. Right conjunctiva has no hemorrhage. Left conjunctiva is not injected. Left conjunctiva has no hemorrhage. No scleral icterus.   Neck: Normal range of motion. Neck supple. No hepatojugular reflux and no JVD present. No tracheal tenderness, no spinous process tenderness and no muscular tenderness present. Carotid bruit is not present. No rigidity. No tracheal deviation, no edema, no erythema and normal range of motion present. No thyroid mass and no thyromegaly present.   Cardiovascular: Normal rate, regular rhythm, normal heart sounds and intact distal pulses.   No extrasystoles are present. Exam reveals no gallop and no friction rub.    No murmur heard.  Pulmonary/Chest: Effort normal and breath sounds normal. No stridor. She is not intubated. No respiratory distress. She has no decreased breath sounds. She has no wheezes. She has no rhonchi. She has no rales. She exhibits no tenderness.   Abdominal: Soft. Normal appearance and bowel sounds are normal. She exhibits no shifting dullness, no distension, no pulsatile liver, no fluid wave, no abdominal bruit, no ascites, no pulsatile midline mass and no mass. There is no hepatosplenomegaly, splenomegaly or hepatomegaly. There is no tenderness. There is no rigidity, no rebound, no guarding, no CVA tenderness, no tenderness at McBurney's point and negative  Estes's sign. No hernia.   Genitourinary: Rectal exam shows guaiac negative stool.   Musculoskeletal: Normal range of motion. She exhibits no edema, tenderness or deformity.        Right shoulder: She exhibits no swelling.      Skin Integrity  -   She hasno right foot ulcer, non-callous right foot, no right foot warmth and no right foot blister. She has no left foot ulcer, non-callous left foot, no left foot warmth and no left foot blister..  Lymphadenopathy:     She has no cervical adenopathy.     She has no axillary adenopathy.   Neurological: She is alert and oriented to person, place, and time. She has normal strength and normal reflexes. She is not disoriented. She displays no atrophy, no tremor and normal reflexes. No cranial nerve deficit or sensory deficit. She exhibits normal muscle tone. She displays no seizure activity. Coordination and gait normal. She displays no Babinski's sign on the right side. She displays no Babinski's sign on the left side.   Skin: Skin is warm and dry. No abrasion, no bruising, no burn, no ecchymosis, no laceration, no lesion, no purpura and no rash noted. Rash is not macular, not papular, not maculopapular, not nodular, not pustular, not vesicular and not urticarial. She is not diaphoretic. No cyanosis or erythema. No pallor. Nails show no clubbing.   Psychiatric: She has a normal mood and affect. Her behavior is normal. Judgment and thought content normal. Her mood appears not anxious. Her affect is not angry, not blunt, not labile and not inappropriate. Her speech is not slurred. She is not agitated, not aggressive, not hyperactive, not slowed, not withdrawn and not combative. Thought content is not paranoid and not delusional. Cognition and memory are not impaired. She does not express impulsivity or inappropriate judgment. She does not exhibit a depressed mood. She expresses no homicidal and no suicidal ideation. She expresses no suicidal plans and no homicidal plans. She  is communicative. She exhibits normal recent memory and normal remote memory.       Discharge Disposition:  Skilled Nursing Facility ([place] - External)    Discharge Medications:   Norma Harkins   Home Medication Instructions REHANA:[id number]    Printed on:04/03/17 2109   Medication Information                      acetaminophen (TYLENOL) 500 MG tablet  Take 500 mg by mouth 1 (one) time as needed for mild pain (1-3).             albuterol (ACCUNEB) 0.63 MG/3ML nebulizer solution  Take 3 mL by nebulization Every 6 (Six) Hours As Needed for wheezing.             albuterol (PROVENTIL HFA;VENTOLIN HFA) 108 (90 BASE) MCG/ACT inhaler  Inhale 2 puffs 4 (Four) Times a Day.             amiodarone (PACERONE) 200 MG tablet  Take 1 tablet by mouth Daily.             apixaban (ELIQUIS) 5 MG tablet tablet  Take 1 tablet by mouth Every 12 (Twelve) Hours.             aspirin 81 MG tablet  Take 1 tablet by mouth Daily.             diltiaZEM (CARDIZEM) 60 MG tablet  Take 60 mg by mouth 2 (Two) Times a Day.             docusate sodium (COLACE) 100 MG capsule  Take 300 mg by mouth Daily.             furosemide (LASIX) 40 MG tablet  Take 1 tablet by mouth Daily.             HYDROcodone-acetaminophen (NORCO) 5-325 MG per tablet  Take 1 tablet by mouth Every 4 (Four) Hours As Needed for Moderate Pain (4-6).             potassium chloride (K-DUR,KLOR-CON) 10 MEQ CR tablet  Take 1 tablet by mouth 2 (Two) Times a Day.             pravastatin (PRAVACHOL) 80 MG tablet  Take 1 tablet by mouth Every Night.             SYMBICORT 160-4.5 MCG/ACT inhaler  inhale 2 puffs by mouth twice a day                 Discharge Diet:   Diet Instructions     Diet: Regular, Cardiac; Thin Liquids, No Restrictions       Discharge Diet:   Regular  Cardiac      Fluid Consistency:  Thin Liquids, No Restrictions       Diet: Regular, Cardiac; Thin Liquids, No Restrictions       Discharge Diet:   Regular  Cardiac      Fluid Consistency:  Thin Liquids, No Restrictions                  Activity at Discharge:   Activity Instructions     Activity as Tolerated           Activity as Tolerated                     Discharge Care Plan/Instructions: see pcp in 1 week     Follow-up Appointments:   Future Appointments  Date Time Provider Department Center   5/2/2017 11:30 AM Sofya Graham MD [facility] None   5/12/2017 10:00 AM Krista Matos MD [facility] None   7/27/2017 2:15 PM Sofya Graham MD [facility] None       Test Results Pending at Discharge: none     Heather Barlow MD  04/03/17  9:09 PM    Time: 35min

## 2017-04-24 ENCOUNTER — OFFICE VISIT (OUTPATIENT)
Dept: INTERNAL MEDICINE | Facility: CLINIC | Age: 82
End: 2017-04-24

## 2017-04-24 VITALS
SYSTOLIC BLOOD PRESSURE: 100 MMHG | WEIGHT: 184 LBS | BODY MASS INDEX: 34.74 KG/M2 | DIASTOLIC BLOOD PRESSURE: 70 MMHG | HEIGHT: 61 IN

## 2017-04-24 DIAGNOSIS — I48.0 PAROXYSMAL ATRIAL FIBRILLATION (HCC): ICD-10-CM

## 2017-04-24 DIAGNOSIS — J43.9 PULMONARY EMPHYSEMA, UNSPECIFIED EMPHYSEMA TYPE (HCC): ICD-10-CM

## 2017-04-24 DIAGNOSIS — J96.11 CHRONIC RESPIRATORY FAILURE WITH HYPOXIA (HCC): Primary | Chronic | ICD-10-CM

## 2017-04-24 DIAGNOSIS — I10 ESSENTIAL HYPERTENSION: ICD-10-CM

## 2017-04-24 PROCEDURE — 99213 OFFICE O/P EST LOW 20 MIN: CPT | Performed by: INTERNAL MEDICINE

## 2017-04-24 RX ORDER — ACETAMINOPHEN 160 MG
TABLET,CHEWABLE ORAL
Qty: 30 TABLET | Refills: 5 | Status: ON HOLD | OUTPATIENT
Start: 2017-04-24 | End: 2017-10-30 | Stop reason: ALTCHOICE

## 2017-04-24 RX ORDER — DILTIAZEM HYDROCHLORIDE 60 MG/1
60 TABLET, FILM COATED ORAL 2 TIMES DAILY
Qty: 60 TABLET | Refills: 5 | Status: SHIPPED | OUTPATIENT
Start: 2017-04-24 | End: 2017-06-15

## 2017-04-24 NOTE — PROGRESS NOTES
Subjective   Norma Harkins is a 86 y.o. female     Patient is in for recheck on HTN, paroxysmal atrial fibrillation, CHF, COPD , chronic respiratory failure.      Her breathing is stable at present. She denies worsening cough or purulent sputum. She is short of breath on exertion which is at baseline.  Patient is on Oxygen at 3 liters 24 hours a day.  O2 sat is 93% on 3 liters of Oxygen at rest and drops to 86% off Oxygen.    Her BP has been running low, and dose of Cardizem was decreased to 60 mg bid.  She denies chest pain or palpitations. Edema in her legs is controlled on Lasix.  Patient remains in NSR, on Amiodarone.  She is anticoagulated with Eliquis. Denies any bleeding related to therapy with Eliquis.    Past Medical History:   Diagnosis Date   • Chronic obstructive lung disease 04/26/2016    on oxygen      • Chronic respiratory failure with hypoxia 04/26/2016   • Essential hypertension 09/08/2015   • Hyperlipidemia    • Obesity    • Paroxysmal atrial fibrillation 03/15/2016     Past Surgical History:   Procedure Laterality Date   • CATARACT EXTRACTION     • HYSTERECTOMY     • JOINT REPLACEMENT      total knee        Social History   Substance Use Topics   • Smoking status: Former Smoker   • Smokeless tobacco: Never Used      Comment:  Patient quit smoking more than 30 years ago, she smoked 1pd for 30 years prior to that   • Alcohol use No     Family History   Problem Relation Age of Onset   • Heart disease Other    • Hypertension Other    • Lung cancer Other      No Known Allergies  Current Outpatient Prescriptions on File Prior to Visit   Medication Sig Dispense Refill   • acetaminophen (TYLENOL) 500 MG tablet Take 500 mg by mouth 1 (one) time as needed for mild pain (1-3).     • albuterol (ACCUNEB) 0.63 MG/3ML nebulizer solution Take 3 mL by nebulization Every 6 (Six) Hours As Needed for wheezing. 129 vial 1   • albuterol (PROVENTIL HFA;VENTOLIN HFA) 108 (90 BASE) MCG/ACT inhaler Inhale 2 puffs 4 (Four)  Times a Day. 1 inhaler 1   • amiodarone (PACERONE) 200 MG tablet Take 1 tablet by mouth Daily. 30 tablet 6   • diltiaZEM (CARDIZEM) 60 MG tablet Take 60 mg by mouth 2 (Two) Times a Day.     • docusate sodium (COLACE) 100 MG capsule Take 300 mg by mouth Daily.     • furosemide (LASIX) 40 MG tablet Take 1 tablet by mouth Daily. 30 tablet 5   • potassium chloride (K-DUR,KLOR-CON) 10 MEQ CR tablet Take 1 tablet by mouth 2 (Two) Times a Day. 60 tablet 5   • pravastatin (PRAVACHOL) 80 MG tablet Take 1 tablet by mouth Every Night. 30 tablet 0   • SYMBICORT 160-4.5 MCG/ACT inhaler inhale 2 puffs by mouth twice a day 30.6 inhaler 2   • [DISCONTINUED] apixaban (ELIQUIS) 5 MG tablet tablet Take 1 tablet by mouth Every 12 (Twelve) Hours. 60 tablet 6   • [DISCONTINUED] aspirin 81 MG tablet Take 1 tablet by mouth Daily. 30 tablet 1   • [DISCONTINUED] HYDROcodone-acetaminophen (NORCO) 5-325 MG per tablet Take 1 tablet by mouth Every 4 (Four) Hours As Needed for Moderate Pain (4-6). 30 tablet 0     No current facility-administered medications on file prior to visit.      Review of Systems   Constitutional: Negative for fatigue.   HENT: Negative.    Eyes: Negative.    Respiratory: Positive for cough and shortness of breath.    Cardiovascular: Negative for chest pain, palpitations and leg swelling.   Genitourinary: Negative for flank pain and frequency.   Musculoskeletal: Positive for back pain.   Neurological: Negative for dizziness, light-headedness and headaches.       Objective   Physical Exam   Constitutional: She is oriented to person, place, and time. She appears well-developed and well-nourished.   HENT:   Head: Normocephalic and atraumatic.   Nose: Nose normal.   Mouth/Throat: Oropharynx is clear and moist.   Eyes: Conjunctivae and EOM are normal.   Neck: Neck supple. No JVD present. No thyromegaly present.   Cardiovascular: Normal rate and regular rhythm.    No murmur heard.  Pulmonary/Chest: Effort normal.   Diminished  breath sounds   Abdominal: Soft. Bowel sounds are normal. She exhibits no mass.   Neurological: She is alert and oriented to person, place, and time. She has normal reflexes.   Skin: Skin is warm and dry.   Psychiatric: She has a normal mood and affect. Her behavior is normal. Thought content normal.   Nursing note and vitals reviewed.    Admission on 03/30/2017, Discharged on 04/03/2017   No results displayed because visit has over 200 results.      Lab Requisition on 03/29/2017   Component Date Value Ref Range Status   • Glucose 03/29/2017 130* 60 - 100 mg/dL Final   • BUN 03/29/2017 20  7 - 21 mg/dL Final   • Creatinine 03/29/2017 0.81  0.50 - 1.00 mg/dL Final   • Sodium 03/29/2017 133* 137 - 145 mmol/L Final   • Potassium 03/29/2017 3.3* 3.5 - 5.1 mmol/L Final   • Chloride 03/29/2017 89* 95 - 110 mmol/L Final   • CO2 03/29/2017 33.0* 22.0 - 31.0 mmol/L Final   • Calcium 03/29/2017 8.5  8.4 - 10.2 mg/dL Final   • Total Protein 03/29/2017 6.5  6.3 - 8.6 g/dL Final   • Albumin 03/29/2017 3.30* 3.40 - 4.80 g/dL Final   • ALT (SGPT) 03/29/2017 53* 9 - 52 U/L Final   • AST (SGOT) 03/29/2017 47* 14 - 36 U/L Final   • Alkaline Phosphatase 03/29/2017 113  38 - 126 U/L Final   • Total Bilirubin 03/29/2017 0.6  0.2 - 1.3 mg/dL Final   • eGFR Non African Amer 03/29/2017 67  39 - 90 mL/min/1.73 Final   • Globulin 03/29/2017 3.2  2.3 - 3.5 gm/dL Final   • A/G Ratio 03/29/2017 1.0* 1.1 - 1.8 g/dL Final   • BUN/Creatinine Ratio 03/29/2017 24.7  7.0 - 25.0 Final   • Anion Gap 03/29/2017 11.0  5.0 - 15.0 mmol/L Final   • Free T4 03/29/2017 1.40  0.78 - 2.19 ng/dL Final   • TSH 03/29/2017 4.540  0.460 - 4.680 mIU/mL Final   • 25 Hydroxy, Vitamin D 03/29/2017 <12.8* 30.0 - 100.0 ng/ml Final   • Color, UA 03/29/2017 Red* Yellow, Straw, Dark Yellow, Rylee Final   • Appearance, UA 03/29/2017 Clear  Clear Final   • pH, UA 03/29/2017 6.5  5.0 - 9.0 Final   • Specific Gravity, UA 03/29/2017 1.013  1.003 - 1.030 Final   • Glucose, UA  03/29/2017 Negative  Negative Final   • Ketones, UA 03/29/2017 Negative  Negative Final   • Bilirubin, UA 03/29/2017 Negative  Negative Final   • Blood, UA 03/29/2017 Negative  Negative Final   • Protein, UA 03/29/2017 Negative  Negative Final   • Leuk Esterase, UA 03/29/2017 Negative  Negative Final   • Nitrite, UA 03/29/2017 Negative  Negative Final   • Urobilinogen, UA 03/29/2017 2.0 E.U./dL* 0.2 - 1.0 E.U./dL Final   • Urine Culture 03/29/2017 No growth at 24 hours   Final   • proBNP 03/29/2017 4090.0* 0.0 - 1800.0 pg/mL Final   • WBC 03/29/2017 9.60  3.20 - 9.80 10*3/mm3 Final   • RBC 03/29/2017 2.90* 3.77 - 5.16 10*6/mm3 Final   • Hemoglobin 03/29/2017 9.1* 12.0 - 15.5 g/dL Final   • Hematocrit 03/29/2017 27.8* 35.0 - 45.0 % Final   • MCV 03/29/2017 95.9  80.0 - 98.0 fL Final   • MCH 03/29/2017 31.4  26.5 - 34.0 pg Final   • MCHC 03/29/2017 32.7  31.4 - 36.0 g/dL Final   • RDW 03/29/2017 14.2  11.5 - 14.5 % Final   • RDW-SD 03/29/2017 49.5* 36.4 - 46.3 fl Final   • MPV 03/29/2017 10.3  8.0 - 12.0 fL Final   • Platelets 03/29/2017 249  150 - 450 10*3/mm3 Final   • Neutrophil % 03/29/2017 79.2  37.0 - 80.0 % Final   • Lymphocyte % 03/29/2017 8.6* 10.0 - 50.0 % Final   • Monocyte % 03/29/2017 5.4  0.0 - 12.0 % Final   • Eosinophil % 03/29/2017 5.3  0.0 - 7.0 % Final   • Basophil % 03/29/2017 0.4  0.0 - 2.0 % Final   • Immature Grans % 03/29/2017 1.1* 0.0 - 0.5 % Final   • Neutrophils, Absolute 03/29/2017 7.59  2.00 - 8.60 10*3/mm3 Final   • Lymphocytes, Absolute 03/29/2017 0.83  0.60 - 4.20 10*3/mm3 Final   • Monocytes, Absolute 03/29/2017 0.52  0.00 - 0.90 10*3/mm3 Final   • Eosinophils, Absolute 03/29/2017 0.51  0.00 - 0.70 10*3/mm3 Final   • Basophils, Absolute 03/29/2017 0.04  0.00 - 0.20 10*3/mm3 Final   • Immature Grans, Absolute 03/29/2017 0.11* 0.00 - 0.02 10*3/mm3 Final   Admission on 03/24/2017, Discharged on 03/28/2017   No results displayed because visit has over 200 results.      Admission on  02/15/2017, Discharged on 02/20/2017   Component Date Value Ref Range Status   • Glucose 02/15/2017 171* 60 - 100 mg/dL Final   • BUN 02/15/2017 16  7 - 21 mg/dL Final   • Creatinine 02/15/2017 0.82  0.50 - 1.00 mg/dL Final   • Sodium 02/15/2017 132* 137 - 145 mmol/L Final   • Potassium 02/15/2017 3.5  3.5 - 5.1 mmol/L Final   • Chloride 02/15/2017 91* 95 - 110 mmol/L Final   • CO2 02/15/2017 32.0* 22.0 - 31.0 mmol/L Final   • Calcium 02/15/2017 8.9  8.4 - 10.2 mg/dL Final   • Total Protein 02/15/2017 6.5  6.3 - 8.6 g/dL Final   • Albumin 02/15/2017 3.50  3.40 - 4.80 g/dL Final   • ALT (SGPT) 02/15/2017 35  9 - 52 U/L Final   • AST (SGOT) 02/15/2017 26  14 - 36 U/L Final   • Alkaline Phosphatase 02/15/2017 43  38 - 126 U/L Final   • Total Bilirubin 02/15/2017 0.5  0.2 - 1.3 mg/dL Final   • eGFR Non African Amer 02/15/2017 66  39 - 90 mL/min/1.73 Final   • Globulin 02/15/2017 3.0  2.3 - 3.5 gm/dL Final   • A/G Ratio 02/15/2017 1.2  1.1 - 1.8 g/dL Final   • BUN/Creatinine Ratio 02/15/2017 19.5  7.0 - 25.0 Final   • Anion Gap 02/15/2017 9.0  5.0 - 15.0 mmol/L Final   • proBNP 02/15/2017 1040.0  0.0 - 1800.0 pg/mL Final   • Troponin I 02/15/2017 0.035* <=0.034 ng/mL Final   • Protime 02/15/2017 13.6  11.1 - 15.3 Seconds Final   • INR 02/15/2017 1.04  0.80 - 1.20 Final   • Extra Tube 02/15/2017 hold for add-on   Final   • Extra Tube 02/15/2017 Hold for add-ons.   Final   • Extra Tube 02/15/2017 hold for add-on   Final   • Extra Tube 02/15/2017 Hold for add-ons.   Final   • WBC 02/15/2017 10.35* 3.20 - 9.80 10*3/mm3 Final   • RBC 02/15/2017 3.62* 3.77 - 5.16 10*6/mm3 Final   • Hemoglobin 02/15/2017 11.9* 12.0 - 15.5 g/dL Final   • Hematocrit 02/15/2017 34.5* 35.0 - 45.0 % Final   • MCV 02/15/2017 95.3  80.0 - 98.0 fL Final   • MCH 02/15/2017 32.9  26.5 - 34.0 pg Final   • MCHC 02/15/2017 34.5  31.4 - 36.0 g/dL Final   • RDW 02/15/2017 13.3  11.5 - 14.5 % Final   • RDW-SD 02/15/2017 45.9  36.4 - 46.3 fl Final   • MPV  02/15/2017 11.1  8.0 - 12.0 fL Final   • Platelets 02/15/2017 129* 150 - 450 10*3/mm3 Final   • Neutrophil % 02/15/2017 90.0* 37.0 - 80.0 % Final   • Lymphocyte % 02/15/2017 3.8* 10.0 - 50.0 % Final   • Monocyte % 02/15/2017 5.5  0.0 - 12.0 % Final   • Eosinophil % 02/15/2017 0.1  0.0 - 7.0 % Final   • Basophil % 02/15/2017 0.1  0.0 - 2.0 % Final   • Immature Grans % 02/15/2017 0.5  0.0 - 0.5 % Final   • Neutrophils, Absolute 02/15/2017 9.32* 2.00 - 8.60 10*3/mm3 Final   • Lymphocytes, Absolute 02/15/2017 0.39* 0.60 - 4.20 10*3/mm3 Final   • Monocytes, Absolute 02/15/2017 0.57  0.00 - 0.90 10*3/mm3 Final   • Eosinophils, Absolute 02/15/2017 0.01  0.00 - 0.70 10*3/mm3 Final   • Basophils, Absolute 02/15/2017 0.01  0.00 - 0.20 10*3/mm3 Final   • Immature Grans, Absolute 02/15/2017 0.05* 0.00 - 0.02 10*3/mm3 Final   • RBC Morphology 02/15/2017 Normal  Normal Final   • WBC Morphology 02/15/2017 Normal  Normal Final   • Platelet Estimate 02/15/2017 Adequate  Normal Final   • Glucose 02/17/2017 292* 60 - 100 mg/dL Final   • BUN 02/17/2017 25* 7 - 21 mg/dL Final   • Creatinine 02/17/2017 0.73  0.50 - 1.00 mg/dL Final   • Sodium 02/17/2017 134* 137 - 145 mmol/L Final   • Potassium 02/17/2017 4.3  3.5 - 5.1 mmol/L Final   • Chloride 02/17/2017 93* 95 - 110 mmol/L Final   • CO2 02/17/2017 34.0* 22.0 - 31.0 mmol/L Final   • Calcium 02/17/2017 9.3  8.4 - 10.2 mg/dL Final   • eGFR Non African Amer 02/17/2017 76  >60 mL/min/1.73 Final   • BUN/Creatinine Ratio 02/17/2017 34.2* 7.0 - 25.0 Final   • Anion Gap 02/17/2017 7.0  5.0 - 15.0 mmol/L Final   • Troponin I 02/17/2017 0.032  <=0.034 ng/mL Final   • Glucose 02/18/2017 249* 60 - 100 mg/dL Final   • BUN 02/18/2017 26* 7 - 21 mg/dL Final   • Creatinine 02/18/2017 0.81  0.50 - 1.00 mg/dL Final   • Sodium 02/18/2017 134* 137 - 145 mmol/L Final   • Potassium 02/18/2017 4.9  3.5 - 5.1 mmol/L Final   • Chloride 02/18/2017 94* 95 - 110 mmol/L Final   • CO2 02/18/2017 36.0* 22.0 - 31.0  mmol/L Final   • Calcium 02/18/2017 8.9  8.4 - 10.2 mg/dL Final   • eGFR Non  Amer 02/18/2017 67  >60 mL/min/1.73 Final   • BUN/Creatinine Ratio 02/18/2017 32.1* 7.0 - 25.0 Final   • Anion Gap 02/18/2017 4.0* 5.0 - 15.0 mmol/L Final   • WBC 02/18/2017 13.36* 3.20 - 9.80 10*3/mm3 Final   • RBC 02/18/2017 3.65* 3.77 - 5.16 10*6/mm3 Final   • Hemoglobin 02/18/2017 12.0  12.0 - 15.5 g/dL Final   • Hematocrit 02/18/2017 35.4  35.0 - 45.0 % Final   • MCV 02/18/2017 97.0  80.0 - 98.0 fL Final   • MCH 02/18/2017 32.9  26.5 - 34.0 pg Final   • MCHC 02/18/2017 33.9  31.4 - 36.0 g/dL Final   • RDW 02/18/2017 13.4  11.5 - 14.5 % Final   • RDW-SD 02/18/2017 47.8* 36.4 - 46.3 fl Final   • MPV 02/18/2017 11.1  8.0 - 12.0 fL Final   • Platelets 02/18/2017 161  150 - 450 10*3/mm3 Final   • Neutrophil % 02/18/2017 88.3* 37.0 - 80.0 % Final   • Lymphocyte % 02/18/2017 5.2* 10.0 - 50.0 % Final   • Monocyte % 02/18/2017 6.0  0.0 - 12.0 % Final   • Eosinophil % 02/18/2017 0.0  0.0 - 7.0 % Final   • Basophil % 02/18/2017 0.1  0.0 - 2.0 % Final   • Immature Grans % 02/18/2017 0.4  0.0 - 0.5 % Final   • Neutrophils, Absolute 02/18/2017 11.80* 2.00 - 8.60 10*3/mm3 Final   • Lymphocytes, Absolute 02/18/2017 0.70  0.60 - 4.20 10*3/mm3 Final   • Monocytes, Absolute 02/18/2017 0.80  0.00 - 0.90 10*3/mm3 Final   • Eosinophils, Absolute 02/18/2017 0.00  0.00 - 0.70 10*3/mm3 Final   • Basophils, Absolute 02/18/2017 0.01  0.00 - 0.20 10*3/mm3 Final   • Immature Grans, Absolute 02/18/2017 0.05* 0.00 - 0.02 10*3/mm3 Final   • Glucose 02/19/2017 224* 70 - 130 mg/dL Final   • Glucose 02/19/2017 296* 70 - 130 mg/dL Final   • Glucose 02/19/2017 225* 70 - 130 mg/dL Final   • Glucose 02/19/2017 311* 70 - 130 mg/dL Final   • Glucose 02/20/2017 254* 70 - 130 mg/dL Final   • Glucose 02/20/2017 270* 70 - 130 mg/dL Final   Admission on 02/03/2017, Discharged on 02/05/2017   Component Date Value Ref Range Status   • Glucose 02/03/2017 126* 60 - 100 mg/dL  Final   • BUN 02/03/2017 13  7 - 21 mg/dL Final   • Creatinine 02/03/2017 0.68  0.50 - 1.00 mg/dL Final   • Sodium 02/03/2017 138  137 - 145 mmol/L Final   • Potassium 02/03/2017 3.9  3.5 - 5.1 mmol/L Final   • Chloride 02/03/2017 96  95 - 110 mmol/L Final   • CO2 02/03/2017 33.0* 22.0 - 31.0 mmol/L Final   • Calcium 02/03/2017 9.5  8.4 - 10.2 mg/dL Final   • Total Protein 02/03/2017 7.4  6.3 - 8.6 g/dL Final   • Albumin 02/03/2017 4.20  3.40 - 4.80 g/dL Final   • ALT (SGPT) 02/03/2017 30  9 - 52 U/L Final   • AST (SGOT) 02/03/2017 27  14 - 36 U/L Final   • Alkaline Phosphatase 02/03/2017 73  38 - 126 U/L Final   • Total Bilirubin 02/03/2017 0.8  0.2 - 1.3 mg/dL Final   • eGFR Non African Amer 02/03/2017 82  39 - 90 mL/min/1.73 Final   • Globulin 02/03/2017 3.2  2.3 - 3.5 gm/dL Final   • A/G Ratio 02/03/2017 1.3  1.1 - 1.8 g/dL Final   • BUN/Creatinine Ratio 02/03/2017 19.1  7.0 - 25.0 Final   • Anion Gap 02/03/2017 9.0  5.0 - 15.0 mmol/L Final   • proBNP 02/03/2017 897.0  0.0 - 1800.0 pg/mL Final   • Troponin I 02/03/2017 0.022  <=0.034 ng/mL Final   • Extra Tube 02/03/2017 Hold for add-ons.   Final   • Extra Tube 02/03/2017 hold for add-on   Final   • WBC 02/03/2017 11.82* 3.20 - 9.80 10*3/mm3 Final   • RBC 02/03/2017 3.74* 3.77 - 5.16 10*6/mm3 Final   • Hemoglobin 02/03/2017 12.4  12.0 - 15.5 g/dL Final   • Hematocrit 02/03/2017 36.3  35.0 - 45.0 % Final   • MCV 02/03/2017 97.1  80.0 - 98.0 fL Final   • MCH 02/03/2017 33.2  26.5 - 34.0 pg Final   • MCHC 02/03/2017 34.2  31.4 - 36.0 g/dL Final   • RDW 02/03/2017 13.3  11.5 - 14.5 % Final   • RDW-SD 02/03/2017 46.1  36.4 - 46.3 fl Final   • MPV 02/03/2017 11.0  8.0 - 12.0 fL Final   • Platelets 02/03/2017 158  150 - 450 10*3/mm3 Final   • Neutrophil % 02/03/2017 68.5  37.0 - 80.0 % Final   • Lymphocyte % 02/03/2017 5.2* 10.0 - 50.0 % Final   • Monocyte % 02/03/2017 6.9  0.0 - 12.0 % Final   • Eosinophil % 02/03/2017 18.8* 0.0 - 7.0 % Final   • Basophil % 02/03/2017  0.3  0.0 - 2.0 % Final   • Immature Grans % 02/03/2017 0.3  0.0 - 0.5 % Final   • Neutrophils, Absolute 02/03/2017 8.10  2.00 - 8.60 10*3/mm3 Final   • Lymphocytes, Absolute 02/03/2017 0.61  0.60 - 4.20 10*3/mm3 Final   • Monocytes, Absolute 02/03/2017 0.82  0.00 - 0.90 10*3/mm3 Final   • Eosinophils, Absolute 02/03/2017 2.22* 0.00 - 0.70 10*3/mm3 Final   • Basophils, Absolute 02/03/2017 0.04  0.00 - 0.20 10*3/mm3 Final   • Immature Grans, Absolute 02/03/2017 0.03* 0.00 - 0.02 10*3/mm3 Final   • nRBC 02/03/2017 0.0  0.0 - 0.0 /100 WBC Final   • Color, UA 02/03/2017 Yellow  Yellow, Straw, Dark Yellow, Rylee Final   • Appearance, UA 02/03/2017 Clear  Clear Final   • pH, UA 02/03/2017 7.5  5.0 - 9.0 Final   • Specific Gravity, UA 02/03/2017 1.006  1.003 - 1.030 Final   • Glucose, UA 02/03/2017 Negative  Negative Final   • Ketones, UA 02/03/2017 Negative  Negative Final   • Bilirubin, UA 02/03/2017 Negative  Negative Final   • Blood, UA 02/03/2017 Negative  Negative Final   • Protein, UA 02/03/2017 Negative  Negative Final   • Leuk Esterase, UA 02/03/2017 Negative  Negative Final   • Nitrite, UA 02/03/2017 Negative  Negative Final   • Urobilinogen, UA 02/03/2017 0.2 E.U./dL  0.2 - 1.0 E.U./dL Final   • Extra Tube 02/03/2017 hold for add-on   Final   • Extra Tube 02/03/2017 hold for add-on   Final   • Extra Tube 02/03/2017 Hold for add-ons.   Final   Admission on 01/20/2017, Discharged on 01/22/2017   Component Date Value Ref Range Status   • WBC 01/20/2017 9.4  3.2 - 9.8 x1000/uL Final   • RBC 01/20/2017 4.16  3.77 - 5.16 hudson/mm3 Final   • Hemoglobin 01/20/2017 13.6  12.0 - 15.5 gm/dl Final   • Hematocrit 01/20/2017 40.0  35.0 - 45.0 % Final   • MCV 01/20/2017 96.2  80.0 - 98.0 fl Final   • MCH 01/20/2017 32.7  26.0 - 34.0 pg Final   • MCHC 01/20/2017 34.0  31.4 - 36.0 gm/dl Final   • RDW 01/20/2017 13.3  11.5 - 14.5 % Final   • Platelets 01/20/2017 206  150 - 450 x1000/mm3 Final   • MPV 01/20/2017 10.7  8.0 - 12.0 fl  Final   • Neutrophil Rel % 01/20/2017 68.2  37.0 - 80.0 % Final   • Lymphocyte Rel % 01/20/2017 10.4  10.0 - 50.0 % Final   • Monocyte Rel % 01/20/2017 8.2  0.0 - 12.0 % Final   • Eosinophil Rel % 01/20/2017 11.8* 0.0 - 7.0 % Final   • Basophil Rel % 01/20/2017 1.0  0.0 - 2.0 % Final   • Immature Granulocyte Rel % 01/20/2017 0.40  0.00 - 0.50 % Final   • Neutrophils Absolute 01/20/2017 6.38  2.00 - 8.60 x1000/uL Final   • Lymphocytes Absolute 01/20/2017 0.97  0.60 - 4.20 x1000/uL Final   • Monocytes Absolute 01/20/2017 0.77  0.00 - 0.90 x1000/uL Final   • Eosinophils Absolute 01/20/2017 1.10* 0.00 - 0.70 x1000/uL Final   • Basophils Absolute 01/20/2017 0.09  0.00 - 0.20 x1000/uL Final   • Immature Granulocytes Absolute 01/20/2017 0.040* 0.005 - 0.022 x1000/uL Final   • Creatine Kinase 01/20/2017 81  30 - 135 U/L Final   • Sodium 01/20/2017 139  137 - 145 mmol/L Final   • Potassium 01/20/2017 4.0  3.5 - 5.1 mmol/L Final   • Chloride 01/20/2017 95  95 - 110 mmol/L Final   • CO2 01/20/2017 32* 22 - 31 mmol/L Final   • Anion Gap 01/20/2017 12.0  5.0 - 15.0 mmol/L Final   • Glucose 01/20/2017 142* 60 - 100 mg/dl Final   • BUN 01/20/2017 19  7 - 21 mg/dl Final   • Creatinine 01/20/2017 1.0  0.5 - 1.0 mg/dl Final   • GFR MDRD Non  01/20/2017 53  39 - 90 mL/min/1.73 sq.M Final   • GFR MDRD  01/20/2017 64  39 - 90 mL/min/1.73 sq.M Final   • Calcium 01/20/2017 9.7  8.4 - 10.2 mg/dl Final   • Total Protein 01/20/2017 7.6  6.3 - 8.6 gm/dl Final   • Albumin 01/20/2017 4.0  3.4 - 4.8 gm/dl Final   • Total Bilirubin 01/20/2017 0.5  0.2 - 1.3 mg/dl Final   • Alkaline Phosphatase 01/20/2017 104  38 - 126 U/L Final   • AST (SGOT) 01/20/2017 29  14 - 36 U/L Final   • ALT (SGPT) 01/20/2017 30  9 - 52 U/L Final   • PTT 01/20/2017 31.4  20.0 - 40.3 seconds Final   • Protime 01/20/2017 13.5  11.1 - 15.3 seconds Final   • INR 01/20/2017 1.0  0.8 - 1.2 Final   • Troponin I 01/20/2017 0.020  0.000 - 0.034  ng/ml Final   • CKMB 01/20/2017 2.5  0.0 - 5.0 ng/ml Final   • Creatine Kinase 01/21/2017 77  30 - 135 U/L Final   • Troponin I 01/21/2017 <0.012  0.000 - 0.034 ng/ml Final   • CKMB 01/21/2017 2.4  0.0 - 5.0 ng/ml Final   • Troponin I 01/21/2017 0.019  <=0.034 ng/mL Final   • Troponin I 01/21/2017 <0.012  <=0.034 ng/mL Final   • Glucose 01/21/2017 99  60 - 100 mg/dL Final   • BUN 01/21/2017 17  7 - 21 mg/dL Final   • Creatinine 01/21/2017 0.82  0.50 - 1.00 mg/dL Final   • Sodium 01/21/2017 140  137 - 145 mmol/L Final   • Potassium 01/21/2017 3.9  3.5 - 5.1 mmol/L Final   • Chloride 01/21/2017 99  95 - 110 mmol/L Final   • CO2 01/21/2017 32.0* 22.0 - 31.0 mmol/L Final   • Calcium 01/21/2017 9.0  8.4 - 10.2 mg/dL Final   • eGFR Non African Amer 01/21/2017 66  39 - 90 mL/min/1.73 Final   • BUN/Creatinine Ratio 01/21/2017 20.7  7.0 - 25.0 Final   • Anion Gap 01/21/2017 9.0  5.0 - 15.0 mmol/L Final   • WBC 01/21/2017 8.27  3.20 - 9.80 10*3/mm3 Final   • RBC 01/21/2017 3.81  3.77 - 5.16 10*6/mm3 Final   • Hemoglobin 01/21/2017 12.4  12.0 - 15.5 g/dL Final   • Hematocrit 01/21/2017 37.2  35.0 - 45.0 % Final   • MCV 01/21/2017 97.6  80.0 - 98.0 fL Final   • MCH 01/21/2017 32.5  26.5 - 34.0 pg Final   • MCHC 01/21/2017 33.3  31.4 - 36.0 g/dL Final   • RDW 01/21/2017 13.7  11.5 - 14.5 % Final   • RDW-SD 01/21/2017 49.1* 36.4 - 46.3 fl Final   • MPV 01/21/2017 11.8  8.0 - 12.0 fL Final   • Platelets 01/21/2017 198  150 - 450 10*3/mm3 Final   • Troponin I 01/21/2017 0.017  <=0.034 ng/mL Final   • WBC 01/22/2017 8.36  3.20 - 9.80 10*3/mm3 Final   • RBC 01/22/2017 3.99  3.77 - 5.16 10*6/mm3 Final   • Hemoglobin 01/22/2017 13.1  12.0 - 15.5 g/dL Final   • Hematocrit 01/22/2017 38.2  35.0 - 45.0 % Final   • MCV 01/22/2017 95.7  80.0 - 98.0 fL Final   • MCH 01/22/2017 32.8  26.5 - 34.0 pg Final   • MCHC 01/22/2017 34.3  31.4 - 36.0 g/dL Final   • RDW 01/22/2017 13.4  11.5 - 14.5 % Final   • RDW-SD 01/22/2017 46.6* 36.4 - 46.3 fl  Final   • MPV 01/22/2017 10.9  8.0 - 12.0 fL Final   • Platelets 01/22/2017 201  150 - 450 10*3/mm3 Final   • Glucose 01/22/2017 107* 60 - 100 mg/dL Final   • BUN 01/22/2017 22* 7 - 21 mg/dL Final   • Creatinine 01/22/2017 0.84  0.50 - 1.00 mg/dL Final   • Sodium 01/22/2017 139  137 - 145 mmol/L Final   • Potassium 01/22/2017 4.1  3.5 - 5.1 mmol/L Final   • Chloride 01/22/2017 99  95 - 110 mmol/L Final   • CO2 01/22/2017 29.0  22.0 - 31.0 mmol/L Final   • Calcium 01/22/2017 9.0  8.4 - 10.2 mg/dL Final   • Total Protein 01/22/2017 6.7  6.3 - 8.6 g/dL Final   • Albumin 01/22/2017 3.60  3.40 - 4.80 g/dL Final   • ALT (SGPT) 01/22/2017 36  9 - 52 U/L Final   • AST (SGOT) 01/22/2017 20  14 - 36 U/L Final   • Alkaline Phosphatase 01/22/2017 99  38 - 126 U/L Final   • Total Bilirubin 01/22/2017 0.6  0.2 - 1.3 mg/dL Final   • eGFR Non African Amer 01/22/2017 64  >60 mL/min/1.73 Final   • Globulin 01/22/2017 3.1  2.3 - 3.5 gm/dL Final   • A/G Ratio 01/22/2017 1.2  1.1 - 1.8 g/dL Final   • BUN/Creatinine Ratio 01/22/2017 26.2* 7.0 - 25.0 Final   • Anion Gap 01/22/2017 11.0  5.0 - 15.0 mmol/L Final   Hospital Outpatient Visit on 12/14/2016   Component Date Value Ref Range Status   • WBC 12/14/2016 8.5  3.2 - 9.8 x1000/uL Final   • RBC 12/14/2016 4.15  3.77 - 5.16 hudson/mm3 Final   • Hemoglobin 12/14/2016 13.6  12.0 - 15.5 gm/dl Final   • Hematocrit 12/14/2016 40.4  35.0 - 45.0 % Final   • MCV 12/14/2016 97.3  80.0 - 98.0 fl Final   • MCH 12/14/2016 32.8  26.0 - 34.0 pg Final   • MCHC 12/14/2016 33.7  31.4 - 36.0 gm/dl Final   • RDW 12/14/2016 13.8  11.5 - 14.5 % Final   • Platelets 12/14/2016 186  150 - 450 x1000/mm3 Final   • MPV 12/14/2016 10.2  8.0 - 12.0 fl Final   • Neutrophil Rel % 12/14/2016 71.5  37.0 - 80.0 % Final   • Lymphocyte Rel % 12/14/2016 9.6* 10.0 - 50.0 % Final   • Monocyte Rel % 12/14/2016 9.5  0.0 - 12.0 % Final   • Eosinophil Rel % 12/14/2016 8.3* 0.0 - 7.0 % Final   • Basophil Rel % 12/14/2016 0.6  0.0 -  2.0 % Final   • Immature Granulocyte Rel % 12/14/2016 0.50  0.00 - 0.50 % Final   • Neutrophils Absolute 12/14/2016 6.10  2.00 - 8.60 x1000/uL Final   • Lymphocytes Absolute 12/14/2016 0.82  0.60 - 4.20 x1000/uL Final   • Monocytes Absolute 12/14/2016 0.81  0.00 - 0.90 x1000/uL Final   • Eosinophils Absolute 12/14/2016 0.71* 0.00 - 0.70 x1000/uL Final   • Basophils Absolute 12/14/2016 0.05  0.00 - 0.20 x1000/uL Final   • Immature Granulocytes Absolute 12/14/2016 0.040* 0.005 - 0.022 x1000/uL Final   • nRBC 12/14/2016 0.0  0.0 - 0.2 % Final   • nRBC 12/14/2016 0.000  x1000/uL Final   • Sodium 12/14/2016 138  137 - 145 mmol/L Final   • Potassium 12/14/2016 3.9  3.5 - 5.1 mmol/L Final   • Chloride 12/14/2016 94* 95 - 110 mmol/L Final   • CO2 12/14/2016 31  22 - 31 mmol/L Final   • Anion Gap 12/14/2016 13.0  5.0 - 15.0 mmol/L Final   • Glucose 12/14/2016 88  60 - 100 mg/dl Final   • BUN 12/14/2016 25* 7 - 21 mg/dl Final   • Creatinine 12/14/2016 1.0  0.5 - 1.0 mg/dl Final   • GFR MDRD Non  12/14/2016 53  39 - 90 mL/min/1.73 sq.M Final   • GFR MDRD  12/14/2016 64  39 - 90 mL/min/1.73 sq.M Final   • Calcium 12/14/2016 10.0  8.4 - 10.2 mg/dl Final   • Magnesium 12/14/2016 1.87  1.60 - 2.30 mg/dl Final   • Protime 12/14/2016 14.8  11.1 - 15.3 seconds Final   • INR 12/14/2016 1.2  0.8 - 1.2 Final   • PTT 12/14/2016 31.3  20.0 - 40.3 seconds Final           Patient Active Problem List   Diagnosis   • Hypertension   • Chronic obstructive pulmonary disease with acute exacerbation   • Acute on chronic respiratory failure with hypoxia   • Low back pain   • Hyperlipidemia   • Atrial fibrillation with RVR   • Chronic respiratory failure with hypoxia   • Acute on chronic systolic congestive heart failure   • Paroxysmal atrial fibrillation   • Essential hypertension   • Chronic obstructive lung disease               Assessment    Diagnosis Plan   1. Chronic respiratory failure with hypoxia     2.  Pulmonary emphysema, unspecified emphysema type     3. Essential hypertension     4. Paroxysmal atrial fibrillation           Plan      1. Patient will remain on 3 liters of Oxygen.      Order for Oxygen sent to [facility] in [place].  2. Continue Symbicort inhaler and Nebs qid.  3. Diltazem 60 mg bid.      DASH.      1.5 gr Sodium restriction.  4. Patient will remain on Amiodarone 200 mg daily and Eliquis 5 mg bid.      Follow up in 3 months.

## 2017-05-05 RX ORDER — NITROFURANTOIN 25; 75 MG/1; MG/1
100 CAPSULE ORAL 2 TIMES DAILY
Qty: 14 CAPSULE | Refills: 0 | Status: SHIPPED | OUTPATIENT
Start: 2017-05-05 | End: 2017-05-23 | Stop reason: HOSPADM

## 2017-05-19 ENCOUNTER — HOSPITAL ENCOUNTER (INPATIENT)
Facility: HOSPITAL | Age: 82
LOS: 4 days | Discharge: HOME HEALTH | End: 2017-05-23
Attending: EMERGENCY MEDICINE | Admitting: FAMILY MEDICINE

## 2017-05-19 ENCOUNTER — APPOINTMENT (OUTPATIENT)
Dept: GENERAL RADIOLOGY | Facility: HOSPITAL | Age: 82
End: 2017-05-19

## 2017-05-19 DIAGNOSIS — Z74.09 IMPAIRED MOBILITY AND ADLS: ICD-10-CM

## 2017-05-19 DIAGNOSIS — R31.9 URINARY TRACT INFECTION WITH HEMATURIA, SITE UNSPECIFIED: ICD-10-CM

## 2017-05-19 DIAGNOSIS — J18.9 PNEUMONIA OF BOTH LUNGS DUE TO INFECTIOUS ORGANISM, UNSPECIFIED PART OF LUNG: Primary | ICD-10-CM

## 2017-05-19 DIAGNOSIS — R26.9 ABNORMALITY OF GAIT AND MOBILITY: ICD-10-CM

## 2017-05-19 DIAGNOSIS — N39.0 URINARY TRACT INFECTION WITH HEMATURIA, SITE UNSPECIFIED: ICD-10-CM

## 2017-05-19 DIAGNOSIS — J44.1 CHRONIC OBSTRUCTIVE PULMONARY DISEASE WITH ACUTE EXACERBATION (HCC): ICD-10-CM

## 2017-05-19 DIAGNOSIS — Z78.9 IMPAIRED MOBILITY AND ADLS: ICD-10-CM

## 2017-05-19 LAB
ALBUMIN SERPL-MCNC: 3.6 G/DL (ref 3.4–4.8)
ALBUMIN/GLOB SERPL: 0.9 G/DL (ref 1.1–1.8)
ALP SERPL-CCNC: 112 U/L (ref 38–126)
ALT SERPL W P-5'-P-CCNC: 35 U/L (ref 9–52)
ANION GAP SERPL CALCULATED.3IONS-SCNC: 11 MMOL/L (ref 5–15)
ARTERIAL PATENCY WRIST A: ABNORMAL
AST SERPL-CCNC: 26 U/L (ref 14–36)
ATMOSPHERIC PRESS: ABNORMAL MMHG
BACTERIA UR QL AUTO: ABNORMAL /HPF
BASE EXCESS BLDA CALC-SCNC: 5.5 MMOL/L (ref -2.4–2.4)
BASOPHILS # BLD AUTO: 0.04 10*3/MM3 (ref 0–0.2)
BASOPHILS NFR BLD AUTO: 0.4 % (ref 0–2)
BDY SITE: ABNORMAL
BILIRUB SERPL-MCNC: 0.5 MG/DL (ref 0.2–1.3)
BILIRUB UR QL STRIP: NEGATIVE
BUN BLD-MCNC: 18 MG/DL (ref 7–21)
BUN/CREAT SERPL: 24.3 (ref 7–25)
CA-I BLD-MCNC: 4.9 MG/DL (ref 4.5–4.9)
CALCIUM SPEC-SCNC: 9.5 MG/DL (ref 8.4–10.2)
CHLORIDE SERPL-SCNC: 91 MMOL/L (ref 95–110)
CK MB SERPL-CCNC: 1.94 NG/ML (ref 0–5)
CK SERPL-CCNC: 35 U/L (ref 30–135)
CLARITY UR: ABNORMAL
CO2 BLDA-SCNC: 33.1 MMOL/L (ref 23–27)
CO2 SERPL-SCNC: 32 MMOL/L (ref 22–31)
COLOR UR: ABNORMAL
CREAT BLD-MCNC: 0.74 MG/DL (ref 0.5–1)
D-LACTATE SERPL-SCNC: 1.1 MMOL/L (ref 0.5–2)
DEPRECATED RDW RBC AUTO: 53.2 FL (ref 36.4–46.3)
EOSINOPHIL # BLD AUTO: 1.9 10*3/MM3 (ref 0–0.7)
EOSINOPHIL NFR BLD AUTO: 20.4 % (ref 0–7)
ERYTHROCYTE [DISTWIDTH] IN BLOOD BY AUTOMATED COUNT: 15.8 % (ref 11.5–14.5)
GFR SERPL CREATININE-BSD FRML MDRD: 74 ML/MIN/1.73 (ref 60–90)
GLOBULIN UR ELPH-MCNC: 3.9 GM/DL (ref 2.3–3.5)
GLUCOSE BLD-MCNC: 124 MG/DL (ref 60–100)
GLUCOSE BLDA-MCNC: 194 MMOL/L
GLUCOSE UR STRIP-MCNC: NEGATIVE MG/DL
HCO3 BLDA-SCNC: 31.5 MMOL/L (ref 22–26)
HCT VFR BLD AUTO: 31.6 % (ref 35–45)
HCT VFR BLD CALC: 36 % (ref 38–47)
HGB BLD-MCNC: 10 G/DL (ref 12–15.5)
HGB BLDA-MCNC: 12.2 G/DL (ref 12–16)
HGB UR QL STRIP.AUTO: ABNORMAL
HOLD SPECIMEN: NORMAL
HOLD SPECIMEN: NORMAL
HYALINE CASTS UR QL AUTO: ABNORMAL /LPF
IMM GRANULOCYTES # BLD: 0.02 10*3/MM3 (ref 0–0.02)
IMM GRANULOCYTES NFR BLD: 0.2 % (ref 0–0.5)
KETONES UR QL STRIP: NEGATIVE
LEUKOCYTE ESTERASE UR QL STRIP.AUTO: ABNORMAL
LYMPHOCYTES # BLD AUTO: 0.51 10*3/MM3 (ref 0.6–4.2)
LYMPHOCYTES NFR BLD AUTO: 5.5 % (ref 10–50)
MCH RBC QN AUTO: 28.9 PG (ref 26.5–34)
MCHC RBC AUTO-ENTMCNC: 31.6 G/DL (ref 31.4–36)
MCV RBC AUTO: 91.3 FL (ref 80–98)
MODALITY: ABNORMAL
MONOCYTES # BLD AUTO: 0.36 10*3/MM3 (ref 0–0.9)
MONOCYTES NFR BLD AUTO: 3.9 % (ref 0–12)
NEUTROPHILS # BLD AUTO: 6.5 10*3/MM3 (ref 2–8.6)
NEUTROPHILS NFR BLD AUTO: 69.6 % (ref 37–80)
NITRITE UR QL STRIP: POSITIVE
NRBC BLD MANUAL-RTO: 0 /100 WBC (ref 0–0)
NT-PROBNP SERPL-MCNC: 2160 PG/ML (ref 0–1800)
PCO2 BLDA: 52.2 MM HG (ref 35–45)
PH BLDA: 7.4 PH UNITS (ref 7.35–7.45)
PH UR STRIP.AUTO: 7.5 [PH] (ref 5–9)
PLATELET # BLD AUTO: 202 10*3/MM3 (ref 150–450)
PMV BLD AUTO: 9.9 FL (ref 8–12)
PO2 BLDA: 149.5 MM HG (ref 80–105)
POTASSIUM BLD-SCNC: 3.9 MMOL/L (ref 3.5–5.1)
POTASSIUM BLDA-SCNC: 4.23 MMOL/L (ref 3.6–4.9)
PROT SERPL-MCNC: 7.5 G/DL (ref 6.3–8.6)
PROT UR QL STRIP: ABNORMAL
RBC # BLD AUTO: 3.46 10*6/MM3 (ref 3.77–5.16)
RBC # UR: ABNORMAL /HPF
REF LAB TEST METHOD: ABNORMAL
SAO2 % BLDCOA: 99.1 %
SODIUM BLD-SCNC: 134 MMOL/L (ref 137–145)
SODIUM BLDA-SCNC: 136.8 MMOL/L (ref 138–146)
SP GR UR STRIP: 1.01 (ref 1–1.03)
SQUAMOUS #/AREA URNS HPF: ABNORMAL /HPF
TROPONIN I SERPL-MCNC: 0.02 NG/ML
UROBILINOGEN UR QL STRIP: ABNORMAL
WBC NRBC COR # BLD: 9.33 10*3/MM3 (ref 3.2–9.8)
WBC UR QL AUTO: ABNORMAL /HPF
WHOLE BLOOD HOLD SPECIMEN: NORMAL
WHOLE BLOOD HOLD SPECIMEN: NORMAL

## 2017-05-19 PROCEDURE — 71020 HC CHEST PA AND LATERAL: CPT

## 2017-05-19 PROCEDURE — 81001 URINALYSIS AUTO W/SCOPE: CPT | Performed by: NURSE PRACTITIONER

## 2017-05-19 PROCEDURE — 94640 AIRWAY INHALATION TREATMENT: CPT

## 2017-05-19 PROCEDURE — 82803 BLOOD GASES ANY COMBINATION: CPT | Performed by: NURSE PRACTITIONER

## 2017-05-19 PROCEDURE — 80053 COMPREHEN METABOLIC PANEL: CPT | Performed by: EMERGENCY MEDICINE

## 2017-05-19 PROCEDURE — 87077 CULTURE AEROBIC IDENTIFY: CPT | Performed by: NURSE PRACTITIONER

## 2017-05-19 PROCEDURE — 25010000002 FUROSEMIDE PER 20 MG: Performed by: NURSE PRACTITIONER

## 2017-05-19 PROCEDURE — 85025 COMPLETE CBC W/AUTO DIFF WBC: CPT | Performed by: EMERGENCY MEDICINE

## 2017-05-19 PROCEDURE — 82553 CREATINE MB FRACTION: CPT | Performed by: NURSE PRACTITIONER

## 2017-05-19 PROCEDURE — 94799 UNLISTED PULMONARY SVC/PX: CPT

## 2017-05-19 PROCEDURE — 25010000002 METHYLPREDNISOLONE PER 125 MG: Performed by: INTERNAL MEDICINE

## 2017-05-19 PROCEDURE — 99285 EMERGENCY DEPT VISIT HI MDM: CPT

## 2017-05-19 PROCEDURE — 87086 URINE CULTURE/COLONY COUNT: CPT | Performed by: NURSE PRACTITIONER

## 2017-05-19 PROCEDURE — 84484 ASSAY OF TROPONIN QUANT: CPT | Performed by: NURSE PRACTITIONER

## 2017-05-19 PROCEDURE — 25010000002 AZITHROMYCIN: Performed by: INTERNAL MEDICINE

## 2017-05-19 PROCEDURE — 82550 ASSAY OF CK (CPK): CPT | Performed by: NURSE PRACTITIONER

## 2017-05-19 PROCEDURE — 87040 BLOOD CULTURE FOR BACTERIA: CPT | Performed by: NURSE PRACTITIONER

## 2017-05-19 PROCEDURE — 93010 ELECTROCARDIOGRAM REPORT: CPT | Performed by: INTERNAL MEDICINE

## 2017-05-19 PROCEDURE — 83880 ASSAY OF NATRIURETIC PEPTIDE: CPT | Performed by: NURSE PRACTITIONER

## 2017-05-19 PROCEDURE — 87186 SC STD MICRODIL/AGAR DIL: CPT | Performed by: NURSE PRACTITIONER

## 2017-05-19 PROCEDURE — 83605 ASSAY OF LACTIC ACID: CPT | Performed by: NURSE PRACTITIONER

## 2017-05-19 PROCEDURE — 25010000002 CEFTRIAXONE: Performed by: NURSE PRACTITIONER

## 2017-05-19 PROCEDURE — 93005 ELECTROCARDIOGRAM TRACING: CPT

## 2017-05-19 RX ORDER — IPRATROPIUM BROMIDE AND ALBUTEROL SULFATE 2.5; .5 MG/3ML; MG/3ML
3 SOLUTION RESPIRATORY (INHALATION)
Status: DISCONTINUED | OUTPATIENT
Start: 2017-05-19 | End: 2017-05-20

## 2017-05-19 RX ORDER — ASPIRIN 81 MG/1
81 TABLET, CHEWABLE ORAL DAILY
Status: DISCONTINUED | OUTPATIENT
Start: 2017-05-19 | End: 2017-05-23 | Stop reason: HOSPADM

## 2017-05-19 RX ORDER — FUROSEMIDE 40 MG/1
40 TABLET ORAL DAILY
Status: DISCONTINUED | OUTPATIENT
Start: 2017-05-19 | End: 2017-05-23 | Stop reason: HOSPADM

## 2017-05-19 RX ORDER — PRAVASTATIN SODIUM 40 MG
80 TABLET ORAL NIGHTLY
Status: DISCONTINUED | OUTPATIENT
Start: 2017-05-19 | End: 2017-05-23 | Stop reason: HOSPADM

## 2017-05-19 RX ORDER — POTASSIUM CHLORIDE 750 MG/1
10 CAPSULE, EXTENDED RELEASE ORAL DAILY
Status: DISCONTINUED | OUTPATIENT
Start: 2017-05-19 | End: 2017-05-23 | Stop reason: HOSPADM

## 2017-05-19 RX ORDER — METHYLPREDNISOLONE SODIUM SUCCINATE 125 MG/2ML
60 INJECTION, POWDER, LYOPHILIZED, FOR SOLUTION INTRAMUSCULAR; INTRAVENOUS EVERY 8 HOURS
Status: DISCONTINUED | OUTPATIENT
Start: 2017-05-19 | End: 2017-05-22

## 2017-05-19 RX ORDER — ALBUTEROL SULFATE 2.5 MG/3ML
2.5 SOLUTION RESPIRATORY (INHALATION) ONCE
Status: COMPLETED | OUTPATIENT
Start: 2017-05-19 | End: 2017-05-19

## 2017-05-19 RX ORDER — SODIUM CHLORIDE 9 MG/ML
INJECTION, SOLUTION INTRAVENOUS
Status: COMPLETED
Start: 2017-05-19 | End: 2017-05-19

## 2017-05-19 RX ORDER — DOCUSATE SODIUM 100 MG/1
300 CAPSULE, LIQUID FILLED ORAL DAILY
Status: DISCONTINUED | OUTPATIENT
Start: 2017-05-19 | End: 2017-05-23 | Stop reason: HOSPADM

## 2017-05-19 RX ORDER — DILTIAZEM HYDROCHLORIDE 60 MG/1
60 TABLET, FILM COATED ORAL 2 TIMES DAILY
Status: DISCONTINUED | OUTPATIENT
Start: 2017-05-19 | End: 2017-05-23 | Stop reason: HOSPADM

## 2017-05-19 RX ORDER — BUDESONIDE AND FORMOTEROL FUMARATE DIHYDRATE 160; 4.5 UG/1; UG/1
2 AEROSOL RESPIRATORY (INHALATION)
Status: DISCONTINUED | OUTPATIENT
Start: 2017-05-19 | End: 2017-05-23 | Stop reason: HOSPADM

## 2017-05-19 RX ORDER — AMIODARONE HYDROCHLORIDE 200 MG/1
200 TABLET ORAL DAILY
Status: DISCONTINUED | OUTPATIENT
Start: 2017-05-19 | End: 2017-05-23 | Stop reason: HOSPADM

## 2017-05-19 RX ORDER — ACETAMINOPHEN 325 MG/1
650 TABLET ORAL EVERY 6 HOURS PRN
Status: DISCONTINUED | OUTPATIENT
Start: 2017-05-19 | End: 2017-05-23 | Stop reason: HOSPADM

## 2017-05-19 RX ORDER — FUROSEMIDE 10 MG/ML
20 INJECTION INTRAMUSCULAR; INTRAVENOUS ONCE
Status: COMPLETED | OUTPATIENT
Start: 2017-05-19 | End: 2017-05-19

## 2017-05-19 RX ORDER — ONDANSETRON 2 MG/ML
4 INJECTION INTRAMUSCULAR; INTRAVENOUS EVERY 6 HOURS PRN
Status: DISCONTINUED | OUTPATIENT
Start: 2017-05-19 | End: 2017-05-23 | Stop reason: HOSPADM

## 2017-05-19 RX ADMIN — SODIUM CHLORIDE: 9 INJECTION, SOLUTION INTRAVENOUS at 16:25

## 2017-05-19 RX ADMIN — AZITHROMYCIN 500 MG: 500 INJECTION, POWDER, LYOPHILIZED, FOR SOLUTION INTRAVENOUS at 16:24

## 2017-05-19 RX ADMIN — CEFTRIAXONE 1 G: 1 INJECTION, POWDER, FOR SOLUTION INTRAMUSCULAR; INTRAVENOUS at 12:38

## 2017-05-19 RX ADMIN — METHYLPREDNISOLONE SODIUM SUCCINATE 60 MG: 125 INJECTION, POWDER, FOR SOLUTION INTRAMUSCULAR; INTRAVENOUS at 17:39

## 2017-05-19 RX ADMIN — APIXABAN 5 MG: 5 TABLET, FILM COATED ORAL at 20:47

## 2017-05-19 RX ADMIN — BUDESONIDE AND FORMOTEROL FUMARATE DIHYDRATE 2 PUFF: 160; 4.5 AEROSOL RESPIRATORY (INHALATION) at 18:45

## 2017-05-19 RX ADMIN — ALBUTEROL SULFATE 2.5 MG: 2.5 SOLUTION RESPIRATORY (INHALATION) at 12:14

## 2017-05-19 RX ADMIN — METHYLPREDNISOLONE SODIUM SUCCINATE 60 MG: 125 INJECTION, POWDER, FOR SOLUTION INTRAMUSCULAR; INTRAVENOUS at 23:27

## 2017-05-19 RX ADMIN — DILTIAZEM HYDROCHLORIDE 60 MG: 60 TABLET, FILM COATED ORAL at 17:39

## 2017-05-19 RX ADMIN — FUROSEMIDE 20 MG: 10 INJECTION, SOLUTION INTRAVENOUS at 12:13

## 2017-05-19 RX ADMIN — IPRATROPIUM BROMIDE AND ALBUTEROL SULFATE 3 ML: 2.5; .5 SOLUTION RESPIRATORY (INHALATION) at 18:45

## 2017-05-20 LAB
ANION GAP SERPL CALCULATED.3IONS-SCNC: 10 MMOL/L (ref 5–15)
BASOPHILS # BLD AUTO: 0.01 10*3/MM3 (ref 0–0.2)
BASOPHILS NFR BLD AUTO: 0.2 % (ref 0–2)
BUN BLD-MCNC: 24 MG/DL (ref 7–21)
BUN/CREAT SERPL: 29.3 (ref 7–25)
CALCIUM SPEC-SCNC: 8.9 MG/DL (ref 8.4–10.2)
CHLORIDE SERPL-SCNC: 95 MMOL/L (ref 95–110)
CO2 SERPL-SCNC: 33 MMOL/L (ref 22–31)
CREAT BLD-MCNC: 0.82 MG/DL (ref 0.5–1)
DEPRECATED RDW RBC AUTO: 53.3 FL (ref 36.4–46.3)
EOSINOPHIL # BLD AUTO: 0 10*3/MM3 (ref 0–0.7)
EOSINOPHIL NFR BLD AUTO: 0 % (ref 0–7)
ERYTHROCYTE [DISTWIDTH] IN BLOOD BY AUTOMATED COUNT: 15.8 % (ref 11.5–14.5)
GFR SERPL CREATININE-BSD FRML MDRD: 66 ML/MIN/1.73 (ref 39–90)
GLUCOSE BLD-MCNC: 174 MG/DL (ref 60–100)
HCT VFR BLD AUTO: 30.6 % (ref 35–45)
HGB BLD-MCNC: 9.6 G/DL (ref 12–15.5)
IMM GRANULOCYTES # BLD: 0.05 10*3/MM3 (ref 0–0.02)
IMM GRANULOCYTES NFR BLD: 0.9 % (ref 0–0.5)
LYMPHOCYTES # BLD AUTO: 0.47 10*3/MM3 (ref 0.6–4.2)
LYMPHOCYTES NFR BLD AUTO: 8 % (ref 10–50)
MCH RBC QN AUTO: 29 PG (ref 26.5–34)
MCHC RBC AUTO-ENTMCNC: 31.4 G/DL (ref 31.4–36)
MCV RBC AUTO: 92.4 FL (ref 80–98)
MONOCYTES # BLD AUTO: 0.14 10*3/MM3 (ref 0–0.9)
MONOCYTES NFR BLD AUTO: 2.4 % (ref 0–12)
NEUTROPHILS # BLD AUTO: 5.21 10*3/MM3 (ref 2–8.6)
NEUTROPHILS NFR BLD AUTO: 88.5 % (ref 37–80)
PLATELET # BLD AUTO: 201 10*3/MM3 (ref 150–450)
PMV BLD AUTO: 10.1 FL (ref 8–12)
POTASSIUM BLD-SCNC: 4.1 MMOL/L (ref 3.5–5.1)
RBC # BLD AUTO: 3.31 10*6/MM3 (ref 3.77–5.16)
SODIUM BLD-SCNC: 138 MMOL/L (ref 137–145)
WBC NRBC COR # BLD: 5.88 10*3/MM3 (ref 3.2–9.8)

## 2017-05-20 PROCEDURE — 94799 UNLISTED PULMONARY SVC/PX: CPT

## 2017-05-20 PROCEDURE — 85025 COMPLETE CBC W/AUTO DIFF WBC: CPT | Performed by: INTERNAL MEDICINE

## 2017-05-20 PROCEDURE — 25010000002 AZITHROMYCIN: Performed by: INTERNAL MEDICINE

## 2017-05-20 PROCEDURE — 25010000002 METHYLPREDNISOLONE PER 125 MG: Performed by: INTERNAL MEDICINE

## 2017-05-20 PROCEDURE — 80048 BASIC METABOLIC PNL TOTAL CA: CPT | Performed by: INTERNAL MEDICINE

## 2017-05-20 PROCEDURE — 94760 N-INVAS EAR/PLS OXIMETRY 1: CPT

## 2017-05-20 RX ORDER — IPRATROPIUM BROMIDE AND ALBUTEROL SULFATE 2.5; .5 MG/3ML; MG/3ML
3 SOLUTION RESPIRATORY (INHALATION) EVERY 6 HOURS PRN
Status: DISCONTINUED | OUTPATIENT
Start: 2017-05-20 | End: 2017-05-23 | Stop reason: HOSPADM

## 2017-05-20 RX ORDER — IPRATROPIUM BROMIDE AND ALBUTEROL SULFATE 2.5; .5 MG/3ML; MG/3ML
3 SOLUTION RESPIRATORY (INHALATION)
Status: DISCONTINUED | OUTPATIENT
Start: 2017-05-20 | End: 2017-05-23 | Stop reason: HOSPADM

## 2017-05-20 RX ADMIN — IPRATROPIUM BROMIDE AND ALBUTEROL SULFATE 3 ML: 2.5; .5 SOLUTION RESPIRATORY (INHALATION) at 20:03

## 2017-05-20 RX ADMIN — APIXABAN 5 MG: 5 TABLET, FILM COATED ORAL at 20:17

## 2017-05-20 RX ADMIN — AMIODARONE HYDROCHLORIDE 200 MG: 200 TABLET ORAL at 09:15

## 2017-05-20 RX ADMIN — METHYLPREDNISOLONE SODIUM SUCCINATE 60 MG: 125 INJECTION, POWDER, FOR SOLUTION INTRAMUSCULAR; INTRAVENOUS at 09:14

## 2017-05-20 RX ADMIN — DILTIAZEM HYDROCHLORIDE 60 MG: 60 TABLET, FILM COATED ORAL at 17:40

## 2017-05-20 RX ADMIN — BUDESONIDE AND FORMOTEROL FUMARATE DIHYDRATE 2 PUFF: 160; 4.5 AEROSOL RESPIRATORY (INHALATION) at 20:04

## 2017-05-20 RX ADMIN — METHYLPREDNISOLONE SODIUM SUCCINATE 60 MG: 125 INJECTION, POWDER, FOR SOLUTION INTRAMUSCULAR; INTRAVENOUS at 23:54

## 2017-05-20 RX ADMIN — POTASSIUM CHLORIDE 10 MEQ: 750 CAPSULE, EXTENDED RELEASE ORAL at 09:15

## 2017-05-20 RX ADMIN — DILTIAZEM HYDROCHLORIDE 60 MG: 60 TABLET, FILM COATED ORAL at 09:15

## 2017-05-20 RX ADMIN — APIXABAN 5 MG: 5 TABLET, FILM COATED ORAL at 09:15

## 2017-05-20 RX ADMIN — BUDESONIDE AND FORMOTEROL FUMARATE DIHYDRATE 2 PUFF: 160; 4.5 AEROSOL RESPIRATORY (INHALATION) at 06:30

## 2017-05-20 RX ADMIN — DOCUSATE SODIUM 300 MG: 100 CAPSULE, LIQUID FILLED ORAL at 09:15

## 2017-05-20 RX ADMIN — IPRATROPIUM BROMIDE AND ALBUTEROL SULFATE 3 ML: 2.5; .5 SOLUTION RESPIRATORY (INHALATION) at 16:29

## 2017-05-20 RX ADMIN — ASPIRIN 81 MG 81 MG: 81 TABLET ORAL at 09:14

## 2017-05-20 RX ADMIN — METHYLPREDNISOLONE SODIUM SUCCINATE 60 MG: 125 INJECTION, POWDER, FOR SOLUTION INTRAMUSCULAR; INTRAVENOUS at 15:17

## 2017-05-20 RX ADMIN — AZITHROMYCIN 500 MG: 500 INJECTION, POWDER, LYOPHILIZED, FOR SOLUTION INTRAVENOUS at 15:17

## 2017-05-20 RX ADMIN — FUROSEMIDE 40 MG: 40 TABLET ORAL at 09:15

## 2017-05-20 RX ADMIN — IPRATROPIUM BROMIDE AND ALBUTEROL SULFATE 3 ML: 2.5; .5 SOLUTION RESPIRATORY (INHALATION) at 00:26

## 2017-05-20 RX ADMIN — IPRATROPIUM BROMIDE AND ALBUTEROL SULFATE 3 ML: 2.5; .5 SOLUTION RESPIRATORY (INHALATION) at 12:07

## 2017-05-20 RX ADMIN — IPRATROPIUM BROMIDE AND ALBUTEROL SULFATE 3 ML: 2.5; .5 SOLUTION RESPIRATORY (INHALATION) at 06:24

## 2017-05-21 LAB
BACTERIA SPEC AEROBE CULT: ABNORMAL
BETA LACTAMASE: ABNORMAL

## 2017-05-21 PROCEDURE — 97166 OT EVAL MOD COMPLEX 45 MIN: CPT

## 2017-05-21 PROCEDURE — 94799 UNLISTED PULMONARY SVC/PX: CPT

## 2017-05-21 PROCEDURE — 25010000002 METHYLPREDNISOLONE PER 125 MG: Performed by: INTERNAL MEDICINE

## 2017-05-21 PROCEDURE — 25010000002 LEVOFLOXACIN PER 250 MG: Performed by: FAMILY MEDICINE

## 2017-05-21 PROCEDURE — 97535 SELF CARE MNGMENT TRAINING: CPT

## 2017-05-21 PROCEDURE — 97162 PT EVAL MOD COMPLEX 30 MIN: CPT

## 2017-05-21 PROCEDURE — 94760 N-INVAS EAR/PLS OXIMETRY 1: CPT

## 2017-05-21 PROCEDURE — G8988 SELF CARE GOAL STATUS: HCPCS

## 2017-05-21 PROCEDURE — G8979 MOBILITY GOAL STATUS: HCPCS

## 2017-05-21 PROCEDURE — G8978 MOBILITY CURRENT STATUS: HCPCS

## 2017-05-21 PROCEDURE — G8987 SELF CARE CURRENT STATUS: HCPCS

## 2017-05-21 PROCEDURE — 97116 GAIT TRAINING THERAPY: CPT

## 2017-05-21 RX ORDER — LEVOFLOXACIN 5 MG/ML
500 INJECTION, SOLUTION INTRAVENOUS EVERY 24 HOURS
Status: DISCONTINUED | OUTPATIENT
Start: 2017-05-21 | End: 2017-05-22

## 2017-05-21 RX ADMIN — DILTIAZEM HYDROCHLORIDE 60 MG: 60 TABLET, FILM COATED ORAL at 17:30

## 2017-05-21 RX ADMIN — IPRATROPIUM BROMIDE AND ALBUTEROL SULFATE 3 ML: 2.5; .5 SOLUTION RESPIRATORY (INHALATION) at 19:23

## 2017-05-21 RX ADMIN — METHYLPREDNISOLONE SODIUM SUCCINATE 60 MG: 125 INJECTION, POWDER, FOR SOLUTION INTRAMUSCULAR; INTRAVENOUS at 23:27

## 2017-05-21 RX ADMIN — APIXABAN 5 MG: 5 TABLET, FILM COATED ORAL at 20:19

## 2017-05-21 RX ADMIN — POTASSIUM CHLORIDE 10 MEQ: 750 CAPSULE, EXTENDED RELEASE ORAL at 08:33

## 2017-05-21 RX ADMIN — AMIODARONE HYDROCHLORIDE 200 MG: 200 TABLET ORAL at 08:33

## 2017-05-21 RX ADMIN — IPRATROPIUM BROMIDE AND ALBUTEROL SULFATE 3 ML: 2.5; .5 SOLUTION RESPIRATORY (INHALATION) at 02:11

## 2017-05-21 RX ADMIN — APIXABAN 5 MG: 5 TABLET, FILM COATED ORAL at 08:33

## 2017-05-21 RX ADMIN — LEVOFLOXACIN 500 MG: 5 INJECTION, SOLUTION INTRAVENOUS at 11:36

## 2017-05-21 RX ADMIN — IPRATROPIUM BROMIDE AND ALBUTEROL SULFATE 3 ML: 2.5; .5 SOLUTION RESPIRATORY (INHALATION) at 10:21

## 2017-05-21 RX ADMIN — IPRATROPIUM BROMIDE AND ALBUTEROL SULFATE 3 ML: 2.5; .5 SOLUTION RESPIRATORY (INHALATION) at 14:11

## 2017-05-21 RX ADMIN — METHYLPREDNISOLONE SODIUM SUCCINATE 60 MG: 125 INJECTION, POWDER, FOR SOLUTION INTRAMUSCULAR; INTRAVENOUS at 08:33

## 2017-05-21 RX ADMIN — ASPIRIN 81 MG 81 MG: 81 TABLET ORAL at 08:33

## 2017-05-21 RX ADMIN — FUROSEMIDE 40 MG: 40 TABLET ORAL at 08:33

## 2017-05-21 RX ADMIN — IPRATROPIUM BROMIDE AND ALBUTEROL SULFATE 3 ML: 2.5; .5 SOLUTION RESPIRATORY (INHALATION) at 06:46

## 2017-05-21 RX ADMIN — DILTIAZEM HYDROCHLORIDE 60 MG: 60 TABLET, FILM COATED ORAL at 08:33

## 2017-05-21 RX ADMIN — BUDESONIDE AND FORMOTEROL FUMARATE DIHYDRATE 2 PUFF: 160; 4.5 AEROSOL RESPIRATORY (INHALATION) at 19:23

## 2017-05-21 RX ADMIN — DOCUSATE SODIUM 300 MG: 100 CAPSULE, LIQUID FILLED ORAL at 08:33

## 2017-05-21 RX ADMIN — METHYLPREDNISOLONE SODIUM SUCCINATE 60 MG: 125 INJECTION, POWDER, FOR SOLUTION INTRAMUSCULAR; INTRAVENOUS at 17:31

## 2017-05-21 RX ADMIN — BUDESONIDE AND FORMOTEROL FUMARATE DIHYDRATE 2 PUFF: 160; 4.5 AEROSOL RESPIRATORY (INHALATION) at 06:51

## 2017-05-22 PROCEDURE — 97535 SELF CARE MNGMENT TRAINING: CPT

## 2017-05-22 PROCEDURE — 25010000002 LEVOFLOXACIN PER 250 MG: Performed by: FAMILY MEDICINE

## 2017-05-22 PROCEDURE — 94760 N-INVAS EAR/PLS OXIMETRY 1: CPT

## 2017-05-22 PROCEDURE — 25010000002 METHYLPREDNISOLONE PER 125 MG: Performed by: INTERNAL MEDICINE

## 2017-05-22 PROCEDURE — 25010000002 METHYLPREDNISOLONE PER 125 MG: Performed by: PHYSICIAN ASSISTANT

## 2017-05-22 PROCEDURE — 94799 UNLISTED PULMONARY SVC/PX: CPT

## 2017-05-22 PROCEDURE — 97530 THERAPEUTIC ACTIVITIES: CPT

## 2017-05-22 PROCEDURE — 97116 GAIT TRAINING THERAPY: CPT

## 2017-05-22 PROCEDURE — 25010000002 AZITHROMYCIN: Performed by: PHYSICIAN ASSISTANT

## 2017-05-22 PROCEDURE — 25010000002 CEFTRIAXONE: Performed by: PHYSICIAN ASSISTANT

## 2017-05-22 RX ORDER — BISACODYL 10 MG
10 SUPPOSITORY, RECTAL RECTAL DAILY PRN
Status: DISCONTINUED | OUTPATIENT
Start: 2017-05-22 | End: 2017-05-23 | Stop reason: HOSPADM

## 2017-05-22 RX ORDER — METHYLPREDNISOLONE SODIUM SUCCINATE 125 MG/2ML
60 INJECTION, POWDER, LYOPHILIZED, FOR SOLUTION INTRAMUSCULAR; INTRAVENOUS EVERY 12 HOURS
Status: DISCONTINUED | OUTPATIENT
Start: 2017-05-22 | End: 2017-05-23 | Stop reason: HOSPADM

## 2017-05-22 RX ADMIN — METHYLPREDNISOLONE SODIUM SUCCINATE 60 MG: 125 INJECTION, POWDER, FOR SOLUTION INTRAMUSCULAR; INTRAVENOUS at 20:15

## 2017-05-22 RX ADMIN — BUDESONIDE AND FORMOTEROL FUMARATE DIHYDRATE 2 PUFF: 160; 4.5 AEROSOL RESPIRATORY (INHALATION) at 20:58

## 2017-05-22 RX ADMIN — APIXABAN 5 MG: 5 TABLET, FILM COATED ORAL at 09:03

## 2017-05-22 RX ADMIN — IPRATROPIUM BROMIDE AND ALBUTEROL SULFATE 3 ML: 2.5; .5 SOLUTION RESPIRATORY (INHALATION) at 20:58

## 2017-05-22 RX ADMIN — AMIODARONE HYDROCHLORIDE 200 MG: 200 TABLET ORAL at 09:03

## 2017-05-22 RX ADMIN — BUDESONIDE AND FORMOTEROL FUMARATE DIHYDRATE 2 PUFF: 160; 4.5 AEROSOL RESPIRATORY (INHALATION) at 07:15

## 2017-05-22 RX ADMIN — ASPIRIN 81 MG 81 MG: 81 TABLET ORAL at 09:05

## 2017-05-22 RX ADMIN — DILTIAZEM HYDROCHLORIDE 60 MG: 60 TABLET, FILM COATED ORAL at 09:03

## 2017-05-22 RX ADMIN — IPRATROPIUM BROMIDE AND ALBUTEROL SULFATE 3 ML: 2.5; .5 SOLUTION RESPIRATORY (INHALATION) at 11:08

## 2017-05-22 RX ADMIN — DILTIAZEM HYDROCHLORIDE 60 MG: 60 TABLET, FILM COATED ORAL at 18:02

## 2017-05-22 RX ADMIN — IPRATROPIUM BROMIDE AND ALBUTEROL SULFATE 3 ML: 2.5; .5 SOLUTION RESPIRATORY (INHALATION) at 07:15

## 2017-05-22 RX ADMIN — MAGNESIUM HYDROXIDE 10 ML: 2400 SUSPENSION ORAL at 14:09

## 2017-05-22 RX ADMIN — METHYLPREDNISOLONE SODIUM SUCCINATE 60 MG: 125 INJECTION, POWDER, FOR SOLUTION INTRAMUSCULAR; INTRAVENOUS at 09:03

## 2017-05-22 RX ADMIN — CEFTRIAXONE 1 G: 1 INJECTION, POWDER, FOR SOLUTION INTRAMUSCULAR; INTRAVENOUS at 14:16

## 2017-05-22 RX ADMIN — APIXABAN 5 MG: 5 TABLET, FILM COATED ORAL at 20:15

## 2017-05-22 RX ADMIN — POTASSIUM CHLORIDE 10 MEQ: 750 CAPSULE, EXTENDED RELEASE ORAL at 09:05

## 2017-05-22 RX ADMIN — LEVOFLOXACIN 500 MG: 5 INJECTION, SOLUTION INTRAVENOUS at 10:55

## 2017-05-22 RX ADMIN — IPRATROPIUM BROMIDE AND ALBUTEROL SULFATE 3 ML: 2.5; .5 SOLUTION RESPIRATORY (INHALATION) at 15:30

## 2017-05-22 RX ADMIN — FUROSEMIDE 40 MG: 40 TABLET ORAL at 09:03

## 2017-05-22 RX ADMIN — AZITHROMYCIN 500 MG: 500 INJECTION, POWDER, LYOPHILIZED, FOR SOLUTION INTRAVENOUS at 15:51

## 2017-05-22 RX ADMIN — DOCUSATE SODIUM 300 MG: 100 CAPSULE, LIQUID FILLED ORAL at 09:04

## 2017-05-23 ENCOUNTER — APPOINTMENT (OUTPATIENT)
Dept: GENERAL RADIOLOGY | Facility: HOSPITAL | Age: 82
End: 2017-05-23

## 2017-05-23 VITALS
RESPIRATION RATE: 16 BRPM | HEART RATE: 74 BPM | BODY MASS INDEX: 33.23 KG/M2 | WEIGHT: 176 LBS | TEMPERATURE: 97.7 F | HEIGHT: 61 IN | DIASTOLIC BLOOD PRESSURE: 75 MMHG | SYSTOLIC BLOOD PRESSURE: 118 MMHG | OXYGEN SATURATION: 96 %

## 2017-05-23 PROBLEM — J18.9 PNEUMONIA OF BOTH LUNGS DUE TO INFECTIOUS ORGANISM: Status: RESOLVED | Noted: 2017-05-19 | Resolved: 2017-05-23

## 2017-05-23 PROCEDURE — 71020 HC CHEST PA AND LATERAL: CPT

## 2017-05-23 PROCEDURE — 97110 THERAPEUTIC EXERCISES: CPT

## 2017-05-23 PROCEDURE — 97116 GAIT TRAINING THERAPY: CPT

## 2017-05-23 PROCEDURE — 97535 SELF CARE MNGMENT TRAINING: CPT

## 2017-05-23 PROCEDURE — 94799 UNLISTED PULMONARY SVC/PX: CPT

## 2017-05-23 PROCEDURE — 97755 ASSISTIVE TECHNOLOGY ASSESS: CPT

## 2017-05-23 PROCEDURE — 25010000002 METHYLPREDNISOLONE PER 125 MG: Performed by: PHYSICIAN ASSISTANT

## 2017-05-23 PROCEDURE — 97530 THERAPEUTIC ACTIVITIES: CPT

## 2017-05-23 PROCEDURE — 94760 N-INVAS EAR/PLS OXIMETRY 1: CPT

## 2017-05-23 RX ORDER — CEFDINIR 300 MG/1
300 CAPSULE ORAL 2 TIMES DAILY
Qty: 20 CAPSULE | Refills: 0 | Status: SHIPPED | OUTPATIENT
Start: 2017-05-23 | End: 2017-05-30

## 2017-05-23 RX ORDER — PREDNISONE 20 MG/1
TABLET ORAL
Qty: 10 TABLET | Refills: 0 | Status: SHIPPED | OUTPATIENT
Start: 2017-05-23 | End: 2017-06-15

## 2017-05-23 RX ADMIN — DILTIAZEM HYDROCHLORIDE 60 MG: 60 TABLET, FILM COATED ORAL at 09:28

## 2017-05-23 RX ADMIN — AMIODARONE HYDROCHLORIDE 200 MG: 200 TABLET ORAL at 09:28

## 2017-05-23 RX ADMIN — FUROSEMIDE 40 MG: 40 TABLET ORAL at 09:28

## 2017-05-23 RX ADMIN — ASPIRIN 81 MG 81 MG: 81 TABLET ORAL at 09:33

## 2017-05-23 RX ADMIN — METHYLPREDNISOLONE SODIUM SUCCINATE 60 MG: 125 INJECTION, POWDER, FOR SOLUTION INTRAMUSCULAR; INTRAVENOUS at 09:28

## 2017-05-23 RX ADMIN — POTASSIUM CHLORIDE 10 MEQ: 750 CAPSULE, EXTENDED RELEASE ORAL at 09:28

## 2017-05-23 RX ADMIN — DOCUSATE SODIUM 300 MG: 100 CAPSULE, LIQUID FILLED ORAL at 09:28

## 2017-05-23 RX ADMIN — IPRATROPIUM BROMIDE AND ALBUTEROL SULFATE 3 ML: 2.5; .5 SOLUTION RESPIRATORY (INHALATION) at 11:28

## 2017-05-23 RX ADMIN — BUDESONIDE AND FORMOTEROL FUMARATE DIHYDRATE 2 PUFF: 160; 4.5 AEROSOL RESPIRATORY (INHALATION) at 07:14

## 2017-05-23 RX ADMIN — APIXABAN 5 MG: 5 TABLET, FILM COATED ORAL at 09:27

## 2017-05-23 RX ADMIN — IPRATROPIUM BROMIDE AND ALBUTEROL SULFATE 3 ML: 2.5; .5 SOLUTION RESPIRATORY (INHALATION) at 07:03

## 2017-05-24 LAB
BACTERIA SPEC AEROBE CULT: NORMAL
BACTERIA SPEC AEROBE CULT: NORMAL

## 2017-05-30 ENCOUNTER — APPOINTMENT (OUTPATIENT)
Dept: LAB | Facility: HOSPITAL | Age: 82
End: 2017-05-30

## 2017-05-30 ENCOUNTER — OFFICE VISIT (OUTPATIENT)
Dept: INTERNAL MEDICINE | Facility: CLINIC | Age: 82
End: 2017-05-30

## 2017-05-30 VITALS
WEIGHT: 177.2 LBS | DIASTOLIC BLOOD PRESSURE: 80 MMHG | BODY MASS INDEX: 33.46 KG/M2 | SYSTOLIC BLOOD PRESSURE: 110 MMHG | HEIGHT: 61 IN

## 2017-05-30 DIAGNOSIS — I48.0 PAROXYSMAL ATRIAL FIBRILLATION (HCC): ICD-10-CM

## 2017-05-30 DIAGNOSIS — N39.0 URINARY TRACT INFECTION WITHOUT HEMATURIA, SITE UNSPECIFIED: ICD-10-CM

## 2017-05-30 DIAGNOSIS — J96.11 CHRONIC RESPIRATORY FAILURE WITH HYPOXIA (HCC): Primary | Chronic | ICD-10-CM

## 2017-05-30 DIAGNOSIS — D53.9 ANEMIA, DEFICIENCY: ICD-10-CM

## 2017-05-30 DIAGNOSIS — J43.9 PULMONARY EMPHYSEMA, UNSPECIFIED EMPHYSEMA TYPE (HCC): ICD-10-CM

## 2017-05-30 LAB
FERRITIN SERPL-MCNC: 109 NG/ML (ref 11.1–264)
IRON 24H UR-MRATE: 70 MCG/DL (ref 37–170)
IRON SATN MFR SERPL: 23 % (ref 15–50)
TIBC SERPL-MCNC: 302 MCG/DL (ref 265–497)
VIT B12 BLD-MCNC: 818 PG/ML (ref 239–931)

## 2017-05-30 PROCEDURE — 83540 ASSAY OF IRON: CPT | Performed by: INTERNAL MEDICINE

## 2017-05-30 PROCEDURE — 82728 ASSAY OF FERRITIN: CPT | Performed by: INTERNAL MEDICINE

## 2017-05-30 PROCEDURE — 99213 OFFICE O/P EST LOW 20 MIN: CPT | Performed by: INTERNAL MEDICINE

## 2017-05-30 PROCEDURE — 83550 IRON BINDING TEST: CPT | Performed by: INTERNAL MEDICINE

## 2017-05-30 PROCEDURE — 82607 VITAMIN B-12: CPT | Performed by: INTERNAL MEDICINE

## 2017-05-30 PROCEDURE — 36415 COLL VENOUS BLD VENIPUNCTURE: CPT | Performed by: INTERNAL MEDICINE

## 2017-05-30 RX ORDER — IPRATROPIUM BROMIDE AND ALBUTEROL SULFATE 2.5; .5 MG/3ML; MG/3ML
SOLUTION RESPIRATORY (INHALATION)
Refills: 0 | COMMUNITY
Start: 2017-05-18 | End: 2017-06-15

## 2017-05-30 RX ORDER — ERGOCALCIFEROL 1.25 MG/1
CAPSULE ORAL
Refills: 0 | COMMUNITY
Start: 2017-05-18 | End: 2017-06-15

## 2017-06-01 ENCOUNTER — TELEPHONE (OUTPATIENT)
Dept: INTERNAL MEDICINE | Facility: CLINIC | Age: 82
End: 2017-06-01

## 2017-06-15 ENCOUNTER — OFFICE VISIT (OUTPATIENT)
Dept: CARDIOLOGY | Facility: CLINIC | Age: 82
End: 2017-06-15

## 2017-06-15 VITALS
BODY MASS INDEX: 33.79 KG/M2 | WEIGHT: 179 LBS | HEART RATE: 85 BPM | SYSTOLIC BLOOD PRESSURE: 128 MMHG | HEIGHT: 61 IN | DIASTOLIC BLOOD PRESSURE: 80 MMHG

## 2017-06-15 DIAGNOSIS — I48.91 ATRIAL FIBRILLATION WITH RVR (HCC): ICD-10-CM

## 2017-06-15 DIAGNOSIS — I10 ESSENTIAL HYPERTENSION: Primary | ICD-10-CM

## 2017-06-15 DIAGNOSIS — I48.0 PAROXYSMAL ATRIAL FIBRILLATION (HCC): ICD-10-CM

## 2017-06-15 DIAGNOSIS — E78.2 MIXED HYPERLIPIDEMIA: ICD-10-CM

## 2017-06-15 PROCEDURE — 93000 ELECTROCARDIOGRAM COMPLETE: CPT | Performed by: INTERNAL MEDICINE

## 2017-06-15 PROCEDURE — 99213 OFFICE O/P EST LOW 20 MIN: CPT | Performed by: INTERNAL MEDICINE

## 2017-06-15 NOTE — PROGRESS NOTES
Norma Harkins  86 y.o. female    06/15/2017  1. Essential hypertension    2. Mixed hyperlipidemia    3. Atrial fibrillation with RVR    4. Paroxysmal atrial fibrillation        History of Present Illness    Mrs. Harkins is here for follow-up of her above stated problems.  She was admitted to [facility] with a Klebsiella urinary tract infection and did develop atrial fibrillation with rapid ventricular response.  She converted to sinus rhythm following cardioversion.  Fortunately has symptoms of improved and she has remained in sinus rhythm.  EKG today confirms sinus rhythm with underlying right bundle branch block.  No bronchospasm or signs of congestive heart failure was noted.  Blood pressure was in the normal range.  She did complain of increased bruising.        SUBJECTIVE    No Known Allergies      Past Medical History:   Diagnosis Date   • Chronic obstructive lung disease 04/26/2016    on oxygen      • Chronic respiratory failure with hypoxia 04/26/2016   • Essential hypertension 09/08/2015   • Hyperlipidemia    • Obesity    • Paroxysmal atrial fibrillation 03/15/2016         Past Surgical History:   Procedure Laterality Date   • CATARACT EXTRACTION     • HYSTERECTOMY     • JOINT REPLACEMENT      total knee          Family History   Problem Relation Age of Onset   • Heart disease Other    • Hypertension Other    • Lung cancer Other          Social History     Social History   • Marital status:      Spouse name: N/A   • Number of children: N/A   • Years of education: N/A     Occupational History   • Not on file.     Social History Main Topics   • Smoking status: Former Smoker   • Smokeless tobacco: Never Used      Comment:  Patient quit smoking more than 30 years ago, she smoked 1pd for 30 years prior to that   • Alcohol use No   • Drug use: No   • Sexual activity: Defer     Other Topics Concern   • Not on file     Social History Narrative         Current Outpatient Prescriptions   Medication Sig  "Dispense Refill   • acetaminophen (TYLENOL) 500 MG tablet Take 500 mg by mouth 1 (one) time as needed for mild pain (1-3).     • albuterol (ACCUNEB) 0.63 MG/3ML nebulizer solution Take 3 mL by nebulization Every 6 (Six) Hours As Needed for wheezing. 129 vial 1   • albuterol (PROVENTIL HFA;VENTOLIN HFA) 108 (90 BASE) MCG/ACT inhaler Inhale 2 puffs 4 (Four) Times a Day. 1 inhaler 1   • amiodarone (PACERONE) 200 MG tablet Take 1 tablet by mouth Daily. 30 tablet 6   • apixaban (ELIQUIS) 5 MG tablet tablet Take 5 mg by mouth Every 12 (Twelve) Hours.     • Calcium Carbonate ([employer] CALCIUM PO) Take 1,000 mg by mouth 2 (Two) Times a Day.     • Cholecalciferol (VITAMIN D3) 34939 UNITS tablet Take 1 tablet by mouth 1 (One) Time Per Week. 4 tablet 5   • docusate sodium (COLACE) 100 MG capsule Take 300 mg by mouth Daily.     • furosemide (LASIX) 40 MG tablet Take 1 tablet by mouth Daily. 30 tablet 5   • potassium chloride (K-DUR,KLOR-CON) 10 MEQ CR tablet Take 1 tablet by mouth 2 (Two) Times a Day. 60 tablet 5   • RA ASPIRIN ADULT LOW STRENGTH 81 MG chewable tablet chew and swallow 1 tablet by mouth once daily 30 tablet 5   • SYMBICORT 160-4.5 MCG/ACT inhaler inhale 2 puffs by mouth twice a day 30.6 inhaler 2     No current facility-administered medications for this visit.          OBJECTIVE    /80  Pulse 85  Ht 61\" (154.9 cm)  Wt 179 lb (81.2 kg)  BMI 33.82 kg/m2        Review of Systems     Constitutional:  Denies recent weight loss, weight gain, fever or chills    HENT:  Denies any hearing loss, epistaxis, hoarseness, or difficulty speaking.     Eyes: Wears eyeglasses or contact lenses     Respiratory:  Dyspnea with exertion improved, no cough, wheezing, or hemoptysis.  on home oxygen    Cardiovascular: Negative for palpations, chest pain,  syncope, or claudication.     Gastrointestinal:  Denies change in bowel habits, dyspepsia, ulcer disease, hematochezia, or melena.     Endocrine: Negative for cold intolerance, " heat intolerance, polydipsia, polyphagia and polyuria.   Genitourinary: Negative.      Musculoskeletal: DJD    Skin:  Increased bruising         Physical Exam     Constitutional: Cooperative, alert and oriented,  in no acute distress.  on home O2   HENT:   Head: Normocephalic, normal hair patterns, no masses or tenderness.  Ears, Nose, and Throat: No gross abnormalities. No pallor or cyanosis.   Eyes: EOMS intact, PERRL, conjunctivae and lids unremarkable. Fundoscopic exam and visual fields not performed.   Neck: No palpable masses or adenopathy, no thyromegaly, no JVD, carotid pulses are full and equal bilaterally and without  Bruits.     Cardiovascular: Regular rhythm, S1 and S2 normal, no S3 or S4.  No murmurs, gallops, or rubs detected.     Pulmonary/Chest: Chest: Increased AP diameter of the chest ,no tenderness to palpation, normal respiratory excursion,           Pulmonary: Normal breath sounds. No rales or ronchi.    Abdominal: Abdomen soft, bowel sounds normoactive, no masses, no hepatosplenomegaly, non-tender, no bruits.     Musculoskeletal: No deformities, clubbing, cyanosis, erythema, or edema observed.        ECG 12 Lead  Date/Time: 6/15/2017 2:23 PM  Performed by: DRAKE CROFT  Authorized by: DRAKE CROFT   Comparison: not compared with previous ECG   Rhythm: sinus rhythm  Rate: normal  Conduction: right bundle branch block  QRS axis: normal  Comments: Sinus rhythm.  QRS duration 156 ms and QTc interval 523 ms              Lab Results   Component Value Date    WBC 5.88 05/20/2017    HGB 9.6 (L) 05/20/2017    HCT 30.6 (L) 05/20/2017    MCV 92.4 05/20/2017     05/20/2017     Lab Results   Component Value Date    GLUCOSE 174 (H) 05/20/2017    BUN 24 (H) 05/20/2017    CREATININE 0.82 05/20/2017    EGFRIFNONA 66 05/20/2017    BCR 29.3 (H) 05/20/2017    CO2 33.0 (H) 05/20/2017    CALCIUM 8.9 05/20/2017    ALBUMIN 3.60 05/19/2017    LABIL2 0.9 (L) 05/19/2017    AST 26  05/19/2017    ALT 35 05/19/2017     No results found for: CHOL  Lab Results   Component Value Date    TRIG 128 07/22/2015     No results found for: HDL  Lab Results   Component Value Date    LDLCALC 124 07/22/2015     No results found for: LDL  No results found for: HDLLDLRATIO  No components found for: CHOLHDL  No results found for: HGBA1C  Lab Results   Component Value Date    TSH 4.540 03/29/2017           ASSESSMENT AND PLAN  Mrs. Harkins is stable as regards her heart with no reoccurrence of atrial fibrillation.  Her present antiarrhythmic therapy with amiodarone and anticoagulation with apixaban has been continued.  I've advised her to stop taking baby aspirin in view of increased bruising.  Lasix along with potassium supplements have been continued.    Norma was seen today for follow-up.    Diagnoses and all orders for this visit:    Essential hypertension    Mixed hyperlipidemia    Atrial fibrillation with RVR    Paroxysmal atrial fibrillation        Sofya Graham MD  6/15/2017  2:18 PM

## 2017-06-28 ENCOUNTER — HOSPITAL ENCOUNTER (OUTPATIENT)
Facility: HOSPITAL | Age: 82
Setting detail: OBSERVATION
Discharge: HOME | End: 2017-06-30
Attending: EMERGENCY MEDICINE | Admitting: INTERNAL MEDICINE

## 2017-06-28 ENCOUNTER — APPOINTMENT (OUTPATIENT)
Dept: GENERAL RADIOLOGY | Facility: HOSPITAL | Age: 82
End: 2017-06-28

## 2017-06-28 DIAGNOSIS — N28.9 RENAL INSUFFICIENCY: ICD-10-CM

## 2017-06-28 DIAGNOSIS — J44.1 COPD EXACERBATION (HCC): Primary | ICD-10-CM

## 2017-06-28 DIAGNOSIS — E86.0 DEHYDRATION: ICD-10-CM

## 2017-06-28 PROBLEM — R31.9 HEMATURIA: Status: ACTIVE | Noted: 2017-06-28

## 2017-06-28 PROBLEM — E11.9 DIABETES MELLITUS (HCC): Chronic | Status: ACTIVE | Noted: 2017-06-28

## 2017-06-28 LAB
ALBUMIN SERPL-MCNC: 4.2 G/DL (ref 3.4–4.8)
ALBUMIN/GLOB SERPL: 1.2 G/DL (ref 1.1–1.8)
ALP SERPL-CCNC: 128 U/L (ref 38–126)
ALT SERPL W P-5'-P-CCNC: 42 U/L (ref 9–52)
ANION GAP SERPL CALCULATED.3IONS-SCNC: 13 MMOL/L (ref 5–15)
ARTERIAL PATENCY WRIST A: ABNORMAL
AST SERPL-CCNC: 36 U/L (ref 14–36)
ATMOSPHERIC PRESS: ABNORMAL MMHG
BACTERIA UR QL AUTO: ABNORMAL /HPF
BASE EXCESS BLDA CALC-SCNC: 3.9 MMOL/L (ref -2.4–2.4)
BASOPHILS # BLD AUTO: 0.07 10*3/MM3 (ref 0–0.2)
BASOPHILS NFR BLD AUTO: 0.6 % (ref 0–2)
BDY SITE: ABNORMAL
BILIRUB SERPL-MCNC: 0.5 MG/DL (ref 0.2–1.3)
BILIRUB UR QL STRIP: NEGATIVE
BUN BLD-MCNC: 30 MG/DL (ref 7–21)
BUN/CREAT SERPL: 24.2 (ref 7–25)
CA-I BLD-MCNC: 4.8 MG/DL (ref 4.5–4.9)
CALCIUM SPEC-SCNC: 9.2 MG/DL (ref 8.4–10.2)
CHLORIDE SERPL-SCNC: 93 MMOL/L (ref 95–110)
CK MB SERPL-CCNC: 2.31 NG/ML (ref 0–5)
CLARITY UR: ABNORMAL
CO2 BLDA-SCNC: 30.8 MMOL/L (ref 23–27)
CO2 SERPL-SCNC: 29 MMOL/L (ref 22–31)
COLOR UR: YELLOW
CREAT BLD-MCNC: 1.24 MG/DL (ref 0.5–1)
DEPRECATED RDW RBC AUTO: 53.1 FL (ref 36.4–46.3)
EOSINOPHIL # BLD AUTO: 0.8 10*3/MM3 (ref 0–0.7)
EOSINOPHIL NFR BLD AUTO: 7.4 % (ref 0–7)
ERYTHROCYTE [DISTWIDTH] IN BLOOD BY AUTOMATED COUNT: 16.4 % (ref 11.5–14.5)
GFR SERPL CREATININE-BSD FRML MDRD: 41 ML/MIN/1.73 (ref 39–90)
GLOBULIN UR ELPH-MCNC: 3.6 GM/DL (ref 2.3–3.5)
GLUCOSE BLD-MCNC: 232 MG/DL (ref 60–100)
GLUCOSE BLDA-MCNC: 195 MMOL/L
GLUCOSE UR STRIP-MCNC: NEGATIVE MG/DL
HCO3 BLDA-SCNC: 29.3 MMOL/L (ref 22–26)
HCT VFR BLD AUTO: 35.1 % (ref 35–45)
HCT VFR BLD CALC: 36 % (ref 38–47)
HGB BLD-MCNC: 11.6 G/DL (ref 12–15.5)
HGB BLDA-MCNC: 12.3 G/DL (ref 12–16)
HGB UR QL STRIP.AUTO: ABNORMAL
HOLD SPECIMEN: NORMAL
HYALINE CASTS UR QL AUTO: ABNORMAL /LPF
IMM GRANULOCYTES # BLD: 0.05 10*3/MM3 (ref 0–0.02)
IMM GRANULOCYTES NFR BLD: 0.5 % (ref 0–0.5)
KETONES UR QL STRIP: NEGATIVE
LEUKOCYTE ESTERASE UR QL STRIP.AUTO: ABNORMAL
LYMPHOCYTES # BLD AUTO: 0.49 10*3/MM3 (ref 0.6–4.2)
LYMPHOCYTES NFR BLD AUTO: 4.5 % (ref 10–50)
MCH RBC QN AUTO: 29.3 PG (ref 26.5–34)
MCHC RBC AUTO-ENTMCNC: 33 G/DL (ref 31.4–36)
MCV RBC AUTO: 88.6 FL (ref 80–98)
MODALITY: ABNORMAL
MONOCYTES # BLD AUTO: 0.15 10*3/MM3 (ref 0–0.9)
MONOCYTES NFR BLD AUTO: 1.4 % (ref 0–12)
NEUTROPHILS # BLD AUTO: 9.21 10*3/MM3 (ref 2–8.6)
NEUTROPHILS NFR BLD AUTO: 85.6 % (ref 37–80)
NITRITE UR QL STRIP: NEGATIVE
PCO2 BLDA: 47.4 MM HG (ref 35–45)
PH BLDA: 7.41 PH UNITS (ref 7.35–7.45)
PH UR STRIP.AUTO: 6.5 [PH] (ref 5–9)
PLATELET # BLD AUTO: 221 10*3/MM3 (ref 150–450)
PMV BLD AUTO: 10.9 FL (ref 8–12)
PO2 BLDA: 187.9 MM HG (ref 80–105)
POTASSIUM BLD-SCNC: 4.3 MMOL/L (ref 3.5–5.1)
POTASSIUM BLDA-SCNC: 4.3 MMOL/L (ref 3.6–4.9)
PROT SERPL-MCNC: 7.8 G/DL (ref 6.3–8.6)
PROT UR QL STRIP: ABNORMAL
RBC # BLD AUTO: 3.96 10*6/MM3 (ref 3.77–5.16)
RBC # UR: ABNORMAL /HPF
REF LAB TEST METHOD: ABNORMAL
SAO2 % BLDCOA: 99.4 %
SODIUM BLD-SCNC: 135 MMOL/L (ref 137–145)
SODIUM BLDA-SCNC: 135.9 MMOL/L (ref 138–146)
SP GR UR STRIP: 1.02 (ref 1–1.03)
SQUAMOUS #/AREA URNS HPF: ABNORMAL /HPF
TROPONIN I SERPL-MCNC: 0.03 NG/ML
UROBILINOGEN UR QL STRIP: ABNORMAL
WBC NRBC COR # BLD: 10.77 10*3/MM3 (ref 3.2–9.8)
WBC UR QL AUTO: ABNORMAL /HPF
WHOLE BLOOD HOLD SPECIMEN: NORMAL

## 2017-06-28 PROCEDURE — 85025 COMPLETE CBC W/AUTO DIFF WBC: CPT | Performed by: EMERGENCY MEDICINE

## 2017-06-28 PROCEDURE — 81001 URINALYSIS AUTO W/SCOPE: CPT | Performed by: EMERGENCY MEDICINE

## 2017-06-28 PROCEDURE — 93010 ELECTROCARDIOGRAM REPORT: CPT | Performed by: INTERNAL MEDICINE

## 2017-06-28 PROCEDURE — 87086 URINE CULTURE/COLONY COUNT: CPT | Performed by: EMERGENCY MEDICINE

## 2017-06-28 PROCEDURE — 25010000002 METHYLPREDNISOLONE PER 125 MG: Performed by: EMERGENCY MEDICINE

## 2017-06-28 PROCEDURE — 96374 THER/PROPH/DIAG INJ IV PUSH: CPT

## 2017-06-28 PROCEDURE — 96361 HYDRATE IV INFUSION ADD-ON: CPT

## 2017-06-28 PROCEDURE — 71020 HC CHEST PA AND LATERAL: CPT

## 2017-06-28 PROCEDURE — 82553 CREATINE MB FRACTION: CPT | Performed by: EMERGENCY MEDICINE

## 2017-06-28 PROCEDURE — 93005 ELECTROCARDIOGRAM TRACING: CPT | Performed by: EMERGENCY MEDICINE

## 2017-06-28 PROCEDURE — 82803 BLOOD GASES ANY COMBINATION: CPT | Performed by: EMERGENCY MEDICINE

## 2017-06-28 PROCEDURE — 80053 COMPREHEN METABOLIC PANEL: CPT | Performed by: EMERGENCY MEDICINE

## 2017-06-28 PROCEDURE — 84484 ASSAY OF TROPONIN QUANT: CPT | Performed by: EMERGENCY MEDICINE

## 2017-06-28 PROCEDURE — 99284 EMERGENCY DEPT VISIT MOD MDM: CPT

## 2017-06-28 RX ORDER — METHYLPREDNISOLONE SODIUM SUCCINATE 125 MG/2ML
125 INJECTION, POWDER, LYOPHILIZED, FOR SOLUTION INTRAMUSCULAR; INTRAVENOUS ONCE
Status: COMPLETED | OUTPATIENT
Start: 2017-06-28 | End: 2017-06-28

## 2017-06-28 RX ORDER — ALBUTEROL SULFATE 2.5 MG/3ML
2.5 SOLUTION RESPIRATORY (INHALATION)
Status: DISPENSED | OUTPATIENT
Start: 2017-06-28 | End: 2017-06-28

## 2017-06-28 RX ORDER — SODIUM CHLORIDE 0.9 % (FLUSH) 0.9 %
10 SYRINGE (ML) INJECTION AS NEEDED
Status: DISCONTINUED | OUTPATIENT
Start: 2017-06-28 | End: 2017-06-30 | Stop reason: HOSPADM

## 2017-06-28 RX ORDER — CEFUROXIME AXETIL 500 MG/1
500 TABLET ORAL 2 TIMES DAILY
Qty: 14 TABLET | Refills: 0 | Status: SHIPPED | OUTPATIENT
Start: 2017-06-28 | End: 2017-06-30 | Stop reason: HOSPADM

## 2017-06-28 RX ORDER — SODIUM CHLORIDE 9 MG/ML
75 INJECTION, SOLUTION INTRAVENOUS CONTINUOUS
Status: DISCONTINUED | OUTPATIENT
Start: 2017-06-28 | End: 2017-06-29

## 2017-06-28 RX ORDER — PREDNISONE 20 MG/1
TABLET ORAL
Qty: 15 TABLET | Refills: 0 | Status: ON HOLD | OUTPATIENT
Start: 2017-06-28 | End: 2017-06-30

## 2017-06-28 RX ADMIN — SODIUM CHLORIDE 75 ML/HR: 900 INJECTION, SOLUTION INTRAVENOUS at 23:40

## 2017-06-28 RX ADMIN — ALBUTEROL SULFATE 2.5 MG: 2.5 SOLUTION RESPIRATORY (INHALATION) at 22:29

## 2017-06-28 RX ADMIN — Medication 10 ML: at 23:40

## 2017-06-28 RX ADMIN — METHYLPREDNISOLONE SODIUM SUCCINATE 125 MG: 125 INJECTION, POWDER, FOR SOLUTION INTRAMUSCULAR; INTRAVENOUS at 22:30

## 2017-06-28 RX ADMIN — ALBUTEROL SULFATE 2.5 MG: 2.5 SOLUTION RESPIRATORY (INHALATION) at 23:45

## 2017-06-29 LAB
ANION GAP SERPL CALCULATED.3IONS-SCNC: 11 MMOL/L (ref 5–15)
BASOPHILS # BLD AUTO: 0.03 10*3/MM3 (ref 0–0.2)
BASOPHILS NFR BLD AUTO: 0.4 % (ref 0–2)
BUN BLD-MCNC: 24 MG/DL (ref 7–21)
BUN/CREAT SERPL: 25 (ref 7–25)
CALCIUM SPEC-SCNC: 8.9 MG/DL (ref 8.4–10.2)
CHLORIDE SERPL-SCNC: 98 MMOL/L (ref 95–110)
CO2 SERPL-SCNC: 27 MMOL/L (ref 22–31)
CREAT BLD-MCNC: 0.96 MG/DL (ref 0.5–1)
DEPRECATED RDW RBC AUTO: 51.8 FL (ref 36.4–46.3)
EOSINOPHIL # BLD AUTO: 0.06 10*3/MM3 (ref 0–0.7)
EOSINOPHIL NFR BLD AUTO: 0.8 % (ref 0–7)
ERYTHROCYTE [DISTWIDTH] IN BLOOD BY AUTOMATED COUNT: 15.8 % (ref 11.5–14.5)
GFR SERPL CREATININE-BSD FRML MDRD: 55 ML/MIN/1.73 (ref 39–90)
GLUCOSE BLD-MCNC: 185 MG/DL (ref 60–100)
HCT VFR BLD AUTO: 33.9 % (ref 35–45)
HGB BLD-MCNC: 10.8 G/DL (ref 12–15.5)
IMM GRANULOCYTES # BLD: 0.1 10*3/MM3 (ref 0–0.02)
IMM GRANULOCYTES NFR BLD: 1.3 % (ref 0–0.5)
LYMPHOCYTES # BLD AUTO: 0.36 10*3/MM3 (ref 0.6–4.2)
LYMPHOCYTES NFR BLD AUTO: 4.7 % (ref 10–50)
MCH RBC QN AUTO: 28.7 PG (ref 26.5–34)
MCHC RBC AUTO-ENTMCNC: 31.9 G/DL (ref 31.4–36)
MCV RBC AUTO: 90.2 FL (ref 80–98)
MONOCYTES # BLD AUTO: 0.02 10*3/MM3 (ref 0–0.9)
MONOCYTES NFR BLD AUTO: 0.3 % (ref 0–12)
NEUTROPHILS # BLD AUTO: 7.05 10*3/MM3 (ref 2–8.6)
NEUTROPHILS NFR BLD AUTO: 92.5 % (ref 37–80)
PLATELET # BLD AUTO: 183 10*3/MM3 (ref 150–450)
PMV BLD AUTO: 10.7 FL (ref 8–12)
POTASSIUM BLD-SCNC: 4 MMOL/L (ref 3.5–5.1)
RBC # BLD AUTO: 3.76 10*6/MM3 (ref 3.77–5.16)
SODIUM BLD-SCNC: 136 MMOL/L (ref 137–145)
WBC NRBC COR # BLD: 7.62 10*3/MM3 (ref 3.2–9.8)

## 2017-06-29 PROCEDURE — 93005 ELECTROCARDIOGRAM TRACING: CPT | Performed by: INTERNAL MEDICINE

## 2017-06-29 PROCEDURE — G0378 HOSPITAL OBSERVATION PER HR: HCPCS

## 2017-06-29 PROCEDURE — 96376 TX/PRO/DX INJ SAME DRUG ADON: CPT

## 2017-06-29 PROCEDURE — 94799 UNLISTED PULMONARY SVC/PX: CPT

## 2017-06-29 PROCEDURE — 94760 N-INVAS EAR/PLS OXIMETRY 1: CPT

## 2017-06-29 PROCEDURE — 25010000002 METHYLPREDNISOLONE PER 125 MG: Performed by: INTERNAL MEDICINE

## 2017-06-29 PROCEDURE — 80048 BASIC METABOLIC PNL TOTAL CA: CPT | Performed by: INTERNAL MEDICINE

## 2017-06-29 PROCEDURE — 94640 AIRWAY INHALATION TREATMENT: CPT

## 2017-06-29 PROCEDURE — 85025 COMPLETE CBC W/AUTO DIFF WBC: CPT | Performed by: INTERNAL MEDICINE

## 2017-06-29 PROCEDURE — 93010 ELECTROCARDIOGRAM REPORT: CPT | Performed by: INTERNAL MEDICINE

## 2017-06-29 RX ORDER — SODIUM CHLORIDE 9 MG/ML
50 INJECTION, SOLUTION INTRAVENOUS CONTINUOUS
Status: DISCONTINUED | OUTPATIENT
Start: 2017-06-29 | End: 2017-06-29

## 2017-06-29 RX ORDER — ALBUTEROL SULFATE 2.5 MG/3ML
2.5 SOLUTION RESPIRATORY (INHALATION)
Status: DISCONTINUED | OUTPATIENT
Start: 2017-06-29 | End: 2017-06-29

## 2017-06-29 RX ORDER — SODIUM CHLORIDE 0.9 % (FLUSH) 0.9 %
1-10 SYRINGE (ML) INJECTION AS NEEDED
Status: DISCONTINUED | OUTPATIENT
Start: 2017-06-29 | End: 2017-06-30 | Stop reason: HOSPADM

## 2017-06-29 RX ORDER — METHYLPREDNISOLONE SODIUM SUCCINATE 125 MG/2ML
60 INJECTION, POWDER, LYOPHILIZED, FOR SOLUTION INTRAMUSCULAR; INTRAVENOUS EVERY 8 HOURS
Status: DISCONTINUED | OUTPATIENT
Start: 2017-06-29 | End: 2017-06-30 | Stop reason: HOSPADM

## 2017-06-29 RX ORDER — AMIODARONE HYDROCHLORIDE 200 MG/1
200 TABLET ORAL DAILY
Status: DISCONTINUED | OUTPATIENT
Start: 2017-06-29 | End: 2017-06-30 | Stop reason: HOSPADM

## 2017-06-29 RX ORDER — FUROSEMIDE 20 MG/1
20 TABLET ORAL DAILY
Status: DISCONTINUED | OUTPATIENT
Start: 2017-06-29 | End: 2017-06-30 | Stop reason: HOSPADM

## 2017-06-29 RX ORDER — ASPIRIN 81 MG/1
81 TABLET, CHEWABLE ORAL DAILY
Status: DISCONTINUED | OUTPATIENT
Start: 2017-06-29 | End: 2017-06-30 | Stop reason: HOSPADM

## 2017-06-29 RX ORDER — BUDESONIDE AND FORMOTEROL FUMARATE DIHYDRATE 160; 4.5 UG/1; UG/1
2 AEROSOL RESPIRATORY (INHALATION)
Status: DISCONTINUED | OUTPATIENT
Start: 2017-06-29 | End: 2017-06-30 | Stop reason: HOSPADM

## 2017-06-29 RX ORDER — FUROSEMIDE 40 MG/1
40 TABLET ORAL DAILY
Status: DISCONTINUED | OUTPATIENT
Start: 2017-06-29 | End: 2017-06-29

## 2017-06-29 RX ORDER — IPRATROPIUM BROMIDE AND ALBUTEROL SULFATE 2.5; .5 MG/3ML; MG/3ML
3 SOLUTION RESPIRATORY (INHALATION)
Status: DISCONTINUED | OUTPATIENT
Start: 2017-06-29 | End: 2017-06-30 | Stop reason: HOSPADM

## 2017-06-29 RX ADMIN — METHYLPREDNISOLONE SODIUM SUCCINATE 60 MG: 125 INJECTION, POWDER, FOR SOLUTION INTRAMUSCULAR; INTRAVENOUS at 05:57

## 2017-06-29 RX ADMIN — BUDESONIDE AND FORMOTEROL FUMARATE DIHYDRATE 2 PUFF: 160; 4.5 AEROSOL RESPIRATORY (INHALATION) at 19:55

## 2017-06-29 RX ADMIN — SODIUM CHLORIDE 75 ML/HR: 900 INJECTION, SOLUTION INTRAVENOUS at 01:30

## 2017-06-29 RX ADMIN — IPRATROPIUM BROMIDE AND ALBUTEROL SULFATE 3 ML: 2.5; .5 SOLUTION RESPIRATORY (INHALATION) at 19:54

## 2017-06-29 RX ADMIN — FUROSEMIDE 20 MG: 20 TABLET ORAL at 10:00

## 2017-06-29 RX ADMIN — IPRATROPIUM BROMIDE AND ALBUTEROL SULFATE 3 ML: 2.5; .5 SOLUTION RESPIRATORY (INHALATION) at 05:08

## 2017-06-29 RX ADMIN — METHYLPREDNISOLONE SODIUM SUCCINATE 60 MG: 125 INJECTION, POWDER, FOR SOLUTION INTRAMUSCULAR; INTRAVENOUS at 14:22

## 2017-06-29 RX ADMIN — IPRATROPIUM BROMIDE AND ALBUTEROL SULFATE 3 ML: 2.5; .5 SOLUTION RESPIRATORY (INHALATION) at 11:16

## 2017-06-29 RX ADMIN — Medication 10 ML: at 14:22

## 2017-06-29 RX ADMIN — BUDESONIDE AND FORMOTEROL FUMARATE DIHYDRATE 2 PUFF: 160; 4.5 AEROSOL RESPIRATORY (INHALATION) at 05:08

## 2017-06-29 RX ADMIN — METHYLPREDNISOLONE SODIUM SUCCINATE 60 MG: 125 INJECTION, POWDER, FOR SOLUTION INTRAMUSCULAR; INTRAVENOUS at 22:10

## 2017-06-29 RX ADMIN — IPRATROPIUM BROMIDE AND ALBUTEROL SULFATE 3 ML: 2.5; .5 SOLUTION RESPIRATORY (INHALATION) at 15:33

## 2017-06-29 RX ADMIN — Medication 10 ML: at 10:00

## 2017-06-29 RX ADMIN — AMIODARONE HYDROCHLORIDE 200 MG: 200 TABLET ORAL at 09:59

## 2017-06-29 RX ADMIN — SODIUM CHLORIDE 50 ML/HR: 900 INJECTION, SOLUTION INTRAVENOUS at 03:45

## 2017-06-30 VITALS
BODY MASS INDEX: 31.72 KG/M2 | DIASTOLIC BLOOD PRESSURE: 84 MMHG | HEART RATE: 79 BPM | RESPIRATION RATE: 18 BRPM | HEIGHT: 62 IN | SYSTOLIC BLOOD PRESSURE: 135 MMHG | TEMPERATURE: 96.7 F | OXYGEN SATURATION: 93 % | WEIGHT: 172.38 LBS

## 2017-06-30 LAB
ANION GAP SERPL CALCULATED.3IONS-SCNC: 13 MMOL/L (ref 5–15)
BACTERIA SPEC AEROBE CULT: NORMAL
BACTERIA SPEC AEROBE CULT: NORMAL
BUN BLD-MCNC: 26 MG/DL (ref 7–21)
BUN/CREAT SERPL: 29.9 (ref 7–25)
CALCIUM SPEC-SCNC: 8.5 MG/DL (ref 8.4–10.2)
CHLORIDE SERPL-SCNC: 97 MMOL/L (ref 95–110)
CO2 SERPL-SCNC: 27 MMOL/L (ref 22–31)
CREAT BLD-MCNC: 0.87 MG/DL (ref 0.5–1)
DEPRECATED RDW RBC AUTO: 52.9 FL (ref 36.4–46.3)
ERYTHROCYTE [DISTWIDTH] IN BLOOD BY AUTOMATED COUNT: 15.8 % (ref 11.5–14.5)
GFR SERPL CREATININE-BSD FRML MDRD: 62 ML/MIN/1.73 (ref 39–90)
GLUCOSE BLD-MCNC: 284 MG/DL (ref 60–100)
HCT VFR BLD AUTO: 33.4 % (ref 35–45)
HGB BLD-MCNC: 10.4 G/DL (ref 12–15.5)
MCH RBC QN AUTO: 28.6 PG (ref 26.5–34)
MCHC RBC AUTO-ENTMCNC: 31.1 G/DL (ref 31.4–36)
MCV RBC AUTO: 91.8 FL (ref 80–98)
PLATELET # BLD AUTO: 184 10*3/MM3 (ref 150–450)
PMV BLD AUTO: 10.7 FL (ref 8–12)
POTASSIUM BLD-SCNC: 3.8 MMOL/L (ref 3.5–5.1)
RBC # BLD AUTO: 3.64 10*6/MM3 (ref 3.77–5.16)
SODIUM BLD-SCNC: 137 MMOL/L (ref 137–145)
WBC NRBC COR # BLD: 14.91 10*3/MM3 (ref 3.2–9.8)

## 2017-06-30 PROCEDURE — 96376 TX/PRO/DX INJ SAME DRUG ADON: CPT

## 2017-06-30 PROCEDURE — 94799 UNLISTED PULMONARY SVC/PX: CPT

## 2017-06-30 PROCEDURE — 94760 N-INVAS EAR/PLS OXIMETRY 1: CPT

## 2017-06-30 PROCEDURE — 80048 BASIC METABOLIC PNL TOTAL CA: CPT | Performed by: NURSE PRACTITIONER

## 2017-06-30 PROCEDURE — 25010000002 METHYLPREDNISOLONE PER 125 MG: Performed by: INTERNAL MEDICINE

## 2017-06-30 PROCEDURE — 85027 COMPLETE CBC AUTOMATED: CPT | Performed by: NURSE PRACTITIONER

## 2017-06-30 PROCEDURE — G0378 HOSPITAL OBSERVATION PER HR: HCPCS

## 2017-06-30 RX ORDER — PREDNISONE 20 MG/1
TABLET ORAL
Qty: 15 TABLET | Refills: 0 | Status: SHIPPED | OUTPATIENT
Start: 2017-06-30 | End: 2017-08-02

## 2017-06-30 RX ADMIN — BUDESONIDE AND FORMOTEROL FUMARATE DIHYDRATE 2 PUFF: 160; 4.5 AEROSOL RESPIRATORY (INHALATION) at 07:11

## 2017-06-30 RX ADMIN — AMIODARONE HYDROCHLORIDE 200 MG: 200 TABLET ORAL at 08:33

## 2017-06-30 RX ADMIN — METHYLPREDNISOLONE SODIUM SUCCINATE 60 MG: 125 INJECTION, POWDER, FOR SOLUTION INTRAMUSCULAR; INTRAVENOUS at 05:50

## 2017-06-30 RX ADMIN — IPRATROPIUM BROMIDE AND ALBUTEROL SULFATE 3 ML: 2.5; .5 SOLUTION RESPIRATORY (INHALATION) at 07:11

## 2017-06-30 RX ADMIN — IPRATROPIUM BROMIDE AND ALBUTEROL SULFATE 3 ML: 2.5; .5 SOLUTION RESPIRATORY (INHALATION) at 10:52

## 2017-06-30 RX ADMIN — FUROSEMIDE 20 MG: 20 TABLET ORAL at 08:33

## 2017-06-30 RX ADMIN — ASPIRIN 81 MG 81 MG: 81 TABLET ORAL at 08:33

## 2017-07-07 ENCOUNTER — OFFICE VISIT (OUTPATIENT)
Dept: INTERNAL MEDICINE | Facility: CLINIC | Age: 82
End: 2017-07-07

## 2017-07-07 VITALS
HEIGHT: 62 IN | WEIGHT: 181.9 LBS | BODY MASS INDEX: 33.47 KG/M2 | DIASTOLIC BLOOD PRESSURE: 80 MMHG | SYSTOLIC BLOOD PRESSURE: 120 MMHG

## 2017-07-07 DIAGNOSIS — I10 ESSENTIAL HYPERTENSION: ICD-10-CM

## 2017-07-07 DIAGNOSIS — J43.9 PULMONARY EMPHYSEMA, UNSPECIFIED EMPHYSEMA TYPE (HCC): ICD-10-CM

## 2017-07-07 DIAGNOSIS — I48.0 PAROXYSMAL ATRIAL FIBRILLATION (HCC): ICD-10-CM

## 2017-07-07 DIAGNOSIS — J96.11 CHRONIC RESPIRATORY FAILURE WITH HYPOXIA (HCC): Primary | ICD-10-CM

## 2017-07-07 PROCEDURE — 99213 OFFICE O/P EST LOW 20 MIN: CPT | Performed by: INTERNAL MEDICINE

## 2017-07-11 NOTE — PROGRESS NOTES
Subjective   Norma Harkins is a 86 y.o. female     Patient is seen for recheck after recent hospitalization for COPD exacerbation.  She was complaining of shortness of breath, cough and wheezing.  Chest X-Ray did not show any acute infiltrates.  She is finishing taking PO Prednisone.  Breathing is at baseline. She denies rest dyspnea but get short of breath on exertion. Denies cough or purulent sputum  Has been using Nebs tid.     Blood pressure is controlled on Diltiazem and Lasix.  She remains in NSR on Amiodarone. Patient is anticoagulated with Eliquis.    Past Medical History:   Diagnosis Date   • Acute on chronic respiratory failure with hypoxia 8/4/2016   • Chronic obstructive pulmonary disease with acute exacerbation 8/4/2016   • Hyperlipidemia    • Hypertension 8/4/2016   • Obesity    • Paroxysmal atrial fibrillation      Past Surgical History:   Procedure Laterality Date   • CATARACT EXTRACTION     • JOINT REPLACEMENT      total knee        Social History   Substance Use Topics   • Smoking status: Former Smoker   • Smokeless tobacco: Never Used   • Alcohol use No     Family History   Problem Relation Age of Onset   • Hypertension Father      No Known Allergies  Current Outpatient Prescriptions on File Prior to Visit   Medication Sig Dispense Refill   • acetaminophen (TYLENOL) 500 MG tablet Take 500 mg by mouth 1 (one) time as needed for mild pain (1-3).     • albuterol (ACCUNEB) 0.63 MG/3ML nebulizer solution Take 3 mL by nebulization Every 6 (Six) Hours As Needed for wheezing. 129 vial 1   • albuterol (PROVENTIL HFA;VENTOLIN HFA) 108 (90 BASE) MCG/ACT inhaler Inhale 2 puffs 4 (Four) Times a Day. 1 inhaler 1   • amiodarone (PACERONE) 200 MG tablet Take 1 tablet by mouth Daily. 30 tablet 6   • apixaban (ELIQUIS) 5 MG tablet tablet Take 5 mg by mouth Every 12 (Twelve) Hours.     • Calcium Carbonate ([employer] CALCIUM PO) Take 1,000 mg by mouth 2 (Two) Times a Day.     • Cholecalciferol (VITAMIN D3) 46277 UNITS  tablet Take 1 tablet by mouth 1 (One) Time Per Week. 4 tablet 5   • docusate sodium (COLACE) 100 MG capsule Take 300 mg by mouth Daily.     • furosemide (LASIX) 40 MG tablet Take 1 tablet by mouth Daily. 30 tablet 5   • potassium chloride (K-DUR,KLOR-CON) 10 MEQ CR tablet Take 1 tablet by mouth 2 (Two) Times a Day. 60 tablet 5   • predniSONE (DELTASONE) 20 MG tablet 20 mg bid for 5 days then 20 mg daily for 5 days 15 tablet 0   • RA ASPIRIN ADULT LOW STRENGTH 81 MG chewable tablet chew and swallow 1 tablet by mouth once daily 30 tablet 5   • SYMBICORT 160-4.5 MCG/ACT inhaler inhale 2 puffs by mouth twice a day 30.6 inhaler 2     No current facility-administered medications on file prior to visit.      Review of Systems   Constitutional: Negative for activity change and fatigue.   Respiratory: Positive for shortness of breath. Negative for cough and chest tightness.    Cardiovascular: Negative for chest pain, palpitations and leg swelling.   Gastrointestinal: Negative for abdominal pain, constipation and diarrhea.   Endocrine: Negative for cold intolerance, heat intolerance, polydipsia and polyuria.   Musculoskeletal: Negative for back pain and joint swelling.   Hematological: Negative for adenopathy.   Psychiatric/Behavioral: Negative for sleep disturbance. The patient is not nervous/anxious.        Objective   Physical Exam   Constitutional: She appears well-developed and well-nourished.   HENT:   Head: Normocephalic and atraumatic.   Eyes: Conjunctivae are normal. Pupils are equal, round, and reactive to light. No scleral icterus.   Neck: Neck supple. No JVD present. No thyromegaly present.   Cardiovascular: Normal rate and regular rhythm.    No murmur heard.  Pulmonary/Chest: Effort normal.   Decreased breath sounds   Abdominal: Soft. Bowel sounds are normal. She exhibits no mass.   Musculoskeletal: Normal range of motion. She exhibits no edema.   Skin: Skin is warm and dry. No rash noted.   Psychiatric: She has  a normal mood and affect. Her behavior is normal.           Patient Active Problem List   Diagnosis   • Hypertension   • Chronic obstructive pulmonary disease with acute exacerbation   • Acute on chronic respiratory failure with hypoxia   • Low back pain   • Hyperlipidemia   • Atrial fibrillation with RVR   • Paroxysmal atrial fibrillation   • Diabetes mellitus   • Hematuria               Assessment    Diagnosis Plan   1. Chronic respiratory failure with hypoxia     2. Pulmonary emphysema, unspecified emphysema type     3. Essential hypertension     4. Paroxysmal atrial fibrillation           Plan    1. Patient will continue with Oxygen at 3 liters/24 hr.      She is advised Pulmonary Rehab but is not interested.  2. Symbicort and Accunebs tid.      She will be referred to Pulmonologist for further evaluation.  3. Diltiazem and Lasix are continued.      DASH.      1.5 gr Sodium restriction.  4. She will continue with Amiodarone for rhythm control.      Anticoagulation with Eliquis is continued.      Follow up in 3 months.

## 2017-07-19 ENCOUNTER — TELEPHONE (OUTPATIENT)
Dept: INTERNAL MEDICINE | Facility: CLINIC | Age: 82
End: 2017-07-19

## 2017-07-19 NOTE — TELEPHONE ENCOUNTER
Scott with [facility] called to let you know that they are discharging pt from OT today but will be continuing PT.  If you have any questions you may call him at [phone].

## 2017-08-01 ENCOUNTER — TELEPHONE (OUTPATIENT)
Dept: INTERNAL MEDICINE | Facility: CLINIC | Age: 82
End: 2017-08-01

## 2017-08-02 ENCOUNTER — OFFICE VISIT (OUTPATIENT)
Dept: PULMONOLOGY | Facility: CLINIC | Age: 82
End: 2017-08-02

## 2017-08-02 VITALS
OXYGEN SATURATION: 98 % | DIASTOLIC BLOOD PRESSURE: 62 MMHG | HEART RATE: 67 BPM | SYSTOLIC BLOOD PRESSURE: 102 MMHG | HEIGHT: 62 IN | BODY MASS INDEX: 33.31 KG/M2 | WEIGHT: 181 LBS

## 2017-08-02 DIAGNOSIS — J96.11 CHRONIC RESPIRATORY FAILURE WITH HYPOXIA (HCC): ICD-10-CM

## 2017-08-02 DIAGNOSIS — R53.81 PHYSICAL DECONDITIONING: ICD-10-CM

## 2017-08-02 DIAGNOSIS — E66.01 MORBID OBESITY, UNSPECIFIED OBESITY TYPE (HCC): ICD-10-CM

## 2017-08-02 DIAGNOSIS — R06.09 DYSPNEA ON EXERTION: Primary | ICD-10-CM

## 2017-08-02 DIAGNOSIS — J44.9 CHRONIC OBSTRUCTIVE PULMONARY DISEASE, UNSPECIFIED COPD TYPE (HCC): ICD-10-CM

## 2017-08-02 PROCEDURE — 99214 OFFICE O/P EST MOD 30 MIN: CPT | Performed by: INTERNAL MEDICINE

## 2017-08-02 PROCEDURE — 94060 EVALUATION OF WHEEZING: CPT | Performed by: INTERNAL MEDICINE

## 2017-08-02 NOTE — PROGRESS NOTES
"Pulmonary Office Follow-up    Subjective     Norma Harkins is seen today at the office for   Chief Complaint   Patient presents with   • Pulmonary Emphysema     ref by Shawna APARICIO  Norma Harkins is a 86 y.o. female with a PMH significant for COPD, chronic hypoxemic respiratory failure, past tobacco use, atrial fibrillation, hypertension, and diabetes who presents to establish care for COPD.  She has previously been followed by Dr. Barnes, but has not been seen in clinic since 2016. Pt has a long standing history of COPD and breathing issues. She was told by Dr. Barnes there was nothing that could be done. Currently, she takes Symbicort BID and albuterol MDI/ nebs at least 3x/d as she was told to do so. Pt does not feel that she needs the albuterol every time. Her daughter reports that when she gets \"choked up\" she requires abx and prednisone. Pt was last hospitalized at the end of  and has been off prednisone for 3wks. She reports dyspnea on minimal exertion, intermitent cough, and rare wheeze. She denies sputum, edema, or chest pain. Pt wears 3lpm O2 ATC. She previously smoked up tp 2ppd for over 30yrs but quit 35yrs ago. 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 She had 3 children who  with heart and lung issues.     Patient Active Problem List   Diagnosis   • Hypertension   • Low back pain   • Hyperlipidemia   • Atrial fibrillation with RVR   • Paroxysmal atrial fibrillation   • Diabetes mellitus   • Hematuria   • Chronic obstructive pulmonary disease   • Morbid obesity   • Physical deconditioning   • Chronic respiratory failure with hypoxia       Review of Systems  Review of Systems   HENT: Positive for congestion and voice change.    Respiratory: Positive for cough, shortness of breath and wheezing.    Cardiovascular: Positive for palpitations and leg swelling.   Genitourinary: Positive for frequency.   Musculoskeletal: Positive for " arthralgias and back pain.   Psychiatric/Behavioral: The patient is nervous/anxious.      As described in the HPI. Otherwise, remainder of ROS (14 systems) were negative.    Medications, Allergies, Social, and Family Histories reviewed as per EMR.    Objective     Vitals:    08/02/17 1250   BP: 102/62   Pulse: 67   SpO2: 98%     Physical Exam   Constitutional: She is oriented to person, place, and time. Vital signs are normal. She appears well-developed and well-nourished.   Obese   HENT:   Head: Normocephalic and atraumatic.   Nose: Nose normal.   Mouth/Throat: Uvula is midline, oropharynx is clear and moist and mucous membranes are normal.   Mallampati 2   Eyes: Conjunctivae, EOM and lids are normal. Pupils are equal, round, and reactive to light.   Neck: Trachea normal and normal range of motion. No tracheal tenderness present. No thyroid mass present.   Cardiovascular: Normal rate, regular rhythm and normal heart sounds.  Exam reveals no gallop.    No murmur heard.  Pulmonary/Chest: Effort normal. She has decreased breath sounds. She has no wheezes. She has no rhonchi. Chest wall is not dull to percussion. She exhibits no tenderness.   Abdominal: Soft. Normal appearance and bowel sounds are normal. There is no tenderness.   Musculoskeletal:   Mild bilateral lower extremity edema   Lymphadenopathy:        Head (right side): No submandibular adenopathy present.        Head (left side): No submandibular adenopathy present.     She has no cervical adenopathy.        Right: No supraclavicular adenopathy present.        Left: No supraclavicular adenopathy present.   Neurological: She is alert and oriented to person, place, and time.   Skin: Skin is warm and dry. No cyanosis. Nails show no clubbing.   Psychiatric: She has a normal mood and affect. Her speech is normal and behavior is normal. Judgment normal.   Nursing note and vitals reviewed.    PFTs: 8/2/17  Ratio 75  FVC 1.18/ 57%  FEV1 0.89/ 58%  Reduced FVC and  FEV1 suggestive of restriction.  Unable to perform lung volumes.  No significant bronchodilator response.  No comparative data available.    IMAGING: CXR 6/28/17 (independently reviewed and interpreted by me) showed thoracic kyphosis, mild cardiomegaly, chronic interstitial markings, hyperinflation        Assessment/Plan     Norma was seen today for pulmonary emphysema.    Diagnoses and all orders for this visit:    Dyspnea on exertion  -     Spirometry With Bronchodilator    Chronic obstructive pulmonary disease, unspecified COPD type  -     Tiotropium Bromide Monohydrate 2.5 MCG/ACT aerosol solution; Inhale 2 puffs Daily.    Morbid obesity, unspecified obesity type    Physical deconditioning    Chronic respiratory failure with hypoxia         Discussion/ Recommendations:   Spirometry suggestive of restriction, but clinically the patient does have underlying COPD.  I think her dyspnea is the pectoral from underlying COPD, deconditioning, and advanced age.  She is currently on dual therapy with Symbicort, but I think she may benefit from the addition of a LAMA.    -Continue Symbicort twice daily.  -Start Spiriva Respimat daily.  Sample provided and demonstrated on use.  -Advised patient to use albuterol only as needed.  -Recommended she enter into pulmonary rehabilitation, but patient is unable to do so without transportation assistance.  She is currently using home health PT.  -Continue supplemental oxygen.    Return in about 6 weeks (around 9/13/2017) for Recheck.          This document has been electronically signed by Yvonne Casey MD on August 2, 2017 1:35 PM      Dictated using Dragon

## 2017-08-28 RX ORDER — AMIODARONE HYDROCHLORIDE 200 MG/1
TABLET ORAL
Qty: 30 TABLET | Refills: 6 | Status: ON HOLD | OUTPATIENT
Start: 2017-08-28 | End: 2017-10-18

## 2017-09-08 ENCOUNTER — TELEPHONE (OUTPATIENT)
Dept: INTERNAL MEDICINE | Facility: CLINIC | Age: 82
End: 2017-09-08

## 2017-09-08 RX ORDER — PREDNISONE 20 MG/1
TABLET ORAL
Qty: 15 TABLET | Refills: 0 | Status: SHIPPED | OUTPATIENT
Start: 2017-09-08 | End: 2017-09-13

## 2017-09-08 RX ORDER — CEFDINIR 300 MG/1
300 CAPSULE ORAL 2 TIMES DAILY
Qty: 20 CAPSULE | Refills: 0 | Status: SHIPPED | OUTPATIENT
Start: 2017-09-08 | End: 2017-09-13

## 2017-09-08 NOTE — TELEPHONE ENCOUNTER
Fany with [facility] called and left voicemail to request another round of antibiotics and steroids for the patient. She said the patient is no better and still is short of breathe. She said her lung sounds have not improved and her sputum is very thick and tan.   Call back number [phone]

## 2017-09-13 ENCOUNTER — OFFICE VISIT (OUTPATIENT)
Dept: INTERNAL MEDICINE | Facility: CLINIC | Age: 82
End: 2017-09-13

## 2017-09-13 VITALS
WEIGHT: 183.8 LBS | HEIGHT: 62 IN | DIASTOLIC BLOOD PRESSURE: 88 MMHG | SYSTOLIC BLOOD PRESSURE: 130 MMHG | OXYGEN SATURATION: 96 % | HEART RATE: 82 BPM | BODY MASS INDEX: 33.82 KG/M2

## 2017-09-13 DIAGNOSIS — E55.9 VITAMIN D DEFICIENCY: ICD-10-CM

## 2017-09-13 DIAGNOSIS — I48.0 PAROXYSMAL ATRIAL FIBRILLATION (HCC): ICD-10-CM

## 2017-09-13 DIAGNOSIS — J96.11 CHRONIC RESPIRATORY FAILURE WITH HYPOXIA (HCC): Primary | ICD-10-CM

## 2017-09-13 DIAGNOSIS — I10 ESSENTIAL HYPERTENSION: Chronic | ICD-10-CM

## 2017-09-13 DIAGNOSIS — J44.9 CHRONIC OBSTRUCTIVE PULMONARY DISEASE, UNSPECIFIED COPD TYPE (HCC): ICD-10-CM

## 2017-09-13 PROCEDURE — 99214 OFFICE O/P EST MOD 30 MIN: CPT | Performed by: INTERNAL MEDICINE

## 2017-09-13 RX ORDER — DILTIAZEM HYDROCHLORIDE 60 MG/1
30 TABLET, FILM COATED ORAL 2 TIMES DAILY
Refills: 0 | COMMUNITY
Start: 2017-09-13 | End: 2017-10-25 | Stop reason: SDUPTHER

## 2017-09-13 RX ORDER — DILTIAZEM HYDROCHLORIDE 60 MG/1
60 TABLET, FILM COATED ORAL 2 TIMES DAILY
Refills: 0 | COMMUNITY
Start: 2017-09-05 | End: 2017-09-13

## 2017-09-13 NOTE — PROGRESS NOTES
Subjective   Norma Harkins is a 86 y.o. female       Patient presents for recheck     She is finishing PO Antibiotcs and PO steroids for acute bronchitis and flare of COPD.  Her cough is better and she denies any purulent sputum.using Accunenebs couple times a day.  On Oxygen at 3 liters.  She was seen by  and was prescribed Spiriva but could not tolerate it due to the side effects.    Her BP has been low at times and she is has been skipping doses of Cardiazem  Remain in NSR on Amiodarone.   Anticoagulated with Eliquis.    Past Medical History:   Diagnosis Date   • Acute on chronic respiratory failure with hypoxia 8/4/2016   • Chronic obstructive pulmonary disease with acute exacerbation 8/4/2016   • Hyperlipidemia    • Hypertension 8/4/2016   • Obesity    • Paroxysmal atrial fibrillation      Past Surgical History:   Procedure Laterality Date   • CATARACT EXTRACTION     • JOINT REPLACEMENT      total knee        Social History   Substance Use Topics   • Smoking status: Former Smoker   • Smokeless tobacco: Never Used   • Alcohol use No     Family History   Problem Relation Age of Onset   • Hypertension Father      No Known Allergies  Current Outpatient Prescriptions on File Prior to Visit   Medication Sig Dispense Refill   • acetaminophen (TYLENOL) 500 MG tablet Take 500 mg by mouth 1 (one) time as needed for mild pain (1-3).     • albuterol (ACCUNEB) 0.63 MG/3ML nebulizer solution Take 3 mL by nebulization Every 6 (Six) Hours As Needed for wheezing. 129 vial 1   • albuterol (PROVENTIL HFA;VENTOLIN HFA) 108 (90 BASE) MCG/ACT inhaler Inhale 2 puffs 4 (Four) Times a Day. 1 inhaler 1   • amiodarone (PACERONE) 200 MG tablet take 1 tablet by mouth once daily 30 tablet 6   • Calcium Carbonate ([employer] CALCIUM PO) Take 1,000 mg by mouth 2 (Two) Times a Day.     • Cholecalciferol (VITAMIN D3) 39153 UNITS tablet Take 1 tablet by mouth 1 (One) Time Per Week. 4 tablet 5   • docusate sodium (COLACE) 100 MG capsule Take  300 mg by mouth Daily.     • furosemide (LASIX) 40 MG tablet Take 1 tablet by mouth Daily. 30 tablet 5   • potassium chloride (K-DUR,KLOR-CON) 10 MEQ CR tablet Take 1 tablet by mouth 2 (Two) Times a Day. 60 tablet 5   • SYMBICORT 160-4.5 MCG/ACT inhaler inhale 2 puffs by mouth twice a day 30.6 inhaler 2   • [DISCONTINUED] apixaban (ELIQUIS) 5 MG tablet tablet Take 5 mg by mouth Every 12 (Twelve) Hours.     • [DISCONTINUED] cefdinir (OMNICEF) 300 MG capsule Take 1 capsule by mouth 2 (Two) Times a Day. 20 capsule 0   • [DISCONTINUED] predniSONE (DELTASONE) 20 MG tablet Take 2 tabs po bid x3 days,take 1 tab po daily for 3 days,then 1/2 tab po daily for 2 days  Then stop 15 tablet 0   • RA ASPIRIN ADULT LOW STRENGTH 81 MG chewable tablet chew and swallow 1 tablet by mouth once daily 30 tablet 5   • [DISCONTINUED] Tiotropium Bromide Monohydrate 2.5 MCG/ACT aerosol solution Inhale 2 puffs Daily. 1 inhaler 11     No current facility-administered medications on file prior to visit.      Review of Systems   Constitutional: Negative for activity change and fatigue.   Respiratory: Positive for cough and shortness of breath.    Cardiovascular: Positive for palpitations and leg swelling. Negative for chest pain.   Gastrointestinal: Negative for abdominal pain, constipation, diarrhea, nausea and vomiting.   Endocrine: Negative for cold intolerance, heat intolerance, polydipsia and polyuria.   Genitourinary: Negative for flank pain and frequency.   Musculoskeletal: Positive for back pain and gait problem.   Neurological: Negative for dizziness, light-headedness and numbness.   Psychiatric/Behavioral: The patient is nervous/anxious.        Objective   Physical Exam   Constitutional: She is oriented to person, place, and time. She appears well-developed and well-nourished.   HENT:   Head: Normocephalic and atraumatic.   Nose: Nose normal.   Mouth/Throat: Oropharynx is clear and moist.   Eyes: Conjunctivae are normal.    Cardiovascular: Normal rate and regular rhythm.    Murmur heard.  Pulmonary/Chest: Effort normal. She has no wheezes.   Decreased breath sounds   Abdominal: Soft. Bowel sounds are normal. She exhibits no mass.   Neurological: She is alert and oriented to person, place, and time. She has normal reflexes.   Skin: Skin is warm and dry. No rash noted.   Psychiatric: She has a normal mood and affect. Her behavior is normal. Judgment and thought content normal.   Nursing note and vitals reviewed.          Patient Active Problem List   Diagnosis   • Hypertension   • Low back pain   • Hyperlipidemia   • Atrial fibrillation with RVR   • Paroxysmal atrial fibrillation   • Diabetes mellitus   • Hematuria   • Chronic obstructive pulmonary disease   • Morbid obesity   • Physical deconditioning   • Chronic respiratory failure with hypoxia               Assessment   Diagnosis Plan   1. Chronic respiratory failure with hypoxia     2. Chronic obstructive pulmonary disease, unspecified COPD type     3. Essential hypertension     4. Paroxysmal atrial fibrillation  Comprehensive Metabolic Panel    TSH   5. Vitamin D deficiency  Vitamin D 25 Hydroxy       Plan      1. Oxygen at 3 liters.      Not able to enter Pulmonary Rehab.      Home Health PT.  2. Symicort inhaler 160 2 puffs bid.      Albuterol Nebs PRN.  3. Diltiazem will be decreased to 30 mg bid.      Lasix is continued.  4. Amiodarone 200 mg daily.      Will recheck TSH and LFTs in view of Amiodarone therapy.      Eliquis is continued for anticoagulation.  5. She has history of severe vit. D deficiency.      Currently on vit D 50 000 U weekly.      Vit D level will be rechecked.    Follow up in 3 months.          This document has been electronically signed by Krista Matos MD on September 13, 2017 11:31 PM

## 2017-09-26 ENCOUNTER — DOCUMENTATION (OUTPATIENT)
Dept: CARDIOLOGY | Facility: CLINIC | Age: 82
End: 2017-09-26

## 2017-09-26 NOTE — PROGRESS NOTES
Home health called with concerns about pts heart rate. Informed Dr Graham, pt not taking Cardizem consistantly, educated on importance of medication adherance.  Pt states will try to do better, home health informed as well.

## 2017-10-13 ENCOUNTER — PATIENT OUTREACH (OUTPATIENT)
Dept: CASE MANAGEMENT | Facility: OTHER | Age: 82
End: 2017-10-13

## 2017-10-13 NOTE — OUTREACH NOTE
Patient stated home health is still following for nursing, physical therapy has completed. Feels her mobility has improved. Stated she is taking medications as prescribed. Stated she obtained flu vaccine at the [facility]. Uses a walker for ambulation at home, also has a cane and home oxygen. No questions or concerns voiced at time of call.

## 2017-10-17 ENCOUNTER — OFFICE VISIT (OUTPATIENT)
Dept: INTERNAL MEDICINE | Facility: CLINIC | Age: 82
End: 2017-10-17

## 2017-10-17 ENCOUNTER — HOSPITAL ENCOUNTER (OUTPATIENT)
Facility: HOSPITAL | Age: 82
Setting detail: OBSERVATION
LOS: 1 days | Discharge: HOME HEALTH | End: 2017-10-18
Attending: INTERNAL MEDICINE | Admitting: INTERNAL MEDICINE

## 2017-10-17 VITALS
OXYGEN SATURATION: 90 % | HEART RATE: 105 BPM | WEIGHT: 185.1 LBS | BODY MASS INDEX: 34.06 KG/M2 | DIASTOLIC BLOOD PRESSURE: 88 MMHG | HEIGHT: 62 IN | SYSTOLIC BLOOD PRESSURE: 130 MMHG

## 2017-10-17 DIAGNOSIS — J44.9 CHRONIC OBSTRUCTIVE PULMONARY DISEASE, UNSPECIFIED COPD TYPE (HCC): Chronic | ICD-10-CM

## 2017-10-17 DIAGNOSIS — J96.11 CHRONIC RESPIRATORY FAILURE WITH HYPOXIA (HCC): Chronic | ICD-10-CM

## 2017-10-17 DIAGNOSIS — I48.91 ATRIAL FIBRILLATION WITH RVR (HCC): Primary | ICD-10-CM

## 2017-10-17 DIAGNOSIS — I50.9 ACUTE CONGESTIVE HEART FAILURE, UNSPECIFIED CONGESTIVE HEART FAILURE TYPE: ICD-10-CM

## 2017-10-17 DIAGNOSIS — J44.9 CHRONIC OBSTRUCTIVE PULMONARY DISEASE, UNSPECIFIED COPD TYPE (HCC): Primary | Chronic | ICD-10-CM

## 2017-10-17 LAB
GLUCOSE BLDC GLUCOMTR-MCNC: 112 MG/DL (ref 70–130)
GLUCOSE BLDC GLUCOMTR-MCNC: 133 MG/DL (ref 70–130)

## 2017-10-17 PROCEDURE — 99214 OFFICE O/P EST MOD 30 MIN: CPT | Performed by: INTERNAL MEDICINE

## 2017-10-17 PROCEDURE — 94640 AIRWAY INHALATION TREATMENT: CPT

## 2017-10-17 PROCEDURE — 94760 N-INVAS EAR/PLS OXIMETRY 1: CPT

## 2017-10-17 PROCEDURE — 93005 ELECTROCARDIOGRAM TRACING: CPT | Performed by: INTERNAL MEDICINE

## 2017-10-17 PROCEDURE — 93010 ELECTROCARDIOGRAM REPORT: CPT | Performed by: INTERNAL MEDICINE

## 2017-10-17 PROCEDURE — 94799 UNLISTED PULMONARY SVC/PX: CPT

## 2017-10-17 PROCEDURE — 82962 GLUCOSE BLOOD TEST: CPT

## 2017-10-17 RX ORDER — DOCUSATE SODIUM 100 MG/1
300 CAPSULE, LIQUID FILLED ORAL NIGHTLY
Status: DISCONTINUED | OUTPATIENT
Start: 2017-10-17 | End: 2017-10-18 | Stop reason: HOSPADM

## 2017-10-17 RX ORDER — BUDESONIDE AND FORMOTEROL FUMARATE DIHYDRATE 160; 4.5 UG/1; UG/1
2 AEROSOL RESPIRATORY (INHALATION)
Status: DISCONTINUED | OUTPATIENT
Start: 2017-10-17 | End: 2017-10-18 | Stop reason: HOSPADM

## 2017-10-17 RX ORDER — ACETAMINOPHEN 500 MG
500 TABLET ORAL ONCE AS NEEDED
Status: DISCONTINUED | OUTPATIENT
Start: 2017-10-17 | End: 2017-10-18 | Stop reason: HOSPADM

## 2017-10-17 RX ORDER — POTASSIUM CHLORIDE 750 MG/1
10 CAPSULE, EXTENDED RELEASE ORAL 2 TIMES DAILY WITH MEALS
Status: DISCONTINUED | OUTPATIENT
Start: 2017-10-17 | End: 2017-10-18 | Stop reason: HOSPADM

## 2017-10-17 RX ORDER — DEXTROSE MONOHYDRATE 25 G/50ML
25 INJECTION, SOLUTION INTRAVENOUS
Status: DISCONTINUED | OUTPATIENT
Start: 2017-10-17 | End: 2017-10-18 | Stop reason: HOSPADM

## 2017-10-17 RX ORDER — NICOTINE POLACRILEX 4 MG
15 LOZENGE BUCCAL
Status: DISCONTINUED | OUTPATIENT
Start: 2017-10-17 | End: 2017-10-18 | Stop reason: HOSPADM

## 2017-10-17 RX ORDER — ALBUTEROL SULFATE 2.5 MG/3ML
0.63 SOLUTION RESPIRATORY (INHALATION)
Status: DISCONTINUED | OUTPATIENT
Start: 2017-10-17 | End: 2017-10-18 | Stop reason: HOSPADM

## 2017-10-17 RX ORDER — ASPIRIN 81 MG/1
81 TABLET, CHEWABLE ORAL DAILY
Status: DISCONTINUED | OUTPATIENT
Start: 2017-10-17 | End: 2017-10-18 | Stop reason: HOSPADM

## 2017-10-17 RX ORDER — FUROSEMIDE 40 MG/1
40 TABLET ORAL DAILY
Status: DISCONTINUED | OUTPATIENT
Start: 2017-10-17 | End: 2017-10-18 | Stop reason: HOSPADM

## 2017-10-17 RX ORDER — AMIODARONE HYDROCHLORIDE 200 MG/1
200 TABLET ORAL EVERY 12 HOURS SCHEDULED
Status: DISCONTINUED | OUTPATIENT
Start: 2017-10-17 | End: 2017-10-18 | Stop reason: HOSPADM

## 2017-10-17 RX ORDER — POTASSIUM CHLORIDE 750 MG/1
TABLET, EXTENDED RELEASE ORAL
Qty: 60 TABLET | Refills: 5 | Status: SHIPPED | OUTPATIENT
Start: 2017-10-17

## 2017-10-17 RX ORDER — SODIUM CHLORIDE 0.9 % (FLUSH) 0.9 %
1-10 SYRINGE (ML) INJECTION AS NEEDED
Status: DISCONTINUED | OUTPATIENT
Start: 2017-10-17 | End: 2017-10-18 | Stop reason: HOSPADM

## 2017-10-17 RX ADMIN — BUDESONIDE AND FORMOTEROL FUMARATE DIHYDRATE 2 PUFF: 160; 4.5 AEROSOL RESPIRATORY (INHALATION) at 18:45

## 2017-10-17 RX ADMIN — DOCUSATE SODIUM 300 MG: 100 CAPSULE, LIQUID FILLED ORAL at 20:36

## 2017-10-17 RX ADMIN — ASPIRIN 81 MG 81 MG: 81 TABLET ORAL at 17:49

## 2017-10-17 RX ADMIN — ALBUTEROL SULFATE 0.63 MG: 2.5 SOLUTION RESPIRATORY (INHALATION) at 18:45

## 2017-10-17 RX ADMIN — POTASSIUM CHLORIDE 10 MEQ: 750 CAPSULE, EXTENDED RELEASE ORAL at 17:50

## 2017-10-17 RX ADMIN — DILTIAZEM HYDROCHLORIDE 30 MG: 30 TABLET, FILM COATED ORAL at 20:37

## 2017-10-17 RX ADMIN — APIXABAN 5 MG: 5 TABLET, FILM COATED ORAL at 20:37

## 2017-10-17 RX ADMIN — AMIODARONE HYDROCHLORIDE 200 MG: 200 TABLET ORAL at 20:36

## 2017-10-17 NOTE — PLAN OF CARE
Problem: Patient Care Overview (Adult)  Goal: Plan of Care Review  Outcome: Ongoing (interventions implemented as appropriate)    10/17/17 9675   Coping/Psychosocial Response Interventions   Plan Of Care Reviewed With patient   Patient Care Overview   Progress no change   Outcome Evaluation   Outcome Summary/Follow up Plan new admission         Problem: Arrhythmia/Dysrhythmia (Symptomatic) (Adult)  Goal: Signs and Symptoms of Listed Potential Problems Will be Absent or Manageable (Arrhythmia/Dysrhythmia)  Outcome: Ongoing (interventions implemented as appropriate)

## 2017-10-17 NOTE — PROGRESS NOTES
Subjective   Norma Harkins is a 86 y.o. female     Patient presents complaining of palpitations, increased heart rate and shortness of breath worsening over the last few days. She denies any chest pain.  Shortness of breath is worse with any activity.  Has some nonproductive cough and has been wheezing more lately. Also noticed increased swelling in her lower extremities.  Patient also has been feeling weak and unsteady.  Denies any fever or chills.  There is history of paroxysmal atrial fibrillation and she is on Amiodarone and is anticoagulated with Eliquis.  Patient required  electrical cardioversion in the past.    Past Medical History:   Diagnosis Date   • Acute on chronic respiratory failure with hypoxia 8/4/2016   • Chronic obstructive pulmonary disease with acute exacerbation 8/4/2016   • Hyperlipidemia    • Hypertension 8/4/2016   • Obesity    • Paroxysmal atrial fibrillation      Past Surgical History:   Procedure Laterality Date   • CATARACT EXTRACTION     • JOINT REPLACEMENT      total knee        Social History   Substance Use Topics   • Smoking status: Former Smoker   • Smokeless tobacco: Never Used   • Alcohol use No     Family History   Problem Relation Age of Onset   • Hypertension Father      No Known Allergies  Current Outpatient Prescriptions on File Prior to Visit   Medication Sig Dispense Refill   • acetaminophen (TYLENOL) 500 MG tablet Take 500 mg by mouth 1 (one) time as needed for mild pain (1-3).     • albuterol (ACCUNEB) 0.63 MG/3ML nebulizer solution Take 3 mL by nebulization Every 6 (Six) Hours As Needed for wheezing. 129 vial 1   • albuterol (PROVENTIL HFA;VENTOLIN HFA) 108 (90 BASE) MCG/ACT inhaler Inhale 2 puffs 4 (Four) Times a Day. 1 inhaler 1   • amiodarone (PACERONE) 200 MG tablet take 1 tablet by mouth once daily 30 tablet 6   • apixaban (ELIQUIS) 5 MG tablet tablet Take 1 tablet by mouth Every 12 (Twelve) Hours. 60 tablet 5   • Calcium Carbonate ([facility]S CALCIUM PO) Take 1,000 mg  by mouth 2 (Two) Times a Day.     • Cholecalciferol (VITAMIN D3) 37913 UNITS tablet Take 1 tablet by mouth 1 (One) Time Per Week. 4 tablet 5   • diltiaZEM (CARDIZEM) 60 MG tablet Take 0.5 tablets by mouth 2 (Two) Times a Day.  0   • docusate sodium (COLACE) 100 MG capsule Take 300 mg by mouth Daily.     • furosemide (LASIX) 40 MG tablet Take 1 tablet by mouth Daily. 30 tablet 5   • potassium chloride (K-DUR,KLOR-CON) 10 MEQ CR tablet Take 1 tablet by mouth 2 (Two) Times a Day. 60 tablet 5   • RA ASPIRIN ADULT LOW STRENGTH 81 MG chewable tablet chew and swallow 1 tablet by mouth once daily 30 tablet 5   • SYMBICORT 160-4.5 MCG/ACT inhaler inhale 2 puffs by mouth twice a day 30.6 inhaler 2     No current facility-administered medications on file prior to visit.      Review of Systems   Constitutional: Positive for fatigue. Negative for chills and fever.   HENT: Negative for congestion.    Eyes: Negative for photophobia and visual disturbance.   Respiratory: Positive for shortness of breath and wheezing.    Cardiovascular: Positive for chest pain, palpitations and leg swelling.   Gastrointestinal: Negative for abdominal pain, constipation, diarrhea, nausea and vomiting.   Endocrine: Negative for cold intolerance, heat intolerance, polydipsia and polyuria.   Genitourinary: Negative for flank pain and frequency.   Musculoskeletal: Positive for back pain.   Neurological: Negative for dizziness, syncope and light-headedness.   Psychiatric/Behavioral: Negative for sleep disturbance. The patient is not nervous/anxious.        Objective   Physical Exam   Constitutional: She is oriented to person, place, and time. She appears well-developed and well-nourished.   HENT:   Nose: Nose normal.   Mouth/Throat: Oropharynx is clear and moist.   Eyes: Conjunctivae and EOM are normal. Pupils are equal, round, and reactive to light.   Neck: Neck supple. No JVD present. No thyromegaly present.   Cardiovascular: An irregularly irregular  rhythm present.   Bilateral edema noted in lower extremities   Pulmonary/Chest: Effort normal. She has wheezes.   Abdominal: Soft. Bowel sounds are normal. She exhibits no mass.   Musculoskeletal: Normal range of motion.   Neurological: She is alert and oriented to person, place, and time. She has normal reflexes.   Skin: Skin is warm and dry. No rash noted.   Psychiatric: She has a normal mood and affect. Her behavior is normal. Judgment and thought content normal.   Nursing note and vitals reviewed.          Patient Active Problem List   Diagnosis   • Hypertension   • Low back pain   • Hyperlipidemia   • Atrial fibrillation with RVR   • Paroxysmal atrial fibrillation   • Diabetes mellitus   • Hematuria   • Chronic obstructive pulmonary disease   • Morbid obesity   • Physical deconditioning   • Chronic respiratory failure with hypoxia             Assessment     Diagnosis Plan   1. Atrial fibrillation with RVR  ECG 12 Lead   2. Acute congestive heart failure, unspecified congestive heart failure type     3. Chronic respiratory failure with hypoxia     4. Chronic obstructive pulmonary disease, unspecified COPD type         Plan        ECG obtained. Atrial fibrillation. Heart rate 112. Right bundle branch block. T wave abnormalities in lateral leads.  Patient will be referred to [facility] for further cardiac evaluation and possible cardioversion.        This document has been electronically signed by Krista Matos MD on October 17, 2017 2:00 PM

## 2017-10-17 NOTE — H&P
History and Physical  Daren Wu MD  Hospitalist      Patient Care Team:  Krista Matos MD     Chief complaint dyspnea, palpitations, generalized weakness.      Subjective     Patient is a 86 y.o. female admitted for worsening shortness of breath, palpitations, generalized weakness. Her symptoms are worse with exertion - earlier today she was found to be in atrial fibrillation and was referred to the hospital for further workup and management.    She has had previous episodes of atrial fibrillation, at times associated with exacerbations of the underlying chronic bronchitis.    History  Past Medical History:   Diagnosis Date   • Chronic obstructive pulmonary disease    • Chronic respiratory failure with hypoxia    • Diabetes mellitus    • Hyperlipidemia    • Hypertension    • Obesity    • Paroxysmal atrial fibrillation      Past Surgical History:   Procedure Laterality Date   • CATARACT EXTRACTION     • KNEE ARTHROPLASTY       Family History   Problem Relation Age of Onset   • Hypertension Father      Social History   Substance Use Topics   • Smoking status: Former Smoker   • Smokeless tobacco: Never Used   • Alcohol use No     Prescriptions Prior to Admission   Medication Sig Dispense Refill Last Dose   • albuterol (ACCUNEB) 0.63 MG/3ML nebulizer solution Take 3 mL by nebulization Every 6 (Six) Hours As Needed for wheezing. 129 vial 1 10/17/2017 at Unknown time   • albuterol (PROVENTIL HFA;VENTOLIN HFA) 108 (90 BASE) MCG/ACT inhaler Inhale 2 puffs 4 (Four) Times a Day. 1 inhaler 1 10/16/2017 at Unknown time   • amiodarone (PACERONE) 200 MG tablet take 1 tablet by mouth once daily 30 tablet 6 10/17/2017 at Unknown time   • apixaban (ELIQUIS) 5 MG tablet tablet Take 1 tablet by mouth Every 12 (Twelve) Hours. 60 tablet 5 10/17/2017 at Unknown time   • Calcium Carbonate (CVS CALCIUM PO) Take 1,000 mg by mouth 2 (Two) Times a Day.   10/17/2017 at Unknown time   • Cholecalciferol (VITAMIN D3) 63800 UNITS tablet Take  1 tablet by mouth 1 (One) Time Per Week. 4 tablet 5 Past Week at Unknown time   • diltiaZEM (CARDIZEM) 60 MG tablet Take 0.5 tablets by mouth 2 (Two) Times a Day.  0 10/17/2017 at Unknown time   • docusate sodium (COLACE) 100 MG capsule Take 300 mg by mouth Every Night.   10/16/2017 at Unknown time   • furosemide (LASIX) 40 MG tablet Take 1 tablet by mouth Daily. 30 tablet 5 10/17/2017 at Unknown time   • SYMBICORT 160-4.5 MCG/ACT inhaler inhale 2 puffs by mouth twice a day 30.6 inhaler 2 10/17/2017 at Unknown time   • acetaminophen (TYLENOL) 500 MG tablet Take 500 mg by mouth 1 (one) time as needed for mild pain (1-3).   Unknown at Unknown time   • RA ASPIRIN ADULT LOW STRENGTH 81 MG chewable tablet chew and swallow 1 tablet by mouth once daily 30 tablet 5 Taking     Allergies:  Review of patient's allergies indicates no known allergies.    Review of Systems  Review of Systems   Constitutional: Positive for fatigue. Negative for fever.   HENT: Negative for congestion.    Respiratory: Positive for cough, shortness of breath and wheezing. Negative for choking.    Cardiovascular: Positive for palpitations and leg swelling. Negative for chest pain.   Gastrointestinal: Negative for abdominal distention, abdominal pain, anal bleeding, constipation, diarrhea, nausea and vomiting.   Genitourinary: Negative for dysuria, frequency and urgency.   Musculoskeletal: Positive for arthralgias, back pain and joint swelling. Negative for gait problem.   Skin: Positive for pallor.   Neurological: Positive for dizziness and weakness. Negative for seizures and numbness.   Psychiatric/Behavioral: Negative for agitation, behavioral problems and confusion.   All other systems reviewed and are negative.      Objective     Vital Signs  Temp:  [96.8 °F (36 °C)-97 °F (36.1 °C)] 96.8 °F (36 °C)  Heart Rate:  [] 116  Resp:  [18-20] 18  BP: (112-130)/(66-88) 112/66    Physical Exam:  Physical Exam   Constitutional: She is oriented to  person, place, and time. She appears well-developed and well-nourished. No distress.   HENT:   Head: Normocephalic and atraumatic.   Eyes: EOM are normal. Pupils are equal, round, and reactive to light. No scleral icterus.   Neck: Normal range of motion. Neck supple.   Cardiovascular: An irregular rhythm present. Tachycardia present.    Pulmonary/Chest: Effort normal. No respiratory distress. She has wheezes. She has no rales.   Abdominal: Soft. Bowel sounds are normal. She exhibits no distension. There is no tenderness.   Musculoskeletal: She exhibits edema. She exhibits no tenderness.   Neurological: She is alert and oriented to person, place, and time. She has normal reflexes.   Skin: Skin is warm and dry. No erythema. There is pallor.   Psychiatric: She has a normal mood and affect. Her behavior is normal.   Vitals reviewed.      Results Review:   Lab Results (last 24 hours)     Procedure Component Value Units Date/Time    POC Glucose Fingerstick [[id number]]  (Normal) Collected:  10/17/17 1753    Specimen:  Blood Updated:  10/17/17 1814     Glucose 112 mg/dL       RN NotifiedMeter: [id number] Notified       POC Glucose Fingerstick [[id number]]  (Abnormal) Collected:  10/17/17 1959    Specimen:  Blood Updated:  10/17/17 2054     Glucose 133 (H) mg/dL       RN NotifiedMeter: [id number] Notified             Imaging Results (last 24 hours)     ** No results found for the last 24 hours. **          Assessment/Plan     Principal Problem:    Paroxysmal atrial fibrillation  Active Problems:    Hypertension    Diabetes mellitus    Chronic obstructive pulmonary disease    Chronic respiratory failure with hypoxia    Atrial fibrillation    Telemetry monitoring, increase the dose of Amiodarone.    Daren Wu MD  10/17/17  10:35 PM

## 2017-10-18 VITALS
WEIGHT: 183.4 LBS | TEMPERATURE: 97.2 F | SYSTOLIC BLOOD PRESSURE: 117 MMHG | BODY MASS INDEX: 33.75 KG/M2 | HEART RATE: 112 BPM | HEIGHT: 62 IN | DIASTOLIC BLOOD PRESSURE: 80 MMHG | OXYGEN SATURATION: 98 % | RESPIRATION RATE: 18 BRPM

## 2017-10-18 LAB
ANION GAP SERPL CALCULATED.3IONS-SCNC: 10 MMOL/L (ref 5–15)
BASOPHILS # BLD AUTO: 0.08 10*3/MM3 (ref 0–0.2)
BASOPHILS NFR BLD AUTO: 0.6 % (ref 0–2)
BUN BLD-MCNC: 31 MG/DL (ref 7–21)
BUN/CREAT SERPL: 22.5 (ref 7–25)
CALCIUM SPEC-SCNC: 8.9 MG/DL (ref 8.4–10.2)
CHLORIDE SERPL-SCNC: 96 MMOL/L (ref 95–110)
CO2 SERPL-SCNC: 35 MMOL/L (ref 22–31)
CREAT BLD-MCNC: 1.38 MG/DL (ref 0.5–1)
DEPRECATED RDW RBC AUTO: 50.8 FL (ref 36.4–46.3)
EOSINOPHIL # BLD AUTO: 5.03 10*3/MM3 (ref 0–0.7)
EOSINOPHIL NFR BLD AUTO: 36.8 % (ref 0–7)
ERYTHROCYTE [DISTWIDTH] IN BLOOD BY AUTOMATED COUNT: 15 % (ref 11.5–14.5)
GFR SERPL CREATININE-BSD FRML MDRD: 36 ML/MIN/1.73 (ref 60–90)
GLUCOSE BLD-MCNC: 120 MG/DL (ref 60–100)
GLUCOSE BLDC GLUCOMTR-MCNC: 116 MG/DL (ref 70–130)
GLUCOSE BLDC GLUCOMTR-MCNC: 117 MG/DL (ref 70–130)
HCT VFR BLD AUTO: 31.8 % (ref 35–45)
HGB BLD-MCNC: 9.9 G/DL (ref 12–15.5)
IMM GRANULOCYTES # BLD: 0.07 10*3/MM3 (ref 0–0.02)
IMM GRANULOCYTES NFR BLD: 0.5 % (ref 0–0.5)
LYMPHOCYTES # BLD AUTO: 1.02 10*3/MM3 (ref 0.6–4.2)
LYMPHOCYTES NFR BLD AUTO: 7.5 % (ref 10–50)
MCH RBC QN AUTO: 29 PG (ref 26.5–34)
MCHC RBC AUTO-ENTMCNC: 31.1 G/DL (ref 31.4–36)
MCV RBC AUTO: 93.3 FL (ref 80–98)
MONOCYTES # BLD AUTO: 0.67 10*3/MM3 (ref 0–0.9)
MONOCYTES NFR BLD AUTO: 4.9 % (ref 0–12)
NEUTROPHILS # BLD AUTO: 6.78 10*3/MM3 (ref 2–8.6)
NEUTROPHILS NFR BLD AUTO: 49.7 % (ref 37–80)
PLATELET # BLD AUTO: 218 10*3/MM3 (ref 150–450)
PMV BLD AUTO: 10.6 FL (ref 8–12)
POTASSIUM BLD-SCNC: 3.5 MMOL/L (ref 3.5–5.1)
RBC # BLD AUTO: 3.41 10*6/MM3 (ref 3.77–5.16)
SODIUM BLD-SCNC: 141 MMOL/L (ref 137–145)
WBC NRBC COR # BLD: 13.65 10*3/MM3 (ref 3.2–9.8)

## 2017-10-18 PROCEDURE — 94760 N-INVAS EAR/PLS OXIMETRY 1: CPT

## 2017-10-18 PROCEDURE — 85025 COMPLETE CBC W/AUTO DIFF WBC: CPT | Performed by: INTERNAL MEDICINE

## 2017-10-18 PROCEDURE — 82962 GLUCOSE BLOOD TEST: CPT

## 2017-10-18 PROCEDURE — G0378 HOSPITAL OBSERVATION PER HR: HCPCS

## 2017-10-18 PROCEDURE — 80048 BASIC METABOLIC PNL TOTAL CA: CPT | Performed by: INTERNAL MEDICINE

## 2017-10-18 PROCEDURE — 94799 UNLISTED PULMONARY SVC/PX: CPT

## 2017-10-18 RX ORDER — AMIODARONE HYDROCHLORIDE 200 MG/1
200 TABLET ORAL 2 TIMES DAILY
Qty: 30 TABLET | Refills: 5 | Status: SHIPPED | OUTPATIENT
Start: 2017-10-18 | End: 2018-01-29 | Stop reason: SDUPTHER

## 2017-10-18 RX ADMIN — APIXABAN 5 MG: 5 TABLET, FILM COATED ORAL at 08:27

## 2017-10-18 RX ADMIN — DILTIAZEM HYDROCHLORIDE 30 MG: 30 TABLET, FILM COATED ORAL at 06:02

## 2017-10-18 RX ADMIN — AMIODARONE HYDROCHLORIDE 200 MG: 200 TABLET ORAL at 08:27

## 2017-10-18 RX ADMIN — ALBUTEROL SULFATE 0.63 MG: 2.5 SOLUTION RESPIRATORY (INHALATION) at 00:20

## 2017-10-18 RX ADMIN — BUDESONIDE AND FORMOTEROL FUMARATE DIHYDRATE 2 PUFF: 160; 4.5 AEROSOL RESPIRATORY (INHALATION) at 06:36

## 2017-10-18 RX ADMIN — FUROSEMIDE 40 MG: 40 TABLET ORAL at 08:27

## 2017-10-18 RX ADMIN — ALBUTEROL SULFATE 0.63 MG: 2.5 SOLUTION RESPIRATORY (INHALATION) at 13:06

## 2017-10-18 RX ADMIN — ALBUTEROL SULFATE 0.63 MG: 2.5 SOLUTION RESPIRATORY (INHALATION) at 06:33

## 2017-10-18 RX ADMIN — ASPIRIN 81 MG 81 MG: 81 TABLET ORAL at 08:31

## 2017-10-18 RX ADMIN — POTASSIUM CHLORIDE 10 MEQ: 750 CAPSULE, EXTENDED RELEASE ORAL at 08:28

## 2017-10-18 NOTE — PLAN OF CARE
Problem: Patient Care Overview (Adult)  Goal: Plan of Care Review  Outcome: Ongoing (interventions implemented as appropriate)    10/18/17 0420   Coping/Psychosocial Response Interventions   Plan Of Care Reviewed With patient   Patient Care Overview   Progress no change   Outcome Evaluation   Outcome Summary/Follow up Plan pt remains in A fib, rate controlled to low 100s         Problem: Arrhythmia/Dysrhythmia (Symptomatic) (Adult)  Goal: Signs and Symptoms of Listed Potential Problems Will be Absent or Manageable (Arrhythmia/Dysrhythmia)  Outcome: Ongoing (interventions implemented as appropriate)

## 2017-10-18 NOTE — PROGRESS NOTES
Discharge Planning Assessment  [facility]     Patient Name: Norma Harkins  MRN: [id number]  Today's Date: 10/18/2017    Admit Date: 10/17/2017          Discharge Needs Assessment       10/18/17 1023    Living Environment    Lives With alone    Living Arrangements house    Home Accessibility bed and bath on same level;grab bars present (bathtub)    Stair Railings at Home other (see comments)   has ramp over steps    Type of Financial/Environmental Concern none    Transportation Available car;family or friend will provide   Drives only short distances.    Living Environment    Provides Primary Care For no one    Quality Of Family Relationships supportive    Able to Return to Prior Living Arrangements yes    Discharge Needs Assessment    Concerns To Be Addressed no discharge needs identified;denies needs/concerns at this time    Readmission Within The Last 30 Days no previous admission in last 30 days    Anticipated Changes Related to Illness none    Equipment Currently Used at Home walker, rolling;bath bench;cane, straight;nebulizer;oxygen;power chair, (recliner lift);ramp   DME from [facility] in [place]. Also has rollator and Life Alert.    Equipment Needed After Discharge none    Current Discharge Risk chronically ill;lives alone    Discharge Disposition still a patient            Discharge Plan       10/18/17 1030    Case Management/Social Work Plan    Plan Home with [facility]. Patient is active with [facility] in [place].    Patient/Family In Agreement With Plan yes    Additional Comments Verified demographics with patient. She has good support from two daughters, but they live about 45 minutes away. They are able to take to appointments. She does have Life Alert. Denies any needs or services. She will continue services with [facility].      10/18/17 0940    Case Management/Social Work Plan    Additional Comments Pt was a direct admit from Dr Matos, atrial fib with MD YUMIKO increasing  amiodorone, continuous cardiac monitoring, cardiology consult pending.        Discharge Placement     No information found        Expected Discharge Date and Time     Expected Discharge Date Expected Discharge Time    Oct 19, 2017               Demographic Summary       10/18/17 1018    Referral Information    Admission Type observation    Arrived From home or self-care    Referral Source high risk screening    Primary Care Physician Information    Name Dr. Feliciano            Functional Status       10/18/17 1020    Functional Status Prior    Ambulation 1-->assistive equipment    Transferring 1-->assistive equipment    Toileting 0-->independent    Bathing 0-->independent    Dressing 0-->independent    Eating 0-->independent    Communication 0-->understands/communicates without difficulty    Swallowing 0-->swallows foods/liquids without difficulty    IADL    Medications independent    Meal Preparation independent    Housekeeping assistive person    Laundry independent    Shopping independent    Oral Care independent    Activity Tolerance    Current Activity Limitations none    Usual Activity Tolerance moderate    Current Activity Tolerance moderate    Cognitive/Perceptual/Developmental    Current Mental Status/Cognitive Functioning no deficits noted    Recent Changes in Mental Status/Cognitive Functioning no changes    Developmental Stage (Eriksson's Stages of Development) Stage 8 (65 years-death/Late Adulthood) Integrity vs. Despair    Employment/Financial    Source Of Income social security;other (see comments)   Rent income from one house.    Financial Concerns none    Application For Public Assistance not applied            Psychosocial     None            Abuse/Neglect     None            Legal     None            Substance Abuse     None            Patient Forms     None          Kori Pringle

## 2017-10-18 NOTE — DISCHARGE SUMMARY
Discharge Summary  Daren Wu MD  Hospitalist     Date of Discharge:  10/18/2017    Discharge Diagnosis:    Principal Problem:    Paroxysmal atrial fibrillation  Active Problems:    Hypertension    Diabetes mellitus    Chronic obstructive pulmonary disease    Chronic respiratory failure with hypoxia    Atrial fibrillation      Presenting Problem/History of Present Illness  Palpitations     Hospital Course  Patient is a 86 y.o. female admitted for palpitations / dyspnea. She was found to be in atrial fibrillation with a rapid rate (up to 120/min). She has had previous similar episodes of paroxymal atrial fibrillation - her rate improved to some extent by increasing the dose of her current medications (70 to 100/min).    She denies chest pain, her vital signs are stable, her O2 sats are 97 to 99 % on the current O2 flow and she wishes to go home.          Consults:   Consults     Date and Time Order Name Status Description    10/17/2017 1709 Inpatient Consult to Cardiology            Pertinent Test Results: cardiac graphics: ECG: Atrial Fibrillation with RBBB QRS pattern    Condition on Discharge:  stable    Vital Signs  Temp:  [96.5 °F (35.8 °C)-97.2 °F (36.2 °C)] 97.2 °F (36.2 °C)  Heart Rate:  [] 101  Resp:  [18] 18  BP: (104-116)/(58-80) 116/76    Physical Exam:  Physical Exam   Constitutional: She is oriented to person, place, and time. She appears well-developed and well-nourished.   HENT:   Head: Normocephalic and atraumatic.   Eyes: EOM are normal. Pupils are equal, round, and reactive to light. No scleral icterus.   Neck: Normal range of motion. Neck supple.   Cardiovascular: Normal rate.  An irregular rhythm present.   Pulmonary/Chest: Effort normal. She has wheezes. She has no rales.   Abdominal: Soft. Bowel sounds are normal. She exhibits no distension. There is no tenderness.   Musculoskeletal: She exhibits edema (minimal bilateral ankle edema).   Neurological: She is alert and oriented to  person, place, and time. She has normal reflexes. A cranial nerve deficit is present.   Skin: Skin is warm and dry. There is pallor.   Psychiatric: She has a normal mood and affect. Her behavior is normal.   Vitals reviewed.      Discharge Disposition  Home-Health Care [facility] [place]    Discharge Medications   Norma Harkins   Home Medication Instructions REHANA:[id number]    Printed on:10/18/17 6339   Medication Information                      acetaminophen (TYLENOL) 500 MG tablet  Take 500 mg by mouth 1 (one) time as needed for mild pain (1-3).             albuterol (ACCUNEB) 0.63 MG/3ML nebulizer solution  Take 3 mL by nebulization Every 6 (Six) Hours As Needed for wheezing.             albuterol (PROVENTIL HFA;VENTOLIN HFA) 108 (90 BASE) MCG/ACT inhaler  Inhale 2 puffs 4 (Four) Times a Day.             amiodarone (PACERONE) 200 MG tablet  Take 1 tablet by mouth 2 (Two) Times a Day.             apixaban (ELIQUIS) 5 MG tablet tablet  Take 1 tablet by mouth Every 12 (Twelve) Hours.             Calcium Carbonate ([facility] CALCIUM PO)  Take 1,000 mg by mouth 2 (Two) Times a Day.             Cholecalciferol (VITAMIN D3) 91562 UNITS tablet  Take 1 tablet by mouth 1 (One) Time Per Week.             diltiaZEM (CARDIZEM) 60 MG tablet  Take 0.5 tablets by mouth 2 (Two) Times a Day.             docusate sodium (COLACE) 100 MG capsule  Take 300 mg by mouth Every Night.             furosemide (LASIX) 40 MG tablet  Take 1 tablet by mouth Daily.             potassium chloride (K-DUR,KLOR-CON) 10 MEQ CR tablet  take 1 tablet by mouth twice a day             RA ASPIRIN ADULT LOW STRENGTH 81 MG chewable tablet  chew and swallow 1 tablet by mouth once daily             SYMBICORT 160-4.5 MCG/ACT inhaler  inhale 2 puffs by mouth twice a day                 Discharge Diet:   Diet Instructions     Diet: Cardiac       Discharge Diet:  Cardiac                 Activity at Discharge:   Activity Instructions     Activity as Tolerated                      Follow-up Appointments  Future Appointments  Date Time Provider Department Center   10/25/2017 2:30 PM Krista Fox MD [facility] None   12/15/2017 2:15 PM Sofya Graham MD [facility] None     Additional Instructions for the Follow-ups that You Need to Schedule     Discharge Follow-up with PCP    As directed    Follow Up Details:  Dr Fox in 1 week   Has the patient’s follow-up appointment been scheduled and documented in the discharge navigator?:  Patient to schedule, documented in the follow-up section       Referral to Home Health    As directed    Face to Face Visit Date:  10/18/2017   Follow-up Provider for Plan of Care?:  I treated the patient in an acute care facility and will not continue treatment after discharge.   Follow-up Provider:  KRISTA FOX   Reason/Clinical Findings:  copd / AFib   Describe mobility limitations that make leaving home difficult:  copd / Afib   Nursing/Therapeutic Services Requested:  Skilled Nursing   Skilled nursing orders:  Cardiopulmonary assessments Comment - transitions   Frequency:  1 Week 1                    Daren Wu MD  10/18/17  4:34 PM

## 2017-10-25 ENCOUNTER — OFFICE VISIT (OUTPATIENT)
Dept: INTERNAL MEDICINE | Facility: CLINIC | Age: 82
End: 2017-10-25

## 2017-10-25 VITALS
HEIGHT: 62 IN | SYSTOLIC BLOOD PRESSURE: 120 MMHG | BODY MASS INDEX: 34.48 KG/M2 | WEIGHT: 187.4 LBS | DIASTOLIC BLOOD PRESSURE: 80 MMHG

## 2017-10-25 DIAGNOSIS — J96.11 CHRONIC RESPIRATORY FAILURE WITH HYPOXIA (HCC): Chronic | ICD-10-CM

## 2017-10-25 DIAGNOSIS — I10 ESSENTIAL HYPERTENSION: ICD-10-CM

## 2017-10-25 DIAGNOSIS — J44.9 CHRONIC OBSTRUCTIVE PULMONARY DISEASE, UNSPECIFIED COPD TYPE (HCC): Chronic | ICD-10-CM

## 2017-10-25 DIAGNOSIS — I48.0 PAROXYSMAL ATRIAL FIBRILLATION (HCC): Primary | ICD-10-CM

## 2017-10-25 DIAGNOSIS — G47.00 INSOMNIA, UNSPECIFIED TYPE: ICD-10-CM

## 2017-10-25 PROCEDURE — 99213 OFFICE O/P EST LOW 20 MIN: CPT | Performed by: INTERNAL MEDICINE

## 2017-10-25 RX ORDER — DILTIAZEM HYDROCHLORIDE 60 MG/1
60 TABLET, FILM COATED ORAL 3 TIMES DAILY
Qty: 90 TABLET | Refills: 5 | Status: SHIPPED | OUTPATIENT
Start: 2017-10-25 | End: 2017-11-30

## 2017-10-25 RX ORDER — FUROSEMIDE 40 MG/1
40 TABLET ORAL 2 TIMES DAILY
Qty: 60 TABLET | Refills: 5 | Status: SHIPPED | OUTPATIENT
Start: 2017-10-25 | End: 2018-02-26 | Stop reason: SDUPTHER

## 2017-10-25 NOTE — PROGRESS NOTES
Subjective   Norma Harkins is a 86 y.o. female       Patient is in for recheck after recent hospitalization.  She is still have episodes of palpitations, despite of increase dose of Diltiazem and Amiodarone. Denies any chest pain or chest pressure.  She is anticoagulated  with Eliquis.    Her breathing is at baseline. She denies denies any worsening cough or purulent sputum.  She has been using Nebulizers on PRN basis.  Patient is on 3 liters of Oxygen.    She is complaining of anxiety and insomnia.Denies depressed mood. Appetite is good.  Asking for medication to help her sleep.    Past Medical History:   Diagnosis Date   • Chronic obstructive pulmonary disease    • Chronic respiratory failure with hypoxia    • Diabetes mellitus    • Hyperlipidemia    • Hypertension    • Obesity    • Paroxysmal atrial fibrillation      Past Surgical History:   Procedure Laterality Date   • CATARACT EXTRACTION     • KNEE ARTHROPLASTY         Social History   Substance Use Topics   • Smoking status: Former Smoker   • Smokeless tobacco: Never Used   • Alcohol use No     Family History   Problem Relation Age of Onset   • Hypertension Father      No Known Allergies  Current Outpatient Prescriptions on File Prior to Visit   Medication Sig Dispense Refill   • acetaminophen (TYLENOL) 500 MG tablet Take 500 mg by mouth 1 (one) time as needed for mild pain (1-3).     • albuterol (ACCUNEB) 0.63 MG/3ML nebulizer solution Take 3 mL by nebulization Every 6 (Six) Hours As Needed for wheezing. 129 vial 1   • albuterol (PROVENTIL HFA;VENTOLIN HFA) 108 (90 BASE) MCG/ACT inhaler Inhale 2 puffs 4 (Four) Times a Day. 1 inhaler 1   • amiodarone (PACERONE) 200 MG tablet Take 1 tablet by mouth 2 (Two) Times a Day. 30 tablet 5   • apixaban (ELIQUIS) 5 MG tablet tablet Take 1 tablet by mouth Every 12 (Twelve) Hours. 60 tablet 5   • Calcium Carbonate ([facility]S CALCIUM PO) Take 1,000 mg by mouth 2 (Two) Times a Day.     • Cholecalciferol (VITAMIN D3) 75248  units tablet Take 1 tablet by mouth 1 (One) Time Per Week. 4 tablet 5   • diltiaZEM (CARDIZEM) 60 MG tablet Take 0.5 tablets by mouth 2 (Two) Times a Day.  0   • docusate sodium (COLACE) 100 MG capsule Take 300 mg by mouth Every Night.     • furosemide (LASIX) 40 MG tablet Take 1 tablet by mouth Daily. 30 tablet 5   • potassium chloride (K-DUR,KLOR-CON) 10 MEQ CR tablet take 1 tablet by mouth twice a day 60 tablet 5   • RA ASPIRIN ADULT LOW STRENGTH 81 MG chewable tablet chew and swallow 1 tablet by mouth once daily 30 tablet 5   • SYMBICORT 160-4.5 MCG/ACT inhaler inhale 2 puffs by mouth twice a day 30.6 inhaler 2     No current facility-administered medications on file prior to visit.      Review of Systems   Constitutional: Positive for fatigue. Negative for chills and fever.   HENT: Negative for congestion.    Respiratory: Positive for shortness of breath. Negative for cough.    Cardiovascular: Positive for palpitations and leg swelling. Negative for chest pain.   Gastrointestinal: Negative for abdominal pain, constipation and diarrhea.   Endocrine: Negative for cold intolerance, heat intolerance, polydipsia and polyuria.   Neurological: Negative for dizziness, light-headedness and numbness.   Psychiatric/Behavioral: Positive for sleep disturbance. The patient is nervous/anxious.        Objective   Physical Exam   Constitutional: She is oriented to person, place, and time. She appears well-developed and well-nourished.   HENT:   Head: Normocephalic and atraumatic.   Nose: Nose normal.   Eyes: Conjunctivae and EOM are normal. Pupils are equal, round, and reactive to light.   Neck: No JVD present. No thyromegaly present.   Cardiovascular: An irregularly irregular rhythm present. Exam reveals no gallop and no friction rub.    No murmur heard.  Pulmonary/Chest: Effort normal.   Diminished breath sounds   Abdominal: Soft. Bowel sounds are normal.   Musculoskeletal: Normal range of motion. She exhibits no deformity.    Neurological: She is oriented to person, place, and time. She has normal reflexes. No cranial nerve deficit.   Skin: Skin is warm and dry. No rash noted.   Psychiatric: She has a normal mood and affect. Her behavior is normal.   Nursing note and vitals reviewed.          Patient Active Problem List   Diagnosis   • Hypertension   • Low back pain   • Hyperlipidemia   • Paroxysmal atrial fibrillation   • Diabetes mellitus   • Hematuria   • Chronic obstructive pulmonary disease   • Morbid obesity   • Physical deconditioning   • Chronic respiratory failure with hypoxia   • Atrial fibrillation         Results for orders placed or performed during the hospital encounter of 10/17/17   Basic Metabolic Panel   Result Value Ref Range    Glucose 120 (H) 60 - 100 mg/dL    BUN 31 (H) 7 - 21 mg/dL    Creatinine 1.38 (H) 0.50 - 1.00 mg/dL    Sodium 141 137 - 145 mmol/L    Potassium 3.5 3.5 - 5.1 mmol/L    Chloride 96 95 - 110 mmol/L    CO2 35.0 (H) 22.0 - 31.0 mmol/L    Calcium 8.9 8.4 - 10.2 mg/dL    eGFR Non African Amer 36 (L) >60 mL/min/1.73    BUN/Creatinine Ratio 22.5 7.0 - 25.0    Anion Gap 10.0 5.0 - 15.0 mmol/L   CBC Auto Differential   Result Value Ref Range    WBC 13.65 (H) 3.20 - 9.80 10*3/mm3    RBC 3.41 (L) 3.77 - 5.16 10*6/mm3    Hemoglobin 9.9 (L) 12.0 - 15.5 g/dL    Hematocrit 31.8 (L) 35.0 - 45.0 %    MCV 93.3 80.0 - 98.0 fL    MCH 29.0 26.5 - 34.0 pg    MCHC 31.1 (L) 31.4 - 36.0 g/dL    RDW 15.0 (H) 11.5 - 14.5 %    RDW-SD 50.8 (H) 36.4 - 46.3 fl    MPV 10.6 8.0 - 12.0 fL    Platelets 218 150 - 450 10*3/mm3    Neutrophil % 49.7 37.0 - 80.0 %    Lymphocyte % 7.5 (L) 10.0 - 50.0 %    Monocyte % 4.9 0.0 - 12.0 %    Eosinophil % 36.8 (H) 0.0 - 7.0 %    Basophil % 0.6 0.0 - 2.0 %    Immature Grans % 0.5 0.0 - 0.5 %    Neutrophils, Absolute 6.78 2.00 - 8.60 10*3/mm3    Lymphocytes, Absolute 1.02 0.60 - 4.20 10*3/mm3    Monocytes, Absolute 0.67 0.00 - 0.90 10*3/mm3    Eosinophils, Absolute 5.03 (H) 0.00 - 0.70  10*3/mm3    Basophils, Absolute 0.08 0.00 - 0.20 10*3/mm3    Immature Grans, Absolute 0.07 (H) 0.00 - 0.02 10*3/mm3   POC Glucose Fingerstick   Result Value Ref Range    Glucose 112 70 - 130 mg/dL   POC Glucose Fingerstick   Result Value Ref Range    Glucose 133 (H) 70 - 130 mg/dL   POC Glucose Fingerstick   Result Value Ref Range    Glucose 117 70 - 130 mg/dL   POC Glucose Fingerstick   Result Value Ref Range    Glucose 116 70 - 130 mg/dL           Assessment   Diagnosis Plan   1. Paroxysmal atrial fibrillation     2. Chronic obstructive pulmonary disease, unspecified COPD type     3. Chronic respiratory failure with hypoxia     4. Essential hypertension     5. Insomnia, unspecified type         Plan        Patient will continue current medications.  She will be seeing cardiologist this week for consideration of cardioversion.  Patient will be started on Trazodone 50 mg daily for insomnia.  Medication side effects discussed.      Follow up in 3 months.            This document has been electronically signed by Krista Matos MD on October 25, 2017 3:41 PM

## 2017-10-27 ENCOUNTER — OFFICE VISIT (OUTPATIENT)
Dept: CARDIOLOGY | Facility: CLINIC | Age: 82
End: 2017-10-27

## 2017-10-27 ENCOUNTER — PREP FOR SURGERY (OUTPATIENT)
Dept: OTHER | Facility: HOSPITAL | Age: 82
End: 2017-10-27

## 2017-10-27 VITALS — WEIGHT: 188 LBS | HEIGHT: 62 IN | BODY MASS INDEX: 34.6 KG/M2 | HEART RATE: 111 BPM

## 2017-10-27 DIAGNOSIS — I48.0 PAROXYSMAL ATRIAL FIBRILLATION (HCC): Primary | ICD-10-CM

## 2017-10-27 DIAGNOSIS — I10 ESSENTIAL HYPERTENSION: Chronic | ICD-10-CM

## 2017-10-27 DIAGNOSIS — E78.2 MIXED HYPERLIPIDEMIA: ICD-10-CM

## 2017-10-27 PROCEDURE — 93000 ELECTROCARDIOGRAM COMPLETE: CPT | Performed by: INTERNAL MEDICINE

## 2017-10-27 PROCEDURE — 99214 OFFICE O/P EST MOD 30 MIN: CPT | Performed by: INTERNAL MEDICINE

## 2017-10-27 RX ORDER — SODIUM CHLORIDE 0.9 % (FLUSH) 0.9 %
1-10 SYRINGE (ML) INJECTION AS NEEDED
Status: CANCELLED | OUTPATIENT
Start: 2017-10-30

## 2017-10-27 RX ORDER — LIDOCAINE HYDROCHLORIDE 10 MG/ML
0.1 INJECTION, SOLUTION EPIDURAL; INFILTRATION; INTRACAUDAL; PERINEURAL ONCE AS NEEDED
Status: CANCELLED | OUTPATIENT
Start: 2017-10-30

## 2017-10-27 RX ORDER — SODIUM CHLORIDE 9 MG/ML
50 INJECTION, SOLUTION INTRAVENOUS CONTINUOUS
Status: CANCELLED | OUTPATIENT
Start: 2017-10-30

## 2017-10-28 NOTE — PROGRESS NOTES
Norma Harkins  86 y.o. female    10/27/2017  1. Paroxysmal atrial fibrillation    2. Mixed hyperlipidemia    3. Essential hypertension        History of Present Illness    Mrs. Harkins is here for follow-up after her recent discharge from [facility] after management of reoccurrence of atrial fibrillation associated with shortness of breath.  Her heart rate did improve with medications and she was discharged on antiarrhythmic therapy, anticoagulation, diltiazem.  The patient has not returned to sinus rhythm and continues to have intermittent palpitations with elevated heart rates.  Her EKG today confirmed atrial fibrillation with rapid ventricular response with underlying right bundle branch block and nonspecific ST-T changes.  Atrial flutter could not entirely be ruled out.  She has chronic dyspnea on exertion with no chest pain.  Her records from the hospital were reviewed in detail.  No signs of congestive heart failure was noted.        SUBJECTIVE    No Known Allergies      Past Medical History:   Diagnosis Date   • Chronic obstructive pulmonary disease    • Chronic respiratory failure with hypoxia    • Diabetes mellitus    • Hyperlipidemia    • Hypertension    • Obesity    • Paroxysmal atrial fibrillation          Past Surgical History:   Procedure Laterality Date   • CATARACT EXTRACTION     • KNEE ARTHROPLASTY           Family History   Problem Relation Age of Onset   • Hypertension Father          Social History     Social History   • Marital status:      Spouse name: N/A   • Number of children: N/A   • Years of education: N/A     Occupational History   • Not on file.     Social History Main Topics   • Smoking status: Former Smoker   • Smokeless tobacco: Never Used   • Alcohol use No   • Drug use: No   • Sexual activity: Not Currently     Other Topics Concern   • Not on file     Social History Narrative         Current Outpatient Prescriptions   Medication Sig Dispense Refill   •  "acetaminophen (TYLENOL) 500 MG tablet Take 500 mg by mouth 1 (one) time as needed for mild pain (1-3).     • albuterol (ACCUNEB) 0.63 MG/3ML nebulizer solution Take 3 mL by nebulization Every 6 (Six) Hours As Needed for wheezing. 129 vial 1   • albuterol (PROVENTIL HFA;VENTOLIN HFA) 108 (90 BASE) MCG/ACT inhaler Inhale 2 puffs 4 (Four) Times a Day. 1 inhaler 1   • amiodarone (PACERONE) 200 MG tablet Take 1 tablet by mouth 2 (Two) Times a Day. 30 tablet 5   • apixaban (ELIQUIS) 5 MG tablet tablet Take 1 tablet by mouth Every 12 (Twelve) Hours. 60 tablet 5   • Calcium Carbonate ([facility]S CALCIUM PO) Take 1,000 mg by mouth 2 (Two) Times a Day.     • Cholecalciferol (VITAMIN D3) 15388 units tablet Take 1 tablet by mouth 1 (One) Time Per Week. 4 tablet 5   • diltiaZEM (CARDIZEM) 60 MG tablet Take 1 tablet by mouth 3 (Three) Times a Day. 90 tablet 5   • docusate sodium (COLACE) 100 MG capsule Take 300 mg by mouth Every Night.     • furosemide (LASIX) 40 MG tablet Take 1 tablet by mouth 2 (Two) Times a Day. 60 tablet 5   • potassium chloride (K-DUR,KLOR-CON) 10 MEQ CR tablet take 1 tablet by mouth twice a day 60 tablet 5   • RA ASPIRIN ADULT LOW STRENGTH 81 MG chewable tablet chew and swallow 1 tablet by mouth once daily 30 tablet 5   • SYMBICORT 160-4.5 MCG/ACT inhaler inhale 2 puffs by mouth twice a day 30.6 inhaler 2     No current facility-administered medications for this visit.          OBJECTIVE    Pulse 111  Ht 62\" (157.5 cm)  Wt 188 lb (85.3 kg)  BMI 34.39 kg/m2        Review of Systems     Constitutional:  Denies recent weight loss, weight gain, fever or chills     HENT:  Denies any hearing loss, epistaxis, hoarseness, or difficulty speaking.     Eyes: Wears eyeglasses or contact lenses     Respiratory:  Dyspnea with exertion,no cough, wheezing, or hemoptysis.     Cardiovascular:  palpations,  No chest pain,     Gastrointestinal:  Denies change in bowel habits, dyspepsia, ulcer disease, hematochezia, or melena. "     Endocrine: Negative for cold intolerance, heat intolerance, polydipsia, polyphagia and polyuria.    Genitourinary: Negative.      Musculoskeletal: DJD      Physical Exam     Constitutional: Cooperative, alert and oriented, on home oxygen    HENT:   Head: Normocephalic, normal hair patterns, no masses or tenderness.  Ears, Nose, and Throat: No gross abnormalities. No pallor or cyanosis.  Eyes: EOMS intact, PERRL, conjunctivae and lids unremarkable. Fundoscopic exam and visual fields not performed.   Neck: No palpable masses or adenopathy, no thyromegaly, no JVD, carotid pulses are full and equal bilaterally and without  Bruits.     Cardiovascular: Irregular rhythm, S1  Variable, S2 normal, no S3 or S4.  No murmurs, gallops, or rubs detected.     Pulmonary/Chest: Chest: Increased AP diameter of the chest no intercostal retraction, no use of accessory muscles.            Pulmonary: Normal breath sounds. No rales or ronchi.    Abdominal: Abdomen soft, bowel sounds normoactive, no masses, no hepatosplenomegaly, non-tender, no bruits.         ECG 12 Lead  Date/Time: 10/27/2017 7:37 PM  Performed by: DRAKE CROFT  Authorized by: DRAKE CROFT   Comparison: not compared with previous ECG   Rhythm: atrial fibrillation  Rate: normal  QRS axis: normal  Comments: Right bundle branch block.  Diffuse nonspecific ST-T changes.  Intermittent atrial flutter could not be ruled out              Lab Results   Component Value Date    WBC 13.65 (H) 10/18/2017    HGB 9.9 (L) 10/18/2017    HCT 31.8 (L) 10/18/2017    MCV 93.3 10/18/2017     10/18/2017     Lab Results   Component Value Date    GLUCOSE 120 (H) 10/18/2017    BUN 31 (H) 10/18/2017    CREATININE 1.38 (H) 10/18/2017    EGFRIFNONA 36 (L) 10/18/2017    BCR 22.5 10/18/2017    CO2 35.0 (H) 10/18/2017    CALCIUM 8.9 10/18/2017    ALBUMIN 4.20 06/28/2017    LABIL2 1.2 06/28/2017    AST 36 06/28/2017    ALT 42 06/28/2017     No results found for:  CHOL  Lab Results   Component Value Date    TRIG 128 07/22/2015     No results found for: HDL  Lab Results   Component Value Date    LDLCALC 124 07/22/2015     No results found for: LDL  No results found for: HDLLDLRATIO  No components found for: CHOLHDL  No results found for: HGBA1C  Lab Results   Component Value Date    TSH 4.540 03/29/2017           ASSESSMENT AND PLAN  Mrs. Gee has not returned to sinus rhythm in spite of being on antiarrhythmic therapy and Cardizem.  I discussed the different treatment options with her and the plan would be to proceed with cardioversion to see if she'll convert to sinus rhythm as she has done in the past.  This has been scheduled for next week.  All risks and benefits were explained to her in detail and she will be ASA 3 and Mallampati 2.  Amiodarone, Eliquis, diltiazem, Lasix and bronchodilators have been continued for now.    Norma was seen today for follow-up.    Diagnoses and all orders for this visit:    Paroxysmal atrial fibrillation  -     Adult Transthoracic Echo Complete W/ Cont if Necessary Per Protocol; Future    Mixed hyperlipidemia  -     Adult Transthoracic Echo Complete W/ Cont if Necessary Per Protocol; Future    Essential hypertension  -     Adult Transthoracic Echo Complete W/ Cont if Necessary Per Protocol; Future        Sofya Graham MD  10/27/2017  7:31 PM

## 2017-10-30 ENCOUNTER — APPOINTMENT (OUTPATIENT)
Dept: CARDIOLOGY | Facility: HOSPITAL | Age: 82
End: 2017-10-30
Attending: INTERNAL MEDICINE

## 2017-10-30 ENCOUNTER — HOSPITAL ENCOUNTER (OUTPATIENT)
Facility: HOSPITAL | Age: 82
Setting detail: HOSPITAL OUTPATIENT SURGERY
Discharge: HOME | End: 2017-10-30
Attending: INTERNAL MEDICINE | Admitting: INTERNAL MEDICINE

## 2017-10-30 VITALS
BODY MASS INDEX: 35.17 KG/M2 | TEMPERATURE: 96.8 F | WEIGHT: 186.29 LBS | HEART RATE: 72 BPM | HEIGHT: 61 IN | SYSTOLIC BLOOD PRESSURE: 136 MMHG | OXYGEN SATURATION: 100 % | RESPIRATION RATE: 18 BRPM | DIASTOLIC BLOOD PRESSURE: 71 MMHG

## 2017-10-30 DIAGNOSIS — I48.0 PAROXYSMAL ATRIAL FIBRILLATION (HCC): ICD-10-CM

## 2017-10-30 LAB
ANION GAP SERPL CALCULATED.3IONS-SCNC: 10 MMOL/L (ref 5–15)
BH CV ECHO MEAS - ACS: 1.5 CM
BH CV ECHO MEAS - AO MAX PG (FULL): 5.2 MMHG
BH CV ECHO MEAS - AO MAX PG: 13.3 MMHG
BH CV ECHO MEAS - AO MEAN PG (FULL): 2.8 MMHG
BH CV ECHO MEAS - AO MEAN PG: 6.9 MMHG
BH CV ECHO MEAS - AO ROOT AREA: 7.3 CM^2
BH CV ECHO MEAS - AO ROOT DIAM: 3 CM
BH CV ECHO MEAS - AO V2 MAX: 182.4 CM/SEC
BH CV ECHO MEAS - AO V2 MEAN: 123.5 CM/SEC
BH CV ECHO MEAS - AO V2 VTI: 40 CM
BH CV ECHO MEAS - AVA(I,A): 2.4 CM^2
BH CV ECHO MEAS - AVA(I,D): 2.4 CM^2
BH CV ECHO MEAS - AVA(V,A): 2.6 CM^2
BH CV ECHO MEAS - AVA(V,D): 2.6 CM^2
BH CV ECHO MEAS - EDV(CUBED): 65 ML
BH CV ECHO MEAS - EDV(TEICH): 70.9 ML
BH CV ECHO MEAS - EF(CUBED): 51.9 %
BH CV ECHO MEAS - EF(TEICH): 44.4 %
BH CV ECHO MEAS - ESV(CUBED): 31.2 ML
BH CV ECHO MEAS - ESV(TEICH): 39.4 ML
BH CV ECHO MEAS - FS: 21.7 %
BH CV ECHO MEAS - IVS/LVPW: 1.1
BH CV ECHO MEAS - IVSD: 1.3 CM
BH CV ECHO MEAS - LV MASS(C)D: 182.3 GRAMS
BH CV ECHO MEAS - LV MAX PG: 8.1 MMHG
BH CV ECHO MEAS - LV MEAN PG: 4.1 MMHG
BH CV ECHO MEAS - LV V1 MAX: 142.2 CM/SEC
BH CV ECHO MEAS - LV V1 MEAN: 96 CM/SEC
BH CV ECHO MEAS - LV V1 VTI: 29.1 CM
BH CV ECHO MEAS - LVIDD: 4 CM
BH CV ECHO MEAS - LVIDS: 3.1 CM
BH CV ECHO MEAS - LVOT AREA (M): 3.5 CM^2
BH CV ECHO MEAS - LVOT AREA: 3.4 CM^2
BH CV ECHO MEAS - LVOT DIAM: 2.1 CM
BH CV ECHO MEAS - LVPWD: 1.2 CM
BH CV ECHO MEAS - MR MAX PG: 98.9 MMHG
BH CV ECHO MEAS - MR MAX VEL: 497.3 CM/SEC
BH CV ECHO MEAS - MV A MAX VEL: 73.3 CM/SEC
BH CV ECHO MEAS - MV DEC SLOPE: 841.1 CM/SEC^2
BH CV ECHO MEAS - MV E MAX VEL: 147.1 CM/SEC
BH CV ECHO MEAS - MV E/A: 2
BH CV ECHO MEAS - MV MAX PG: 12.3 MMHG
BH CV ECHO MEAS - MV MEAN PG: 3.7 MMHG
BH CV ECHO MEAS - MV P1/2T MAX VEL: 164.2 CM/SEC
BH CV ECHO MEAS - MV P1/2T: 57.2 MSEC
BH CV ECHO MEAS - MV V2 MAX: 175.4 CM/SEC
BH CV ECHO MEAS - MV V2 MEAN: 82.8 CM/SEC
BH CV ECHO MEAS - MV V2 VTI: 41.7 CM
BH CV ECHO MEAS - MVA P1/2T LCG: 1.3 CM^2
BH CV ECHO MEAS - MVA(P1/2T): 3.8 CM^2
BH CV ECHO MEAS - MVA(VTI): 2.3 CM^2
BH CV ECHO MEAS - PA MAX PG: 2.3 MMHG
BH CV ECHO MEAS - PA V2 MAX: 76.6 CM/SEC
BH CV ECHO MEAS - RAP SYSTOLE: 15 MMHG
BH CV ECHO MEAS - RVDD: 3.1 CM
BH CV ECHO MEAS - RVSP: 74.9 MMHG
BH CV ECHO MEAS - SV(AO): 290.5 ML
BH CV ECHO MEAS - SV(CUBED): 33.8 ML
BH CV ECHO MEAS - SV(LVOT): 97.4 ML
BH CV ECHO MEAS - SV(TEICH): 31.5 ML
BH CV ECHO MEAS - TR MAX VEL: 386.9 CM/SEC
BUN BLD-MCNC: 35 MG/DL (ref 7–21)
BUN/CREAT SERPL: 30.2 (ref 7–25)
CALCIUM SPEC-SCNC: 9.3 MG/DL (ref 8.4–10.2)
CHLORIDE SERPL-SCNC: 97 MMOL/L (ref 95–110)
CO2 SERPL-SCNC: 32 MMOL/L (ref 22–31)
CREAT BLD-MCNC: 1.16 MG/DL (ref 0.5–1)
DEPRECATED RDW RBC AUTO: 50.1 FL (ref 36.4–46.3)
ERYTHROCYTE [DISTWIDTH] IN BLOOD BY AUTOMATED COUNT: 14.9 % (ref 11.5–14.5)
GFR SERPL CREATININE-BSD FRML MDRD: 44 ML/MIN/1.73 (ref 39–90)
GLUCOSE BLD-MCNC: 123 MG/DL (ref 60–100)
HCT VFR BLD AUTO: 32.8 % (ref 35–45)
HGB BLD-MCNC: 10.3 G/DL (ref 12–15.5)
LV EF 2D ECHO EST: 60 %
MCH RBC QN AUTO: 28.7 PG (ref 26.5–34)
MCHC RBC AUTO-ENTMCNC: 31.4 G/DL (ref 31.4–36)
MCV RBC AUTO: 91.4 FL (ref 80–98)
PLATELET # BLD AUTO: 199 10*3/MM3 (ref 150–450)
PMV BLD AUTO: 10.3 FL (ref 8–12)
POTASSIUM BLD-SCNC: 4.2 MMOL/L (ref 3.5–5.1)
RBC # BLD AUTO: 3.59 10*6/MM3 (ref 3.77–5.16)
SODIUM BLD-SCNC: 139 MMOL/L (ref 137–145)
WBC NRBC COR # BLD: 11.23 10*3/MM3 (ref 3.2–9.8)

## 2017-10-30 PROCEDURE — 92960 CARDIOVERSION ELECTRIC EXT: CPT | Performed by: INTERNAL MEDICINE

## 2017-10-30 PROCEDURE — 25010000002 MIDAZOLAM PER 1 MG: Performed by: INTERNAL MEDICINE

## 2017-10-30 PROCEDURE — 85027 COMPLETE CBC AUTOMATED: CPT | Performed by: INTERNAL MEDICINE

## 2017-10-30 PROCEDURE — 93306 TTE W/DOPPLER COMPLETE: CPT

## 2017-10-30 PROCEDURE — 80048 BASIC METABOLIC PNL TOTAL CA: CPT | Performed by: INTERNAL MEDICINE

## 2017-10-30 PROCEDURE — 92960 CARDIOVERSION ELECTRIC EXT: CPT

## 2017-10-30 PROCEDURE — 93306 TTE W/DOPPLER COMPLETE: CPT | Performed by: INTERNAL MEDICINE

## 2017-10-30 PROCEDURE — 93005 ELECTROCARDIOGRAM TRACING: CPT | Performed by: INTERNAL MEDICINE

## 2017-10-30 RX ORDER — MIDAZOLAM HYDROCHLORIDE 1 MG/ML
INJECTION INTRAMUSCULAR; INTRAVENOUS
Status: COMPLETED | OUTPATIENT
Start: 2017-10-30 | End: 2017-10-30

## 2017-10-30 RX ORDER — SODIUM CHLORIDE 0.9 % (FLUSH) 0.9 %
1-10 SYRINGE (ML) INJECTION AS NEEDED
Status: DISCONTINUED | OUTPATIENT
Start: 2017-10-30 | End: 2017-10-31 | Stop reason: HOSPADM

## 2017-10-30 RX ORDER — LIDOCAINE HYDROCHLORIDE 10 MG/ML
0.1 INJECTION, SOLUTION EPIDURAL; INFILTRATION; INTRACAUDAL; PERINEURAL ONCE AS NEEDED
Status: DISCONTINUED | OUTPATIENT
Start: 2017-10-30 | End: 2017-10-31 | Stop reason: HOSPADM

## 2017-10-30 RX ORDER — SODIUM CHLORIDE 9 MG/ML
50 INJECTION, SOLUTION INTRAVENOUS CONTINUOUS
Status: DISCONTINUED | OUTPATIENT
Start: 2017-10-30 | End: 2017-10-31 | Stop reason: HOSPADM

## 2017-10-30 RX ADMIN — MIDAZOLAM 2 MG: 1 INJECTION INTRAMUSCULAR; INTRAVENOUS at 08:28

## 2017-10-30 RX ADMIN — MIDAZOLAM 1 MG: 1 INJECTION INTRAMUSCULAR; INTRAVENOUS at 08:30

## 2017-10-30 RX ADMIN — SODIUM CHLORIDE 50 ML/HR: 900 INJECTION, SOLUTION INTRAVENOUS at 07:11

## 2017-11-02 RX ORDER — CEPHALEXIN 500 MG/1
500 CAPSULE ORAL 3 TIMES DAILY
Qty: 21 CAPSULE | Refills: 0 | Status: SHIPPED | OUTPATIENT
Start: 2017-11-02 | End: 2017-11-25

## 2017-11-20 RX ORDER — ALBUTEROL SULFATE 90 UG/1
2 AEROSOL, METERED RESPIRATORY (INHALATION)
Qty: 1 INHALER | Refills: 1 | Status: SHIPPED | OUTPATIENT
Start: 2017-11-20 | End: 2017-12-15 | Stop reason: SDUPTHER

## 2017-11-25 ENCOUNTER — APPOINTMENT (OUTPATIENT)
Dept: GENERAL RADIOLOGY | Facility: HOSPITAL | Age: 82
End: 2017-11-25

## 2017-11-25 ENCOUNTER — HOSPITAL ENCOUNTER (OUTPATIENT)
Facility: HOSPITAL | Age: 82
Setting detail: OBSERVATION
Discharge: HOME HEALTH | End: 2017-11-29
Attending: FAMILY MEDICINE | Admitting: INTERNAL MEDICINE

## 2017-11-25 DIAGNOSIS — Z78.9 DECREASED ACTIVITIES OF DAILY LIVING (ADL): ICD-10-CM

## 2017-11-25 DIAGNOSIS — Z74.09 IMPAIRED MOBILITY AND ENDURANCE: ICD-10-CM

## 2017-11-25 DIAGNOSIS — J44.1 COPD EXACERBATION (HCC): Primary | ICD-10-CM

## 2017-11-25 LAB
ALBUMIN SERPL-MCNC: 4 G/DL (ref 3.4–4.8)
ALBUMIN/GLOB SERPL: 1.1 G/DL (ref 1.1–1.8)
ALP SERPL-CCNC: 116 U/L (ref 38–126)
ALT SERPL W P-5'-P-CCNC: 40 U/L (ref 9–52)
ANION GAP SERPL CALCULATED.3IONS-SCNC: 11 MMOL/L (ref 5–15)
AST SERPL-CCNC: 35 U/L (ref 14–36)
BASOPHILS # BLD AUTO: 0.05 10*3/MM3 (ref 0–0.2)
BASOPHILS NFR BLD AUTO: 0.4 % (ref 0–2)
BILIRUB SERPL-MCNC: 0.5 MG/DL (ref 0.2–1.3)
BUN BLD-MCNC: 31 MG/DL (ref 7–21)
BUN/CREAT SERPL: 20.8 (ref 7–25)
CALCIUM SPEC-SCNC: 8.9 MG/DL (ref 8.4–10.2)
CHLORIDE SERPL-SCNC: 95 MMOL/L (ref 95–110)
CO2 SERPL-SCNC: 33 MMOL/L (ref 22–31)
CREAT BLD-MCNC: 1.49 MG/DL (ref 0.5–1)
D-DIMER, QUANTITATIVE (MAD,POW, STR): 809 NG/ML (FEU) (ref 0–470)
DEPRECATED RDW RBC AUTO: 52.4 FL (ref 36.4–46.3)
EOSINOPHIL # BLD AUTO: 1.98 10*3/MM3 (ref 0–0.7)
EOSINOPHIL NFR BLD AUTO: 16.8 % (ref 0–7)
ERYTHROCYTE [DISTWIDTH] IN BLOOD BY AUTOMATED COUNT: 15.9 % (ref 11.5–14.5)
GFR SERPL CREATININE-BSD FRML MDRD: 33 ML/MIN/1.73 (ref 39–90)
GLOBULIN UR ELPH-MCNC: 3.5 GM/DL (ref 2.3–3.5)
GLUCOSE BLD-MCNC: 155 MG/DL (ref 60–100)
GLUCOSE BLDC GLUCOMTR-MCNC: 156 MG/DL (ref 70–130)
HCT VFR BLD AUTO: 32.7 % (ref 35–45)
HGB BLD-MCNC: 10.3 G/DL (ref 12–15.5)
HOLD SPECIMEN: NORMAL
HOLD SPECIMEN: NORMAL
IMM GRANULOCYTES # BLD: 0.04 10*3/MM3 (ref 0–0.02)
IMM GRANULOCYTES NFR BLD: 0.3 % (ref 0–0.5)
INR PPP: 1.21 (ref 0.8–1.2)
LYMPHOCYTES # BLD AUTO: 0.97 10*3/MM3 (ref 0.6–4.2)
LYMPHOCYTES NFR BLD AUTO: 8.2 % (ref 10–50)
MCH RBC QN AUTO: 28.5 PG (ref 26.5–34)
MCHC RBC AUTO-ENTMCNC: 31.5 G/DL (ref 31.4–36)
MCV RBC AUTO: 90.6 FL (ref 80–98)
MONOCYTES # BLD AUTO: 0.35 10*3/MM3 (ref 0–0.9)
MONOCYTES NFR BLD AUTO: 3 % (ref 0–12)
NEUTROPHILS # BLD AUTO: 8.39 10*3/MM3 (ref 2–8.6)
NEUTROPHILS NFR BLD AUTO: 71.3 % (ref 37–80)
NT-PROBNP SERPL-MCNC: 1480 PG/ML (ref 0–1800)
NT-PROBNP SERPL-MCNC: 1510 PG/ML (ref 0–1800)
PLATELET # BLD AUTO: 204 10*3/MM3 (ref 150–450)
PMV BLD AUTO: 10.6 FL (ref 8–12)
POTASSIUM BLD-SCNC: 3.1 MMOL/L (ref 3.5–5.1)
PROT SERPL-MCNC: 7.5 G/DL (ref 6.3–8.6)
PROTHROMBIN TIME: 15.3 SECONDS (ref 11.1–15.3)
RBC # BLD AUTO: 3.61 10*6/MM3 (ref 3.77–5.16)
SODIUM BLD-SCNC: 139 MMOL/L (ref 137–145)
TROPONIN I SERPL-MCNC: 0.02 NG/ML
TROPONIN I SERPL-MCNC: 0.03 NG/ML
WBC NRBC COR # BLD: 11.78 10*3/MM3 (ref 3.2–9.8)
WHOLE BLOOD HOLD SPECIMEN: NORMAL
WHOLE BLOOD HOLD SPECIMEN: NORMAL

## 2017-11-25 PROCEDURE — 93010 ELECTROCARDIOGRAM REPORT: CPT | Performed by: INTERNAL MEDICINE

## 2017-11-25 PROCEDURE — G0378 HOSPITAL OBSERVATION PER HR: HCPCS

## 2017-11-25 PROCEDURE — 25010000002 METHYLPREDNISOLONE PER 125 MG: Performed by: FAMILY MEDICINE

## 2017-11-25 PROCEDURE — 85025 COMPLETE CBC W/AUTO DIFF WBC: CPT | Performed by: FAMILY MEDICINE

## 2017-11-25 PROCEDURE — 96375 TX/PRO/DX INJ NEW DRUG ADDON: CPT

## 2017-11-25 PROCEDURE — 83880 ASSAY OF NATRIURETIC PEPTIDE: CPT | Performed by: NURSE PRACTITIONER

## 2017-11-25 PROCEDURE — 25010000002 METHYLPREDNISOLONE PER 125 MG: Performed by: NURSE PRACTITIONER

## 2017-11-25 PROCEDURE — 71010 HC CHEST PA OR AP: CPT

## 2017-11-25 PROCEDURE — 96374 THER/PROPH/DIAG INJ IV PUSH: CPT

## 2017-11-25 PROCEDURE — 84484 ASSAY OF TROPONIN QUANT: CPT | Performed by: FAMILY MEDICINE

## 2017-11-25 PROCEDURE — 83880 ASSAY OF NATRIURETIC PEPTIDE: CPT | Performed by: FAMILY MEDICINE

## 2017-11-25 PROCEDURE — 80053 COMPREHEN METABOLIC PANEL: CPT | Performed by: FAMILY MEDICINE

## 2017-11-25 PROCEDURE — 85379 FIBRIN DEGRADATION QUANT: CPT | Performed by: FAMILY MEDICINE

## 2017-11-25 PROCEDURE — 85610 PROTHROMBIN TIME: CPT | Performed by: FAMILY MEDICINE

## 2017-11-25 PROCEDURE — 82962 GLUCOSE BLOOD TEST: CPT

## 2017-11-25 PROCEDURE — 99284 EMERGENCY DEPT VISIT MOD MDM: CPT

## 2017-11-25 PROCEDURE — 94799 UNLISTED PULMONARY SVC/PX: CPT

## 2017-11-25 PROCEDURE — 93005 ELECTROCARDIOGRAM TRACING: CPT | Performed by: FAMILY MEDICINE

## 2017-11-25 PROCEDURE — 96376 TX/PRO/DX INJ SAME DRUG ADON: CPT

## 2017-11-25 RX ORDER — NICOTINE POLACRILEX 4 MG
15 LOZENGE BUCCAL
Status: DISCONTINUED | OUTPATIENT
Start: 2017-11-25 | End: 2017-11-29 | Stop reason: HOSPADM

## 2017-11-25 RX ORDER — METHYLPREDNISOLONE SODIUM SUCCINATE 125 MG/2ML
60 INJECTION, POWDER, LYOPHILIZED, FOR SOLUTION INTRAMUSCULAR; INTRAVENOUS EVERY 8 HOURS
Status: DISCONTINUED | OUTPATIENT
Start: 2017-11-25 | End: 2017-11-26

## 2017-11-25 RX ORDER — AMIODARONE HYDROCHLORIDE 200 MG/1
200 TABLET ORAL 2 TIMES DAILY
Status: DISCONTINUED | OUTPATIENT
Start: 2017-11-25 | End: 2017-11-29 | Stop reason: HOSPADM

## 2017-11-25 RX ORDER — ACETAMINOPHEN 325 MG/1
650 TABLET ORAL EVERY 4 HOURS PRN
Status: DISCONTINUED | OUTPATIENT
Start: 2017-11-25 | End: 2017-11-29 | Stop reason: HOSPADM

## 2017-11-25 RX ORDER — IPRATROPIUM BROMIDE AND ALBUTEROL SULFATE 2.5; .5 MG/3ML; MG/3ML
3 SOLUTION RESPIRATORY (INHALATION)
Status: DISCONTINUED | OUTPATIENT
Start: 2017-11-25 | End: 2017-11-26

## 2017-11-25 RX ORDER — ONDANSETRON 4 MG/1
4 TABLET, ORALLY DISINTEGRATING ORAL EVERY 6 HOURS PRN
Status: DISCONTINUED | OUTPATIENT
Start: 2017-11-25 | End: 2017-11-29 | Stop reason: HOSPADM

## 2017-11-25 RX ORDER — ALBUTEROL SULFATE 2.5 MG/3ML
2.5 SOLUTION RESPIRATORY (INHALATION) ONCE
Status: COMPLETED | OUTPATIENT
Start: 2017-11-25 | End: 2017-11-25

## 2017-11-25 RX ORDER — ONDANSETRON 2 MG/ML
4 INJECTION INTRAMUSCULAR; INTRAVENOUS EVERY 6 HOURS PRN
Status: DISCONTINUED | OUTPATIENT
Start: 2017-11-25 | End: 2017-11-29 | Stop reason: HOSPADM

## 2017-11-25 RX ORDER — SODIUM CHLORIDE 0.9 % (FLUSH) 0.9 %
10 SYRINGE (ML) INJECTION AS NEEDED
Status: DISCONTINUED | OUTPATIENT
Start: 2017-11-25 | End: 2017-11-29 | Stop reason: HOSPADM

## 2017-11-25 RX ORDER — DILTIAZEM HYDROCHLORIDE 60 MG/1
60 TABLET, FILM COATED ORAL 2 TIMES DAILY
Status: DISCONTINUED | OUTPATIENT
Start: 2017-11-25 | End: 2017-11-29 | Stop reason: HOSPADM

## 2017-11-25 RX ORDER — FUROSEMIDE 40 MG/1
40 TABLET ORAL 2 TIMES DAILY
Status: DISCONTINUED | OUTPATIENT
Start: 2017-11-25 | End: 2017-11-29 | Stop reason: HOSPADM

## 2017-11-25 RX ORDER — BUDESONIDE AND FORMOTEROL FUMARATE DIHYDRATE 160; 4.5 UG/1; UG/1
2 AEROSOL RESPIRATORY (INHALATION)
Status: DISCONTINUED | OUTPATIENT
Start: 2017-11-25 | End: 2017-11-29 | Stop reason: HOSPADM

## 2017-11-25 RX ORDER — SODIUM CHLORIDE 0.9 % (FLUSH) 0.9 %
1-10 SYRINGE (ML) INJECTION AS NEEDED
Status: DISCONTINUED | OUTPATIENT
Start: 2017-11-25 | End: 2017-11-29 | Stop reason: HOSPADM

## 2017-11-25 RX ORDER — IPRATROPIUM BROMIDE AND ALBUTEROL SULFATE 2.5; .5 MG/3ML; MG/3ML
3 SOLUTION RESPIRATORY (INHALATION)
Status: DISCONTINUED | OUTPATIENT
Start: 2017-11-25 | End: 2017-11-29 | Stop reason: HOSPADM

## 2017-11-25 RX ORDER — DOCUSATE SODIUM 100 MG/1
300 CAPSULE, LIQUID FILLED ORAL NIGHTLY
Status: DISCONTINUED | OUTPATIENT
Start: 2017-11-25 | End: 2017-11-29 | Stop reason: HOSPADM

## 2017-11-25 RX ORDER — METHYLPREDNISOLONE SODIUM SUCCINATE 125 MG/2ML
80 INJECTION, POWDER, LYOPHILIZED, FOR SOLUTION INTRAMUSCULAR; INTRAVENOUS ONCE
Status: COMPLETED | OUTPATIENT
Start: 2017-11-25 | End: 2017-11-25

## 2017-11-25 RX ORDER — POTASSIUM CHLORIDE 750 MG/1
10 CAPSULE, EXTENDED RELEASE ORAL 2 TIMES DAILY WITH MEALS
Status: DISCONTINUED | OUTPATIENT
Start: 2017-11-25 | End: 2017-11-29 | Stop reason: HOSPADM

## 2017-11-25 RX ORDER — DEXTROSE MONOHYDRATE 25 G/50ML
25 INJECTION, SOLUTION INTRAVENOUS
Status: DISCONTINUED | OUTPATIENT
Start: 2017-11-25 | End: 2017-11-29 | Stop reason: HOSPADM

## 2017-11-25 RX ORDER — ONDANSETRON 4 MG/1
4 TABLET, FILM COATED ORAL EVERY 6 HOURS PRN
Status: DISCONTINUED | OUTPATIENT
Start: 2017-11-25 | End: 2017-11-29 | Stop reason: HOSPADM

## 2017-11-25 RX ORDER — POTASSIUM CHLORIDE 750 MG/1
40 CAPSULE, EXTENDED RELEASE ORAL ONCE
Status: COMPLETED | OUTPATIENT
Start: 2017-11-25 | End: 2017-11-25

## 2017-11-25 RX ADMIN — POTASSIUM CHLORIDE 10 MEQ: 750 CAPSULE, EXTENDED RELEASE ORAL at 21:39

## 2017-11-25 RX ADMIN — DILTIAZEM HYDROCHLORIDE 60 MG: 60 TABLET, FILM COATED ORAL at 21:39

## 2017-11-25 RX ADMIN — FUROSEMIDE 40 MG: 40 TABLET ORAL at 21:39

## 2017-11-25 RX ADMIN — ALBUTEROL SULFATE 2.5 MG: 2.5 SOLUTION RESPIRATORY (INHALATION) at 16:26

## 2017-11-25 RX ADMIN — BUDESONIDE AND FORMOTEROL FUMARATE DIHYDRATE 2 PUFF: 160; 4.5 AEROSOL RESPIRATORY (INHALATION) at 20:15

## 2017-11-25 RX ADMIN — AMIODARONE HYDROCHLORIDE 200 MG: 200 TABLET ORAL at 21:39

## 2017-11-25 RX ADMIN — METHYLPREDNISOLONE SODIUM SUCCINATE 80 MG: 125 INJECTION, POWDER, FOR SOLUTION INTRAMUSCULAR; INTRAVENOUS at 16:45

## 2017-11-25 RX ADMIN — IPRATROPIUM BROMIDE AND ALBUTEROL SULFATE 3 ML: 2.5; .5 SOLUTION RESPIRATORY (INHALATION) at 16:26

## 2017-11-25 RX ADMIN — IPRATROPIUM BROMIDE AND ALBUTEROL SULFATE 3 ML: 2.5; .5 SOLUTION RESPIRATORY (INHALATION) at 20:15

## 2017-11-25 RX ADMIN — Medication 1 TABLET: at 21:39

## 2017-11-25 RX ADMIN — Medication 10 ML: at 21:41

## 2017-11-25 RX ADMIN — POTASSIUM CHLORIDE 40 MEQ: 750 CAPSULE, EXTENDED RELEASE ORAL at 17:46

## 2017-11-25 RX ADMIN — Medication 10 ML: at 21:40

## 2017-11-25 RX ADMIN — DOCUSATE SODIUM 300 MG: 100 CAPSULE, LIQUID FILLED ORAL at 21:39

## 2017-11-25 RX ADMIN — APIXABAN 5 MG: 5 TABLET, FILM COATED ORAL at 21:39

## 2017-11-25 RX ADMIN — METHYLPREDNISOLONE SODIUM SUCCINATE 60 MG: 125 INJECTION, POWDER, FOR SOLUTION INTRAMUSCULAR; INTRAVENOUS at 21:38

## 2017-11-26 LAB
ANION GAP SERPL CALCULATED.3IONS-SCNC: 13 MMOL/L (ref 5–15)
BACTERIA UR QL AUTO: ABNORMAL /HPF
BASOPHILS # BLD AUTO: 0.01 10*3/MM3 (ref 0–0.2)
BASOPHILS NFR BLD AUTO: 0.1 % (ref 0–2)
BILIRUB UR QL STRIP: NEGATIVE
BUN BLD-MCNC: 30 MG/DL (ref 7–21)
BUN/CREAT SERPL: 27.3 (ref 7–25)
CALCIUM SPEC-SCNC: 9.9 MG/DL (ref 8.4–10.2)
CHLORIDE SERPL-SCNC: 97 MMOL/L (ref 95–110)
CLARITY UR: ABNORMAL
CO2 SERPL-SCNC: 31 MMOL/L (ref 22–31)
COLOR UR: YELLOW
CREAT BLD-MCNC: 1.1 MG/DL (ref 0.5–1)
DEPRECATED RDW RBC AUTO: 53.4 FL (ref 36.4–46.3)
EOSINOPHIL # BLD AUTO: 0.01 10*3/MM3 (ref 0–0.7)
EOSINOPHIL NFR BLD AUTO: 0.1 % (ref 0–7)
ERYTHROCYTE [DISTWIDTH] IN BLOOD BY AUTOMATED COUNT: 16 % (ref 11.5–14.5)
GFR SERPL CREATININE-BSD FRML MDRD: 47 ML/MIN/1.73 (ref 39–90)
GLUCOSE BLD-MCNC: 168 MG/DL (ref 60–100)
GLUCOSE BLDC GLUCOMTR-MCNC: 169 MG/DL (ref 70–130)
GLUCOSE BLDC GLUCOMTR-MCNC: 192 MG/DL (ref 70–130)
GLUCOSE BLDC GLUCOMTR-MCNC: 193 MG/DL (ref 70–130)
GLUCOSE BLDC GLUCOMTR-MCNC: 194 MG/DL (ref 70–130)
GLUCOSE BLDC GLUCOMTR-MCNC: 278 MG/DL (ref 70–130)
GLUCOSE UR STRIP-MCNC: ABNORMAL MG/DL
HCT VFR BLD AUTO: 35.2 % (ref 35–45)
HGB BLD-MCNC: 10.9 G/DL (ref 12–15.5)
HGB UR QL STRIP.AUTO: ABNORMAL
HYALINE CASTS UR QL AUTO: ABNORMAL /LPF
IMM GRANULOCYTES # BLD: 0.05 10*3/MM3 (ref 0–0.02)
IMM GRANULOCYTES NFR BLD: 0.5 % (ref 0–0.5)
KETONES UR QL STRIP: NEGATIVE
LEUKOCYTE ESTERASE UR QL STRIP.AUTO: ABNORMAL
LYMPHOCYTES # BLD AUTO: 0.36 10*3/MM3 (ref 0.6–4.2)
LYMPHOCYTES NFR BLD AUTO: 3.9 % (ref 10–50)
MCH RBC QN AUTO: 28.3 PG (ref 26.5–34)
MCHC RBC AUTO-ENTMCNC: 31 G/DL (ref 31.4–36)
MCV RBC AUTO: 91.4 FL (ref 80–98)
MONOCYTES # BLD AUTO: 0.08 10*3/MM3 (ref 0–0.9)
MONOCYTES NFR BLD AUTO: 0.9 % (ref 0–12)
NEUTROPHILS # BLD AUTO: 8.8 10*3/MM3 (ref 2–8.6)
NEUTROPHILS NFR BLD AUTO: 94.5 % (ref 37–80)
NITRITE UR QL STRIP: NEGATIVE
PH UR STRIP.AUTO: 6 [PH] (ref 5–9)
PLATELET # BLD AUTO: 230 10*3/MM3 (ref 150–450)
PMV BLD AUTO: 11 FL (ref 8–12)
POTASSIUM BLD-SCNC: 4.4 MMOL/L (ref 3.5–5.1)
PROT UR QL STRIP: ABNORMAL
RBC # BLD AUTO: 3.85 10*6/MM3 (ref 3.77–5.16)
RBC # UR: ABNORMAL /HPF
REF LAB TEST METHOD: ABNORMAL
SODIUM BLD-SCNC: 141 MMOL/L (ref 137–145)
SP GR UR STRIP: 1.02 (ref 1–1.03)
SQUAMOUS #/AREA URNS HPF: ABNORMAL /HPF
TRANS CELLS #/AREA URNS HPF: ABNORMAL /HPF
UROBILINOGEN UR QL STRIP: ABNORMAL
WBC NRBC COR # BLD: 9.31 10*3/MM3 (ref 3.2–9.8)
WBC UR QL AUTO: ABNORMAL /HPF

## 2017-11-26 PROCEDURE — 80048 BASIC METABOLIC PNL TOTAL CA: CPT | Performed by: NURSE PRACTITIONER

## 2017-11-26 PROCEDURE — 96376 TX/PRO/DX INJ SAME DRUG ADON: CPT

## 2017-11-26 PROCEDURE — 85025 COMPLETE CBC W/AUTO DIFF WBC: CPT | Performed by: NURSE PRACTITIONER

## 2017-11-26 PROCEDURE — 87086 URINE CULTURE/COLONY COUNT: CPT | Performed by: NURSE PRACTITIONER

## 2017-11-26 PROCEDURE — 87077 CULTURE AEROBIC IDENTIFY: CPT | Performed by: NURSE PRACTITIONER

## 2017-11-26 PROCEDURE — 94799 UNLISTED PULMONARY SVC/PX: CPT

## 2017-11-26 PROCEDURE — 96366 THER/PROPH/DIAG IV INF ADDON: CPT

## 2017-11-26 PROCEDURE — 81001 URINALYSIS AUTO W/SCOPE: CPT | Performed by: NURSE PRACTITIONER

## 2017-11-26 PROCEDURE — 25010000002 DIPHENHYDRAMINE PER 50 MG: Performed by: NURSE PRACTITIONER

## 2017-11-26 PROCEDURE — 25010000002 DIPHENHYDRAMINE PER 50 MG

## 2017-11-26 PROCEDURE — 25010000002 LEVOFLOXACIN PER 250 MG: Performed by: NURSE PRACTITIONER

## 2017-11-26 PROCEDURE — 96365 THER/PROPH/DIAG IV INF INIT: CPT

## 2017-11-26 PROCEDURE — 25010000002 METHYLPREDNISOLONE PER 125 MG: Performed by: NURSE PRACTITIONER

## 2017-11-26 PROCEDURE — 82962 GLUCOSE BLOOD TEST: CPT

## 2017-11-26 PROCEDURE — 25010000002 CEFTRIAXONE PER 250 MG: Performed by: NURSE PRACTITIONER

## 2017-11-26 PROCEDURE — 87186 SC STD MICRODIL/AGAR DIL: CPT | Performed by: NURSE PRACTITIONER

## 2017-11-26 PROCEDURE — G0378 HOSPITAL OBSERVATION PER HR: HCPCS

## 2017-11-26 PROCEDURE — 63710000001 INSULIN ASPART PER 5 UNITS: Performed by: NURSE PRACTITIONER

## 2017-11-26 PROCEDURE — 96375 TX/PRO/DX INJ NEW DRUG ADDON: CPT

## 2017-11-26 PROCEDURE — 94760 N-INVAS EAR/PLS OXIMETRY 1: CPT

## 2017-11-26 RX ORDER — ALBUTEROL SULFATE 2.5 MG/3ML
2.5 SOLUTION RESPIRATORY (INHALATION) EVERY 4 HOURS PRN
Status: DISCONTINUED | OUTPATIENT
Start: 2017-11-26 | End: 2017-11-29 | Stop reason: HOSPADM

## 2017-11-26 RX ORDER — DIPHENHYDRAMINE HYDROCHLORIDE 50 MG/ML
50 INJECTION INTRAMUSCULAR; INTRAVENOUS ONCE
Status: COMPLETED | OUTPATIENT
Start: 2017-11-26 | End: 2017-11-26

## 2017-11-26 RX ORDER — METHYLPREDNISOLONE SODIUM SUCCINATE 125 MG/2ML
60 INJECTION, POWDER, LYOPHILIZED, FOR SOLUTION INTRAMUSCULAR; INTRAVENOUS 2 TIMES DAILY
Status: DISCONTINUED | OUTPATIENT
Start: 2017-11-26 | End: 2017-11-28

## 2017-11-26 RX ORDER — DIPHENHYDRAMINE HYDROCHLORIDE 50 MG/ML
INJECTION INTRAMUSCULAR; INTRAVENOUS
Status: COMPLETED
Start: 2017-11-26 | End: 2017-11-26

## 2017-11-26 RX ORDER — FAMOTIDINE 10 MG/ML
20 INJECTION, SOLUTION INTRAVENOUS ONCE
Status: COMPLETED | OUTPATIENT
Start: 2017-11-26 | End: 2017-11-26

## 2017-11-26 RX ORDER — METHYLPREDNISOLONE SODIUM SUCCINATE 125 MG/2ML
125 INJECTION, POWDER, LYOPHILIZED, FOR SOLUTION INTRAMUSCULAR; INTRAVENOUS ONCE
Status: COMPLETED | OUTPATIENT
Start: 2017-11-26 | End: 2017-11-26

## 2017-11-26 RX ORDER — SODIUM CHLORIDE FOR INHALATION 0.9 %
VIAL, NEBULIZER (ML) INHALATION
Status: COMPLETED
Start: 2017-11-26 | End: 2017-11-26

## 2017-11-26 RX ORDER — LEVOFLOXACIN 5 MG/ML
500 INJECTION, SOLUTION INTRAVENOUS EVERY 24 HOURS
Status: DISCONTINUED | OUTPATIENT
Start: 2017-11-26 | End: 2017-11-27

## 2017-11-26 RX ADMIN — CEFTRIAXONE SODIUM 1 G: 1 INJECTION, POWDER, FOR SOLUTION INTRAMUSCULAR; INTRAVENOUS at 11:45

## 2017-11-26 RX ADMIN — METHYLPREDNISOLONE SODIUM SUCCINATE 60 MG: 125 INJECTION, POWDER, FOR SOLUTION INTRAMUSCULAR; INTRAVENOUS at 17:54

## 2017-11-26 RX ADMIN — POTASSIUM CHLORIDE 10 MEQ: 750 CAPSULE, EXTENDED RELEASE ORAL at 08:59

## 2017-11-26 RX ADMIN — RACEPINEPHRINE HYDROCHLORIDE: 11.25 SOLUTION RESPIRATORY (INHALATION) at 12:30

## 2017-11-26 RX ADMIN — DIPHENHYDRAMINE HYDROCHLORIDE: 50 INJECTION INTRAMUSCULAR; INTRAVENOUS at 12:12

## 2017-11-26 RX ADMIN — IPRATROPIUM BROMIDE AND ALBUTEROL SULFATE 3 ML: .5; 3 SOLUTION RESPIRATORY (INHALATION) at 19:00

## 2017-11-26 RX ADMIN — Medication 1 TABLET: at 17:52

## 2017-11-26 RX ADMIN — BUDESONIDE AND FORMOTEROL FUMARATE DIHYDRATE 2 PUFF: 160; 4.5 AEROSOL RESPIRATORY (INHALATION) at 19:00

## 2017-11-26 RX ADMIN — IPRATROPIUM BROMIDE AND ALBUTEROL SULFATE 3 ML: .5; 3 SOLUTION RESPIRATORY (INHALATION) at 07:05

## 2017-11-26 RX ADMIN — ALBUTEROL SULFATE 2.5 MG: 2.5 SOLUTION RESPIRATORY (INHALATION) at 01:00

## 2017-11-26 RX ADMIN — METHYLPREDNISOLONE SODIUM SUCCINATE 125 MG: 125 INJECTION, POWDER, FOR SOLUTION INTRAMUSCULAR; INTRAVENOUS at 12:12

## 2017-11-26 RX ADMIN — IPRATROPIUM BROMIDE AND ALBUTEROL SULFATE 3 ML: .5; 3 SOLUTION RESPIRATORY (INHALATION) at 10:29

## 2017-11-26 RX ADMIN — APIXABAN 5 MG: 5 TABLET, FILM COATED ORAL at 20:32

## 2017-11-26 RX ADMIN — FUROSEMIDE 40 MG: 40 TABLET ORAL at 08:59

## 2017-11-26 RX ADMIN — METHYLPREDNISOLONE SODIUM SUCCINATE 60 MG: 125 INJECTION, POWDER, FOR SOLUTION INTRAMUSCULAR; INTRAVENOUS at 03:45

## 2017-11-26 RX ADMIN — FUROSEMIDE 40 MG: 40 TABLET ORAL at 17:52

## 2017-11-26 RX ADMIN — INSULIN ASPART 2 UNITS: 100 INJECTION, SOLUTION INTRAVENOUS; SUBCUTANEOUS at 08:59

## 2017-11-26 RX ADMIN — ISODIUM CHLORIDE: 0.03 SOLUTION RESPIRATORY (INHALATION) at 12:41

## 2017-11-26 RX ADMIN — LEVOFLOXACIN 500 MG: 5 INJECTION, SOLUTION INTRAVENOUS at 16:18

## 2017-11-26 RX ADMIN — Medication 1 TABLET: at 08:59

## 2017-11-26 RX ADMIN — DILTIAZEM HYDROCHLORIDE 60 MG: 60 TABLET, FILM COATED ORAL at 17:52

## 2017-11-26 RX ADMIN — IPRATROPIUM BROMIDE AND ALBUTEROL SULFATE 3 ML: .5; 3 SOLUTION RESPIRATORY (INHALATION) at 15:19

## 2017-11-26 RX ADMIN — POTASSIUM CHLORIDE 10 MEQ: 750 CAPSULE, EXTENDED RELEASE ORAL at 17:51

## 2017-11-26 RX ADMIN — APIXABAN 5 MG: 5 TABLET, FILM COATED ORAL at 08:59

## 2017-11-26 RX ADMIN — DOCUSATE SODIUM 300 MG: 100 CAPSULE, LIQUID FILLED ORAL at 20:32

## 2017-11-26 RX ADMIN — BUDESONIDE AND FORMOTEROL FUMARATE DIHYDRATE 2 PUFF: 160; 4.5 AEROSOL RESPIRATORY (INHALATION) at 07:05

## 2017-11-26 RX ADMIN — AMIODARONE HYDROCHLORIDE 200 MG: 200 TABLET ORAL at 17:52

## 2017-11-26 RX ADMIN — FAMOTIDINE 20 MG: 10 INJECTION, SOLUTION INTRAVENOUS at 12:30

## 2017-11-26 RX ADMIN — DILTIAZEM HYDROCHLORIDE 60 MG: 60 TABLET, FILM COATED ORAL at 11:17

## 2017-11-26 RX ADMIN — INSULIN ASPART 6 UNITS: 100 INJECTION, SOLUTION INTRAVENOUS; SUBCUTANEOUS at 11:45

## 2017-11-26 RX ADMIN — DIPHENHYDRAMINE HYDROCHLORIDE 50 MG: 50 INJECTION INTRAMUSCULAR; INTRAVENOUS at 12:12

## 2017-11-26 RX ADMIN — AMIODARONE HYDROCHLORIDE 200 MG: 200 TABLET ORAL at 08:59

## 2017-11-26 RX ADMIN — Medication 10 ML: at 12:42

## 2017-11-26 RX ADMIN — INSULIN ASPART 2 UNITS: 100 INJECTION, SOLUTION INTRAVENOUS; SUBCUTANEOUS at 18:26

## 2017-11-27 LAB
ANION GAP SERPL CALCULATED.3IONS-SCNC: 12 MMOL/L (ref 5–15)
BACTERIA SPEC AEROBE CULT: ABNORMAL
BACTERIA SPEC AEROBE CULT: ABNORMAL
BASOPHILS # BLD AUTO: 0 10*3/MM3 (ref 0–0.2)
BASOPHILS NFR BLD AUTO: 0 % (ref 0–2)
BUN BLD-MCNC: 41 MG/DL (ref 7–21)
BUN/CREAT SERPL: 37.6 (ref 7–25)
CALCIUM SPEC-SCNC: 9.3 MG/DL (ref 8.4–10.2)
CHLORIDE SERPL-SCNC: 95 MMOL/L (ref 95–110)
CO2 SERPL-SCNC: 30 MMOL/L (ref 22–31)
CREAT BLD-MCNC: 1.09 MG/DL (ref 0.5–1)
DEPRECATED RDW RBC AUTO: 53.5 FL (ref 36.4–46.3)
EOSINOPHIL # BLD AUTO: 0 10*3/MM3 (ref 0–0.7)
EOSINOPHIL NFR BLD AUTO: 0 % (ref 0–7)
ERYTHROCYTE [DISTWIDTH] IN BLOOD BY AUTOMATED COUNT: 16.2 % (ref 11.5–14.5)
GFR SERPL CREATININE-BSD FRML MDRD: 48 ML/MIN/1.73 (ref 39–90)
GLUCOSE BLD-MCNC: 161 MG/DL (ref 60–100)
GLUCOSE BLDC GLUCOMTR-MCNC: 158 MG/DL (ref 70–130)
GLUCOSE BLDC GLUCOMTR-MCNC: 193 MG/DL (ref 70–130)
GLUCOSE BLDC GLUCOMTR-MCNC: 217 MG/DL (ref 70–130)
GLUCOSE BLDC GLUCOMTR-MCNC: 226 MG/DL (ref 70–130)
HCT VFR BLD AUTO: 32 % (ref 35–45)
HGB BLD-MCNC: 9.9 G/DL (ref 12–15.5)
IMM GRANULOCYTES # BLD: 0.06 10*3/MM3 (ref 0–0.02)
IMM GRANULOCYTES NFR BLD: 0.4 % (ref 0–0.5)
LYMPHOCYTES # BLD AUTO: 0.38 10*3/MM3 (ref 0.6–4.2)
LYMPHOCYTES NFR BLD AUTO: 2.4 % (ref 10–50)
MCH RBC QN AUTO: 28.2 PG (ref 26.5–34)
MCHC RBC AUTO-ENTMCNC: 30.9 G/DL (ref 31.4–36)
MCV RBC AUTO: 91.2 FL (ref 80–98)
MONOCYTES # BLD AUTO: 0.63 10*3/MM3 (ref 0–0.9)
MONOCYTES NFR BLD AUTO: 4 % (ref 0–12)
NEUTROPHILS # BLD AUTO: 14.55 10*3/MM3 (ref 2–8.6)
NEUTROPHILS NFR BLD AUTO: 93.2 % (ref 37–80)
PLATELET # BLD AUTO: 226 10*3/MM3 (ref 150–450)
PMV BLD AUTO: 11.4 FL (ref 8–12)
POTASSIUM BLD-SCNC: 3.7 MMOL/L (ref 3.5–5.1)
RBC # BLD AUTO: 3.51 10*6/MM3 (ref 3.77–5.16)
SODIUM BLD-SCNC: 137 MMOL/L (ref 137–145)
WBC NRBC COR # BLD: 15.62 10*3/MM3 (ref 3.2–9.8)

## 2017-11-27 PROCEDURE — 94799 UNLISTED PULMONARY SVC/PX: CPT

## 2017-11-27 PROCEDURE — 63710000001 INSULIN ASPART PER 5 UNITS: Performed by: NURSE PRACTITIONER

## 2017-11-27 PROCEDURE — 85025 COMPLETE CBC W/AUTO DIFF WBC: CPT | Performed by: NURSE PRACTITIONER

## 2017-11-27 PROCEDURE — 25010000002 LEVOFLOXACIN PER 250 MG: Performed by: NURSE PRACTITIONER

## 2017-11-27 PROCEDURE — 80048 BASIC METABOLIC PNL TOTAL CA: CPT | Performed by: NURSE PRACTITIONER

## 2017-11-27 PROCEDURE — 96366 THER/PROPH/DIAG IV INF ADDON: CPT

## 2017-11-27 PROCEDURE — 82962 GLUCOSE BLOOD TEST: CPT

## 2017-11-27 PROCEDURE — 96376 TX/PRO/DX INJ SAME DRUG ADON: CPT

## 2017-11-27 PROCEDURE — 94760 N-INVAS EAR/PLS OXIMETRY 1: CPT

## 2017-11-27 PROCEDURE — G0378 HOSPITAL OBSERVATION PER HR: HCPCS

## 2017-11-27 PROCEDURE — 25010000002 METHYLPREDNISOLONE PER 125 MG: Performed by: NURSE PRACTITIONER

## 2017-11-27 RX ORDER — PREDNISONE 20 MG/1
TABLET ORAL
Qty: 15 TABLET | Refills: 0 | OUTPATIENT
Start: 2017-11-27 | End: 2017-11-29 | Stop reason: HOSPADM

## 2017-11-27 RX ORDER — LEVOFLOXACIN 5 MG/ML
250 INJECTION, SOLUTION INTRAVENOUS EVERY 24 HOURS
Status: DISCONTINUED | OUTPATIENT
Start: 2017-11-27 | End: 2017-11-29 | Stop reason: HOSPADM

## 2017-11-27 RX ADMIN — INSULIN ASPART 2 UNITS: 100 INJECTION, SOLUTION INTRAVENOUS; SUBCUTANEOUS at 17:58

## 2017-11-27 RX ADMIN — IPRATROPIUM BROMIDE AND ALBUTEROL SULFATE 3 ML: .5; 3 SOLUTION RESPIRATORY (INHALATION) at 07:23

## 2017-11-27 RX ADMIN — DILTIAZEM HYDROCHLORIDE 60 MG: 60 TABLET, FILM COATED ORAL at 17:58

## 2017-11-27 RX ADMIN — APIXABAN 5 MG: 5 TABLET, FILM COATED ORAL at 10:23

## 2017-11-27 RX ADMIN — FUROSEMIDE 40 MG: 40 TABLET ORAL at 17:58

## 2017-11-27 RX ADMIN — IPRATROPIUM BROMIDE AND ALBUTEROL SULFATE 3 ML: .5; 3 SOLUTION RESPIRATORY (INHALATION) at 14:28

## 2017-11-27 RX ADMIN — INSULIN ASPART 4 UNITS: 100 INJECTION, SOLUTION INTRAVENOUS; SUBCUTANEOUS at 21:35

## 2017-11-27 RX ADMIN — INSULIN ASPART 2 UNITS: 100 INJECTION, SOLUTION INTRAVENOUS; SUBCUTANEOUS at 10:24

## 2017-11-27 RX ADMIN — IPRATROPIUM BROMIDE AND ALBUTEROL SULFATE 3 ML: .5; 3 SOLUTION RESPIRATORY (INHALATION) at 11:04

## 2017-11-27 RX ADMIN — Medication 10 ML: at 14:58

## 2017-11-27 RX ADMIN — METHYLPREDNISOLONE SODIUM SUCCINATE 60 MG: 125 INJECTION, POWDER, FOR SOLUTION INTRAMUSCULAR; INTRAVENOUS at 17:59

## 2017-11-27 RX ADMIN — APIXABAN 5 MG: 5 TABLET, FILM COATED ORAL at 21:35

## 2017-11-27 RX ADMIN — Medication 10 ML: at 10:27

## 2017-11-27 RX ADMIN — POTASSIUM CHLORIDE 10 MEQ: 750 CAPSULE, EXTENDED RELEASE ORAL at 17:58

## 2017-11-27 RX ADMIN — POTASSIUM CHLORIDE 10 MEQ: 750 CAPSULE, EXTENDED RELEASE ORAL at 10:23

## 2017-11-27 RX ADMIN — Medication 1 TABLET: at 17:58

## 2017-11-27 RX ADMIN — DOCUSATE SODIUM 300 MG: 100 CAPSULE, LIQUID FILLED ORAL at 21:34

## 2017-11-27 RX ADMIN — BUDESONIDE AND FORMOTEROL FUMARATE DIHYDRATE 2 PUFF: 160; 4.5 AEROSOL RESPIRATORY (INHALATION) at 07:23

## 2017-11-27 RX ADMIN — IPRATROPIUM BROMIDE AND ALBUTEROL SULFATE 3 ML: .5; 3 SOLUTION RESPIRATORY (INHALATION) at 19:53

## 2017-11-27 RX ADMIN — Medication 1 TABLET: at 10:22

## 2017-11-27 RX ADMIN — LEVOFLOXACIN 250 MG: 5 INJECTION, SOLUTION INTRAVENOUS at 14:58

## 2017-11-27 RX ADMIN — METHYLPREDNISOLONE SODIUM SUCCINATE 60 MG: 125 INJECTION, POWDER, FOR SOLUTION INTRAMUSCULAR; INTRAVENOUS at 10:25

## 2017-11-27 RX ADMIN — FUROSEMIDE 40 MG: 40 TABLET ORAL at 10:22

## 2017-11-27 RX ADMIN — AMIODARONE HYDROCHLORIDE 200 MG: 200 TABLET ORAL at 17:58

## 2017-11-27 RX ADMIN — AMIODARONE HYDROCHLORIDE 200 MG: 200 TABLET ORAL at 10:23

## 2017-11-27 RX ADMIN — Medication 10 ML: at 17:59

## 2017-11-27 RX ADMIN — DILTIAZEM HYDROCHLORIDE 60 MG: 60 TABLET, FILM COATED ORAL at 10:22

## 2017-11-27 RX ADMIN — BUDESONIDE AND FORMOTEROL FUMARATE DIHYDRATE 2 PUFF: 160; 4.5 AEROSOL RESPIRATORY (INHALATION) at 19:53

## 2017-11-28 LAB
ANION GAP SERPL CALCULATED.3IONS-SCNC: 11 MMOL/L (ref 5–15)
BASOPHILS # BLD AUTO: 0 10*3/MM3 (ref 0–0.2)
BASOPHILS NFR BLD AUTO: 0 % (ref 0–2)
BUN BLD-MCNC: 46 MG/DL (ref 7–21)
BUN/CREAT SERPL: 37.7 (ref 7–25)
CALCIUM SPEC-SCNC: 9.4 MG/DL (ref 8.4–10.2)
CHLORIDE SERPL-SCNC: 93 MMOL/L (ref 95–110)
CO2 SERPL-SCNC: 33 MMOL/L (ref 22–31)
CREAT BLD-MCNC: 1.22 MG/DL (ref 0.5–1)
DEPRECATED RDW RBC AUTO: 53.6 FL (ref 36.4–46.3)
EOSINOPHIL # BLD AUTO: 0 10*3/MM3 (ref 0–0.7)
EOSINOPHIL NFR BLD AUTO: 0 % (ref 0–7)
ERYTHROCYTE [DISTWIDTH] IN BLOOD BY AUTOMATED COUNT: 16.2 % (ref 11.5–14.5)
GFR SERPL CREATININE-BSD FRML MDRD: 42 ML/MIN/1.73 (ref 60–90)
GLUCOSE BLD-MCNC: 172 MG/DL (ref 60–100)
GLUCOSE BLDC GLUCOMTR-MCNC: 183 MG/DL (ref 70–130)
GLUCOSE BLDC GLUCOMTR-MCNC: 199 MG/DL (ref 70–130)
GLUCOSE BLDC GLUCOMTR-MCNC: 272 MG/DL (ref 70–130)
HCT VFR BLD AUTO: 32.5 % (ref 35–45)
HGB BLD-MCNC: 10.2 G/DL (ref 12–15.5)
IMM GRANULOCYTES # BLD: 0.06 10*3/MM3 (ref 0–0.02)
IMM GRANULOCYTES NFR BLD: 0.5 % (ref 0–0.5)
LYMPHOCYTES # BLD AUTO: 0.36 10*3/MM3 (ref 0.6–4.2)
LYMPHOCYTES NFR BLD AUTO: 3 % (ref 10–50)
MCH RBC QN AUTO: 28.4 PG (ref 26.5–34)
MCHC RBC AUTO-ENTMCNC: 31.4 G/DL (ref 31.4–36)
MCV RBC AUTO: 90.5 FL (ref 80–98)
MONOCYTES # BLD AUTO: 0.39 10*3/MM3 (ref 0–0.9)
MONOCYTES NFR BLD AUTO: 3.3 % (ref 0–12)
NEUTROPHILS # BLD AUTO: 11.18 10*3/MM3 (ref 2–8.6)
NEUTROPHILS NFR BLD AUTO: 93.2 % (ref 37–80)
PLATELET # BLD AUTO: 205 10*3/MM3 (ref 150–450)
PMV BLD AUTO: 10.9 FL (ref 8–12)
POTASSIUM BLD-SCNC: 4 MMOL/L (ref 3.5–5.1)
RBC # BLD AUTO: 3.59 10*6/MM3 (ref 3.77–5.16)
SODIUM BLD-SCNC: 137 MMOL/L (ref 137–145)
WBC NRBC COR # BLD: 11.99 10*3/MM3 (ref 3.2–9.8)

## 2017-11-28 PROCEDURE — G0378 HOSPITAL OBSERVATION PER HR: HCPCS

## 2017-11-28 PROCEDURE — 25010000002 METHYLPREDNISOLONE PER 125 MG: Performed by: NURSE PRACTITIONER

## 2017-11-28 PROCEDURE — 85025 COMPLETE CBC W/AUTO DIFF WBC: CPT | Performed by: NURSE PRACTITIONER

## 2017-11-28 PROCEDURE — 94760 N-INVAS EAR/PLS OXIMETRY 1: CPT

## 2017-11-28 PROCEDURE — G8978 MOBILITY CURRENT STATUS: HCPCS

## 2017-11-28 PROCEDURE — 63710000001 INSULIN ASPART PER 5 UNITS: Performed by: NURSE PRACTITIONER

## 2017-11-28 PROCEDURE — 97161 PT EVAL LOW COMPLEX 20 MIN: CPT

## 2017-11-28 PROCEDURE — 94799 UNLISTED PULMONARY SVC/PX: CPT

## 2017-11-28 PROCEDURE — 96376 TX/PRO/DX INJ SAME DRUG ADON: CPT

## 2017-11-28 PROCEDURE — G8979 MOBILITY GOAL STATUS: HCPCS

## 2017-11-28 PROCEDURE — 96366 THER/PROPH/DIAG IV INF ADDON: CPT

## 2017-11-28 PROCEDURE — G8988 SELF CARE GOAL STATUS: HCPCS

## 2017-11-28 PROCEDURE — 97165 OT EVAL LOW COMPLEX 30 MIN: CPT

## 2017-11-28 PROCEDURE — 25010000002 LEVOFLOXACIN PER 250 MG: Performed by: NURSE PRACTITIONER

## 2017-11-28 PROCEDURE — 82962 GLUCOSE BLOOD TEST: CPT

## 2017-11-28 PROCEDURE — G8987 SELF CARE CURRENT STATUS: HCPCS

## 2017-11-28 PROCEDURE — 80048 BASIC METABOLIC PNL TOTAL CA: CPT | Performed by: NURSE PRACTITIONER

## 2017-11-28 RX ORDER — METHYLPREDNISOLONE SODIUM SUCCINATE 125 MG/2ML
40 INJECTION, POWDER, LYOPHILIZED, FOR SOLUTION INTRAMUSCULAR; INTRAVENOUS 2 TIMES DAILY
Status: DISCONTINUED | OUTPATIENT
Start: 2017-11-28 | End: 2017-11-28

## 2017-11-28 RX ORDER — PREDNISONE 20 MG/1
40 TABLET ORAL
Status: DISCONTINUED | OUTPATIENT
Start: 2017-11-29 | End: 2017-11-29 | Stop reason: HOSPADM

## 2017-11-28 RX ORDER — METHYLPREDNISOLONE SODIUM SUCCINATE 40 MG/ML
40 INJECTION, POWDER, LYOPHILIZED, FOR SOLUTION INTRAMUSCULAR; INTRAVENOUS 2 TIMES DAILY
Status: COMPLETED | OUTPATIENT
Start: 2017-11-28 | End: 2017-11-28

## 2017-11-28 RX ADMIN — Medication 1 TABLET: at 09:05

## 2017-11-28 RX ADMIN — POTASSIUM CHLORIDE 10 MEQ: 750 CAPSULE, EXTENDED RELEASE ORAL at 09:05

## 2017-11-28 RX ADMIN — INSULIN ASPART 6 UNITS: 100 INJECTION, SOLUTION INTRAVENOUS; SUBCUTANEOUS at 20:32

## 2017-11-28 RX ADMIN — IPRATROPIUM BROMIDE AND ALBUTEROL SULFATE 3 ML: .5; 3 SOLUTION RESPIRATORY (INHALATION) at 15:06

## 2017-11-28 RX ADMIN — DILTIAZEM HYDROCHLORIDE 60 MG: 60 TABLET, FILM COATED ORAL at 18:18

## 2017-11-28 RX ADMIN — LEVOFLOXACIN 250 MG: 5 INJECTION, SOLUTION INTRAVENOUS at 14:29

## 2017-11-28 RX ADMIN — IPRATROPIUM BROMIDE AND ALBUTEROL SULFATE 3 ML: .5; 3 SOLUTION RESPIRATORY (INHALATION) at 08:03

## 2017-11-28 RX ADMIN — DILTIAZEM HYDROCHLORIDE 60 MG: 60 TABLET, FILM COATED ORAL at 09:05

## 2017-11-28 RX ADMIN — IPRATROPIUM BROMIDE AND ALBUTEROL SULFATE 3 ML: .5; 3 SOLUTION RESPIRATORY (INHALATION) at 20:34

## 2017-11-28 RX ADMIN — BUDESONIDE AND FORMOTEROL FUMARATE DIHYDRATE 2 PUFF: 160; 4.5 AEROSOL RESPIRATORY (INHALATION) at 20:37

## 2017-11-28 RX ADMIN — INSULIN ASPART 2 UNITS: 100 INJECTION, SOLUTION INTRAVENOUS; SUBCUTANEOUS at 09:09

## 2017-11-28 RX ADMIN — METHYLPREDNISOLONE SODIUM SUCCINATE 40 MG: 125 INJECTION, POWDER, FOR SOLUTION INTRAMUSCULAR; INTRAVENOUS at 18:27

## 2017-11-28 RX ADMIN — Medication 1 TABLET: at 18:18

## 2017-11-28 RX ADMIN — INSULIN ASPART 2 UNITS: 100 INJECTION, SOLUTION INTRAVENOUS; SUBCUTANEOUS at 18:25

## 2017-11-28 RX ADMIN — APIXABAN 5 MG: 5 TABLET, FILM COATED ORAL at 09:05

## 2017-11-28 RX ADMIN — AMIODARONE HYDROCHLORIDE 200 MG: 200 TABLET ORAL at 09:05

## 2017-11-28 RX ADMIN — AMIODARONE HYDROCHLORIDE 200 MG: 200 TABLET ORAL at 18:18

## 2017-11-28 RX ADMIN — APIXABAN 5 MG: 5 TABLET, FILM COATED ORAL at 20:32

## 2017-11-28 RX ADMIN — FUROSEMIDE 40 MG: 40 TABLET ORAL at 18:18

## 2017-11-28 RX ADMIN — INSULIN ASPART 2 UNITS: 100 INJECTION, SOLUTION INTRAVENOUS; SUBCUTANEOUS at 13:32

## 2017-11-28 RX ADMIN — BUDESONIDE AND FORMOTEROL FUMARATE DIHYDRATE 2 PUFF: 160; 4.5 AEROSOL RESPIRATORY (INHALATION) at 08:03

## 2017-11-28 RX ADMIN — DOCUSATE SODIUM 300 MG: 100 CAPSULE, LIQUID FILLED ORAL at 20:32

## 2017-11-28 RX ADMIN — FUROSEMIDE 40 MG: 40 TABLET ORAL at 09:05

## 2017-11-28 RX ADMIN — POTASSIUM CHLORIDE 10 MEQ: 750 CAPSULE, EXTENDED RELEASE ORAL at 18:18

## 2017-11-28 RX ADMIN — IPRATROPIUM BROMIDE AND ALBUTEROL SULFATE 3 ML: .5; 3 SOLUTION RESPIRATORY (INHALATION) at 10:50

## 2017-11-28 RX ADMIN — METHYLPREDNISOLONE SODIUM SUCCINATE 60 MG: 125 INJECTION, POWDER, FOR SOLUTION INTRAMUSCULAR; INTRAVENOUS at 09:05

## 2017-11-28 RX ADMIN — ACETAMINOPHEN 650 MG: 325 TABLET ORAL at 23:01

## 2017-11-29 VITALS
DIASTOLIC BLOOD PRESSURE: 87 MMHG | HEIGHT: 61 IN | HEART RATE: 73 BPM | WEIGHT: 181 LBS | TEMPERATURE: 97 F | SYSTOLIC BLOOD PRESSURE: 133 MMHG | RESPIRATION RATE: 20 BRPM | BODY MASS INDEX: 34.17 KG/M2 | OXYGEN SATURATION: 96 %

## 2017-11-29 LAB
ANION GAP SERPL CALCULATED.3IONS-SCNC: 10 MMOL/L (ref 5–15)
BASOPHILS # BLD AUTO: 0.01 10*3/MM3 (ref 0–0.2)
BASOPHILS NFR BLD AUTO: 0.1 % (ref 0–2)
BUN BLD-MCNC: 46 MG/DL (ref 7–21)
BUN/CREAT SERPL: 39 (ref 7–25)
CALCIUM SPEC-SCNC: 9.5 MG/DL (ref 8.4–10.2)
CHLORIDE SERPL-SCNC: 92 MMOL/L (ref 95–110)
CO2 SERPL-SCNC: 35 MMOL/L (ref 22–31)
CREAT BLD-MCNC: 1.18 MG/DL (ref 0.5–1)
DEPRECATED RDW RBC AUTO: 53.5 FL (ref 36.4–46.3)
EOSINOPHIL # BLD AUTO: 0 10*3/MM3 (ref 0–0.7)
EOSINOPHIL NFR BLD AUTO: 0 % (ref 0–7)
ERYTHROCYTE [DISTWIDTH] IN BLOOD BY AUTOMATED COUNT: 16.2 % (ref 11.5–14.5)
GFR SERPL CREATININE-BSD FRML MDRD: 43 ML/MIN/1.73 (ref 60–90)
GLUCOSE BLD-MCNC: 156 MG/DL (ref 60–100)
GLUCOSE BLDC GLUCOMTR-MCNC: 146 MG/DL (ref 70–130)
GLUCOSE BLDC GLUCOMTR-MCNC: 151 MG/DL (ref 70–130)
HCT VFR BLD AUTO: 34.6 % (ref 35–45)
HGB BLD-MCNC: 10.7 G/DL (ref 12–15.5)
IMM GRANULOCYTES # BLD: 0.06 10*3/MM3 (ref 0–0.02)
IMM GRANULOCYTES NFR BLD: 0.6 % (ref 0–0.5)
LYMPHOCYTES # BLD AUTO: 0.51 10*3/MM3 (ref 0.6–4.2)
LYMPHOCYTES NFR BLD AUTO: 4.8 % (ref 10–50)
MCH RBC QN AUTO: 28.1 PG (ref 26.5–34)
MCHC RBC AUTO-ENTMCNC: 30.9 G/DL (ref 31.4–36)
MCV RBC AUTO: 90.8 FL (ref 80–98)
MONOCYTES # BLD AUTO: 0.87 10*3/MM3 (ref 0–0.9)
MONOCYTES NFR BLD AUTO: 8.2 % (ref 0–12)
NEUTROPHILS # BLD AUTO: 9.21 10*3/MM3 (ref 2–8.6)
NEUTROPHILS NFR BLD AUTO: 86.3 % (ref 37–80)
PLATELET # BLD AUTO: 205 10*3/MM3 (ref 150–450)
PMV BLD AUTO: 10.9 FL (ref 8–12)
POTASSIUM BLD-SCNC: 4 MMOL/L (ref 3.5–5.1)
RBC # BLD AUTO: 3.81 10*6/MM3 (ref 3.77–5.16)
SODIUM BLD-SCNC: 137 MMOL/L (ref 137–145)
WBC NRBC COR # BLD: 10.66 10*3/MM3 (ref 3.2–9.8)

## 2017-11-29 PROCEDURE — 97116 GAIT TRAINING THERAPY: CPT

## 2017-11-29 PROCEDURE — G0378 HOSPITAL OBSERVATION PER HR: HCPCS

## 2017-11-29 PROCEDURE — 80048 BASIC METABOLIC PNL TOTAL CA: CPT | Performed by: NURSE PRACTITIONER

## 2017-11-29 PROCEDURE — 63710000001 PREDNISONE PER 1 MG: Performed by: NURSE PRACTITIONER

## 2017-11-29 PROCEDURE — 94760 N-INVAS EAR/PLS OXIMETRY 1: CPT

## 2017-11-29 PROCEDURE — 94799 UNLISTED PULMONARY SVC/PX: CPT

## 2017-11-29 PROCEDURE — 97530 THERAPEUTIC ACTIVITIES: CPT

## 2017-11-29 PROCEDURE — 63710000001 INSULIN ASPART PER 5 UNITS: Performed by: NURSE PRACTITIONER

## 2017-11-29 PROCEDURE — 97535 SELF CARE MNGMENT TRAINING: CPT

## 2017-11-29 PROCEDURE — 85025 COMPLETE CBC W/AUTO DIFF WBC: CPT | Performed by: NURSE PRACTITIONER

## 2017-11-29 PROCEDURE — 82962 GLUCOSE BLOOD TEST: CPT

## 2017-11-29 RX ORDER — LEVOFLOXACIN 250 MG/1
250 TABLET ORAL DAILY
Qty: 4 TABLET | Refills: 0 | Status: SHIPPED | OUTPATIENT
Start: 2017-11-29 | End: 2017-12-08

## 2017-11-29 RX ORDER — PREDNISONE 10 MG/1
TABLET ORAL
Qty: 20 TABLET | Refills: 0 | Status: SHIPPED | OUTPATIENT
Start: 2017-11-29 | End: 2017-12-08

## 2017-11-29 RX ADMIN — AMIODARONE HYDROCHLORIDE 200 MG: 200 TABLET ORAL at 08:38

## 2017-11-29 RX ADMIN — IPRATROPIUM BROMIDE AND ALBUTEROL SULFATE 3 ML: .5; 3 SOLUTION RESPIRATORY (INHALATION) at 07:18

## 2017-11-29 RX ADMIN — DILTIAZEM HYDROCHLORIDE 60 MG: 60 TABLET, FILM COATED ORAL at 08:38

## 2017-11-29 RX ADMIN — PREDNISONE 40 MG: 20 TABLET ORAL at 08:37

## 2017-11-29 RX ADMIN — INSULIN ASPART 2 UNITS: 100 INJECTION, SOLUTION INTRAVENOUS; SUBCUTANEOUS at 13:11

## 2017-11-29 RX ADMIN — POTASSIUM CHLORIDE 10 MEQ: 750 CAPSULE, EXTENDED RELEASE ORAL at 08:37

## 2017-11-29 RX ADMIN — APIXABAN 5 MG: 5 TABLET, FILM COATED ORAL at 08:38

## 2017-11-29 RX ADMIN — Medication 1 TABLET: at 08:38

## 2017-11-29 RX ADMIN — IPRATROPIUM BROMIDE AND ALBUTEROL SULFATE 3 ML: .5; 3 SOLUTION RESPIRATORY (INHALATION) at 11:44

## 2017-11-29 RX ADMIN — FUROSEMIDE 40 MG: 40 TABLET ORAL at 08:38

## 2017-11-29 RX ADMIN — BUDESONIDE AND FORMOTEROL FUMARATE DIHYDRATE 2 PUFF: 160; 4.5 AEROSOL RESPIRATORY (INHALATION) at 07:26

## 2017-11-30 ENCOUNTER — OFFICE VISIT (OUTPATIENT)
Dept: CARDIOLOGY | Facility: CLINIC | Age: 82
End: 2017-11-30

## 2017-11-30 VITALS
HEART RATE: 80 BPM | DIASTOLIC BLOOD PRESSURE: 78 MMHG | WEIGHT: 183 LBS | HEIGHT: 61 IN | SYSTOLIC BLOOD PRESSURE: 110 MMHG | BODY MASS INDEX: 34.55 KG/M2

## 2017-11-30 DIAGNOSIS — I10 ESSENTIAL HYPERTENSION: Chronic | ICD-10-CM

## 2017-11-30 DIAGNOSIS — I48.0 PAROXYSMAL ATRIAL FIBRILLATION (HCC): Primary | ICD-10-CM

## 2017-11-30 DIAGNOSIS — E78.2 MIXED HYPERLIPIDEMIA: ICD-10-CM

## 2017-11-30 PROCEDURE — 99213 OFFICE O/P EST LOW 20 MIN: CPT | Performed by: INTERNAL MEDICINE

## 2017-11-30 RX ORDER — DILTIAZEM HYDROCHLORIDE 180 MG/1
180 CAPSULE, COATED, EXTENDED RELEASE ORAL DAILY
Qty: 90 CAPSULE | Refills: 3 | Status: SHIPPED | OUTPATIENT
Start: 2017-11-30 | End: 2018-04-09

## 2017-11-30 NOTE — PROGRESS NOTES
Norma Harkins  86 y.o. female    11/30/2017  1. Paroxysmal atrial fibrillation    2. Mixed hyperlipidemia    3. Essential hypertension        History of Present Illness    Ms. Gee is here for follow-up of her above stated problems.  She was discharged from [facility] only yesterday after management of exacerbation of COPD and Escherichia coli cystitis.  She did not have any cardiac issues during this admission and remained in sinus rhythm.  She denied any chest pain but does have chronic dyspnea on exertion and is on home oxygen.  She feels weak and has a cough.  No signs of congestive heart failure was noted.  Her echocardiogram performed in October this year showed :  · Left ventricular wall thickness is consistent with mild concentric hypertrophy.  · Left ventricular systolic function is normal. Estimated EF = 60%.  · Right ventricular cavity is mildly dilated.  · Left atrial cavity size is moderate-to-severely dilated. RA moderate to severely dilated  · Mild mitral valve regurgitation is present  · Moderate to severe tricuspid valve regurgitation is present.  · Severe pulmonary hypertension RVSP > 60 mm hg    I did discuss the above findings with the patient.  SUBJECTIVE    Allergies   Allergen Reactions   • Rocephin [Ceftriaxone] Anaphylaxis and Shortness Of Breath         Past Medical History:   Diagnosis Date   • Arthritis    • CHF (congestive heart failure)    • Chronic obstructive pulmonary disease    • Chronic respiratory failure with hypoxia    • Diabetes mellitus    • Hyperlipidemia    • Hypertension    • Obesity    • Paroxysmal atrial fibrillation          Past Surgical History:   Procedure Laterality Date   • CATARACT EXTRACTION     • KNEE ARTHROPLASTY           Family History   Problem Relation Age of Onset   • Hypertension Father          Social History     Social History   • Marital status:      Spouse name: N/A   • Number of children: N/A   • Years of education: N/A      Occupational History   • Not on file.     Social History Main Topics   • Smoking status: Former Smoker   • Smokeless tobacco: Never Used   • Alcohol use No   • Drug use: No   • Sexual activity: Not Currently     Other Topics Concern   • Not on file     Social History Narrative         Current Outpatient Prescriptions   Medication Sig Dispense Refill   • acetaminophen (TYLENOL) 500 MG tablet Take 500 mg by mouth 1 (one) time as needed for mild pain (1-3).     • albuterol (ACCUNEB) 0.63 MG/3ML nebulizer solution Take 3 mL by nebulization Every 6 (Six) Hours As Needed for wheezing. 129 vial 1   • albuterol (PROVENTIL HFA;VENTOLIN HFA) 108 (90 Base) MCG/ACT inhaler Inhale 2 puffs 4 (Four) Times a Day. 1 inhaler 1   • amiodarone (PACERONE) 200 MG tablet Take 1 tablet by mouth 2 (Two) Times a Day. 30 tablet 5   • apixaban (ELIQUIS) 5 MG tablet tablet Take 1 tablet by mouth Every 12 (Twelve) Hours. 60 tablet 5   • Calcium Carbonate ([employer] CALCIUM PO) Take 1,200 mg by mouth 2 (Two) Times a Day.     • Cholecalciferol (VITAMIN D3) 56568 units tablet Take 1 tablet by mouth 1 (One) Time Per Week. (Patient taking differently: Take 1 tablet by mouth 1 (One) Time Per Week. On Wednesdays.) 4 tablet 5   • docusate sodium (COLACE) 100 MG capsule Take 300 mg by mouth Every Night.     • furosemide (LASIX) 40 MG tablet Take 1 tablet by mouth 2 (Two) Times a Day. 60 tablet 5   • levoFLOXacin (LEVAQUIN) 250 MG tablet Take 1 tablet by mouth Daily. 4 tablet 0   • potassium chloride (K-DUR,KLOR-CON) 10 MEQ CR tablet take 1 tablet by mouth twice a day 60 tablet 5   • predniSONE (DELTASONE) 10 MG tablet 40mg daily x 2 days, then 30 mg daily x 2 days, then 20 mg daily x 2 days, then 10 mg daily x 2 days. 20 tablet 0   • SYMBICORT 160-4.5 MCG/ACT inhaler inhale 2 puffs by mouth twice a day 30.6 inhaler 2   • diltiaZEM CD (CARDIZEM CD) 180 MG 24 hr capsule Take 1 capsule by mouth Daily. 90 capsule 3     No current facility-administered  "medications for this visit.          OBJECTIVE    /78  Pulse 80  Ht 61\" (154.9 cm)  Wt 183 lb (83 kg)  BMI 34.58 kg/m2        Review of Systems     Constitutional:  Denies recent weight loss, weight gain,Fatigue     HENT:  Denies any hearing loss, epistaxis, hoarseness, or difficulty speaking.     Eyes: Wears eyeglasses or contact lenses     Respiratory:  Dyspnea with exertion, cough, wheezing    Cardiovascular: Negative for palpations, chest pain, orthopnea,    Gastrointestinal:  Denies change in bowel habits, dyspepsia, ulcer disease, hematochezia, or melena.     Endocrine: Negative for cold intolerance, heat intolerance, polydipsia, polyphagia and polyuria. Denies any history of weight change, heat/cold intolerance, polydipsia, polyuria     Genitourinary: Negative.      Musculoskeletal: DJD      Physical Exam     Constitutional: Cooperative, alert and oriented, chronically ill appearing.  On home oxygen    HENT:   Head: Normocephalic, normal hair patterns, no masses or tenderness.  Ears, Nose, and Throat: No gross abnormalities. No pallor or cyanosis.   Eyes: EOMS intact, PERRL, conjunctivae and lids unremarkable. Fundoscopic exam and visual fields not performed.   Neck: No palpable masses or adenopathy, no thyromegaly, no JVD, carotid pulses are full and equal bilaterally and without  Bruits.     Cardiovascular: Regular rhythm, S1 and S2 normal, no S3 or S4. No murmurs, gallops, or rubs detected.     Pulmonary/Chest: Chest: Increased diameter of the chest no intercostal retraction, no use of accessory muscles.            Pulmonary: Normal breath sounds. No rales or ronchi.    Abdominal: Abdomen soft, bowel sounds normoactive, no masses, no hepatosplenomegaly, non-tender, no bruits.     Musculoskeletal: DJD, 1+ edema legs    Procedures      Lab Results   Component Value Date    WBC 10.66 (H) 11/29/2017    HGB 10.7 (L) 11/29/2017    HCT 34.6 (L) 11/29/2017    MCV 90.8 11/29/2017     11/29/2017 "     Lab Results   Component Value Date    GLUCOSE 156 (H) 11/29/2017    BUN 46 (H) 11/29/2017    CREATININE 1.18 (H) 11/29/2017    EGFRIFNONA 43 (L) 11/29/2017    BCR 39.0 (H) 11/29/2017    CO2 35.0 (H) 11/29/2017    CALCIUM 9.5 11/29/2017    ALBUMIN 4.00 11/25/2017    LABIL2 1.1 11/25/2017    AST 35 11/25/2017    ALT 40 11/25/2017     No results found for: CHOL  Lab Results   Component Value Date    TRIG 128 07/22/2015     No results found for: HDL  Lab Results   Component Value Date    LDLCALC 124 07/22/2015     No results found for: LDL  No results found for: HDLLDLRATIO  No components found for: CHOLHDL  No results found for: HGBA1C  Lab Results   Component Value Date    TSH 4.540 03/29/2017           ASSESSMENT AND PLAN  Ms. aHrkins is stable with regards to her heart with no reoccurrence of atrial fibrillation.  Her present antiarrhythmic therapy with amiodarone, anticoagulation with Eliquis, Lasix, potassium have been continued.  Her hospital records were reviewed in detail.  I have changed Cardizem 60 mg 3 times a day to Cardizem  mg daily.    Norma was seen today for follow-up.    Diagnoses and all orders for this visit:    Paroxysmal atrial fibrillation    Mixed hyperlipidemia    Essential hypertension    Other orders  -     diltiaZEM CD (CARDIZEM CD) 180 MG 24 hr capsule; Take 1 capsule by mouth Daily.        Sofya Graham MD  11/30/2017  3:00 PM

## 2017-12-08 ENCOUNTER — OFFICE VISIT (OUTPATIENT)
Dept: INTERNAL MEDICINE | Facility: CLINIC | Age: 82
End: 2017-12-08

## 2017-12-08 VITALS
WEIGHT: 177.5 LBS | HEIGHT: 61 IN | SYSTOLIC BLOOD PRESSURE: 110 MMHG | DIASTOLIC BLOOD PRESSURE: 60 MMHG | BODY MASS INDEX: 33.51 KG/M2

## 2017-12-08 DIAGNOSIS — I10 ESSENTIAL HYPERTENSION: ICD-10-CM

## 2017-12-08 DIAGNOSIS — J96.11 CHRONIC RESPIRATORY FAILURE WITH HYPOXIA (HCC): Primary | Chronic | ICD-10-CM

## 2017-12-08 DIAGNOSIS — J43.9 PULMONARY EMPHYSEMA, UNSPECIFIED EMPHYSEMA TYPE (HCC): Chronic | ICD-10-CM

## 2017-12-08 DIAGNOSIS — I48.0 PAROXYSMAL A-FIB (HCC): ICD-10-CM

## 2017-12-08 PROCEDURE — 99213 OFFICE O/P EST LOW 20 MIN: CPT | Performed by: INTERNAL MEDICINE

## 2017-12-10 NOTE — PROGRESS NOTES
Subjective   Norma Harkins is a 86 y.o. female     Patient presents for recheck after recent hospitalization for COPD exacerbation and UTI.  She was treated with IV Antibiotics, IV steroids and Nebulizers.  While in the hospital she developed allergic reaction to IV Rocephin requiring IV Benadryl, IV Solumedrol and racemic epinephrine.  She also required BIPAP.  Patient gradually improved and could be discharged home on 3 liters of Oxygen.She is off steroids.    She did not have any cardiac issues during hospitalization and remained in NSR.    Patient at present denies any cough or purulent sputum.Compains of exertional dyspnea which is at baseline.  Denies any chest pain or palpitations. Edema in her legs is better on Lasix.    Past Medical History:   Diagnosis Date   • Arthritis    • CHF (congestive heart failure)    • Chronic obstructive pulmonary disease    • Chronic respiratory failure with hypoxia    • Diabetes mellitus    • Hyperlipidemia    • Hypertension    • Obesity    • Paroxysmal atrial fibrillation      Past Surgical History:   Procedure Laterality Date   • CATARACT EXTRACTION     • KNEE ARTHROPLASTY         Social History   Substance Use Topics   • Smoking status: Former Smoker   • Smokeless tobacco: Never Used   • Alcohol use No     Family History   Problem Relation Age of Onset   • Hypertension Father      Allergies   Allergen Reactions   • Rocephin [Ceftriaxone] Anaphylaxis and Shortness Of Breath     Current Outpatient Prescriptions on File Prior to Visit   Medication Sig Dispense Refill   • acetaminophen (TYLENOL) 500 MG tablet Take 500 mg by mouth 1 (one) time as needed for mild pain (1-3).     • albuterol (ACCUNEB) 0.63 MG/3ML nebulizer solution Take 3 mL by nebulization Every 6 (Six) Hours As Needed for wheezing. 129 vial 1   • albuterol (PROVENTIL HFA;VENTOLIN HFA) 108 (90 Base) MCG/ACT inhaler Inhale 2 puffs 4 (Four) Times a Day. 1 inhaler 1   • amiodarone (PACERONE) 200 MG tablet Take  1 tablet by mouth 2 (Two) Times a Day. 30 tablet 5   • apixaban (ELIQUIS) 5 MG tablet tablet Take 1 tablet by mouth Every 12 (Twelve) Hours. 60 tablet 5   • Calcium Carbonate ([employer]S CALCIUM PO) Take 1,200 mg by mouth 2 (Two) Times a Day.     • Cholecalciferol (VITAMIN D3) 09178 units tablet Take 1 tablet by mouth 1 (One) Time Per Week. (Patient taking differently: Take 1 tablet by mouth 1 (One) Time Per Week. On Wednesdays.) 4 tablet 5   • diltiaZEM CD (CARDIZEM CD) 180 MG 24 hr capsule Take 1 capsule by mouth Daily. 90 capsule 3   • docusate sodium (COLACE) 100 MG capsule Take 300 mg by mouth Every Night.     • furosemide (LASIX) 40 MG tablet Take 1 tablet by mouth 2 (Two) Times a Day. 60 tablet 5   • potassium chloride (K-DUR,KLOR-CON) 10 MEQ CR tablet take 1 tablet by mouth twice a day 60 tablet 5   • SYMBICORT 160-4.5 MCG/ACT inhaler inhale 2 puffs by mouth twice a day 30.6 inhaler 2     No current facility-administered medications on file prior to visit.    Current outpatient and discharge medications have been reconciled for the patient.  Krista Matos MD    Review of Systems   Constitutional: Negative for chills and fever.   Respiratory: Positive for shortness of breath. Negative for wheezing.    Cardiovascular: Negative for chest pain, palpitations and leg swelling.   Endocrine: Negative for cold intolerance, polydipsia and polyphagia.   Genitourinary: Negative for difficulty urinating, flank pain and frequency.   Musculoskeletal: Negative for back pain and neck pain.   Skin: Negative for color change and rash.   Hematological: Negative for adenopathy.   Psychiatric/Behavioral: Negative for sleep disturbance. The patient is not nervous/anxious.        Objective   Physical Exam   Constitutional: She is oriented to person, place, and time. She appears well-developed and well-nourished.   HENT:   Head: Atraumatic.   Eyes: Conjunctivae and EOM are normal. No scleral icterus.   Neck: Neck supple. No JVD present.  No thyromegaly present.   Cardiovascular: Normal rate and regular rhythm.    Murmur heard.  Pulmonary/Chest: Effort normal. She has no wheezes.   Decreased breath sounds   Abdominal: Bowel sounds are normal. She exhibits no mass. No hernia.   Musculoskeletal: Normal range of motion.   Neurological: She is alert and oriented to person, place, and time.   Skin: Skin is warm and dry. No rash noted.   Psychiatric: She has a normal mood and affect. Her behavior is normal.   Nursing note and vitals reviewed.          Patient Active Problem List   Diagnosis   • Hypertension   • Low back pain   • Hyperlipidemia   • Paroxysmal atrial fibrillation   • Diabetes mellitus   • Hematuria   • Chronic obstructive pulmonary disease   • Morbid obesity   • Physical deconditioning   • Chronic respiratory failure with hypoxia   • Atrial fibrillation   • COPD exacerbation     Admission on 11/25/2017, Discharged on 11/29/2017   No results displayed because visit has over 200 results.      Admission on 10/30/2017, Discharged on 10/30/2017   Component Date Value Ref Range Status   • Glucose 10/30/2017 123* 60 - 100 mg/dL Final   • BUN 10/30/2017 35* 7 - 21 mg/dL Final   • Creatinine 10/30/2017 1.16* 0.50 - 1.00 mg/dL Final   • Sodium 10/30/2017 139  137 - 145 mmol/L Final   • Potassium 10/30/2017 4.2  3.5 - 5.1 mmol/L Final   • Chloride 10/30/2017 97  95 - 110 mmol/L Final   • CO2 10/30/2017 32.0* 22.0 - 31.0 mmol/L Final   • Calcium 10/30/2017 9.3  8.4 - 10.2 mg/dL Final   • eGFR Non African Amer 10/30/2017 44  39 - 90 mL/min/1.73 Final   • BUN/Creatinine Ratio 10/30/2017 30.2* 7.0 - 25.0 Final   • Anion Gap 10/30/2017 10.0  5.0 - 15.0 mmol/L Final   • WBC 10/30/2017 11.23* 3.20 - 9.80 10*3/mm3 Final   • RBC 10/30/2017 3.59* 3.77 - 5.16 10*6/mm3 Final   • Hemoglobin 10/30/2017 10.3* 12.0 - 15.5 g/dL Final   • Hematocrit 10/30/2017 32.8* 35.0 - 45.0 % Final   • MCV 10/30/2017 91.4  80.0 - 98.0 fL Final   • MCH 10/30/2017 28.7  26.5 - 34.0 pg  Final   • MCHC 10/30/2017 31.4  31.4 - 36.0 g/dL Final   • RDW 10/30/2017 14.9* 11.5 - 14.5 % Final   • RDW-SD 10/30/2017 50.1* 36.4 - 46.3 fl Final   • MPV 10/30/2017 10.3  8.0 - 12.0 fL Final   • Platelets 10/30/2017 199  150 - 450 10*3/mm3 Final   • BH CV ECHO NOLAN - RVDD 10/30/2017 3.1  cm Preliminary   • IVSd 10/30/2017 1.3  cm Preliminary   • LVIDd 10/30/2017 4.0  cm Preliminary   • LVIDs 10/30/2017 3.1  cm Preliminary   • LVPWd 10/30/2017 1.2  cm Preliminary   • IVS/LVPW 10/30/2017 1.1   Preliminary   • FS 10/30/2017 21.7  % Preliminary   • EDV(Teich) 10/30/2017 70.9  ml Preliminary   • ESV(Teich) 10/30/2017 39.4  ml Preliminary   • EF(Teich) 10/30/2017 44.4  % Preliminary   • EDV(cubed) 10/30/2017 65.0  ml Preliminary   • ESV(cubed) 10/30/2017 31.2  ml Preliminary   • EF(cubed) 10/30/2017 51.9  % Preliminary   • LV mass(C)d 10/30/2017 182.3  grams Preliminary   • SV(Teich) 10/30/2017 31.5  ml Preliminary   • SV(cubed) 10/30/2017 33.8  ml Preliminary   • Ao root diam 10/30/2017 3.0  cm Preliminary   • Ao root area 10/30/2017 7.3  cm^2 Preliminary   • ACS 10/30/2017 1.5  cm Preliminary   • LVOT diam 10/30/2017 2.1  cm Preliminary   • LVOT area 10/30/2017 3.4  cm^2 Preliminary   • LVOT area(traced) 10/30/2017 3.5  cm^2 Preliminary   • MV E max julian 10/30/2017 147.1  cm/sec Preliminary   • MV A max julian 10/30/2017 73.3  cm/sec Preliminary   • MV E/A 10/30/2017 2.0   Preliminary   • MV V2 max 10/30/2017 175.4  cm/sec Preliminary   • MV max PG 10/30/2017 12.3  mmHg Preliminary   • MV V2 mean 10/30/2017 82.8  cm/sec Preliminary   • MV mean PG 10/30/2017 3.7  mmHg Preliminary   • MV V2 VTI 10/30/2017 41.7  cm Preliminary   • MVA(VTI) 10/30/2017 2.3  cm^2 Preliminary   • MV P1/2t max julian 10/30/2017 164.2  cm/sec Preliminary   • MV P1/2t 10/30/2017 57.2  msec Preliminary   • MVA(P1/2t) 10/30/2017 3.8  cm^2 Preliminary   • MV dec slope 10/30/2017 841.1  cm/sec^2 Preliminary   • Ao pk julian 10/30/2017 182.4  cm/sec  Preliminary   • Ao max PG 10/30/2017 13.3  mmHg Preliminary   • Ao max PG (full) 10/30/2017 5.2  mmHg Preliminary   • Ao V2 mean 10/30/2017 123.5  cm/sec Preliminary   • Ao mean PG 10/30/2017 6.9  mmHg Preliminary   • Ao mean PG (full) 10/30/2017 2.8  mmHg Preliminary   • Ao V2 VTI 10/30/2017 40.0  cm Preliminary   • ANIYA(I,A) 10/30/2017 2.4  cm^2 Preliminary   • ANIYA(I,D) 10/30/2017 2.4  cm^2 Preliminary   • ANIYA(V,A) 10/30/2017 2.6  cm^2 Preliminary   • ANIYA(V,D) 10/30/2017 2.6  cm^2 Preliminary   • LV V1 max PG 10/30/2017 8.1  mmHg Preliminary   • LV V1 mean PG 10/30/2017 4.1  mmHg Preliminary   • LV V1 max 10/30/2017 142.2  cm/sec Preliminary   • LV V1 mean 10/30/2017 96.0  cm/sec Preliminary   • LV V1 VTI 10/30/2017 29.1  cm Preliminary   • MR max julian 10/30/2017 497.3  cm/sec Preliminary   • MR max PG 10/30/2017 98.9  mmHg Preliminary   • SV(Ao) 10/30/2017 290.5  ml Preliminary   • SV(LVOT) 10/30/2017 97.4  ml Preliminary   • PA V2 max 10/30/2017 76.6  cm/sec Preliminary   • PA max PG 10/30/2017 2.3  mmHg Preliminary   • TR max julian 10/30/2017 386.9  cm/sec Preliminary   • RVSP(TR) 10/30/2017 74.9  mmHg Preliminary   • RAP systole 10/30/2017 15.0  mmHg Preliminary   • MVA P1/2T LCG 10/30/2017 1.3  cm^2 Preliminary   • Echo EF Estimated 10/30/2017 60  % Final   Admission on 10/17/2017, Discharged on 10/18/2017   Component Date Value Ref Range Status   • Glucose 10/17/2017 112  70 - 130 mg/dL Final   • Glucose 10/17/2017 133* 70 - 130 mg/dL Final   • Glucose 10/18/2017 120* 60 - 100 mg/dL Final   • BUN 10/18/2017 31* 7 - 21 mg/dL Final   • Creatinine 10/18/2017 1.38* 0.50 - 1.00 mg/dL Final   • Sodium 10/18/2017 141  137 - 145 mmol/L Final   • Potassium 10/18/2017 3.5  3.5 - 5.1 mmol/L Final   • Chloride 10/18/2017 96  95 - 110 mmol/L Final   • CO2 10/18/2017 35.0* 22.0 - 31.0 mmol/L Final   • Calcium 10/18/2017 8.9  8.4 - 10.2 mg/dL Final   • eGFR Non  Amer 10/18/2017 36* >60 mL/min/1.73 Final   •  BUN/Creatinine Ratio 10/18/2017 22.5  7.0 - 25.0 Final   • Anion Gap 10/18/2017 10.0  5.0 - 15.0 mmol/L Final   • WBC 10/18/2017 13.65* 3.20 - 9.80 10*3/mm3 Final   • RBC 10/18/2017 3.41* 3.77 - 5.16 10*6/mm3 Final   • Hemoglobin 10/18/2017 9.9* 12.0 - 15.5 g/dL Final   • Hematocrit 10/18/2017 31.8* 35.0 - 45.0 % Final   • MCV 10/18/2017 93.3  80.0 - 98.0 fL Final   • MCH 10/18/2017 29.0  26.5 - 34.0 pg Final   • MCHC 10/18/2017 31.1* 31.4 - 36.0 g/dL Final   • RDW 10/18/2017 15.0* 11.5 - 14.5 % Final   • RDW-SD 10/18/2017 50.8* 36.4 - 46.3 fl Final   • MPV 10/18/2017 10.6  8.0 - 12.0 fL Final   • Platelets 10/18/2017 218  150 - 450 10*3/mm3 Final   • Neutrophil % 10/18/2017 49.7  37.0 - 80.0 % Final   • Lymphocyte % 10/18/2017 7.5* 10.0 - 50.0 % Final   • Monocyte % 10/18/2017 4.9  0.0 - 12.0 % Final   • Eosinophil % 10/18/2017 36.8* 0.0 - 7.0 % Final   • Basophil % 10/18/2017 0.6  0.0 - 2.0 % Final   • Immature Grans % 10/18/2017 0.5  0.0 - 0.5 % Final   • Neutrophils, Absolute 10/18/2017 6.78  2.00 - 8.60 10*3/mm3 Final   • Lymphocytes, Absolute 10/18/2017 1.02  0.60 - 4.20 10*3/mm3 Final   • Monocytes, Absolute 10/18/2017 0.67  0.00 - 0.90 10*3/mm3 Final   • Eosinophils, Absolute 10/18/2017 5.03* 0.00 - 0.70 10*3/mm3 Final   • Basophils, Absolute 10/18/2017 0.08  0.00 - 0.20 10*3/mm3 Final   • Immature Grans, Absolute 10/18/2017 0.07* 0.00 - 0.02 10*3/mm3 Final   • Glucose 10/18/2017 117  70 - 130 mg/dL Final   • Glucose 10/18/2017 116  70 - 130 mg/dL Final               Assessment   Diagnosis Plan   1. Chronic respiratory failure with hypoxia     2. Pulmonary emphysema, unspecified emphysema type     3. Essential hypertension     4. Paroxysmal a-fib         Plan     1. Patient will continue Symbicort.      Will add Tudorza 1 puff bid. Samples provided.      Advised to use Albuterol nebs on PRN basis only.  2. Patient will continue supplemental Oxygen.      Discussed Pulmonary rehab but patient is unable to do  that due to problems with transportation.  3. Diltiazem and Lasix are continued.      DASH.      1.5 gr Sodium restriction.  4. Antiarrythmic therapy with Amiodarone and anticoagulation with Eliquis are continued.      Follow up in 3 months.              This document has been electronically signed by Krista Matos MD on December 10, 2017 1:40 PM

## 2017-12-15 ENCOUNTER — TELEPHONE (OUTPATIENT)
Dept: INTERNAL MEDICINE | Facility: CLINIC | Age: 82
End: 2017-12-15

## 2017-12-15 RX ORDER — ALBUTEROL SULFATE 0.63 MG/3ML
1 SOLUTION RESPIRATORY (INHALATION) EVERY 6 HOURS PRN
Qty: 129 VIAL | Refills: 1 | Status: SHIPPED | OUTPATIENT
Start: 2017-12-15 | End: 2018-02-26 | Stop reason: SDUPTHER

## 2017-12-15 RX ORDER — ALBUTEROL SULFATE 90 UG/1
2 AEROSOL, METERED RESPIRATORY (INHALATION)
Qty: 1 INHALER | Refills: 1 | Status: SHIPPED | OUTPATIENT
Start: 2017-12-15

## 2018-01-03 RX ORDER — METHYLPREDNISOLONE 4 MG/1
TABLET ORAL
Qty: 1 EACH | Refills: 0 | Status: SHIPPED | OUTPATIENT
Start: 2018-01-03 | End: 2018-03-21 | Stop reason: HOSPADM

## 2018-01-03 RX ORDER — DOXYCYCLINE HYCLATE 100 MG/1
100 CAPSULE ORAL 2 TIMES DAILY
Qty: 14 CAPSULE | Refills: 0 | Status: SHIPPED | OUTPATIENT
Start: 2018-01-03 | End: 2018-02-26 | Stop reason: SDUPTHER

## 2018-01-11 RX ORDER — BUDESONIDE AND FORMOTEROL FUMARATE DIHYDRATE 160; 4.5 UG/1; UG/1
AEROSOL RESPIRATORY (INHALATION)
Qty: 30.6 G | Refills: 3 | Status: SHIPPED | OUTPATIENT
Start: 2018-01-11

## 2018-01-27 ENCOUNTER — APPOINTMENT (OUTPATIENT)
Dept: GENERAL RADIOLOGY | Facility: HOSPITAL | Age: 83
End: 2018-01-27

## 2018-01-27 ENCOUNTER — HOSPITAL ENCOUNTER (EMERGENCY)
Facility: HOSPITAL | Age: 83
Discharge: HOME | End: 2018-01-27
Attending: EMERGENCY MEDICINE | Admitting: EMERGENCY MEDICINE

## 2018-01-27 VITALS
WEIGHT: 170 LBS | OXYGEN SATURATION: 97 % | DIASTOLIC BLOOD PRESSURE: 68 MMHG | RESPIRATION RATE: 20 BRPM | SYSTOLIC BLOOD PRESSURE: 131 MMHG | HEART RATE: 73 BPM | BODY MASS INDEX: 32.1 KG/M2 | HEIGHT: 61 IN | TEMPERATURE: 97.6 F

## 2018-01-27 DIAGNOSIS — S30.0XXA LUMBAR CONTUSION, INITIAL ENCOUNTER: Primary | ICD-10-CM

## 2018-01-27 PROCEDURE — 72100 X-RAY EXAM L-S SPINE 2/3 VWS: CPT

## 2018-01-27 PROCEDURE — 99284 EMERGENCY DEPT VISIT MOD MDM: CPT

## 2018-01-27 RX ORDER — HYDROCODONE BITARTRATE AND ACETAMINOPHEN 7.5; 325 MG/1; MG/1
1 TABLET ORAL ONCE
Status: COMPLETED | OUTPATIENT
Start: 2018-01-27 | End: 2018-01-27

## 2018-01-27 RX ORDER — HYDROCODONE BITARTRATE AND ACETAMINOPHEN 5; 325 MG/1; MG/1
1 TABLET ORAL EVERY 8 HOURS PRN
Qty: 12 TABLET | Refills: 0 | Status: SHIPPED | OUTPATIENT
Start: 2018-01-27 | End: 2018-06-11

## 2018-01-27 RX ORDER — ONDANSETRON 4 MG/1
4 TABLET, ORALLY DISINTEGRATING ORAL ONCE
Status: COMPLETED | OUTPATIENT
Start: 2018-01-27 | End: 2018-01-27

## 2018-01-27 RX ORDER — ONDANSETRON 4 MG/1
4 TABLET, ORALLY DISINTEGRATING ORAL EVERY 8 HOURS PRN
Qty: 12 TABLET | Refills: 0 | Status: SHIPPED | OUTPATIENT
Start: 2018-01-27 | End: 2018-06-18

## 2018-01-27 RX ADMIN — HYDROCODONE BITARTRATE AND ACETAMINOPHEN 1 TABLET: 7.5; 325 TABLET ORAL at 12:17

## 2018-01-27 RX ADMIN — ONDANSETRON 4 MG: 4 TABLET, ORALLY DISINTEGRATING ORAL at 12:17

## 2018-01-27 NOTE — ED PROVIDER NOTES
Subjective   Patient is a 87 y.o. female presenting with fall.   History provided by:  Patient  Fall   Mechanism of injury: fall    Injury location:  Torso and face  Facial injury location:  Forehead  Torso injury location:  Back and L flank  Incident location:  Home  Time since incident:  2 days  Arrived directly from scene: no    Fall:     Fall occurred: Mechanical fall at home secondary to oxygen getting caught on recliner.    Impact surface:  Hard floor    Entrapped after fall: no    Suspicion of alcohol use: no    Suspicion of drug use: no    Associated symptoms: back pain    Associated symptoms: no abdominal pain, no chest pain and no loss of consciousness    Risk factors: anticoagulation therapy        Review of Systems   Constitutional: Negative.  Negative for fever.   HENT: Negative.    Respiratory: Negative.    Cardiovascular: Negative.  Negative for chest pain.   Gastrointestinal: Negative.  Negative for abdominal pain.   Endocrine: Negative.    Genitourinary: Negative.  Negative for dysuria.   Musculoskeletal: Positive for back pain.   Skin: Positive for wound.   Neurological: Negative.  Negative for loss of consciousness.   Psychiatric/Behavioral: Negative.    All other systems reviewed and are negative.      Past Medical History:   Diagnosis Date   • Arthritis    • CAD (coronary artery disease)    • CAD (coronary artery disease)    • CHF (congestive heart failure)    • Chronic obstructive pulmonary disease    • Chronic respiratory failure with hypoxia    • Diabetes mellitus    • Disease of thyroid gland    • Hyperlipidemia    • Hypertension    • Obesity    • Paroxysmal atrial fibrillation        Allergies   Allergen Reactions   • Rocephin [Ceftriaxone] Anaphylaxis and Shortness Of Breath       Past Surgical History:   Procedure Laterality Date   • CATARACT EXTRACTION     • KNEE ARTHROPLASTY         Family History   Problem Relation Age of Onset   • Hypertension Father        Social History     Social  History   • Marital status:      Spouse name: N/A   • Number of children: N/A   • Years of education: N/A     Social History Main Topics   • Smoking status: Former Smoker   • Smokeless tobacco: Never Used   • Alcohol use No   • Drug use: No   • Sexual activity: Not Currently     Other Topics Concern   • None     Social History Narrative   • None           Objective   Physical Exam   Constitutional: She is oriented to person, place, and time. She appears well-developed and well-nourished. No distress.   HENT:   Head: Normocephalic and atraumatic.   Nose: Nose normal.   Eyes: Conjunctivae and EOM are normal. Pupils are equal, round, and reactive to light.   Neck: Normal range of motion. Neck supple. No JVD present. No tracheal deviation present.   Cardiovascular: Normal rate, regular rhythm and normal heart sounds.    No murmur heard.  Pulmonary/Chest: Effort normal. No respiratory distress. She has no wheezes.   Abdominal: Soft. There is no tenderness.   Musculoskeletal: She exhibits tenderness. She exhibits no edema or deformity.   Lumbar tenderness/coccyx tenderness.   Neurological: She is alert and oriented to person, place, and time. No cranial nerve deficit.   Skin: Skin is warm and dry. No rash noted. She is not diaphoretic. No erythema. No pallor.   Large area of ecchymosis noted on the coccyx.  Areas of ecchymosis noted to the left flank.  Small abrasion noted to the left temporal area.   Psychiatric: She has a normal mood and affect. Her behavior is normal. Thought content normal.   Nursing note and vitals reviewed.      Procedures         ED Course  ED Course   Comment By Time   XR rad reviewed:  1.  No evidence of acute traumatic osseous injury.               If pain or symptoms persist beyond reasonable expectations,  suggest MRI as is deemed clinically appropriate. AMOS Godwin 01/27 1227                  MDM  Number of Diagnoses or Management Options  Lumbar contusion, initial encounter: new  and requires workup     Amount and/or Complexity of Data Reviewed  Tests in the radiology section of CPT®: reviewed    Risk of Complications, Morbidity, and/or Mortality  Presenting problems: low  Diagnostic procedures: low  Management options: low    Patient Progress  Patient progress: stable      Final diagnoses:   Lumbar contusion, initial encounter            AMOS Godwin  01/27/18 1256

## 2018-01-29 ENCOUNTER — TELEPHONE (OUTPATIENT)
Dept: INTERNAL MEDICINE | Facility: CLINIC | Age: 83
End: 2018-01-29

## 2018-01-29 RX ORDER — AMIODARONE HYDROCHLORIDE 200 MG/1
200 TABLET ORAL 2 TIMES DAILY
Qty: 60 TABLET | Refills: 0 | Status: SHIPPED | OUTPATIENT
Start: 2018-01-29 | End: 2018-02-21 | Stop reason: SDUPTHER

## 2018-01-29 NOTE — TELEPHONE ENCOUNTER
PT NEEDS A REFILL ON AMIODARONE 1 TAB 2 X DAILY S[facility] [place].

## 2018-02-21 RX ORDER — AMIODARONE HYDROCHLORIDE 200 MG/1
TABLET ORAL
Qty: 60 TABLET | Refills: 0 | Status: SHIPPED | OUTPATIENT
Start: 2018-02-21 | End: 2018-02-26 | Stop reason: SDUPTHER

## 2018-02-26 ENCOUNTER — OFFICE VISIT (OUTPATIENT)
Dept: INTERNAL MEDICINE | Facility: CLINIC | Age: 83
End: 2018-02-26

## 2018-02-26 VITALS
WEIGHT: 181.2 LBS | BODY MASS INDEX: 34.21 KG/M2 | SYSTOLIC BLOOD PRESSURE: 100 MMHG | HEIGHT: 61 IN | DIASTOLIC BLOOD PRESSURE: 60 MMHG

## 2018-02-26 DIAGNOSIS — J44.1 COPD WITH ACUTE EXACERBATION (HCC): Primary | ICD-10-CM

## 2018-02-26 PROCEDURE — 99213 OFFICE O/P EST LOW 20 MIN: CPT | Performed by: INTERNAL MEDICINE

## 2018-02-26 PROCEDURE — 96372 THER/PROPH/DIAG INJ SC/IM: CPT | Performed by: INTERNAL MEDICINE

## 2018-02-26 RX ORDER — METHYLPREDNISOLONE ACETATE 40 MG/ML
40 INJECTION, SUSPENSION INTRA-ARTICULAR; INTRALESIONAL; INTRAMUSCULAR; SOFT TISSUE ONCE
Status: COMPLETED | OUTPATIENT
Start: 2018-02-26 | End: 2018-02-26

## 2018-02-26 RX ORDER — ALBUTEROL SULFATE 0.63 MG/3ML
1 SOLUTION RESPIRATORY (INHALATION) EVERY 6 HOURS PRN
Qty: 1290 VIAL | Refills: 1 | Status: SHIPPED | OUTPATIENT
Start: 2018-02-26

## 2018-02-26 RX ORDER — AMIODARONE HYDROCHLORIDE 200 MG/1
200 TABLET ORAL 2 TIMES DAILY
Qty: 180 TABLET | Refills: 1 | Status: SHIPPED | OUTPATIENT
Start: 2018-02-26 | End: 2019-08-28

## 2018-02-26 RX ORDER — FUROSEMIDE 40 MG/1
40 TABLET ORAL 2 TIMES DAILY
Qty: 180 TABLET | Refills: 1 | Status: SHIPPED | OUTPATIENT
Start: 2018-02-26

## 2018-02-26 RX ORDER — DOXYCYCLINE HYCLATE 100 MG/1
100 CAPSULE ORAL 2 TIMES DAILY
Qty: 14 CAPSULE | Refills: 0 | Status: SHIPPED | OUTPATIENT
Start: 2018-02-26 | End: 2018-03-21 | Stop reason: HOSPADM

## 2018-02-26 RX ADMIN — METHYLPREDNISOLONE ACETATE 40 MG: 40 INJECTION, SUSPENSION INTRA-ARTICULAR; INTRALESIONAL; INTRAMUSCULAR; SOFT TISSUE at 15:17

## 2018-03-18 ENCOUNTER — APPOINTMENT (OUTPATIENT)
Dept: GENERAL RADIOLOGY | Facility: HOSPITAL | Age: 83
End: 2018-03-18

## 2018-03-18 ENCOUNTER — HOSPITAL ENCOUNTER (OUTPATIENT)
Facility: HOSPITAL | Age: 83
Setting detail: OBSERVATION
Discharge: HOME HEALTH | End: 2018-03-21
Attending: EMERGENCY MEDICINE | Admitting: INTERNAL MEDICINE

## 2018-03-18 DIAGNOSIS — Z74.09 IMPAIRED MOBILITY AND ACTIVITIES OF DAILY LIVING: ICD-10-CM

## 2018-03-18 DIAGNOSIS — Z78.9 IMPAIRED MOBILITY AND ACTIVITIES OF DAILY LIVING: ICD-10-CM

## 2018-03-18 DIAGNOSIS — Z74.09 IMPAIRED FUNCTIONAL MOBILITY, BALANCE, GAIT, AND ENDURANCE: ICD-10-CM

## 2018-03-18 DIAGNOSIS — J44.1 COPD EXACERBATION (HCC): Primary | ICD-10-CM

## 2018-03-18 DIAGNOSIS — R06.02 SHORTNESS OF BREATH: ICD-10-CM

## 2018-03-18 LAB
ANION GAP SERPL CALCULATED.3IONS-SCNC: 16 MMOL/L (ref 5–15)
APTT PPP: 35.3 SECONDS (ref 20–40.3)
BASOPHILS # BLD AUTO: 0.07 10*3/MM3 (ref 0–0.2)
BASOPHILS NFR BLD AUTO: 0.6 % (ref 0–2)
BILIRUB UR QL STRIP: NEGATIVE
BUN BLD-MCNC: 43 MG/DL (ref 7–21)
BUN/CREAT SERPL: 28.5 (ref 7–25)
CALCIUM SPEC-SCNC: 9.3 MG/DL (ref 8.4–10.2)
CHLORIDE SERPL-SCNC: 90 MMOL/L (ref 95–110)
CLARITY UR: CLEAR
CO2 SERPL-SCNC: 30 MMOL/L (ref 22–31)
COLOR UR: YELLOW
CREAT BLD-MCNC: 1.51 MG/DL (ref 0.5–1)
D-LACTATE SERPL-SCNC: 0.9 MMOL/L (ref 0.5–2)
DEPRECATED RDW RBC AUTO: 53.3 FL (ref 36.4–46.3)
EOSINOPHIL # BLD AUTO: 1.84 10*3/MM3 (ref 0–0.7)
EOSINOPHIL NFR BLD AUTO: 16.8 % (ref 0–7)
ERYTHROCYTE [DISTWIDTH] IN BLOOD BY AUTOMATED COUNT: 16.8 % (ref 11.5–14.5)
GFR SERPL CREATININE-BSD FRML MDRD: 33 ML/MIN/1.73 (ref 39–90)
GLUCOSE BLD-MCNC: 119 MG/DL (ref 60–100)
GLUCOSE UR STRIP-MCNC: NEGATIVE MG/DL
HCT VFR BLD AUTO: 33.7 % (ref 35–45)
HGB BLD-MCNC: 10.5 G/DL (ref 12–15.5)
HGB UR QL STRIP.AUTO: NEGATIVE
IMM GRANULOCYTES # BLD: 0.04 10*3/MM3 (ref 0–0.02)
IMM GRANULOCYTES NFR BLD: 0.4 % (ref 0–0.5)
INR PPP: 1.42 (ref 0.8–1.2)
KETONES UR QL STRIP: NEGATIVE
LEUKOCYTE ESTERASE UR QL STRIP.AUTO: ABNORMAL
LYMPHOCYTES # BLD AUTO: 0.53 10*3/MM3 (ref 0.6–4.2)
LYMPHOCYTES NFR BLD AUTO: 4.9 % (ref 10–50)
MAGNESIUM SERPL-MCNC: 2.2 MG/DL (ref 1.6–2.3)
MCH RBC QN AUTO: 26.6 PG (ref 26.5–34)
MCHC RBC AUTO-ENTMCNC: 31.2 G/DL (ref 31.4–36)
MCV RBC AUTO: 85.5 FL (ref 80–98)
MONOCYTES # BLD AUTO: 0.7 10*3/MM3 (ref 0–0.9)
MONOCYTES NFR BLD AUTO: 6.4 % (ref 0–12)
NEUTROPHILS # BLD AUTO: 7.74 10*3/MM3 (ref 2–8.6)
NEUTROPHILS NFR BLD AUTO: 70.9 % (ref 37–80)
NITRITE UR QL STRIP: NEGATIVE
NRBC BLD MANUAL-RTO: 0 /100 WBC (ref 0–0)
NT-PROBNP SERPL-MCNC: 2220 PG/ML (ref 0–1800)
PH UR STRIP.AUTO: 7 [PH] (ref 5–9)
PHOSPHATE SERPL-MCNC: 4.9 MG/DL (ref 2.4–4.4)
PLATELET # BLD AUTO: 244 10*3/MM3 (ref 150–450)
PMV BLD AUTO: 10.5 FL (ref 8–12)
POTASSIUM BLD-SCNC: 3.3 MMOL/L (ref 3.5–5.1)
PROT UR QL STRIP: NEGATIVE
PROTHROMBIN TIME: 16.9 SECONDS (ref 11.1–15.3)
RBC # BLD AUTO: 3.94 10*6/MM3 (ref 3.77–5.16)
SODIUM BLD-SCNC: 136 MMOL/L (ref 137–145)
SP GR UR STRIP: 1.01 (ref 1–1.03)
TROPONIN I SERPL-MCNC: 0.03 NG/ML
UROBILINOGEN UR QL STRIP: ABNORMAL
WBC NRBC COR # BLD: 10.92 10*3/MM3 (ref 3.2–9.8)

## 2018-03-18 PROCEDURE — 84443 ASSAY THYROID STIM HORMONE: CPT | Performed by: EMERGENCY MEDICINE

## 2018-03-18 PROCEDURE — 87040 BLOOD CULTURE FOR BACTERIA: CPT | Performed by: EMERGENCY MEDICINE

## 2018-03-18 PROCEDURE — 85610 PROTHROMBIN TIME: CPT | Performed by: EMERGENCY MEDICINE

## 2018-03-18 PROCEDURE — 85730 THROMBOPLASTIN TIME PARTIAL: CPT | Performed by: EMERGENCY MEDICINE

## 2018-03-18 PROCEDURE — 96374 THER/PROPH/DIAG INJ IV PUSH: CPT

## 2018-03-18 PROCEDURE — 80048 BASIC METABOLIC PNL TOTAL CA: CPT | Performed by: EMERGENCY MEDICINE

## 2018-03-18 PROCEDURE — 99285 EMERGENCY DEPT VISIT HI MDM: CPT

## 2018-03-18 PROCEDURE — 83735 ASSAY OF MAGNESIUM: CPT | Performed by: EMERGENCY MEDICINE

## 2018-03-18 PROCEDURE — 93010 ELECTROCARDIOGRAM REPORT: CPT | Performed by: INTERNAL MEDICINE

## 2018-03-18 PROCEDURE — 71045 X-RAY EXAM CHEST 1 VIEW: CPT

## 2018-03-18 PROCEDURE — 93005 ELECTROCARDIOGRAM TRACING: CPT | Performed by: EMERGENCY MEDICINE

## 2018-03-18 PROCEDURE — 25010000002 METHYLPREDNISOLONE PER 125 MG: Performed by: EMERGENCY MEDICINE

## 2018-03-18 PROCEDURE — 84484 ASSAY OF TROPONIN QUANT: CPT | Performed by: EMERGENCY MEDICINE

## 2018-03-18 PROCEDURE — 85025 COMPLETE CBC W/AUTO DIFF WBC: CPT | Performed by: EMERGENCY MEDICINE

## 2018-03-18 PROCEDURE — 81001 URINALYSIS AUTO W/SCOPE: CPT | Performed by: EMERGENCY MEDICINE

## 2018-03-18 PROCEDURE — 84439 ASSAY OF FREE THYROXINE: CPT | Performed by: EMERGENCY MEDICINE

## 2018-03-18 PROCEDURE — 83880 ASSAY OF NATRIURETIC PEPTIDE: CPT | Performed by: EMERGENCY MEDICINE

## 2018-03-18 PROCEDURE — 83605 ASSAY OF LACTIC ACID: CPT | Performed by: EMERGENCY MEDICINE

## 2018-03-18 PROCEDURE — 84100 ASSAY OF PHOSPHORUS: CPT | Performed by: EMERGENCY MEDICINE

## 2018-03-18 RX ORDER — IPRATROPIUM BROMIDE AND ALBUTEROL SULFATE 2.5; .5 MG/3ML; MG/3ML
6 SOLUTION RESPIRATORY (INHALATION) ONCE
Status: COMPLETED | OUTPATIENT
Start: 2018-03-18 | End: 2018-03-18

## 2018-03-18 RX ORDER — METHYLPREDNISOLONE SODIUM SUCCINATE 125 MG/2ML
125 INJECTION, POWDER, LYOPHILIZED, FOR SOLUTION INTRAMUSCULAR; INTRAVENOUS ONCE
Status: COMPLETED | OUTPATIENT
Start: 2018-03-18 | End: 2018-03-18

## 2018-03-18 RX ORDER — SODIUM CHLORIDE 0.9 % (FLUSH) 0.9 %
10 SYRINGE (ML) INJECTION AS NEEDED
Status: DISCONTINUED | OUTPATIENT
Start: 2018-03-18 | End: 2018-03-21 | Stop reason: HOSPADM

## 2018-03-18 RX ADMIN — IPRATROPIUM BROMIDE AND ALBUTEROL SULFATE 6 ML: 2.5; .5 SOLUTION RESPIRATORY (INHALATION) at 23:58

## 2018-03-18 RX ADMIN — METHYLPREDNISOLONE SODIUM SUCCINATE 125 MG: 125 INJECTION, POWDER, FOR SOLUTION INTRAMUSCULAR; INTRAVENOUS at 23:58

## 2018-03-19 PROBLEM — R06.02 SHORTNESS OF BREATH: Status: ACTIVE | Noted: 2018-03-19

## 2018-03-19 LAB
ANION GAP SERPL CALCULATED.3IONS-SCNC: 16 MMOL/L (ref 5–15)
ATMOSPHERIC PRESS: ABNORMAL MMHG
BACTERIA UR QL AUTO: NORMAL /HPF
BASE EXCESS BLDV CALC-SCNC: 4.2 MMOL/L (ref -2.4–2.4)
BDY SITE: ABNORMAL
BUN BLD-MCNC: 38 MG/DL (ref 7–21)
BUN/CREAT SERPL: 30.6 (ref 7–25)
CA-I BLD-MCNC: 4.4 MG/DL (ref 4.5–4.9)
CALCIUM SPEC-SCNC: 8.7 MG/DL (ref 8.4–10.2)
CHLORIDE SERPL-SCNC: 92 MMOL/L (ref 95–110)
CO2 BLDA-SCNC: 31.4 MMOL/L (ref 23–27)
CO2 SERPL-SCNC: 28 MMOL/L (ref 22–31)
CREAT BLD-MCNC: 1.24 MG/DL (ref 0.5–1)
DEPRECATED RDW RBC AUTO: 52.6 FL (ref 36.4–46.3)
ERYTHROCYTE [DISTWIDTH] IN BLOOD BY AUTOMATED COUNT: 16.9 % (ref 11.5–14.5)
GFR SERPL CREATININE-BSD FRML MDRD: 41 ML/MIN/1.73 (ref 39–90)
GLUCOSE BLD-MCNC: 236 MG/DL (ref 60–100)
GLUCOSE BLDC GLUCOMTR-MCNC: 206 MG/DL (ref 70–130)
GLUCOSE BLDC GLUCOMTR-MCNC: 216 MG/DL (ref 70–130)
GLUCOSE BLDC GLUCOMTR-MCNC: 243 MG/DL (ref 70–130)
HCO3 BLDV-SCNC: 29.9 MMOL/L
HCT VFR BLD AUTO: 29.5 % (ref 35–45)
HGB BLD-MCNC: 9.2 G/DL (ref 12–15.5)
HGB BLDA-MCNC: 11 G/DL (ref 12–16)
HYALINE CASTS UR QL AUTO: NORMAL /LPF
MCH RBC QN AUTO: 26.7 PG (ref 26.5–34)
MCHC RBC AUTO-ENTMCNC: 31.2 G/DL (ref 31.4–36)
MCV RBC AUTO: 85.5 FL (ref 80–98)
MODALITY: ABNORMAL
PCO2 BLDV: 50 MM HG
PH BLDV: 7.39 PH UNITS (ref 7.31–7.42)
PLATELET # BLD AUTO: 208 10*3/MM3 (ref 150–450)
PMV BLD AUTO: 10.2 FL (ref 8–12)
PO2 BLDV: 41.2 MM HG
POTASSIUM BLD-SCNC: 3.2 MMOL/L (ref 3.5–5.1)
RBC # BLD AUTO: 3.45 10*6/MM3 (ref 3.77–5.16)
RBC # UR: NORMAL /HPF
REF LAB TEST METHOD: NORMAL
SODIUM BLD-SCNC: 136 MMOL/L (ref 137–145)
SQUAMOUS #/AREA URNS HPF: NORMAL /HPF
T4 FREE SERPL-MCNC: 0.88 NG/DL (ref 0.78–2.19)
TSH SERPL DL<=0.05 MIU/L-ACNC: 40.9 MIU/ML (ref 0.46–4.68)
WBC NRBC COR # BLD: 7.13 10*3/MM3 (ref 3.2–9.8)
WBC UR QL AUTO: NORMAL /HPF

## 2018-03-19 PROCEDURE — G0378 HOSPITAL OBSERVATION PER HR: HCPCS

## 2018-03-19 PROCEDURE — 96376 TX/PRO/DX INJ SAME DRUG ADON: CPT

## 2018-03-19 PROCEDURE — G8988 SELF CARE GOAL STATUS: HCPCS

## 2018-03-19 PROCEDURE — 82962 GLUCOSE BLOOD TEST: CPT

## 2018-03-19 PROCEDURE — 25010000002 METHYLPREDNISOLONE PER 125 MG: Performed by: INTERNAL MEDICINE

## 2018-03-19 PROCEDURE — 97166 OT EVAL MOD COMPLEX 45 MIN: CPT

## 2018-03-19 PROCEDURE — 25010000002 METHYLPREDNISOLONE PER 125 MG: Performed by: NURSE PRACTITIONER

## 2018-03-19 PROCEDURE — G8979 MOBILITY GOAL STATUS: HCPCS

## 2018-03-19 PROCEDURE — 94760 N-INVAS EAR/PLS OXIMETRY 1: CPT

## 2018-03-19 PROCEDURE — G8978 MOBILITY CURRENT STATUS: HCPCS

## 2018-03-19 PROCEDURE — 97530 THERAPEUTIC ACTIVITIES: CPT

## 2018-03-19 PROCEDURE — 94799 UNLISTED PULMONARY SVC/PX: CPT

## 2018-03-19 PROCEDURE — 80048 BASIC METABOLIC PNL TOTAL CA: CPT | Performed by: NURSE PRACTITIONER

## 2018-03-19 PROCEDURE — G8987 SELF CARE CURRENT STATUS: HCPCS

## 2018-03-19 PROCEDURE — 96361 HYDRATE IV INFUSION ADD-ON: CPT

## 2018-03-19 PROCEDURE — 94640 AIRWAY INHALATION TREATMENT: CPT

## 2018-03-19 PROCEDURE — 85027 COMPLETE CBC AUTOMATED: CPT | Performed by: NURSE PRACTITIONER

## 2018-03-19 PROCEDURE — 87040 BLOOD CULTURE FOR BACTERIA: CPT | Performed by: EMERGENCY MEDICINE

## 2018-03-19 PROCEDURE — 97161 PT EVAL LOW COMPLEX 20 MIN: CPT

## 2018-03-19 PROCEDURE — 82803 BLOOD GASES ANY COMBINATION: CPT | Performed by: EMERGENCY MEDICINE

## 2018-03-19 PROCEDURE — 63710000001 INSULIN ASPART PER 5 UNITS: Performed by: NURSE PRACTITIONER

## 2018-03-19 RX ORDER — ACETAMINOPHEN 325 MG/1
650 TABLET ORAL EVERY 4 HOURS PRN
Status: DISCONTINUED | OUTPATIENT
Start: 2018-03-19 | End: 2018-03-21 | Stop reason: HOSPADM

## 2018-03-19 RX ORDER — HYDRALAZINE HYDROCHLORIDE 20 MG/ML
10 INJECTION INTRAMUSCULAR; INTRAVENOUS EVERY 6 HOURS PRN
Status: DISCONTINUED | OUTPATIENT
Start: 2018-03-19 | End: 2018-03-21 | Stop reason: HOSPADM

## 2018-03-19 RX ORDER — PANTOPRAZOLE SODIUM 40 MG/1
40 TABLET, DELAYED RELEASE ORAL
Status: DISCONTINUED | OUTPATIENT
Start: 2018-03-20 | End: 2018-03-21 | Stop reason: HOSPADM

## 2018-03-19 RX ORDER — DEXTROSE MONOHYDRATE 25 G/50ML
25 INJECTION, SOLUTION INTRAVENOUS
Status: DISCONTINUED | OUTPATIENT
Start: 2018-03-19 | End: 2018-03-21 | Stop reason: HOSPADM

## 2018-03-19 RX ORDER — HYDROCODONE BITARTRATE AND ACETAMINOPHEN 5; 325 MG/1; MG/1
1 TABLET ORAL EVERY 8 HOURS PRN
Status: DISCONTINUED | OUTPATIENT
Start: 2018-03-19 | End: 2018-03-21 | Stop reason: HOSPADM

## 2018-03-19 RX ORDER — NICOTINE POLACRILEX 4 MG
15 LOZENGE BUCCAL
Status: DISCONTINUED | OUTPATIENT
Start: 2018-03-19 | End: 2018-03-21 | Stop reason: HOSPADM

## 2018-03-19 RX ORDER — IPRATROPIUM BROMIDE AND ALBUTEROL SULFATE 2.5; .5 MG/3ML; MG/3ML
3 SOLUTION RESPIRATORY (INHALATION)
Status: DISCONTINUED | OUTPATIENT
Start: 2018-03-19 | End: 2018-03-21 | Stop reason: HOSPADM

## 2018-03-19 RX ORDER — DILTIAZEM HYDROCHLORIDE 180 MG/1
180 CAPSULE, COATED, EXTENDED RELEASE ORAL DAILY
Status: DISCONTINUED | OUTPATIENT
Start: 2018-03-19 | End: 2018-03-21 | Stop reason: HOSPADM

## 2018-03-19 RX ORDER — DOCUSATE SODIUM 100 MG/1
200 CAPSULE, LIQUID FILLED ORAL 2 TIMES DAILY
Status: DISCONTINUED | OUTPATIENT
Start: 2018-03-19 | End: 2018-03-21 | Stop reason: HOSPADM

## 2018-03-19 RX ORDER — GUAIFENESIN 600 MG/1
1200 TABLET, EXTENDED RELEASE ORAL EVERY 12 HOURS SCHEDULED
Status: DISCONTINUED | OUTPATIENT
Start: 2018-03-19 | End: 2018-03-21 | Stop reason: HOSPADM

## 2018-03-19 RX ORDER — BISACODYL 10 MG
10 SUPPOSITORY, RECTAL RECTAL DAILY PRN
Status: DISCONTINUED | OUTPATIENT
Start: 2018-03-19 | End: 2018-03-21 | Stop reason: HOSPADM

## 2018-03-19 RX ORDER — AMIODARONE HYDROCHLORIDE 200 MG/1
200 TABLET ORAL 2 TIMES DAILY
Status: DISCONTINUED | OUTPATIENT
Start: 2018-03-19 | End: 2018-03-21 | Stop reason: HOSPADM

## 2018-03-19 RX ORDER — METHYLPREDNISOLONE SODIUM SUCCINATE 125 MG/2ML
125 INJECTION, POWDER, LYOPHILIZED, FOR SOLUTION INTRAMUSCULAR; INTRAVENOUS ONCE
Status: COMPLETED | OUTPATIENT
Start: 2018-03-19 | End: 2018-03-19

## 2018-03-19 RX ORDER — METHYLPREDNISOLONE SODIUM SUCCINATE 125 MG/2ML
60 INJECTION, POWDER, LYOPHILIZED, FOR SOLUTION INTRAMUSCULAR; INTRAVENOUS EVERY 12 HOURS
Status: DISCONTINUED | OUTPATIENT
Start: 2018-03-19 | End: 2018-03-19

## 2018-03-19 RX ORDER — METHYLPREDNISOLONE SODIUM SUCCINATE 125 MG/2ML
60 INJECTION, POWDER, LYOPHILIZED, FOR SOLUTION INTRAMUSCULAR; INTRAVENOUS EVERY 12 HOURS
Status: DISCONTINUED | OUTPATIENT
Start: 2018-03-19 | End: 2018-03-21 | Stop reason: HOSPADM

## 2018-03-19 RX ORDER — SODIUM CHLORIDE 0.9 % (FLUSH) 0.9 %
1-10 SYRINGE (ML) INJECTION AS NEEDED
Status: DISCONTINUED | OUTPATIENT
Start: 2018-03-19 | End: 2018-03-21 | Stop reason: HOSPADM

## 2018-03-19 RX ORDER — FUROSEMIDE 40 MG/1
40 TABLET ORAL 2 TIMES DAILY
Status: DISCONTINUED | OUTPATIENT
Start: 2018-03-19 | End: 2018-03-21 | Stop reason: HOSPADM

## 2018-03-19 RX ORDER — SODIUM CHLORIDE 9 MG/ML
100 INJECTION, SOLUTION INTRAVENOUS CONTINUOUS
Status: DISCONTINUED | OUTPATIENT
Start: 2018-03-19 | End: 2018-03-21 | Stop reason: HOSPADM

## 2018-03-19 RX ORDER — POTASSIUM CHLORIDE 750 MG/1
40 CAPSULE, EXTENDED RELEASE ORAL DAILY
Status: DISCONTINUED | OUTPATIENT
Start: 2018-03-19 | End: 2018-03-21 | Stop reason: HOSPADM

## 2018-03-19 RX ADMIN — AMIODARONE HYDROCHLORIDE 200 MG: 200 TABLET ORAL at 21:42

## 2018-03-19 RX ADMIN — APIXABAN 5 MG: 5 TABLET, FILM COATED ORAL at 21:42

## 2018-03-19 RX ADMIN — GUAIFENESIN 1200 MG: 600 TABLET, EXTENDED RELEASE ORAL at 09:19

## 2018-03-19 RX ADMIN — IPRATROPIUM BROMIDE AND ALBUTEROL SULFATE 3 ML: 2.5; .5 SOLUTION RESPIRATORY (INHALATION) at 11:29

## 2018-03-19 RX ADMIN — INSULIN ASPART 4 UNITS: 100 INJECTION, SOLUTION INTRAVENOUS; SUBCUTANEOUS at 11:22

## 2018-03-19 RX ADMIN — SODIUM CHLORIDE 100 ML/HR: 9 INJECTION, SOLUTION INTRAVENOUS at 07:00

## 2018-03-19 RX ADMIN — DOCUSATE SODIUM 200 MG: 100 CAPSULE, LIQUID FILLED ORAL at 21:42

## 2018-03-19 RX ADMIN — IPRATROPIUM BROMIDE AND ALBUTEROL SULFATE 3 ML: 2.5; .5 SOLUTION RESPIRATORY (INHALATION) at 21:15

## 2018-03-19 RX ADMIN — GUAIFENESIN 1200 MG: 600 TABLET, EXTENDED RELEASE ORAL at 21:42

## 2018-03-19 RX ADMIN — IPRATROPIUM BROMIDE AND ALBUTEROL SULFATE 3 ML: 2.5; .5 SOLUTION RESPIRATORY (INHALATION) at 14:47

## 2018-03-19 RX ADMIN — INSULIN ASPART 4 UNITS: 100 INJECTION, SOLUTION INTRAVENOUS; SUBCUTANEOUS at 21:13

## 2018-03-19 RX ADMIN — IPRATROPIUM BROMIDE AND ALBUTEROL SULFATE 3 ML: 2.5; .5 SOLUTION RESPIRATORY (INHALATION) at 08:33

## 2018-03-19 RX ADMIN — DOCUSATE SODIUM 200 MG: 100 CAPSULE, LIQUID FILLED ORAL at 09:19

## 2018-03-19 RX ADMIN — POTASSIUM CHLORIDE 40 MEQ: 750 CAPSULE, EXTENDED RELEASE ORAL at 09:19

## 2018-03-19 RX ADMIN — METHYLPREDNISOLONE SODIUM SUCCINATE 125 MG: 125 INJECTION, POWDER, FOR SOLUTION INTRAMUSCULAR; INTRAVENOUS at 09:20

## 2018-03-19 RX ADMIN — APIXABAN 5 MG: 5 TABLET, FILM COATED ORAL at 09:23

## 2018-03-19 RX ADMIN — IPRATROPIUM BROMIDE AND ALBUTEROL SULFATE 3 ML: 2.5; .5 SOLUTION RESPIRATORY (INHALATION) at 23:58

## 2018-03-19 RX ADMIN — INSULIN ASPART 6 UNITS: 100 INJECTION, SOLUTION INTRAVENOUS; SUBCUTANEOUS at 18:26

## 2018-03-19 RX ADMIN — SODIUM CHLORIDE 100 ML/HR: 9 INJECTION, SOLUTION INTRAVENOUS at 18:23

## 2018-03-19 RX ADMIN — FUROSEMIDE 40 MG: 40 TABLET ORAL at 11:22

## 2018-03-19 RX ADMIN — METHYLPREDNISOLONE SODIUM SUCCINATE 60 MG: 125 INJECTION, POWDER, FOR SOLUTION INTRAMUSCULAR; INTRAVENOUS at 21:14

## 2018-03-19 RX ADMIN — DILTIAZEM HYDROCHLORIDE 180 MG: 180 CAPSULE, COATED, EXTENDED RELEASE ORAL at 16:13

## 2018-03-19 NOTE — PLAN OF CARE
Problem: Patient Care Overview  Goal: Plan of Care Review  Outcome: Ongoing (interventions implemented as appropriate)   03/19/18 3227   Coping/Psychosocial   Plan of Care Reviewed With patient   OTHER   Outcome Summary PT evaluation completed today. Pt presents with decreased strength, endurance and pulmonary function. She ambulated 100 ft with RW and 3L O2, her SpO2 dropped to 81% post gait and improved to 90% after ~ 45 sec sit rest break. She will benefit from cont skilled PT to achieve highest level function prior to d/c home with  PT

## 2018-03-19 NOTE — PLAN OF CARE
Problem: Patient Care Overview  Goal: Plan of Care Review  Outcome: Ongoing (interventions implemented as appropriate)   03/19/18 8899   Coping/Psychosocial   Plan of Care Reviewed With patient   OTHER   Outcome Summary OT eval on this date. Pt required SBA/CGA for sit to stand and CGA for ambulation 30' and transfers. Pt was independent prior and could benefit from OT services to increase independence with ADLs and functional mobility/transfers.

## 2018-03-19 NOTE — PLAN OF CARE
Problem: Patient Care Overview  Goal: Plan of Care Review  Outcome: Ongoing (interventions implemented as appropriate)   03/19/18 0213   Coping/Psychosocial   Plan of Care Reviewed With patient   Plan of Care Review   Progress no change   OTHER   Outcome Summary pt alert and oriented, vs stable, lives home alone, wears oxygen at home at 3.5 L, no orders at this time will continue to monitor closly.     Goal: Individualization and Mutuality  Outcome: Ongoing (interventions implemented as appropriate)    Goal: Discharge Needs Assessment  Outcome: Ongoing (interventions implemented as appropriate)    Goal: Interprofessional Rounds/Family Conf  Outcome: Ongoing (interventions implemented as appropriate)      Problem: Fall Risk (Adult)  Goal: Identify Related Risk Factors and Signs and Symptoms  Outcome: Ongoing (interventions implemented as appropriate)    Goal: Absence of Fall  Outcome: Ongoing (interventions implemented as appropriate)      Problem: Chronic Obstructive Pulmonary Disease (Adult)  Goal: Signs and Symptoms of Listed Potential Problems Will be Absent, Minimized or Managed (Chronic Obstructive Pulmonary Disease)  Outcome: Ongoing (interventions implemented as appropriate)

## 2018-03-19 NOTE — THERAPY EVALUATION
Acute Care - Physical Therapy Initial Evaluation  [facility]     Patient Name: Norma Harkins  : 1931  MRN: [id number]  Today's Date: 3/19/2018   Onset of Illness/Injury or Date of Surgery: 18  Date of Referral to PT: 18  Referring Physician: Dr. Maldonado      Admit Date: 3/18/2018    Visit Dx:     ICD-10-CM ICD-9-CM   1. COPD exacerbation J44.1 491.21   2. Shortness of breath R06.02 786.05   3. Impaired mobility and activities of daily living Z74.09 799.89   4. Impaired functional mobility, balance, gait, and endurance Z74.09 V49.89     Patient Active Problem List   Diagnosis   • Hypertension   • Low back pain   • Hyperlipidemia   • Paroxysmal atrial fibrillation   • Diabetes mellitus   • Chronic obstructive pulmonary disease   • Morbid obesity   • Physical deconditioning   • Chronic respiratory failure with hypoxia   • Atrial fibrillation   • COPD exacerbation   • Obesity   • CHF (congestive heart failure)   • Arthritis   • CAD (coronary artery disease)   • Disease of thyroid gland   • Shortness of breath     Past Medical History:   Diagnosis Date   • Arthritis    • CAD (coronary artery disease)    • CAD (coronary artery disease)    • CHF (congestive heart failure)    • Chronic obstructive pulmonary disease    • Chronic respiratory failure with hypoxia    • Diabetes mellitus    • Disease of thyroid gland    • Hyperlipidemia    • Hypertension    • Obesity    • Paroxysmal atrial fibrillation      Past Surgical History:   Procedure Laterality Date   • CATARACT EXTRACTION     • KNEE ARTHROPLASTY          PT ASSESSMENT (last 72 hours)      Physical Therapy Evaluation     Row Name 18 1355          PT Evaluation Time/Intention    Subjective Information complains of;weakness;fatigue  -MN     Document Type evaluation  -MN     Mode of Treatment individual therapy;physical therapy  -MN     Patient Effort good  -MN     Row Name 18 7556          General Information    Patient Profile  Reviewed? yes  -MN     Onset of Illness/Injury or Date of Surgery 03/18/18  -MN     Referring Physician Dr. Maldonado  -MN     Patient Observations alert;cooperative  -MN     General Observations of Patient Supine HOB up +3L O2 +IV  -MN     Prior Level of Function independent:;all household mobility;community mobility;transfer;gait;bathing;dressing  -MN     Equipment Currently Used at Home oxygen;rollator;bath bench;raised toilet;cane, straight  -MN     Existing Precautions/Restrictions fall;oxygen therapy device and L/min  -MN     Limitations/Impairments --   last fall ~2 months ago  -MN     Risks Reviewed patient:;LOB;nausea/vomiting;dizziness;increased discomfort;change in vital signs;increased drainage;lines disloged  -MN     Benefits Reviewed patient:;improve function;increase independence;increase strength;increase balance;decrease pain;decrease risk of DVT;improve skin integrity;increase knowledge  -MN     Barriers to Rehab medically complex  -MN     Row Name 03/19/18 1355          Relationship/Environment    Lives With alone  -MN     Row Name 03/19/18 1355          Resource/Environmental Concerns    Current Living Arrangements home/apartment/condo  -MN     Row Name 03/19/18 1355          Cognitive Assessment/Intervention- PT/OT    Orientation Status (Cognition) oriented x 4  -MN     Follows Commands (Cognition) WNL  -MN     Row Name 03/19/18 1355          Bed Mobility Assessment/Treatment    Bed Mobility Assessment/Treatment supine-sit  -MN     Supine-Sit [place] (Bed Mobility) supervision  -MN     Row Name 03/19/18 1355          Transfer Assessment/Treatment    Transfer Assessment/Treatment sit-stand transfer;stand-sit transfer  -MN     Sit-Stand [place] (Transfers) contact guard  -MN     Stand-Sit [place] (Transfers) contact guard  -MN     Row Name 03/19/18 1355          Sit-Stand Transfer    Assistive Device (Sit-Stand Transfers) walker, front-wheeled  -MN     Row Name 03/19/18 1351           "Stand-Sit Transfer    Assistive Device (Stand-Sit Transfers) walker, front-wheeled  -MN     Row Name 03/19/18 1350          Gait/Stairs Assessment/Training    [place] Level (Gait) contact guard;supervision  -MN     Assistive Device (Gait) walker, front-wheeled  -MN     Distance in Feet (Gait) 100  -MN     Row Name 03/19/18 135          General ROM    GENERAL ROM COMMENTS no ROM deficits  -MN     Row Name 03/19/18 [facility]          General Assessment (Manual Muscle Testing)    General Manual Muscle Testing (MMT) Assessment lower extremity strength deficits identified  -MN     Row Name 03/19/18 1358          Lower Extremity (Manual Muscle Testing)    Comment, MMT: Lower Extremity BLEs: hip flex 4/5, knee ext 4/5, knee flex 4/5, DF 4/5, PF 2/5  -MN     Row Name 03/19/18 [facility]          Sensory Assessment/Intervention    Sensory General Assessment no sensation deficits identified   BLEs  -MN     [facility] [place] Name 03/19/18 1357          Vision Assessment/Intervention    Visual Impairment/Limitations WFL;corrective lenses for reading  -MN     [facility] Name 03/19/18 [facility]          Pain Scale: Numbers Pre/Post-Treatment    Pain Scale: Numbers, Pretreatment 0/10 - no pain  -MN     Pain Scale: Numbers, Post-Treatment 0/10 - no pain  -MN     [facility] Name 03/19/18 [facility]          Edema Assessment & Management    Additional Documentation --   +1 edema BLEs distal to knees  -MN     Row Name 03/19/18 1353          Plan of Care Review    Plan of Care Reviewed With patient  -MN     [facility] [place] Name 03/19/18 [facility]          Physical Therapy Clinical Impression    Date of Referral to PT 03/19/18  -MN     PT Diagnosis (PT Clinical Impression) impaired endurance  -MN     Patient/Family Goals Statement (PT Clinical Impression) \"Go home\"  -MN     Criteria for Skilled Interventions Met (PT Clinical Impression) yes;treatment indicated  -MN     Pathology/Pathophysiology Noted (Describe Specifically for Each System) musculoskeletal;cardiovascular;pulmonary  -MN     " Impairments Found (describe specific impairments) aerobic capacity/endurance;gait, locomotion, and balance  -MN     Rehab Potential (PT Clinical Summary) good, to achieve stated therapy goals  -MN     Predicted Duration of Therapy (PT) until d/c  -MN     Care Plan Review (PT) evaluation/treatment results reviewed;care plan/treatment goals reviewed;risks/benefits reviewed;patient/other agree to care plan  -MN     Row Name 03/19/18 1569          Vital Signs    Post Systolic BP Rehab 120  -MN     Post Treatment Diastolic BP 66  -MN     Pretreatment Heart Rate (beats/min) 59  -MN     Intratreatment Heart Rate (beats/min) 66  -MN     Posttreatment Heart Rate (beats/min) 62  -MN     Pre SpO2 (%) 93  -MN     O2 Delivery Pre Treatment supplemental O2  -MN     Intra SpO2 (%) 81   post gait, increased to 90% after ~45 sec sit rest break  -MN     O2 Delivery Intra Treatment supplemental O2  -MN     Post SpO2 (%) 94  -MN     O2 Delivery Post Treatment supplemental O2  -MN     Pre Patient Position Supine  -MN     Intra Patient Position Sitting  -MN     Post Patient Position Supine  -MN     Row Name 03/19/18 4824          Physical Therapy Goals    Transfer Goal Selection (PT) transfer, PT goal 1  -MN     Gait Training Goal Selection (PT) gait training, PT goal 1  -MN     Stairs Goal Selection (PT) stairs, PT goal 1  -MN     Additional Documentation Stairs Goal Selection (PT) (Row)  -MN     Row Name 03/19/18 3151          Transfer Goal 1 (PT)    Activity/Assistive Device (Transfer Goal 1, PT) sit-to-stand/stand-to-sit;bed-to-chair/chair-to-bed;toilet;walker, rolling  -MN     [place] Level/Cues Needed (Transfer Goal 1, PT) conditional independence  -MN     Time Frame (Transfer Goal 1, PT) 1 week  -MN     Progress/Outcome (Transfer Goal 1, PT) goal not met  -MN     Row Name 03/19/18 7972          Gait Training Goal 1 (PT)    Activity/Assistive Device (Gait Training Goal 1, PT) gait (walking locomotion);walker, rolling  -MN      [place] Level (Gait Training Goal 1, PT) conditional independence  -MN     Distance (Gait Goal 1, PT) 150  -MN     Time Frame (Gait Training Goal 1, PT) 1 week  -MN     Progress/Outcome (Gait Training Goal 1, PT) goal not met  -MN     Row Name 03/19/18 1355          Stairs Goal 1 (PT)    Activity/Assistive Device (Stairs Goal 1, PT) other (see comments)   asc/desc ramp with RW  -MN     [place] Level/Cues Needed (Stairs Goal 1, PT) conditional independence  -MN     Progress/Outcome (Stairs Goal 1, PT) goal not met  -MN     Row Name 03/19/18 1353          Positioning and Restraints    Pre-Treatment Position in bed  -MN     Post Treatment Position chair  -MN     In Chair notified nsg;reclined;call light within reach;encouraged to call for assist  -MN     Row Name 03/19/18 1352          Living Environment    Home Accessibility tub/shower is not walk in   ramp to enter home  -MN       User Key  (r) = Recorded By, (t) = Taken By, (c) = Cosigned By    Initials Name Provider Type    MN Cynthia Perez PT Physical Therapist          Physical Therapy Education     Title: PT OT SLP Therapies (Active)     Topic: Physical Therapy (Active)     Point: Mobility training (Done)    Learning Progress Summary     Learner Status Readiness Method Response Comment Documented by    Patient Done Acceptance E VU Role of PT. No OOB/chair without assist MN 03/19/18 1632          Point: Precautions (Done)    Learning Progress Summary     Learner Status Readiness Method Response Comment Documented by    Patient Done Acceptance E VU Role of PT. No OOB/chair without assist MN 03/19/18 1632                      User Key     Initials Effective Dates Name Provider Type Discipline    MN 03/07/18 -  Cynthia Perez PT Physical Therapist PT                PT Recommendation and Plan  Anticipated Discharge Disposition (PT): home with home health care  Planned Therapy Interventions (PT Eval): balance training, bed mobility training, gait  training, home exercise program, strengthening, stretching, transfer training, patient/family education  Therapy Frequency (PT Clinical Impression): other (see comments) (5-14x/week)  Outcome Summary/Treatment Plan (PT)  Anticipated Discharge Disposition (PT): home with home health care  Plan of Care Reviewed With: patient  Outcome Summary: PT evaluation completed today. Pt presents with decreased strength, endurance and pulmonary function. She ambulated 100 ft with RW and 3L O2, her SpO2 dropped to 81% post gait and improved to 90% after ~ 45 sec sit rest break. She will benefit from cont skilled PT to achieve highest level function prior to d/c home with  PT          Outcome Measures     Row Name 03/19/18 1355 03/19/18 1050          How much help from another person do you currently need...    Turning from your back to your side while in flat bed without using bedrails? 4  -MN  --     Moving from lying on back to sitting on the side of a flat bed without bedrails? 3  -MN  --     Moving to and from a bed to a chair (including a wheelchair)? 3  -MN  --     Standing up from a chair using your arms (e.g., wheelchair, bedside chair)? 3  -MN  --     Climbing 3-5 steps with a railing? 3  -MN  --     To walk in hospital room? 3  -MN  --     AM-PAC 6 Clicks Score 19  -MN  --        How much help from another is currently needed...    Putting on and taking off regular lower body clothing?  -- 3  -RB     Bathing (including washing, rinsing, and drying)  -- 2  -RB     Toileting (which includes using toilet bed pan or urinal)  -- 3  -RB     Putting on and taking off regular upper body clothing  -- 3  -RB     Taking care of personal grooming (such as brushing teeth)  -- 4  -RB     Eating meals  -- 4  -RB     Score  -- 19  -RB        Functional Assessment    Outcome Measure Options AM-PAC 6 Clicks Basic Mobility (PT)  -MN AM-PAC 6 Clicks Daily Activity (OT)  -RB       User Key  (r) = Recorded By, (t) = Taken By, (c) = Cosigned  By    Initials Name Provider Type    NICKY Mueller, OT Occupational Therapist    [place] Cynthia Perez, PT Physical Therapist           Time Calculation:         PT Charges     Row Name 03/19/18 1630             Time Calculation    Start Time 1355  -MN      Stop Time 1429  -MN      Time Calculation (min) 34 min  -MN      PT Received On 03/19/18  -MN      PT Goal Re-Cert Due Date 04/01/18  -MN         Time Calculation- PT    Total Timed Code Minutes- PT 8 minute(s)  -MN        User Key  (r) = Recorded By, (t) = Taken By, (c) = Cosigned By    Initials Name Provider Type    MN Cynthia Perez, PT Physical Therapist          Therapy Charges for Today     Code Description Service Date Service Provider Modifiers Qty    88038318988 HC PT MOBILITY CURRENT 3/19/2018 Cynthia Perez, PT GP, CJ 1    87432576450 HC PT MOBILITY PROJECTED 3/19/2018 Cynthia Perez PT GP, CI 1    47972378524 HC PT EVAL LOW COMPLEXITY 1 3/19/2018 Cynthia Perez, PT GP 1    39827834849 HC PT THERAPEUTIC ACT EA 15 MIN 3/19/2018 Cynthia Perez, PT GP 1          PT G-Codes  PT Professional Judgement Used?: Yes  Outcome Measure Options: AM-PAC 6 Clicks Basic Mobility (PT)  Score: 19  Functional Limitation: Mobility: Walking and moving around  Mobility: Walking and Moving Around Current Status (): At least 20 percent but less than 40 percent impaired, limited or restricted  Mobility: Walking and Moving Around Goal Status (): At least 1 percent but less than 20 percent impaired, limited or restricted      Cynthia Perez PT  3/19/2018

## 2018-03-19 NOTE — H&P
[facility]  INPATIENT HISTORY AND PHYSICAL       Patient Care Team:  Krista Matos MD as PCP - General  Krista Matos MD as PCP - Claims Attributed  Jenny Moses RN as Care Coordinator (Population Health)    Date of Admission March 19, 2018 6:56 AM      Chief complaint   Chief Complaint   Patient presents with   • Shortness of Breath       Subjective     Patient is a 87 y.o. female presents with Complaint of having shortness of breath which has been worsening over last 1 week.  Patient said her shortness of breath has been worsening despite of taking extra dose of Lasix.  Patient states she's been coughing up whitish to yellow colored sputum.  No fever or chills have been reported.  No nausea or vomiting have been reported.  Patient does complain of lower shortness swelling.  Patient denies any change in bowel habits or dietary intake.  Patient does complain of having proximal nocturnal dyspnea and states her excess tolerance has significantly reduced over past few days due to shortness of air.  No urinary complaint have been reported.  No chest chest pain or palpitation have been reported.    Review of Systems   Review of Systems   Constitutional: Negative for appetite change, chills, diaphoresis and fever.   HENT: Negative for congestion, rhinorrhea, sore throat and trouble swallowing.    Eyes: Negative for visual disturbance.   Respiratory: Positive for cough, chest tightness and shortness of breath. Negative for wheezing.    Cardiovascular: Positive for leg swelling. Negative for chest pain and palpitations.   Gastrointestinal: Negative for abdominal pain, blood in stool, diarrhea, nausea and vomiting.   Endocrine: Negative for cold intolerance and heat intolerance.   Genitourinary: Negative for decreased urine volume and difficulty urinating.   Musculoskeletal: Negative for back pain, gait problem and neck pain.   Skin: Negative for rash.    Neurological: Positive for weakness. Negative for dizziness, syncope, light-headedness, numbness and headaches.   Psychiatric/Behavioral: The patient is not nervous/anxious.        History  Past Medical History:   Diagnosis Date   • Arthritis    • CAD (coronary artery disease)    • CAD (coronary artery disease)    • CHF (congestive heart failure)    • Chronic obstructive pulmonary disease    • Chronic respiratory failure with hypoxia    • Diabetes mellitus    • Disease of thyroid gland    • Hyperlipidemia    • Hypertension    • Obesity    • Paroxysmal atrial fibrillation      Past Surgical History:   Procedure Laterality Date   • CATARACT EXTRACTION     • KNEE ARTHROPLASTY       Family History   Problem Relation Age of Onset   • Hypertension Father      Social History   Substance Use Topics   • Smoking status: Former Smoker   • Smokeless tobacco: Never Used   • Alcohol use No     Prescriptions Prior to Admission   Medication Sig Dispense Refill Last Dose   • acetaminophen (TYLENOL) 500 MG tablet Take 500 mg by mouth 1 (one) time as needed for mild pain (1-3).   Taking   • albuterol (ACCUNEB) 0.63 MG/3ML nebulizer solution Take 3 mL by nebulization Every 6 (Six) Hours As Needed for Wheezing. 1290 vial 1    • albuterol (PROVENTIL HFA;VENTOLIN HFA) 108 (90 Base) MCG/ACT inhaler Inhale 2 puffs 4 (Four) Times a Day. 1 inhaler 1 3/18/2018 at Unknown time   • amiodarone (PACERONE) 200 MG tablet Take 1 tablet by mouth 2 (Two) Times a Day. 180 tablet 1 3/18/2018 at Unknown time   • apixaban (ELIQUIS) 5 MG tablet tablet Take 1 tablet by mouth Every 12 (Twelve) Hours. 180 tablet 1 3/18/2018 at Unknown time   • Calcium Carbonate ([employer] CALCIUM PO) Take 1,200 mg by mouth 2 (Two) Times a Day.   3/18/2018 at Unknown time   • Cholecalciferol (VITAMIN D3) 07229 units tablet Take 1 tablet by mouth 1 (One) Time Per Week. (Patient taking differently: Take 1 tablet by mouth 1 (One) Time Per Week. On Wednesdays.) 4 tablet 5 Past Week at  Unknown time   • diltiaZEM CD (CARDIZEM CD) 180 MG 24 hr capsule Take 1 capsule by mouth Daily. 90 capsule 3 3/18/2018 at Unknown time   • docusate sodium (COLACE) 100 MG capsule Take 300 mg by mouth Every Night.   3/18/2018 at Unknown time   • doxycycline (VIBRAMYCIN) 100 MG capsule Take 1 capsule by mouth 2 (Two) Times a Day. 14 capsule 0    • furosemide (LASIX) 40 MG tablet Take 1 tablet by mouth 2 (Two) Times a Day. 180 tablet 1 3/18/2018 at Unknown time   • HYDROcodone-acetaminophen (NORCO) 5-325 MG per tablet Take 1 tablet by mouth Every 8 (Eight) Hours As Needed for Moderate Pain . 12 tablet 0 Taking   • ondansetron ODT (ZOFRAN-ODT) 4 MG disintegrating tablet Take 1 tablet by mouth Every 8 (Eight) Hours As Needed for Nausea or Vomiting. 12 tablet 0 Taking   • potassium chloride (K-DUR,KLOR-CON) 10 MEQ CR tablet take 1 tablet by mouth twice a day 60 tablet 5 3/18/2018 at Unknown time   • SYMBICORT 160-4.5 MCG/ACT inhaler inhale 2 puffs by mouth twice a day Rinse mouth after use 30.6 g 3 Taking   • aclidinium bromide (TUDORZA PRESSAIR) 400 MCG/ACT aerosol powder  powder for inhalation Inhale 1 puff 2 (Two) Times a Day. 2 each 0 Taking   • MethylPREDNISolone (MEDROL) 4 MG tablet Take as directed on package instructions. 1 each 0 Taking     Allergies:  Rocephin [ceftriaxone]  Prior to Admission medications    Medication Sig Start Date End Date Taking? Authorizing Provider   acetaminophen (TYLENOL) 500 MG tablet Take 500 mg by mouth 1 (one) time as needed for mild pain (1-3).   Yes Historical Provider, MD   albuterol (ACCUNEB) 0.63 MG/3ML nebulizer solution Take 3 mL by nebulization Every 6 (Six) Hours As Needed for Wheezing. 2/26/18  Yes Krista Matos MD   albuterol (PROVENTIL HFA;VENTOLIN HFA) 108 (90 Base) MCG/ACT inhaler Inhale 2 puffs 4 (Four) Times a Day. 12/15/17  Yes Krista Matos MD   amiodarone (PACERONE) 200 MG tablet Take 1 tablet by mouth 2 (Two) Times a Day. 2/26/18  Yes Krista Matos MD    apixaban (ELIQUIS) 5 MG tablet tablet Take 1 tablet by mouth Every 12 (Twelve) Hours. 2/26/18  Yes Krista Matos MD   Calcium Carbonate ([employer] CALCIUM PO) Take 1,200 mg by mouth 2 (Two) Times a Day.   Yes Historical Provider, MD   Cholecalciferol (VITAMIN D3) 27244 units tablet Take 1 tablet by mouth 1 (One) Time Per Week.  Patient taking differently: Take 1 tablet by mouth 1 (One) Time Per Week. On Wednesdays. 10/20/17  Yes Krista Matos MD   diltiaZEM CD (CARDIZEM CD) 180 MG 24 hr capsule Take 1 capsule by mouth Daily. 11/30/17  Yes Sofya Graham MD   docusate sodium (COLACE) 100 MG capsule Take 300 mg by mouth Every Night.   Yes Historical Provider, MD   doxycycline (VIBRAMYCIN) 100 MG capsule Take 1 capsule by mouth 2 (Two) Times a Day. 2/26/18  Yes Krista Matos MD   furosemide (LASIX) 40 MG tablet Take 1 tablet by mouth 2 (Two) Times a Day. 2/26/18  Yes Krista Matos MD   HYDROcodone-acetaminophen (NORCO) 5-325 MG per tablet Take 1 tablet by mouth Every 8 (Eight) Hours As Needed for Moderate Pain . 1/27/18  Yes Mustapha Peterson MD   ondansetron ODT (ZOFRAN-ODT) 4 MG disintegrating tablet Take 1 tablet by mouth Every 8 (Eight) Hours As Needed for Nausea or Vomiting. 1/27/18  Yes AMOS Godwin   potassium chloride (K-DUR,KLOR-CON) 10 MEQ CR tablet take 1 tablet by mouth twice a day 10/17/17  Yes Krista Matos MD   SYMBICORT 160-4.5 MCG/ACT inhaler inhale 2 puffs by mouth twice a day Rinse mouth after use 1/11/18  Yes Krista Matos MD   aclidinium bromide (TUDORZA PRESSAIR) 400 MCG/ACT aerosol powder  powder for inhalation Inhale 1 puff 2 (Two) Times a Day. 12/10/17   Krista Matos MD   MethylPREDNISolone (MEDROL) 4 MG tablet Take as directed on package instructions. 1/3/18   Krista Matos MD       Objective     Vital Signs    Temp:  [96.4 °F (35.8 °C)-97.8 °F (36.6 °C)] 96.4 °F (35.8 °C)  Heart Rate:  [68-82] 82  Resp:  [18-22] 18  BP: (109-135)/(54-75) 109/55    Physical Exam:       Physical Exam   Constitutional: She is oriented to person, place, and time. She appears well-developed and well-nourished.   HENT:   Head: Normocephalic and atraumatic.   Nose: Nose normal.   Eyes: Conjunctivae and EOM are normal. Pupils are equal, round, and reactive to light.   Neck: Normal range of motion. Neck supple. No JVD present. No tracheal deviation present. No thyromegaly present.   Cardiovascular: Normal rate, regular rhythm, normal heart sounds and intact distal pulses.    Pulmonary/Chest: She is in respiratory distress. She has wheezes. She has rales. She exhibits no tenderness.   Abdominal: Soft. Bowel sounds are normal. She exhibits no distension. There is no tenderness. There is no rebound and no guarding.   Musculoskeletal: Normal range of motion. She exhibits edema.   Lymphadenopathy:     She has no cervical adenopathy.   Neurological: She is alert and oriented to person, place, and time. She has normal reflexes. No cranial nerve deficit.   Skin: Skin is warm and dry.   Intact   Psychiatric: She has a normal mood and affect.   Nursing note and vitals reviewed.      Results Review:       Results from last 7 days  Lab Units 03/18/18  2305   SODIUM mmol/L 136*   POTASSIUM mmol/L 3.3*   CHLORIDE mmol/L 90*   CO2 mmol/L 30.0   BUN mg/dL 43*   CREATININE mg/dL 1.51*   GLUCOSE mg/dL 119*   CALCIUM mg/dL 9.3         Results from last 7 days  Lab Units 03/18/18  2305   MAGNESIUM mg/dL 2.2   PHOSPHORUS mg/dL 4.9*         Results from last 7 days  Lab Units 03/18/18  2305   WBC 10*3/mm3 10.92*   HEMOGLOBIN g/dL 10.5*   HEMATOCRIT % 33.7*   PLATELETS 10*3/mm3 244         Results from last 7 days  Lab Units 03/18/18  2305   INR  1.42*     Imaging Results (last 7 days)     Procedure Component Value Units Date/Time    XR Chest 1 View [[id number]] Collected:  03/18/18 2305     Updated:  03/18/18 2320    Narrative:         Chest single view on  3/18/2018     CLINICAL INDICATION: Shortness of breath    COMPARISON:  11/25/2017    FINDINGS: Mild cardiomegaly is noted. Mild chronic interstitial  changes are noted. Vascular calcification is noted within a  tortuous aorta. No acute pulmonary opacity is noted. Degenerative  changes are noted in the shoulders.      Impression:       No significant change and no acute disease.    Electronically signed by:  Musa Molina  3/18/2018 11:19 PM  CDT Workstation: [id number]          Assessment/Plan     Principal Problem:    COPD exacerbation  Active Problems:    Hypertension    Hyperlipidemia    Diabetes mellitus    Hematuria    Morbid obesity    CAD (coronary artery disease)    Disease of thyroid gland    Shortness of breath      -We'll give IV steroids and nebulizer treatment.  -We'll closely monitor patient's BUN/creatinine.  -We'll get PTOT evaluation.  -We'll hold off on antibiotics at this point in time.  -We'll resume patient's home medication as prior to coming to hospital.  -DVT and GI prophylaxis in place.  -We'll continue monitoring patient in hospital setting and treat patient as course dictates.    I discussed the patients findings and my recommendations with patient and nursing staff.         This document has been electronically signed by Alfredo Maldonado MD on March 19, 2018 6:56 AM        Total Time Spent: 43 minutes.    EMR Dragon/Transcription disclaimer:   Dictated utilizing Dragon dictation.

## 2018-03-19 NOTE — THERAPY EVALUATION
Acute Care - Occupational Therapy Initial Evaluation  [facility]     Patient Name: Norma Harkins  : 1931  MRN: [id number]  Today's Date: 3/19/2018  Onset of Illness/Injury or Date of Surgery: 18  Date of Referral to OT: 18  Referring Physician: Dr. Maldonado    Admit Date: 3/18/2018       ICD-10-CM ICD-9-CM   1. COPD exacerbation J44.1 491.21   2. Shortness of breath R06.02 786.05   3. Impaired mobility and activities of daily living Z74.09 799.89     Patient Active Problem List   Diagnosis   • Hypertension   • Low back pain   • Hyperlipidemia   • Paroxysmal atrial fibrillation   • Diabetes mellitus   • Chronic obstructive pulmonary disease   • Morbid obesity   • Physical deconditioning   • Chronic respiratory failure with hypoxia   • Atrial fibrillation   • COPD exacerbation   • Obesity   • CHF (congestive heart failure)   • Arthritis   • CAD (coronary artery disease)   • Disease of thyroid gland   • Shortness of breath     Past Medical History:   Diagnosis Date   • Arthritis    • CAD (coronary artery disease)    • CAD (coronary artery disease)    • CHF (congestive heart failure)    • Chronic obstructive pulmonary disease    • Chronic respiratory failure with hypoxia    • Diabetes mellitus    • Disease of thyroid gland    • Hyperlipidemia    • Hypertension    • Obesity    • Paroxysmal atrial fibrillation      Past Surgical History:   Procedure Laterality Date   • CATARACT EXTRACTION     • KNEE ARTHROPLASTY            OT ASSESSMENT FLOWSHEET (last 72 hours)      Occupational Therapy Evaluation     Row Name 18 1050                   OT Evaluation Time/Intention    Subjective Information complains of;weakness  -RB        Document Type evaluation  -RB        Mode of Treatment occupational therapy  -RB        Total Evaluation Minutes, Occupational Therapy 32  -RB        Patient Effort good  -RB        Symptoms Noted During/After Treatment fatigue  -RB           General Information     Patient Profile Reviewed? yes  -RB        Onset of Illness/Injury or Date of Surgery 03/18/18  -RB        Referring Physician ANGELIQUE Patel MD  -RB        Patient Observations alert;cooperative;agree to therapy  -RB        General Observations of Patient Sitting EOB with IV and 2L nasal canula.  -RB        Prior Level of Function independent:;gait;transfer;ADL's;cooking  -RB        Equipment Currently Used at Home rollator;oxygen;nebulizer  -RB        Pertinent History of Current Functional Problem Hosp with SOA, leg edema and weakness.  Dx with COPD exacerbation.    -RB        Existing Precautions/Restrictions fall;oxygen therapy device and L/min  -RB        Limitations/Impairments hearing  -RB        Equipment Issued to Patient gait belt  -RB        Risks Reviewed patient:  -RB        Benefits Reviewed patient:  -RB           Relationship/Environment    Primary Source of Support/Comfort child(zenon)   2 dtrs check on her often.  -RB        Lives With alone  -RB           Resource/Environmental Concerns    Current Living Arrangements home/apartment/condo  -RB           Cognitive Assessment/Intervention- PT/OT    Orientation Status (Cognition) oriented x 4  -RB        Follows Commands (Cognition) WFL  -RB           Safety Issues, Functional Mobility    Impairments Affecting Function (Mobility) endurance/activity tolerance;strength  -RB           Bed Mobility Assessment/Treatment    Comment (Bed Mobility) not observed.  -RB           Functional Mobility    Functional Mobility- Ind. Level contact guard assist  -RB        Functional Mobility- Device rolling walker  -RB        Functional Mobility-Distance (Feet) 30  -RB        Functional Mobility- Safety Issues supplemental O2  -RB           Transfer Assessment/Treatment    Transfer Assessment/Treatment sit-stand transfer;stand-sit transfer  -RB        Sit-Stand [place] (Transfers) supervision;contact guard  -RB        Stand-Sit [place] (Transfers)  supervision;contact guard  -RB           Sit-Stand Transfer    Assistive Device (Sit-Stand Transfers) walker, front-wheeled  -RB           Stand-Sit Transfer    Assistive Device (Stand-Sit Transfers) walker, front-wheeled  -RB           General ROM    GENERAL ROM COMMENTS L shld flex ~ 80* and rest WNL.  -RB           General Assessment (Manual Muscle Testing)    General Manual Muscle Testing (MMT) Assessment upper extremity strength deficits identified  -RB        Comment, General Manual Muscle Testing (MMT) Assessment 2-/5 for L shld flex, 3+/5 for R shld flex and 4/5 for rest of B UE strength.  -RB           Sensory Assessment/Intervention    Sensory General Assessment no sensation deficits identified  -RB           Pain Assessment    Additional Documentation Pain Scale: Numbers Pre/Post-Treatment (Group)  -RB           Pain Scale: Numbers Pre/Post-Treatment    Pain Scale: Numbers, Pretreatment 0/10 - no pain  -RB        Pain Scale: Numbers, Post-Treatment 0/10 - no pain  -RB           Clinical Impression (OT)    Date of Referral to OT 03/19/18  -RB        OT Diagnosis Impaired mobility and ADLs   -RB        Functional Level at Time of Evaluation (OT Eval) Impaired mobility and ADLs.  -RB        Criteria for Skilled Therapeutic Interventions Met (OT Eval) yes;treatment indicated  -RB        Rehab Potential (OT Eval) good, to achieve stated therapy goals  -RB        Therapy Frequency (OT Eval) --   3-14x/wk  -RB        Predicted Duration of Therapy Intervention (OT Eval) Until D/C or goals met.  -RB        Care Plan Review (OT) evaluation/treatment results reviewed;care plan/treatment goals reviewed;risks/benefits reviewed;patient/other agree to care plan  -RB        Anticipated Discharge Disposition (OT) home with home health  -RB           Vital Signs    Pre Systolic BP Rehab 178  -RB        Pre Treatment Diastolic BP 70  -RB        Post Systolic BP Rehab 164  -RB        Post Treatment Diastolic BP 62  -RB         Pretreatment Heart Rate (beats/min) 55  -RB        Posttreatment Heart Rate (beats/min) 57  -RB        Pre SpO2 (%) 96  -RB        O2 Delivery Pre Treatment supplemental O2  -RB        Post SpO2 (%) 94  -RB        O2 Delivery Post Treatment supplemental O2  -RB        Pre Patient Position Sitting  -RB        Intra Patient Position Standing  -RB        Post Patient Position Sitting  -RB           Planned OT Interventions    Planned Therapy Interventions (OT Eval) activity tolerance training  -RB           OT Goals    Transfer Goal Selection (OT) transfer, OT goal 1  -RB        Bathing Goal Selection (OT) bathing, OT goal 1  -RB        Dressing Goal Selection (OT) dressing, OT goal 1  -RB           Transfer Goal 1 (OT)    Activity/Assistive Device (Transfer Goal 1, OT) transfers, all  -RB        [place] Level/Cues Needed (Transfer Goal 1, OT) conditional independence  -RB        Time Frame (Transfer Goal 1, OT) long term goal (LTG);by discharge  -RB        Progress/Outcome (Transfer Goal 1, OT) goal not met  -RB           Bathing Goal 1 (OT)    Activity/Assistive Device (Bathing Goal 1, OT) bathing skills, all  -RB        [place] Level/Cues Needed (Bathing Goal 1, OT) conditional independence  -RB        Time Frame (Bathing Goal 1, OT) long term goal (LTG);by discharge  -RB        Progress/Outcomes (Bathing Goal 1, OT) goal not met  -RB           Dressing Goal 1 (OT)    Activity/Assistive Device (Dressing Goal 1, OT) dressing skills, all  -RB        [place]/Cues Needed (Dressing Goal 1, OT) conditional independence  -RB        Time Frame (Dressing Goal 1, OT) long term goal (LTG);by discharge  -RB        Progress/Outcome (Dressing Goal 1, OT) goal not met  -RB           Patient Education Goal (OT)    Activity (Patient Education Goal, OT) B UE HEP with no shld exercise, EC/WS and home safety/fall prevention.  -RB        [place]/Cues/Accuracy (Memory Goal 2, OT) demonstrates adequately;verbalizes  understanding  -RB        Time Frame (Patient Education Goal, OT) long term goal (LTG);by discharge  -RB        Progress/Outcome (Patient Education Goal, OT) goal not met  -RB           Living Environment    Home Accessibility other (see comments)   Ramp with one rail on R.  -RB          User Key  (r) = Recorded By, (t) = Taken By, (c) = Cosigned By    Initials Name Effective Dates    RB Mc Mueller, OT 06/15/16 -            Occupational Therapy Education     Title: PT OT SLP Therapies (Active)     Topic: Occupational Therapy (Active)     Point: Precautions (Done)     Description: Instruct learner(s) on prescribed precautions during self-care and functional transfers.   Learning Progress Summary     Learner Status Readiness Method Response Comment Documented by    Patient Done Acceptance E VU Edu pt on use of gait belt and non skid socks/shoes when OOB and no OOB without assist. RB 03/19/18 1418                      User Key     Initials Effective Dates Name Provider Type Discipline    RB 06/15/16 -  Mc Mueller, OT Occupational Therapist OT                  OT Recommendation and Plan  Rehab Outcome Summary/Treatment Plan  Anticipated Discharge Disposition (OT): home with home health  Planned Therapy Interventions (OT Eval): activity tolerance training  Therapy Frequency (OT Eval):  (3-14x/wk)  Plan of Care Review  Plan of Care Reviewed With: patient  Plan of Care Reviewed With: patient  Outcome Summary: OT eval on this date.  Pt required SBA/CGA for sit to stand and CGA for ambulation 30' and transfers.  Pt was independent prior and could benefit from OT services to increase independence with ADLs and functional mobility/transfers.          Outcome Measures     Row Name 03/19/18 1050             How much help from another is currently needed...    Putting on and taking off regular lower body clothing? 3  -RB      Bathing (including washing, rinsing, and drying) 2  -RB      Toileting (which includes using toilet  bed pan or urinal) 3  -RB      Putting on and taking off regular upper body clothing 3  -RB      Taking care of personal grooming (such as brushing teeth) 4  -RB      Eating meals 4  -RB      Score 19  -RB         Functional Assessment    Outcome Measure Options AM-PAC 6 Clicks Daily Activity (OT)  -RB        User Key  (r) = Recorded By, (t) = Taken By, (c) = Cosigned By    Initials Name Provider Type    RB Mc Mueller OT Occupational Therapist          Time Calculation:   OT Start Time: 1050  OT Stop Time: 1120  OT Time Calculation (min): 30 min    Therapy Charges for Today     Code Description Service Date Service Provider Modifiers Qty    0241934  OT SELFCARE CURRENT 3/19/2018 JENNA Angulo CK 1    [id number]  OT SELFCARE PROJECTED 3/19/2018 JENNA Angulo CJ 1    [id number]  OT EVAL MOD COMPLEXITY 2 3/19/2018 Mc Mueller OT GO 1          OT G-codes  OT Professional Judgement Used?: Yes  OT Functional Scales Options: AM-PAC 6 Clicks Daily Activity (OT)  Score: 19  Functional Limitation: Self care  Self Care Current Status (): At least 40 percent but less than 60 percent impaired, limited or restricted  Self Care Goal Status (): At least 20 percent but less than 40 percent impaired, limited or restricted    Mc Mueller OT  3/19/2018

## 2018-03-19 NOTE — ED PROVIDER NOTES
Subjective   History of Present Illness  87-year-old female with a history of coronary artery disease, congestive heart failure, COPD with chronic oxygen requirement typically of 3 L at home presents to the emergency department because of increasing shortness of breath with increasing oxygen requirement she tells me that she can usually ambulate around her home without too much difficulty but now it's gotten to the point where she can no longer go to the bathroom without becoming severely short of breath.  She tells me the symptoms and worsening over the last 2-3 weeks and not improved with her therapies at home which include increasing her Lasix dose for heart failure as well as using multiple nebulizer breathing treatments throughout the day.  She did have some lower extremity edema that she tells me improved after increasing her Lasix dose that her shortness of breath is been persistent without any improvement despite the improving edema.  She denies having any chest pain, abdominal pain, back pain, nausea, vomiting, diarrhea.  She does tell me that she has some productive cough after using a breathing treatment and that she's been taking some Mucinex and attempt to help improve some of the sputum production  Review of Systems   Constitutional: Negative for chills and fever.   HENT: Negative for rhinorrhea, sinus pressure and sneezing.    Eyes: Negative for pain and redness.   Respiratory: Positive for cough and shortness of breath. Negative for chest tightness.    Cardiovascular: Positive for leg swelling. Negative for chest pain and palpitations.   Gastrointestinal: Negative for abdominal pain, diarrhea, nausea and vomiting.   Genitourinary: Negative for dysuria, flank pain, menstrual problem, pelvic pain, vaginal bleeding, vaginal discharge and vaginal pain.   Musculoskeletal: Negative for arthralgias, back pain and joint swelling.   Neurological: Negative for dizziness, syncope, weakness, numbness and  headaches.   Hematological: Negative.    Psychiatric/Behavioral: Negative.        Past Medical History:   Diagnosis Date   • Arthritis    • CAD (coronary artery disease)    • CAD (coronary artery disease)    • CHF (congestive heart failure)    • Chronic obstructive pulmonary disease    • Chronic respiratory failure with hypoxia    • Diabetes mellitus    • Disease of thyroid gland    • Hyperlipidemia    • Hypertension    • Obesity    • Paroxysmal atrial fibrillation        Allergies   Allergen Reactions   • Rocephin [Ceftriaxone] Anaphylaxis and Shortness Of Breath       Past Surgical History:   Procedure Laterality Date   • CATARACT EXTRACTION     • KNEE ARTHROPLASTY         Family History   Problem Relation Age of Onset   • Hypertension Father        Social History     Social History   • Marital status:      Social History Main Topics   • Smoking status: Former Smoker   • Smokeless tobacco: Never Used   • Alcohol use No   • Drug use: No   • Sexual activity: Not Currently     Other Topics Concern   • Not on file           Objective   Physical Exam   Constitutional: She is oriented to person, place, and time. She appears well-developed and well-nourished.   HENT:   Head: Normocephalic and atraumatic.   Eyes: EOM are normal. Pupils are equal, round, and reactive to light.   Neck: Normal range of motion. Neck supple.   Cardiovascular: Normal rate and regular rhythm.    Pulmonary/Chest: Effort normal.   Prolonged expiration with diffuse expiratory wheezing.  She has decent inspiratory effort with decent air movement without much wheezing and inspiratory phase.   Abdominal: Soft. Bowel sounds are normal.   Musculoskeletal: Normal range of motion.   Trace edema of the bilateral lower extremities   Neurological: She is alert and oriented to person, place, and time.   Skin: Skin is warm and dry. Capillary refill takes less than 2 seconds.   Psychiatric: She has a normal mood and affect.   Nursing note and vitals  reviewed.      Procedures         ED Course  ED Course      Labs Reviewed   BASIC METABOLIC PANEL - Abnormal; Notable for the following:        Result Value    Glucose 119 (*)     BUN 43 (*)     Creatinine 1.51 (*)     Sodium 136 (*)     Potassium 3.3 (*)     Chloride 90 (*)     eGFR Non African Amer 33 (*)     BUN/Creatinine Ratio 28.5 (*)     Anion Gap 16.0 (*)     All other components within normal limits    Narrative:     The MDRD GFR formula is only valid for adults with stable renal function between ages 18 and 70.   PROTIME-INR - Abnormal; Notable for the following:     Protime 16.9 (*)     INR 1.42 (*)     All other components within normal limits    Narrative:     Therapeutic range for most indications is 2.0-3.0 INR,  or 2.5-3.5 for mechanical heart valves.   URINALYSIS W/ MICROSCOPIC IF INDICATED - Abnormal; Notable for the following:     Leuk Esterase, UA Trace (*)     All other components within normal limits   BNP (IN-HOUSE) - Abnormal; Notable for the following:     proBNP 2,220.0 (*)     All other components within normal limits   PHOSPHORUS - Abnormal; Notable for the following:     Phosphorus 4.9 (*)     All other components within normal limits   CBC WITH AUTO DIFFERENTIAL - Abnormal; Notable for the following:     WBC 10.92 (*)     Hemoglobin 10.5 (*)     Hematocrit 33.7 (*)     MCHC 31.2 (*)     RDW 16.8 (*)     RDW-SD 53.3 (*)     Lymphocyte % 4.9 (*)     Eosinophil % 16.8 (*)     Lymphocytes, Absolute 0.53 (*)     Eosinophils, Absolute 1.84 (*)     Immature Grans, Absolute 0.04 (*)     All other components within normal limits   APTT - Normal    Narrative:     The recommended Heparin therapeutic range is 68-97 seconds.   LACTIC ACID, PLASMA - Normal   TROPONIN (IN-HOUSE) - Normal   MAGNESIUM - Normal   BLOOD CULTURE   BLOOD CULTURE   URINALYSIS, MICROSCOPIC ONLY   TSH   T4, FREE   BLOOD GAS, VENOUS   CBC AND DIFFERENTIAL    Narrative:     The following orders were created for panel order CBC &  Differential.  Procedure                               Abnormality         Status                     ---------                               -----------         ------                     CBC Auto Differential[[id number]]        Abnormal            Final result                 Please view results for these tests on the individual orders.     XR Chest 1 View   Final Result   No significant change and no acute disease.      Electronically signed by:  Musa Molina  3/18/2018 11:19 PM   CDT Workstation: [id number]                    [facility]  Number of Diagnoses or Management Options  Diagnosis management comments: Patient with what sounds like a worsening COPD exacerbation without improvement at home.  At this point she is been approximately 2-3 weeks with persistently worsening symptoms and an increasing oxygen requirement as well as inability to now ambulate around her home.  She is failed outpatient therapy and will require admission for COPD exacerbation.  We will start with some breathing treatments and IV Solu-Medrol in the emergency department.      Final diagnoses:   COPD exacerbation   Shortness of breath            Vincent Parnell MD  03/19/18 0039

## 2018-03-19 NOTE — PROGRESS NOTES
[facility]  INPATIENT PROGRESS NOTE    Length of Stay: 0  Date of Admission: 3/18/2018  Primary Care Physician: Krista Matos MD    Subjective   Chief Complaint: shortness of breath  HPI:  87-year-old  female past history of osteoarthritis, coronary artery disease, congestive heart failure, COPD, chronic hypoxic respiratory failure requiring oxygen dependence, type 2 diabetes, hypothyroidism, hypertension, hyperlipidemia, paroxysmal atrial fibrillation who presented on 3/18/2018 with complaints of worsening shortness of breath.  The patient reports being oxygen dependent on 3 L per nasal cannula at home, but reports that over the course of the past week prior to admission that her shortness of breath is worsening despite oxygen and use of her diuretics.  She also reports productive cough with yellow to white sputum production.  The patient has been hospitalized since currently being treated for acute hypoxic respiratory failure related to exacerbation of COPD.  She has no new complaints this morning and reports that her shortness of breath and cough persist.    Review of Systems   Constitutional: Negative for chills.   Respiratory: Positive for cough and shortness of breath. Negative for wheezing.    Cardiovascular: Negative for chest pain, palpitations and leg swelling.   Gastrointestinal: Negative for abdominal pain, constipation, diarrhea, nausea and vomiting.   Musculoskeletal: Negative for back pain.   Skin: Negative for pallor.   Neurological: Negative for dizziness and weakness.   Psychiatric/Behavioral: Negative for confusion.        All pertinent negatives and positives are as above. All other systems have been reviewed and are negative unless otherwise stated.     Objective    Temp:  [96.4 °F (35.8 °C)-97.8 °F (36.6 °C)] 97 °F (36.1 °C)  Heart Rate:  [55-82] 62  Resp:  [18-22] 18  BP: ()/(48-75) 164/62    Physical Exam    Constitutional: She is oriented to person, place, and time. She appears well-developed and well-nourished.   HENT:   Head: Normocephalic.   Eyes: Conjunctivae are normal.   Neck: Neck supple.   Cardiovascular: Normal rate, regular rhythm, normal heart sounds and intact distal pulses.    Pulmonary/Chest: Effort normal. She has wheezes.   Abdominal: Soft. Bowel sounds are normal. She exhibits no distension. There is no tenderness.   Musculoskeletal: Normal range of motion.   Neurological: She is alert and oriented to person, place, and time.   Skin: Skin is warm and dry.   Psychiatric: She has a normal mood and affect. Her behavior is normal.   Vitals reviewed.        Results Review:  I have reviewed the labs, radiology results, and diagnostic studies.    Laboratory Data:     Results from last 7 days  Lab Units 03/19/18  0911 03/18/18  2305   SODIUM mmol/L 136* 136*   POTASSIUM mmol/L 3.2* 3.3*   CHLORIDE mmol/L 92* 90*   CO2 mmol/L 28.0 30.0   BUN mg/dL 38* 43*   CREATININE mg/dL 1.24* 1.51*   GLUCOSE mg/dL 236* 119*   CALCIUM mg/dL 8.7 9.3   ANION GAP mmol/L 16.0* 16.0*     Estimated Creatinine Clearance: 31 mL/min (by C-G formula based on SCr of 1.24 mg/dL (H)).    Results from last 7 days  Lab Units 03/18/18  2305   MAGNESIUM mg/dL 2.2   PHOSPHORUS mg/dL 4.9*           Results from last 7 days  Lab Units 03/19/18  0911 03/18/18  2305   WBC 10*3/mm3 7.13 10.92*   HEMOGLOBIN g/dL 9.2* 10.5*   HEMATOCRIT % 29.5* 33.7*   PLATELETS 10*3/mm3 208 244       Results from last 7 days  Lab Units 03/18/18  2305   INR  1.42*       Culture Data:   Blood Culture   Date Value Ref Range Status   03/18/2018 No growth at less than 24 hours  Preliminary     No results found for: URINECX  No results found for: RESPCX  No results found for: WOUNDCX  No results found for: STOOLCX  No components found for: BODYFLD    Radiology Data:   Imaging Results (last 24 hours)     Procedure Component Value Units Date/Time    XR Chest 1 View  [[id number]] Collected:  03/18/18 2305     Updated:  03/18/18 2320    Narrative:         Chest single view on  3/18/2018     CLINICAL INDICATION: Shortness of breath    COMPARISON: 11/25/2017    FINDINGS: Mild cardiomegaly is noted. Mild chronic interstitial  changes are noted. Vascular calcification is noted within a  tortuous aorta. No acute pulmonary opacity is noted. Degenerative  changes are noted in the shoulders.      Impression:       No significant change and no acute disease.    Electronically signed by:  Musa Molina  3/18/2018 11:19 PM  CDT Workstation: [id number]          I have reviewed the patient current medications.     Assessment/Plan     Active Hospital Problems (** Indicates Principal Problem)    Diagnosis Date Noted   • **COPD exacerbation [J44.1] 11/25/2017   • Shortness of breath [R06.02] 03/19/2018   • Disease of thyroid gland [E07.9]    • CAD (coronary artery disease) [I25.10]    • Morbid obesity [E66.01] 08/02/2017   • Diabetes mellitus [E11.9] 06/28/2017   • Hypertension [I10] 08/04/2016   • Paroxysmal atrial fibrillation [I48.0] 03/15/2016   • Hyperlipidemia [E78.5] 01/11/2015      Resolved Hospital Problems    Diagnosis Date Noted Date Resolved   No resolved problems to display.       Plan:    1. Acute on chronic hypoxic respiratory failure: o2, nebs, steroids, Symbicort  2. COPD exacerbation: solumedrol, 02, nebs, Symbicort  3. HTN:   4. HLD:   5. Hypothyroidism:   6. CAD:  7. Paroxysmal atrial fibrillation: Amiodarone, Cardizem for rate control, Eliquis for anticoagulation.  8. Hx CHF: Lasix        This document has been electronically signed by STACEY Gómez on March 19, 2018 12:48 PM

## 2018-03-19 NOTE — PLAN OF CARE
Problem: Patient Care Overview  Goal: Plan of Care Review   03/19/18 2898   Coping/Psychosocial   Plan of Care Reviewed With patient   Plan of Care Review   Progress no change   OTHER   Outcome Summary no concerns or complications noted this shift     Goal: Discharge Needs Assessment  Outcome: Outcome(s) achieved Date Met: 03/19/18    Goal: Interprofessional Rounds/Family Conf  Outcome: Ongoing (interventions implemented as appropriate)      Problem: Fall Risk (Adult)  Goal: Identify Related Risk Factors and Signs and Symptoms  Outcome: Outcome(s) achieved Date Met: 03/19/18    Goal: Absence of Fall  Outcome: Ongoing (interventions implemented as appropriate)      Problem: Chronic Obstructive Pulmonary Disease (Adult)  Goal: Signs and Symptoms of Listed Potential Problems Will be Absent, Minimized or Managed (Chronic Obstructive Pulmonary Disease)  Outcome: Ongoing (interventions implemented as appropriate)

## 2018-03-20 LAB
ALBUMIN SERPL-MCNC: 3.7 G/DL (ref 3.4–4.8)
ALBUMIN/GLOB SERPL: 1.1 G/DL (ref 1.1–1.8)
ALP SERPL-CCNC: 133 U/L (ref 38–126)
ALT SERPL W P-5'-P-CCNC: 107 U/L (ref 9–52)
ANION GAP SERPL CALCULATED.3IONS-SCNC: 14 MMOL/L (ref 5–15)
AST SERPL-CCNC: 80 U/L (ref 14–36)
BACTERIA SPEC RESP CULT: NORMAL
BACTERIA UR QL AUTO: ABNORMAL /HPF
BASOPHILS # BLD AUTO: 0 10*3/MM3 (ref 0–0.2)
BASOPHILS NFR BLD AUTO: 0 % (ref 0–2)
BILIRUB SERPL-MCNC: 0.3 MG/DL (ref 0.2–1.3)
BILIRUB UR QL STRIP: NEGATIVE
BUN BLD-MCNC: 38 MG/DL (ref 7–21)
BUN/CREAT SERPL: 30.2 (ref 7–25)
CALCIUM SPEC-SCNC: 8.8 MG/DL (ref 8.4–10.2)
CHLORIDE SERPL-SCNC: 99 MMOL/L (ref 95–110)
CLARITY UR: CLEAR
CO2 SERPL-SCNC: 27 MMOL/L (ref 22–31)
COLOR UR: YELLOW
CREAT BLD-MCNC: 1.26 MG/DL (ref 0.5–1)
DEPRECATED RDW RBC AUTO: 54.2 FL (ref 36.4–46.3)
EOSINOPHIL # BLD AUTO: 0 10*3/MM3 (ref 0–0.7)
EOSINOPHIL NFR BLD AUTO: 0 % (ref 0–7)
ERYTHROCYTE [DISTWIDTH] IN BLOOD BY AUTOMATED COUNT: 17.2 % (ref 11.5–14.5)
GFR SERPL CREATININE-BSD FRML MDRD: 40 ML/MIN/1.73 (ref 39–90)
GLOBULIN UR ELPH-MCNC: 3.5 GM/DL (ref 2.3–3.5)
GLUCOSE BLD-MCNC: 175 MG/DL (ref 60–100)
GLUCOSE BLDC GLUCOMTR-MCNC: 175 MG/DL (ref 70–130)
GLUCOSE BLDC GLUCOMTR-MCNC: 185 MG/DL (ref 70–130)
GLUCOSE BLDC GLUCOMTR-MCNC: 190 MG/DL (ref 70–130)
GLUCOSE BLDC GLUCOMTR-MCNC: 192 MG/DL (ref 70–130)
GLUCOSE UR STRIP-MCNC: NEGATIVE MG/DL
GRAM STN SPEC: NORMAL
GRAM STN SPEC: NORMAL
HCT VFR BLD AUTO: 29.3 % (ref 35–45)
HGB BLD-MCNC: 9.1 G/DL (ref 12–15.5)
HGB UR QL STRIP.AUTO: ABNORMAL
HYALINE CASTS UR QL AUTO: ABNORMAL /LPF
IMM GRANULOCYTES # BLD: 0.02 10*3/MM3 (ref 0–0.02)
IMM GRANULOCYTES NFR BLD: 0.2 % (ref 0–0.5)
KETONES UR QL STRIP: NEGATIVE
LEUKOCYTE ESTERASE UR QL STRIP.AUTO: NEGATIVE
LYMPHOCYTES # BLD AUTO: 0.33 10*3/MM3 (ref 0.6–4.2)
LYMPHOCYTES NFR BLD AUTO: 3.1 % (ref 10–50)
MAGNESIUM SERPL-MCNC: 2.3 MG/DL (ref 1.6–2.3)
MCH RBC QN AUTO: 27.1 PG (ref 26.5–34)
MCHC RBC AUTO-ENTMCNC: 31.1 G/DL (ref 31.4–36)
MCV RBC AUTO: 87.2 FL (ref 80–98)
MONOCYTES # BLD AUTO: 0.28 10*3/MM3 (ref 0–0.9)
MONOCYTES NFR BLD AUTO: 2.6 % (ref 0–12)
NEUTROPHILS # BLD AUTO: 10.14 10*3/MM3 (ref 2–8.6)
NEUTROPHILS NFR BLD AUTO: 94.1 % (ref 37–80)
NITRITE UR QL STRIP: NEGATIVE
PH UR STRIP.AUTO: 6 [PH] (ref 5–9)
PLATELET # BLD AUTO: 204 10*3/MM3 (ref 150–450)
PMV BLD AUTO: 10.4 FL (ref 8–12)
POTASSIUM BLD-SCNC: 3.7 MMOL/L (ref 3.5–5.1)
PROT SERPL-MCNC: 7.2 G/DL (ref 6.3–8.6)
PROT UR QL STRIP: NEGATIVE
RBC # BLD AUTO: 3.36 10*6/MM3 (ref 3.77–5.16)
RBC # UR: ABNORMAL /HPF
REF LAB TEST METHOD: ABNORMAL
SODIUM BLD-SCNC: 140 MMOL/L (ref 137–145)
SP GR UR STRIP: 1.02 (ref 1–1.03)
SQUAMOUS #/AREA URNS HPF: ABNORMAL /HPF
UROBILINOGEN UR QL STRIP: ABNORMAL
WBC NRBC COR # BLD: 10.77 10*3/MM3 (ref 3.2–9.8)
WBC UR QL AUTO: ABNORMAL /HPF

## 2018-03-20 PROCEDURE — 97110 THERAPEUTIC EXERCISES: CPT

## 2018-03-20 PROCEDURE — 94799 UNLISTED PULMONARY SVC/PX: CPT

## 2018-03-20 PROCEDURE — 97530 THERAPEUTIC ACTIVITIES: CPT

## 2018-03-20 PROCEDURE — 96376 TX/PRO/DX INJ SAME DRUG ADON: CPT

## 2018-03-20 PROCEDURE — 94760 N-INVAS EAR/PLS OXIMETRY 1: CPT

## 2018-03-20 PROCEDURE — 97535 SELF CARE MNGMENT TRAINING: CPT

## 2018-03-20 PROCEDURE — 87205 SMEAR GRAM STAIN: CPT | Performed by: INTERNAL MEDICINE

## 2018-03-20 PROCEDURE — G0378 HOSPITAL OBSERVATION PER HR: HCPCS

## 2018-03-20 PROCEDURE — 96361 HYDRATE IV INFUSION ADD-ON: CPT

## 2018-03-20 PROCEDURE — 97116 GAIT TRAINING THERAPY: CPT

## 2018-03-20 PROCEDURE — 85025 COMPLETE CBC W/AUTO DIFF WBC: CPT | Performed by: INTERNAL MEDICINE

## 2018-03-20 PROCEDURE — 83735 ASSAY OF MAGNESIUM: CPT | Performed by: INTERNAL MEDICINE

## 2018-03-20 PROCEDURE — 82962 GLUCOSE BLOOD TEST: CPT

## 2018-03-20 PROCEDURE — 80053 COMPREHEN METABOLIC PANEL: CPT | Performed by: INTERNAL MEDICINE

## 2018-03-20 PROCEDURE — 63710000001 INSULIN ASPART PER 5 UNITS: Performed by: NURSE PRACTITIONER

## 2018-03-20 PROCEDURE — 25010000002 METHYLPREDNISOLONE PER 125 MG: Performed by: NURSE PRACTITIONER

## 2018-03-20 PROCEDURE — 81001 URINALYSIS AUTO W/SCOPE: CPT | Performed by: INTERNAL MEDICINE

## 2018-03-20 RX ADMIN — IPRATROPIUM BROMIDE AND ALBUTEROL SULFATE 3 ML: 2.5; .5 SOLUTION RESPIRATORY (INHALATION) at 03:14

## 2018-03-20 RX ADMIN — IPRATROPIUM BROMIDE AND ALBUTEROL SULFATE 3 ML: 2.5; .5 SOLUTION RESPIRATORY (INHALATION) at 07:29

## 2018-03-20 RX ADMIN — PANTOPRAZOLE SODIUM 40 MG: 40 TABLET, DELAYED RELEASE ORAL at 06:11

## 2018-03-20 RX ADMIN — FUROSEMIDE 40 MG: 40 TABLET ORAL at 20:52

## 2018-03-20 RX ADMIN — DOCUSATE SODIUM 200 MG: 100 CAPSULE, LIQUID FILLED ORAL at 08:41

## 2018-03-20 RX ADMIN — APIXABAN 5 MG: 5 TABLET, FILM COATED ORAL at 20:52

## 2018-03-20 RX ADMIN — METHYLPREDNISOLONE SODIUM SUCCINATE 60 MG: 125 INJECTION, POWDER, FOR SOLUTION INTRAMUSCULAR; INTRAVENOUS at 20:52

## 2018-03-20 RX ADMIN — IPRATROPIUM BROMIDE AND ALBUTEROL SULFATE 3 ML: 2.5; .5 SOLUTION RESPIRATORY (INHALATION) at 18:46

## 2018-03-20 RX ADMIN — INSULIN ASPART 2 UNITS: 100 INJECTION, SOLUTION INTRAVENOUS; SUBCUTANEOUS at 17:36

## 2018-03-20 RX ADMIN — AMIODARONE HYDROCHLORIDE 200 MG: 200 TABLET ORAL at 20:51

## 2018-03-20 RX ADMIN — POTASSIUM CHLORIDE 40 MEQ: 750 CAPSULE, EXTENDED RELEASE ORAL at 08:41

## 2018-03-20 RX ADMIN — IPRATROPIUM BROMIDE AND ALBUTEROL SULFATE 3 ML: 2.5; .5 SOLUTION RESPIRATORY (INHALATION) at 23:14

## 2018-03-20 RX ADMIN — AMIODARONE HYDROCHLORIDE 200 MG: 200 TABLET ORAL at 08:40

## 2018-03-20 RX ADMIN — SODIUM CHLORIDE 100 ML/HR: 9 INJECTION, SOLUTION INTRAVENOUS at 17:36

## 2018-03-20 RX ADMIN — INSULIN ASPART 2 UNITS: 100 INJECTION, SOLUTION INTRAVENOUS; SUBCUTANEOUS at 20:53

## 2018-03-20 RX ADMIN — METHYLPREDNISOLONE SODIUM SUCCINATE 60 MG: 125 INJECTION, POWDER, FOR SOLUTION INTRAMUSCULAR; INTRAVENOUS at 08:41

## 2018-03-20 RX ADMIN — GUAIFENESIN 1200 MG: 600 TABLET, EXTENDED RELEASE ORAL at 08:40

## 2018-03-20 RX ADMIN — DILTIAZEM HYDROCHLORIDE 180 MG: 180 CAPSULE, COATED, EXTENDED RELEASE ORAL at 08:40

## 2018-03-20 RX ADMIN — IPRATROPIUM BROMIDE AND ALBUTEROL SULFATE 3 ML: 2.5; .5 SOLUTION RESPIRATORY (INHALATION) at 11:58

## 2018-03-20 RX ADMIN — DOCUSATE SODIUM 200 MG: 100 CAPSULE, LIQUID FILLED ORAL at 20:52

## 2018-03-20 RX ADMIN — INSULIN ASPART 2 UNITS: 100 INJECTION, SOLUTION INTRAVENOUS; SUBCUTANEOUS at 08:47

## 2018-03-20 RX ADMIN — IPRATROPIUM BROMIDE AND ALBUTEROL SULFATE 3 ML: 2.5; .5 SOLUTION RESPIRATORY (INHALATION) at 14:58

## 2018-03-20 RX ADMIN — FUROSEMIDE 40 MG: 40 TABLET ORAL at 08:40

## 2018-03-20 RX ADMIN — INSULIN ASPART 2 UNITS: 100 INJECTION, SOLUTION INTRAVENOUS; SUBCUTANEOUS at 11:19

## 2018-03-20 RX ADMIN — APIXABAN 5 MG: 5 TABLET, FILM COATED ORAL at 08:40

## 2018-03-20 RX ADMIN — GUAIFENESIN 1200 MG: 600 TABLET, EXTENDED RELEASE ORAL at 20:52

## 2018-03-20 NOTE — THERAPY TREATMENT NOTE
Acute Care - Physical Therapy Treatment Note  [facility]     Patient Name: Norma Harkins  : 1931  MRN: [id number]  Today's Date: 3/20/2018  Onset of Illness/Injury or Date of Surgery: 18  Date of Referral to PT: 18  Referring Physician: Dr. Maldonado    Admit Date: 3/18/2018    Visit Dx:    ICD-10-CM ICD-9-CM   1. COPD exacerbation J44.1 491.21   2. Shortness of breath R06.02 786.05   3. Impaired mobility and activities of daily living Z74.09 799.89   4. Impaired functional mobility, balance, gait, and endurance Z74.09 V49.89     Patient Active Problem List   Diagnosis   • Hypertension   • Low back pain   • Hyperlipidemia   • Paroxysmal atrial fibrillation   • Diabetes mellitus   • Chronic obstructive pulmonary disease   • Morbid obesity   • Physical deconditioning   • Chronic respiratory failure with hypoxia   • Atrial fibrillation   • COPD exacerbation   • Obesity   • CHF (congestive heart failure)   • Arthritis   • CAD (coronary artery disease)   • Disease of thyroid gland   • Shortness of breath       Therapy Treatment    Therapy Treatment / Health Promotion    Treatment Time/Intention  Discipline: physical therapy assistant (18 0908 : Katelyn Carlisle PTA)  Document Type: therapy note (daily note) (18 09 : Katelyn Carilsle PTA)  Subjective Information: no complaints (18 0908 : Katelyn Carlisle PTA)  Mode of Treatment: physical therapy (18 0908 : Katelyn Carlisle PTA)  Total Minutes, Physical Therapy Treatment: 49 (18 09 : Katelyn Carlisle PTA)  Patient Effort: good (18 09 : Katelyn Carlisle PTA)  Existing Precautions/Restrictions: fall, oxygen therapy device and L/min (18 09 : Katelyn Carlisle PTA)    Vitals/Pain/Safety  Vital Signs  Pretreatment Heart Rate (beats/min): 60 (18 09 : Katelyn Carlisle PTA)  Intratreatment Heart Rate (beats/min): 67 (18 0908 : Katelyn Carlisle PTA)  Posttreatment Heart Rate (beats/min): 65 (18  0908 : Katelyn M Almon, PTA)  Pre SpO2 (%): 97 (03/20/18 0908 : Katelyn M Almon, PTA)  O2 Delivery Pre Treatment: supplemental O2 (03/20/18 0908 : Katelyn M Almon, PTA)  Intra SpO2 (%): 89 (03/20/18 0908 : Katelyn M Almon, PTA)  Post SpO2 (%): 93 (03/20/18 0908 : Katelyn M Almon, PTA)  Pre Patient Position: Sitting (03/20/18 0908 : Katelyn M Almon, PTA)  Post Patient Position: Sitting (03/20/18 0908 : Katelyn M Almon, PTA)  Pain Scale: Numbers Pre/Post-Treatment  Pain Scale: Numbers, Pretreatment: 0/10 - no pain (03/20/18 0908 : Katelyn M Almon, PTA)  Pain Scale: Numbers, Post-Treatment: 0/10 - no pain (03/20/18 0908 : Katelyn M Almon, PTA)  Positioning and Restraints  Pre-Treatment Position: sitting in chair/recliner (03/20/18 0908 : Katelyn M Almon, PTA)  Post Treatment Position: chair (03/20/18 0908 : Katelyn M Almon, PTA)    Mobility,ADL,Motor, Modality  Transfer Assessment/Treatment  Transfer Assessment/Treatment: sit-stand transfer, stand-sit transfer (03/20/18 0908 : Katelyn M Almon, PTA)  Sit-Stand Transfer  Sit-Stand [place] (Transfers): contact guard (03/20/18 0908 : Katelyn M Almon, PTA)  Assistive Device (Sit-Stand Transfers): walker, front-wheeled (03/20/18 0908 : Katelyn M Almon, PTA)  Stand-Sit Transfer  Stand-Sit [place] (Transfers): contact guard (03/20/18 0908 : Katelyn M Almon, PTA)  Assistive Device (Stand-Sit Transfers): walker, front-wheeled (03/20/18 0908 : Katelyn M Almon, PTA)  Gait/Stairs Assessment/Training  [place] Level (Gait): supervision (03/20/18 0908 : Katelyn DARIO Vasquezon, PTA)  Assistive Device (Gait): walker, front-wheeled (03/20/18 0908 : Katelyn Carlisle, PTA)  Distance in Feet (Gait): 130 (x 2) (03/20/18 0908 : Katelyn Carlisle, PTA)     Therapeutic Exercise  Lower Extremity (Therapeutic Exercise): gluteal sets, LAQ (long arc quad), bilateral, marching while seated (03/20/18 0908 : Katelyn Carlisle, PTA)  Lower Extremity Range of Motion (Therapeutic Exercise): hip abduction/adduction,  bilateral, ankle dorsiflexion/plantar flexion, bilateral (03/20/18 0908 : Katelyn Carlisle PTA)  Exercise Type (Therapeutic Exercise): AROM (active range of motion) (03/20/18 0908 : Katelyn Carlisle PTA)  Position (Therapeutic Exercise): seated (03/20/18 0908 : Katelyn Carlisle PTA)  Sets/Reps (Therapeutic Exercise): 20 (03/20/18 0908 : Katelyn Carlisle PTA)           ROM/MMT             Sensory, Edema, Orthotics          Cognition, Communication, Swallow  Cognitive Assessment/Intervention- PT/OT  Orientation Status (Cognition): oriented x 4 (03/20/18 0908 : Katelyn Carlisle PTA)  Follows Commands (Cognition): WNL (03/20/18 0908 : Katelyn Carlisle PTA)  Speaking Valve  Pretreatment Heart Rate (beats/min): 60 (03/20/18 0908 : Katelyn Carlisle PTA)  Pre SpO2 (%): 97 (03/20/18 0908 : Katelyn Carlisle PTA)  Posttreatment Heart Rate (beats/min): 65 (03/20/18 0908 : Katelyn Carlisle PTA)  Post SpO2 (%): 93 (03/20/18 0908 : Katelyn Carlisle PTA)  General Eating/Swallowing Observations  Pre SpO2 (%): 97 (03/20/18 0908 : Katelyn Carlisle PTA)  Post SpO2 (%): 93 (03/20/18 0908 : Katelyn Carlisle PTA)    Outcome Summary  Outcome Summary/Treatment Plan (PT)  Daily Summary of Progress (PT): progress toward functional goals is good (03/20/18 0908 : Katelyn Carlisle PTA)  Anticipated Discharge Disposition (PT): home with home health care (03/20/18 0908 : Katelyn Carlisle PTA)            PT Rehab Goals     Row Name 03/20/18 1100 03/19/18 4052          Transfer Goal 1 (PT)    Activity/Assistive Device (Transfer Goal 1, PT) sit-to-stand/stand-to-sit;bed-to-chair/chair-to-bed  -TA sit-to-stand/stand-to-sit;bed-to-chair/chair-to-bed;toilet;walker, rolling  -MN     [place] Level/Cues Needed (Transfer Goal 1, PT) conditional independence  -TA conditional independence  -MN     Time Frame (Transfer Goal 1, PT) 1 week  -TA 1 week  -MN     Progress/Outcome (Transfer Goal 1, PT) goal not met  -TA goal not met  -MN        Gait Training Goal 1 (PT)     Activity/Assistive Device (Gait Training Goal 1, PT) gait (walking locomotion)  -TA gait (walking locomotion);walker, rolling  -MN     [place] Level (Gait Training Goal 1, PT) conditional independence  -TA conditional independence  -MN     Distance (Gait Goal 1, PT) 150  -  -MN     Time Frame (Gait Training Goal 1, PT) 1 week  -TA 1 week  -MN     Progress/Outcome (Gait Training Goal 1, PT) goal not met  -TA goal not met  -MN        Stairs Goal 1 (PT)    Activity/Assistive Device (Stairs Goal 1, PT) other (see comments)   asc/desc ramp woth RW  -TA other (see comments)   asc/desc ramp with RW  -MN     [place] Level/Cues Needed (Stairs Goal 1, PT) conditional independence  -TA conditional independence  -MN     Progress/Outcome (Stairs Goal 1, PT) goal not met  -TA goal not met  -MN       User Key  (r) = Recorded By, (t) = Taken By, (c) = Cosigned By    Initials Name Provider Type    MN Cynthia Perez, NATHALY Physical Therapist    MIKE Carlisle PTA Physical Therapy Assistant          Physical Therapy Education     Title: PT OT SLP Therapies (Active)     Topic: Physical Therapy (Active)     Point: Mobility training (Done)    Learning Progress Summary     Learner Status Readiness Method Response Comment Documented by    Patient Done Acceptance E VU Role of PT. No OOB/chair without assist MN 03/19/18 1632          Point: Precautions (Done)    Learning Progress Summary     Learner Status Readiness Method Response Comment Documented by    Patient Done Acceptance E VU Role of PT. No OOB/chair without assist MN 03/19/18 1632                      User Key     Initials Effective Dates Name Provider Type Discipline    MN 03/07/18 -  Cynthia ePrez PT Physical Therapist PT                    PT Recommendation and Plan  Anticipated Discharge Disposition (PT): home with home health care  Outcome Summary/Treatment Plan (PT)  Daily Summary of Progress (PT): progress toward functional goals is good  Anticipated  Discharge Disposition (PT): home with home health care  Progress: improving  Outcome Summary: pt ambulated 130` with RW & SBA, pt's O2 stat decreased  to 89% with gait, pt would benefit from HHPT @ D/C          Outcome Measures     Row Name 03/20/18 1100 03/19/18 1355 03/19/18 1050       How much help from another person do you currently need...    Turning from your back to your side while in flat bed without using bedrails? 4  -TA 4  -MN  --    Moving from lying on back to sitting on the side of a flat bed without bedrails? 3  -TA 3  -MN  --    Moving to and from a bed to a chair (including a wheelchair)? 3  -TA 3  -MN  --    Standing up from a chair using your arms (e.g., wheelchair, bedside chair)? 3  -TA 3  -MN  --    Climbing 3-5 steps with a railing? 3  -TA 3  -MN  --    To walk in hospital room? 3  -TA 3  -MN  --    AM-PAC 6 Clicks Score 19  -TA 19  -MN  --       How much help from another is currently needed...    Putting on and taking off regular lower body clothing?  --  -- 3  -RB    Bathing (including washing, rinsing, and drying)  --  -- 2  -RB    Toileting (which includes using toilet bed pan or urinal)  --  -- 3  -RB    Putting on and taking off regular upper body clothing  --  -- 3  -RB    Taking care of personal grooming (such as brushing teeth)  --  -- 4  -RB    Eating meals  --  -- 4  -RB    Score  --  -- 19  -RB       Functional Assessment    Outcome Measure Options AM-PAC 6 Clicks Basic Mobility (PT)  -TA AM-PAC 6 Clicks Basic Mobility (PT)  -MN AM-PAC 6 Clicks Daily Activity (OT)  -RB      User Key  (r) = Recorded By, (t) = Taken By, (c) = Cosigned By    Initials Name Provider Type    NICKY Mueller, OT Occupational Therapist    NISHA Perez, PT Physical Therapist    MIKE Carlisle, PTA Physical Therapy Assistant           Time Calculation:         PT Charges     Row Name 03/20/18 3539             Time Calculation    Start Time 0908  -TA      Stop Time 0957  -TA      Time Calculation  (min) 49 min  -TA         Time Calculation- PT    Total Timed Code Minutes- PT 49 minute(s)  -TA        User Key  (r) = Recorded By, (t) = Taken By, (c) = Cosigned By    Initials Name Provider Type    MIKE Carlisle PTA Physical Therapy Assistant          Therapy Charges for Today     Code Description Service Date Service Provider Modifiers Qty    39991546108 HC GAIT TRAINING EA 15 MIN 3/20/2018 Katelyn Carlisle, LYRIC GP 1    97530042514 HC PT THER PROC EA 15 MIN 3/20/2018 Katelyn Carlisle PTA GP 1    79824098801 HC PT THERAPEUTIC ACT EA 15 MIN 3/20/2018 Katelyn Carlisle PTA GP 1          PT G-Codes  PT Professional Judgement Used?: Yes  Outcome Measure Options: AM-PAC 6 Clicks Basic Mobility (PT)  Score: 19  Functional Limitation: Mobility: Walking and moving around  Mobility: Walking and Moving Around Current Status (): At least 20 percent but less than 40 percent impaired, limited or restricted  Mobility: Walking and Moving Around Goal Status (): At least 1 percent but less than 20 percent impaired, limited or restricted    Katelyn Carlisle PTA  3/20/2018

## 2018-03-20 NOTE — DISCHARGE PLACEMENT REQUEST
"Norma Harkins (87 y.o. Female)     Date of Birth Social Security Number Address Home Phone MRN    01/05/1931  [address]  [place] [address] [phone] [id number]    Latter day Marital Status          Scientologist        Admission Date Admission Type Admitting Provider Attending Provider Department, Room/Bed    3/18/18 Emergency Alfredo Maldonado MD Patel, Harshul Amrutlal, MD [address], 431/1    Discharge Date Discharge Disposition Discharge Destination                       Attending Provider:  Alfredo Maldonado MD    Allergies:  Rocephin [Ceftriaxone]    Isolation:  None   Infection:  None   Code Status:  FULL    Ht:  154.9 cm (61\")   Wt:  83.8 kg (184 lb 12.8 oz)    Admission Cmt:  None   Principal Problem:  COPD exacerbation [J44.1]                 Active Insurance as of 3/18/2018     Primary Coverage     Payor Plan Insurance Group Employer/Plan Group    MEDICARE MEDICARE A & B      Payor Plan Address Payor Plan Phone Number Effective From Effective To    [address] [phone] 1/1/1996     [place] [address]       Subscriber Name Subscriber Birth Date Member ID       NORMA HARKINS 1/5/1931 [id number]           Secondary Coverage     Payor Plan Insurance Group Employer/Plan Group    AARP MED SUPP AAR HEALTH CARE OPTIONS [id number]     Payor Plan Address Payor Plan Phone Number Effective From Effective To    [facility] [phone] 1/1/2016     [address]       [place] [address]       Subscriber Name Subscriber Birth Date Member ID       NORMA HARKINS 1/5/1931 [id number]                 Emergency Contacts      (Rel.) Home Phone Work Phone Mobile Phone    PieroDang (Daughter) [phone]    Rosie Dhaliwal (Daughter) [phone] -- [phone]            Insurance Information                MEDICARE/MEDICARE A & B Phone: [phone]    Subscriber: Norma Harkins " Subscriber#: [id number]    Group#:  Precert#:         AARP MED SUPP/AARP HEALTH CARE OPTIONS Phone: [phone]    Subscriber: Norma Harkins Subscriber#: [id number]    Group#: [id number] Precert#:              History & Physical      Alfredo Maldonado MD at 3/19/2018 12:31 AM                [facility] Medicine Services  INPATIENT HISTORY AND PHYSICAL       Patient Care Team:  Krista Matos MD as PCP - General  Krista Matos MD as PCP - Claims Attributed  Jenny Moses RN as Care Coordinator ([facility])    Date of Admission March 19, 2018 6:56 AM      Chief complaint   Chief Complaint   Patient presents with   • Shortness of Breath       Subjective     Patient is a 87 y.o. female presents with Complaint of having shortness of breath which has been worsening over last 1 week.  Patient said her shortness of breath has been worsening despite of taking extra dose of Lasix.  Patient states she's been coughing up whitish to yellow colored sputum.  No fever or chills have been reported.  No nausea or vomiting have been reported.  Patient does complain of lower shortness swelling.  Patient denies any change in bowel habits or dietary intake.  Patient does complain of having proximal nocturnal dyspnea and states her excess tolerance has significantly reduced over past few days due to shortness of air.  No urinary complaint have been reported.  No chest chest pain or palpitation have been reported.    Review of Systems   Review of Systems   Constitutional: Negative for appetite change, chills, diaphoresis and fever.   HENT: Negative for congestion, rhinorrhea, sore throat and trouble swallowing.    Eyes: Negative for visual disturbance.   Respiratory: Positive for cough, chest tightness and shortness of breath. Negative for wheezing.    Cardiovascular: Positive for leg swelling. Negative for chest pain and palpitations.   Gastrointestinal: Negative for abdominal  pain, blood in stool, diarrhea, nausea and vomiting.   Endocrine: Negative for cold intolerance and heat intolerance.   Genitourinary: Negative for decreased urine volume and difficulty urinating.   Musculoskeletal: Negative for back pain, gait problem and neck pain.   Skin: Negative for rash.   Neurological: Positive for weakness. Negative for dizziness, syncope, light-headedness, numbness and headaches.   Psychiatric/Behavioral: The patient is not nervous/anxious.        History  Past Medical History:   Diagnosis Date   • Arthritis    • CAD (coronary artery disease)    • CAD (coronary artery disease)    • CHF (congestive heart failure)    • Chronic obstructive pulmonary disease    • Chronic respiratory failure with hypoxia    • Diabetes mellitus    • Disease of thyroid gland    • Hyperlipidemia    • Hypertension    • Obesity    • Paroxysmal atrial fibrillation      Past Surgical History:   Procedure Laterality Date   • CATARACT EXTRACTION     • KNEE ARTHROPLASTY       Family History   Problem Relation Age of Onset   • Hypertension Father      Social History   Substance Use Topics   • Smoking status: Former Smoker   • Smokeless tobacco: Never Used   • Alcohol use No     Prescriptions Prior to Admission   Medication Sig Dispense Refill Last Dose   • acetaminophen (TYLENOL) 500 MG tablet Take 500 mg by mouth 1 (one) time as needed for mild pain (1-3).   Taking   • albuterol (ACCUNEB) 0.63 MG/3ML nebulizer solution Take 3 mL by nebulization Every 6 (Six) Hours As Needed for Wheezing. 1290 vial 1    • albuterol (PROVENTIL HFA;VENTOLIN HFA) 108 (90 Base) MCG/ACT inhaler Inhale 2 puffs 4 (Four) Times a Day. 1 inhaler 1 3/18/2018 at Unknown time   • amiodarone (PACERONE) 200 MG tablet Take 1 tablet by mouth 2 (Two) Times a Day. 180 tablet 1 3/18/2018 at Unknown time   • apixaban (ELIQUIS) 5 MG tablet tablet Take 1 tablet by mouth Every 12 (Twelve) Hours. 180 tablet 1 3/18/2018 at Unknown time   • Calcium Carbonate ([facility]  CALCIUM PO) Take 1,200 mg by mouth 2 (Two) Times a Day.   3/18/2018 at Unknown time   • Cholecalciferol (VITAMIN D3) 16452 units tablet Take 1 tablet by mouth 1 (One) Time Per Week. (Patient taking differently: Take 1 tablet by mouth 1 (One) Time Per Week. On Wednesdays.) 4 tablet 5 Past Week at Unknown time   • diltiaZEM CD (CARDIZEM CD) 180 MG 24 hr capsule Take 1 capsule by mouth Daily. 90 capsule 3 3/18/2018 at Unknown time   • docusate sodium (COLACE) 100 MG capsule Take 300 mg by mouth Every Night.   3/18/2018 at Unknown time   • doxycycline (VIBRAMYCIN) 100 MG capsule Take 1 capsule by mouth 2 (Two) Times a Day. 14 capsule 0    • furosemide (LASIX) 40 MG tablet Take 1 tablet by mouth 2 (Two) Times a Day. 180 tablet 1 3/18/2018 at Unknown time   • HYDROcodone-acetaminophen (NORCO) 5-325 MG per tablet Take 1 tablet by mouth Every 8 (Eight) Hours As Needed for Moderate Pain . 12 tablet 0 Taking   • ondansetron ODT (ZOFRAN-ODT) 4 MG disintegrating tablet Take 1 tablet by mouth Every 8 (Eight) Hours As Needed for Nausea or Vomiting. 12 tablet 0 Taking   • potassium chloride (K-DUR,KLOR-CON) 10 MEQ CR tablet take 1 tablet by mouth twice a day 60 tablet 5 3/18/2018 at Unknown time   • SYMBICORT 160-4.5 MCG/ACT inhaler inhale 2 puffs by mouth twice a day Rinse mouth after use 30.6 g 3 Taking   • aclidinium bromide (TUDORZA PRESSAIR) 400 MCG/ACT aerosol powder  powder for inhalation Inhale 1 puff 2 (Two) Times a Day. 2 each 0 Taking   • MethylPREDNISolone (MEDROL) 4 MG tablet Take as directed on package instructions. 1 each 0 Taking     Allergies:  Rocephin [ceftriaxone]  Prior to Admission medications    Medication Sig Start Date End Date Taking? Authorizing Provider   acetaminophen (TYLENOL) 500 MG tablet Take 500 mg by mouth 1 (one) time as needed for mild pain (1-3).   Yes Historical Provider, MD   albuterol (ACCUNEB) 0.63 MG/3ML nebulizer solution Take 3 mL by nebulization Every 6 (Six) Hours As Needed for  Wheezing. 2/26/18  Yes Krista Matos MD   albuterol (PROVENTIL HFA;VENTOLIN HFA) 108 (90 Base) MCG/ACT inhaler Inhale 2 puffs 4 (Four) Times a Day. 12/15/17  Yes Krista Matos MD   amiodarone (PACERONE) 200 MG tablet Take 1 tablet by mouth 2 (Two) Times a Day. 2/26/18  Yes Krista Matos MD   apixaban (ELIQUIS) 5 MG tablet tablet Take 1 tablet by mouth Every 12 (Twelve) Hours. 2/26/18  Yes Krista Matos MD   Calcium Carbonate ([facility]S CALCIUM PO) Take 1,200 mg by mouth 2 (Two) Times a Day.   Yes Historical Provider, MD   Cholecalciferol (VITAMIN D3) 68859 units tablet Take 1 tablet by mouth 1 (One) Time Per Week.  Patient taking differently: Take 1 tablet by mouth 1 (One) Time Per Week. On Wednesdays. 10/20/17  Yes Krista Matos MD   diltiaZEM CD (CARDIZEM CD) 180 MG 24 hr capsule Take 1 capsule by mouth Daily. 11/30/17  Yes Sofya Graham MD   docusate sodium (COLACE) 100 MG capsule Take 300 mg by mouth Every Night.   Yes Historical Provider, MD   doxycycline (VIBRAMYCIN) 100 MG capsule Take 1 capsule by mouth 2 (Two) Times a Day. 2/26/18  Yes Krista Matos MD   furosemide (LASIX) 40 MG tablet Take 1 tablet by mouth 2 (Two) Times a Day. 2/26/18  Yes Krista Matos MD   HYDROcodone-acetaminophen (NORCO) 5-325 MG per tablet Take 1 tablet by mouth Every 8 (Eight) Hours As Needed for Moderate Pain . 1/27/18  Yes Mustapha Peterson MD   ondansetron ODT (ZOFRAN-ODT) 4 MG disintegrating tablet Take 1 tablet by mouth Every 8 (Eight) Hours As Needed for Nausea or Vomiting. 1/27/18  Yes AMOS Godwin   potassium chloride (K-DUR,KLOR-CON) 10 MEQ CR tablet take 1 tablet by mouth twice a day 10/17/17  Yes Krista Matos MD   SYMBICORT 160-4.5 MCG/ACT inhaler inhale 2 puffs by mouth twice a day Rinse mouth after use 1/11/18  Yes Krista Matos MD   aclidinium bromide (TUDORZA PRESSAIR) 400 MCG/ACT aerosol powder  powder for inhalation Inhale 1 puff 2 (Two) Times a Day. 12/10/17   Krista Matos MD    MethylPREDNISolone (MEDROL) 4 MG tablet Take as directed on package instructions. 1/3/18   Krista Matos MD       Objective     Vital Signs    Temp:  [96.4 °F (35.8 °C)-97.8 °F (36.6 °C)] 96.4 °F (35.8 °C)  Heart Rate:  [68-82] 82  Resp:  [18-22] 18  BP: (109-135)/(54-75) 109/55    Physical Exam:      Physical Exam   Constitutional: She is oriented to person, place, and time. She appears well-developed and well-nourished.   HENT:   Head: Normocephalic and atraumatic.   Nose: Nose normal.   Eyes: Conjunctivae and EOM are normal. Pupils are equal, round, and reactive to light.   Neck: Normal range of motion. Neck supple. No JVD present. No tracheal deviation present. No thyromegaly present.   Cardiovascular: Normal rate, regular rhythm, normal heart sounds and intact distal pulses.    Pulmonary/Chest: She is in respiratory distress. She has wheezes. She has rales. She exhibits no tenderness.   Abdominal: Soft. Bowel sounds are normal. She exhibits no distension. There is no tenderness. There is no rebound and no guarding.   Musculoskeletal: Normal range of motion. She exhibits edema.   Lymphadenopathy:     She has no cervical adenopathy.   Neurological: She is alert and oriented to person, place, and time. She has normal reflexes. No cranial nerve deficit.   Skin: Skin is warm and dry.   Intact   Psychiatric: She has a normal mood and affect.   Nursing note and vitals reviewed.      Results Review:       Results from last 7 days  Lab Units 03/18/18  2305   SODIUM mmol/L 136*   POTASSIUM mmol/L 3.3*   CHLORIDE mmol/L 90*   CO2 mmol/L 30.0   BUN mg/dL 43*   CREATININE mg/dL 1.51*   GLUCOSE mg/dL 119*   CALCIUM mg/dL 9.3         Results from last 7 days  Lab Units 03/18/18  2305   MAGNESIUM mg/dL 2.2   PHOSPHORUS mg/dL 4.9*         Results from last 7 days  Lab Units 03/18/18  2305   WBC 10*3/mm3 10.92*   HEMOGLOBIN g/dL 10.5*   HEMATOCRIT % 33.7*   PLATELETS 10*3/mm3 244         Results from last 7 days  Lab Units  03/18/18 2305   INR  1.42*     Imaging Results (last 7 days)     Procedure Component Value Units Date/Time    XR Chest 1 View [[id number]] Collected:  03/18/18 2305     Updated:  03/18/18 2320    Narrative:         Chest single view on  3/18/2018     CLINICAL INDICATION: Shortness of breath    COMPARISON: 11/25/2017    FINDINGS: Mild cardiomegaly is noted. Mild chronic interstitial  changes are noted. Vascular calcification is noted within a  tortuous aorta. No acute pulmonary opacity is noted. Degenerative  changes are noted in the shoulders.      Impression:       No significant change and no acute disease.    Electronically signed by:  Musa Molina  3/18/2018 11:19 PM  CDT Workstation: [id number]          Assessment/Plan     Principal Problem:    COPD exacerbation  Active Problems:    Hypertension    Hyperlipidemia    Diabetes mellitus    Hematuria    Morbid obesity    CAD (coronary artery disease)    Disease of thyroid gland    Shortness of breath      -We'll give IV steroids and nebulizer treatment.  -We'll closely monitor patient's BUN/creatinine.  -We'll get PTOT evaluation.  -We'll hold off on antibiotics at this point in time.  -We'll resume patient's home medication as prior to coming to hospital.  -DVT and GI prophylaxis in place.  -We'll continue monitoring patient in hospital setting and treat patient as course dictates.    I discussed the patients findings and my recommendations with patient and nursing staff.         This document has been electronically signed by Alfredo Maldonado MD on March 19, 2018 6:56 AM        Total Time Spent: 43 minutes.    EMR Dragon/Transcription disclaimer:   Dictated utilizing Dragon dictation.           Electronically signed by Alfredo Maldonado MD at 3/19/2018  7:39 AM          Physical Therapy Notes (last 24 hours) (Notes from 3/19/2018 11:50 AM through 3/20/2018 11:50 AM)      Cynthia Perez PT at 3/19/2018  4:35 PM  Version 1 of 1          Problem: Patient Care Overview  Goal: Plan of Care Review  Outcome: Ongoing (interventions implemented as appropriate)   18 0013   Coping/Psychosocial   Plan of Care Reviewed With patient   OTHER   Outcome Summary PT evaluation completed today. Pt presents with decreased strength, endurance and pulmonary function. She ambulated 100 ft with RW and 3L O2, her SpO2 dropped to 81% post gait and improved to 90% after ~ 45 sec sit rest break. She will benefit from cont skilled PT to achieve highest level function prior to d/c home with  PT           Electronically signed by Cynthia Perez PT at 3/19/2018  4:35 PM     Cynthia Perez PT at 3/19/2018  4:35 PM  Version 1 of 1         Acute Care - Physical Therapy Initial Evaluation  [facility]     Patient Name: Norma Harkins  : 1931  MRN: [id number]  Today's Date: 3/19/2018   Onset of Illness/Injury or Date of Surgery: 18  Date of Referral to PT: 18  Referring Physician: Dr. Maldonado      Admit Date: 3/18/2018    Visit Dx:     ICD-10-CM ICD-9-CM   1. COPD exacerbation J44.1 491.21   2. Shortness of breath R06.02 786.05   3. Impaired mobility and activities of daily living Z74.09 799.89   4. Impaired functional mobility, balance, gait, and endurance Z74.09 V49.89     Patient Active Problem List   Diagnosis   • Hypertension   • Low back pain   • Hyperlipidemia   • Paroxysmal atrial fibrillation   • Diabetes mellitus   • Chronic obstructive pulmonary disease   • Morbid obesity   • Physical deconditioning   • Chronic respiratory failure with hypoxia   • Atrial fibrillation   • COPD exacerbation   • Obesity   • CHF (congestive heart failure)   • Arthritis   • CAD (coronary artery disease)   • Disease of thyroid gland   • Shortness of breath     Past Medical History:   Diagnosis Date   • Arthritis    • CAD (coronary artery disease)    • CAD (coronary artery disease)    • CHF (congestive heart failure)    • Chronic obstructive pulmonary  disease    • Chronic respiratory failure with hypoxia    • Diabetes mellitus    • Disease of thyroid gland    • Hyperlipidemia    • Hypertension    • Obesity    • Paroxysmal atrial fibrillation      Past Surgical History:   Procedure Laterality Date   • CATARACT EXTRACTION     • KNEE ARTHROPLASTY          PT ASSESSMENT (last 72 hours)      Physical Therapy Evaluation     Row Name 03/19/18 3714          PT Evaluation Time/Intention    Subjective Information complains of;weakness;fatigue  -MN     Document Type evaluation  -MN     Mode of Treatment individual therapy;physical therapy  -MN     Patient Effort good  -MN     Row Name 03/19/18 2787          General Information    Patient Profile Reviewed? yes  -MN     Onset of Illness/Injury or Date of Surgery 03/18/18  -MN     Referring Physician Dr. Maldonado  -MN     Patient Observations alert;cooperative  -MN     General Observations of Patient Supine HOB up +3L O2 +IV  -MN     Prior Level of Function independent:;all household mobility;community mobility;transfer;gait;bathing;dressing  -MN     Equipment Currently Used at Home oxygen;rollator;bath bench;raised toilet;cane, straight  -MN     Existing Precautions/Restrictions fall;oxygen therapy device and L/min  -MN     Limitations/Impairments --   last fall ~2 months ago  -MN     Risks Reviewed patient:;LOB;nausea/vomiting;dizziness;increased discomfort;change in vital signs;increased drainage;lines disloged  -MN     Benefits Reviewed patient:;improve function;increase independence;increase strength;increase balance;decrease pain;decrease risk of DVT;improve skin integrity;increase knowledge  -MN     Barriers to Rehab medically complex  -MN     Row Name 03/19/18 6535          Relationship/Environment    Lives With alone  -MN     Row Name 03/19/18 9049          Resource/Environmental Concerns    Current Living Arrangements home/apartment/condo  -MN     Row Name 03/19/18 4319          Cognitive Assessment/Intervention- PT/OT     Orientation Status (Cognition) oriented x 4  -MN     Follows Commands (Cognition) WNL  -MN     [facility] Name 03/19/18 1355          Bed Mobility Assessment/Treatment    Bed Mobility Assessment/Treatment supine-sit  -MN     Supine-Sit [place] (Bed Mobility) supervision  -MN     [facility] Name 03/19/18 1355          Transfer Assessment/Treatment    Transfer Assessment/Treatment sit-stand transfer;stand-sit transfer  -MN     Sit-Stand [place] (Transfers) contact guard  -MN     Stand-Sit [place] (Transfers) contact guard  -MN     Row Name 03/19/18 1355          Sit-Stand Transfer    Assistive Device (Sit-Stand Transfers) walker, front-wheeled  -MN     Row Name 03/19/18 1355          Stand-Sit Transfer    Assistive Device (Stand-Sit Transfers) walker, front-wheeled  -MN     [facility] Name 03/19/18 1355          Gait/Stairs Assessment/Training    [place] Level (Gait) contact guard;supervision  -MN     Assistive Device (Gait) walker, front-wheeled  -MN     Distance in Feet (Gait) 100  -MN     Row Name 03/19/18 1355          General ROM    GENERAL ROM COMMENTS no ROM deficits  -MN     Row Name 03/19/18 1355          General Assessment (Manual Muscle Testing)    General Manual Muscle Testing (MMT) Assessment lower extremity strength deficits identified  -MN     Row Name 03/19/18 1355          Lower Extremity (Manual Muscle Testing)    Comment, MMT: Lower Extremity BLEs: hip flex 4/5, knee ext 4/5, knee flex 4/5, DF 4/5, PF 2/5  -MN     Row Name 03/19/18 1355          Sensory Assessment/Intervention    Sensory General Assessment no sensation deficits identified   BLEs  -MN     Row Name 03/19/18 1355          Vision Assessment/Intervention    Visual Impairment/Limitations WFL;corrective lenses for reading  -MN     Row Name 03/19/18 1358          Pain Scale: Numbers Pre/Post-Treatment    Pain Scale: Numbers, Pretreatment 0/10 - no pain  -MN     Pain Scale: Numbers, Post-Treatment 0/10 - no pain  -MN     Row Name 03/19/18  "7255          Edema Assessment & Management    Additional Documentation --   +1 edema BLEs distal to knees  -MN     Row Name 03/19/18 1355          Plan of Care Review    Plan of Care Reviewed With patient  -MN     Row Name 03/19/18 1355          Physical Therapy Clinical Impression    Date of Referral to PT 03/19/18  -MN     PT Diagnosis (PT Clinical Impression) impaired endurance  -MN     Patient/Family Goals Statement (PT Clinical Impression) \"Go home\"  -MN     Criteria for Skilled Interventions Met (PT Clinical Impression) yes;treatment indicated  -MN     Pathology/Pathophysiology Noted (Describe Specifically for Each System) musculoskeletal;cardiovascular;pulmonary  -MN     Impairments Found (describe specific impairments) aerobic capacity/endurance;gait, locomotion, and balance  -MN     Rehab Potential (PT Clinical Summary) good, to achieve stated therapy goals  -MN     Predicted Duration of Therapy (PT) until d/c  -MN     Care Plan Review (PT) evaluation/treatment results reviewed;care plan/treatment goals reviewed;risks/benefits reviewed;patient/other agree to care plan  -MN     Row Name 03/19/18 1353          Vital Signs    Post Systolic BP Rehab 120  -MN     Post Treatment Diastolic BP 66  -MN     Pretreatment Heart Rate (beats/min) 59  -MN     Intratreatment Heart Rate (beats/min) 66  -MN     Posttreatment Heart Rate (beats/min) 62  -MN     Pre SpO2 (%) 93  -MN     O2 Delivery Pre Treatment supplemental O2  -MN     Intra SpO2 (%) 81   post gait, increased to 90% after ~45 sec sit rest break  -MN     O2 Delivery Intra Treatment supplemental O2  -MN     Post SpO2 (%) 94  -MN     O2 Delivery Post Treatment supplemental O2  -MN     Pre Patient Position Supine  -MN     Intra Patient Position Sitting  -MN     Post Patient Position Supine  -MN     Row Name 03/19/18 0135          Physical Therapy Goals    Transfer Goal Selection (PT) transfer, PT goal 1  -MN     Gait Training Goal Selection (PT) gait training, PT " goal 1  -MN     Stairs Goal Selection (PT) stairs, PT goal 1  -MN     Additional Documentation Stairs Goal Selection (PT) (Row)  -MN     Row Name 03/19/18 6953          Transfer Goal 1 (PT)    Activity/Assistive Device (Transfer Goal 1, PT) sit-to-stand/stand-to-sit;bed-to-chair/chair-to-bed;toilet;walker, rolling  -MN     [place] Level/Cues Needed (Transfer Goal 1, PT) conditional independence  -MN     Time Frame (Transfer Goal 1, PT) 1 week  -MN     Progress/Outcome (Transfer Goal 1, PT) goal not met  -MN     Row Name 03/19/18 9598          Gait Training Goal 1 (PT)    Activity/Assistive Device (Gait Training Goal 1, PT) gait (walking locomotion);walker, rolling  -MN     [place] Level (Gait Training Goal 1, PT) conditional independence  -MN     Distance (Gait Goal 1, PT) 150  -MN     Time Frame (Gait Training Goal 1, PT) 1 week  -MN     Progress/Outcome (Gait Training Goal 1, PT) goal not met  -MN     Row Name 03/19/18 0393          Stairs Goal 1 (PT)    Activity/Assistive Device (Stairs Goal 1, PT) other (see comments)   asc/desc ramp with RW  -MN     [place] Level/Cues Needed (Stairs Goal 1, PT) conditional independence  -MN     Progress/Outcome (Stairs Goal 1, PT) goal not met  -MN     Row Name 03/19/18 1184          Positioning and Restraints    Pre-Treatment Position in bed  -MN     Post Treatment Position chair  -MN     In Chair notified nsg;reclined;call light within reach;encouraged to call for assist  -MN     Row Name 03/19/18 4414          Living Environment    Home Accessibility tub/shower is not walk in   ramp to enter home  -MN       User Key  (r) = Recorded By, (t) = Taken By, (c) = Cosigned By    Initials Name Provider Type    NISHA Perez PT Physical Therapist          Physical Therapy Education     Title: PT OT SLP Therapies (Active)     Topic: Physical Therapy (Active)     Point: Mobility training (Done)    Learning Progress Summary     Learner Status Readiness Method  Response Comment Documented by    Patient Done Acceptance E VU Role of PT. No OOB/chair without assist MN 03/19/18 1632          Point: Precautions (Done)    Learning Progress Summary     Learner Status Readiness Method Response Comment Documented by    Patient Done Acceptance E VU Role of PT. No OOB/chair without assist MN 03/19/18 1632                      User Key     Initials Effective Dates Name Provider Type Discipline    MN 03/07/18 -  Cynthia Perez, PT Physical Therapist PT                PT Recommendation and Plan  Anticipated Discharge Disposition (PT): home with home health care  Planned Therapy Interventions (PT Eval): balance training, bed mobility training, gait training, home exercise program, strengthening, stretching, transfer training, patient/family education  Therapy Frequency (PT Clinical Impression): other (see comments) (5-14x/week)  Outcome Summary/Treatment Plan (PT)  Anticipated Discharge Disposition (PT): home with home health care  Plan of Care Reviewed With: patient  Outcome Summary: PT evaluation completed today. Pt presents with decreased strength, endurance and pulmonary function. She ambulated 100 ft with RW and 3L O2, her SpO2 dropped to 81% post gait and improved to 90% after ~ 45 sec sit rest break. She will benefit from cont skilled PT to achieve highest level function prior to d/c home with  PT          Outcome Measures     Row Name 03/19/18 1355 03/19/18 1050          How much help from another person do you currently need...    Turning from your back to your side while in flat bed without using bedrails? 4  -MN  --     Moving from lying on back to sitting on the side of a flat bed without bedrails? 3  -MN  --     Moving to and from a bed to a chair (including a wheelchair)? 3  -MN  --     Standing up from a chair using your arms (e.g., wheelchair, bedside chair)? 3  -MN  --     Climbing 3-5 steps with a railing? 3  -MN  --     To walk in hospital room? 3  -MN  --      AM-PAC 6 Clicks Score 19  -MN  --        How much help from another is currently needed...    Putting on and taking off regular lower body clothing?  -- 3  -RB     Bathing (including washing, rinsing, and drying)  -- 2  -RB     Toileting (which includes using toilet bed pan or urinal)  -- 3  -RB     Putting on and taking off regular upper body clothing  -- 3  -RB     Taking care of personal grooming (such as brushing teeth)  -- 4  -RB     Eating meals  -- 4  -RB     Score  -- 19  -RB        Functional Assessment    Outcome Measure Options AM-PAC 6 Clicks Basic Mobility (PT)  -MN AM-PAC 6 Clicks Daily Activity (OT)  -RB       User Key  (r) = Recorded By, (t) = Taken By, (c) = Cosigned By    Initials Name Provider Type    RB Mc Mueller, OT Occupational Therapist    MN Cynthia Perez, PT Physical Therapist           Time Calculation:         PT Charges     Row Name 03/19/18 1630             Time Calculation    Start Time 1355  -MN      Stop Time 1429  -MN      Time Calculation (min) 34 min  -MN      PT Received On 03/19/18  -MN      PT Goal Re-Cert Due Date 04/01/18  -MN         Time Calculation- PT    Total Timed Code Minutes- PT 8 minute(s)  -MN        User Key  (r) = Recorded By, (t) = Taken By, (c) = Cosigned By    Initials Name Provider Type    NISHA Perez, PT Physical Therapist          Therapy Charges for Today     Code Description Service Date Service Provider Modifiers Qty    58049331487 HC PT MOBILITY CURRENT 3/19/2018 Cynthia Perez PT GP, CJ 1    97395681817 HC PT MOBILITY PROJECTED 3/19/2018 Cynthia Perez PT GP, CI 1    48673836410 HC PT EVAL LOW COMPLEXITY 1 3/19/2018 Cynthia Perez PT GP 1    37325567139 HC PT THERAPEUTIC ACT EA 15 MIN 3/19/2018 Cynthia Perez PT GP 1          PT G-Codes  PT Professional Judgement Used?: Yes  Outcome Measure Options: AM-PAC 6 Clicks Basic Mobility (PT)  Score: 19  Functional Limitation: Mobility: Walking and moving around  Mobility: Walking and  Moving Around Current Status (): At least 20 percent but less than 40 percent impaired, limited or restricted  Mobility: Walking and Moving Around Goal Status (): At least 1 percent but less than 20 percent impaired, limited or restricted      Cynthia Perez PT  3/19/2018         Electronically signed by Cynthia Perez PT at 3/19/2018  4:35 PM     Katelyn Carlisle PTA at 3/20/2018 11:39 AM  Version 1 of 1         Problem: Patient Care Overview  Goal: Plan of Care Review  Outcome: Ongoing (interventions implemented as appropriate)   18 0900 18 0957   Coping/Psychosocial   Plan of Care Reviewed With patient --    Plan of Care Review   Progress --  improving   OTHER   Outcome Summary --  pt ambulated 130` with RW & SBA, pt's O2 stat decreased to 89% with gait, pt would benefit from HHPT @ D/C           Electronically signed by Katelyn Carlisle PTA at 3/20/2018 11:39 AM     Katelyn Carlisle PTA at 3/20/2018 11:40 AM  Version 1 of 1         Acute Care - Physical Therapy Treatment Note  [facility]     Patient Name: Norma Harkins  : 1931  MRN: [id number]  Today's Date: 3/20/2018  Onset of Illness/Injury or Date of Surgery: 18  Date of Referral to PT: 18  Referring Physician: Dr. Maldonado    Admit Date: 3/18/2018    Visit Dx:    ICD-10-CM ICD-9-CM   1. COPD exacerbation J44.1 491.21   2. Shortness of breath R06.02 786.05   3. Impaired mobility and activities of daily living Z74.09 799.89   4. Impaired functional mobility, balance, gait, and endurance Z74.09 V49.89     Patient Active Problem List   Diagnosis   • Hypertension   • Low back pain   • Hyperlipidemia   • Paroxysmal atrial fibrillation   • Diabetes mellitus   • Chronic obstructive pulmonary disease   • Morbid obesity   • Physical deconditioning   • Chronic respiratory failure with hypoxia   • Atrial fibrillation   • COPD exacerbation   • Obesity   • CHF (congestive heart failure)   • Arthritis   • CAD (coronary  artery disease)   • Disease of thyroid gland   • Shortness of breath       Therapy Treatment    Therapy Treatment / Health Promotion    Treatment Time/Intention  Discipline: physical therapy assistant (03/20/18 0908 : Katelyn Carlisle PTA)  Document Type: therapy note (daily note) (03/20/18 0908 : Katelyn Carlisle PTA)  Subjective Information: no complaints (03/20/18 0908 : Katelyn Carlisle PTA)  Mode of Treatment: physical therapy (03/20/18 0908 : Katelyn Carlisle PTA)  Total Minutes, Physical Therapy Treatment: 49 (03/20/18 0908 : Katelyn Carlisle PTA)  Patient Effort: good (03/20/18 0908 : Katelyn Carlisle PTA)  Existing Precautions/Restrictions: fall, oxygen therapy device and L/min (03/20/18 0908 : Katelyn Carlisle PTA)    Vitals/Pain/Safety  Vital Signs  Pretreatment Heart Rate (beats/min): 60 (03/20/18 0908 : Katelyn Carlisle PTA)  Intratreatment Heart Rate (beats/min): 67 (03/20/18 0908 : Katelyn Carlisle PTA)  Posttreatment Heart Rate (beats/min): 65 (03/20/18 0908 : Katelyn Carlisle PTA)  Pre SpO2 (%): 97 (03/20/18 0908 : Katelyn Carlisle PTA)  O2 Delivery Pre Treatment: supplemental O2 (03/20/18 0908 : Katelyn Carlisle PTA)  Intra SpO2 (%): 89 (03/20/18 0908 : Katelyn Carlisle PTA)  Post SpO2 (%): 93 (03/20/18 0908 : Katelyn Carlisle PTA)  Pre Patient Position: Sitting (03/20/18 0908 : Katelyn Carlisle PTA)  Post Patient Position: Sitting (03/20/18 0908 : Katelyn Carlisle PTA)  Pain Scale: Numbers Pre/Post-Treatment  Pain Scale: Numbers, Pretreatment: 0/10 - no pain (03/20/18 0908 : Katelyn Carlisle PTA)  Pain Scale: Numbers, Post-Treatment: 0/10 - no pain (03/20/18 0908 : Katelyn Carlisle PTA)  Positioning and Restraints  Pre-Treatment Position: sitting in chair/recliner (03/20/18 0908 : Katelyn Carlisle PTA)  Post Treatment Position: chair (03/20/18 0908 : Katelyn Carlisle PTA)    Mobility,ADL,Motor, Modality  Transfer Assessment/Treatment  Transfer Assessment/Treatment: sit-stand transfer, stand-sit transfer (03/20/18  0908 : Katelyn DARIO Carlisle, PTA)  Sit-Stand Transfer  Sit-Stand [place] (Transfers): contact guard (03/20/18 0908 : Katelyn DARIO Carlisle, PTA)  Assistive Device (Sit-Stand Transfers): walker, front-wheeled (03/20/18 0908 : Katelyn DARIO Carlisle, PTA)  Stand-Sit Transfer  Stand-Sit [place] (Transfers): contact guard (03/20/18 0908 : Katelyn DARIO Carlisle, PTA)  Assistive Device (Stand-Sit Transfers): walker, front-wheeled (03/20/18 0908 : Katelyn DARIO [place], PTA)  Gait/Stairs Assessment/Training  [place] Level (Gait): supervision (03/20/18 0908 : Katelyn Carlisle PTA)  Assistive Device (Gait): walker, front-wheeled (03/20/18 0908 : Katelyn DARIO Carlisle, PTA)  Distance in Feet (Gait): 130 (x 2) (03/20/18 0908 : Katelyn Carlisle PTA)     Therapeutic Exercise  Lower Extremity (Therapeutic Exercise): gluteal sets, LAQ (long arc quad), bilateral, marching while seated (03/20/18 0908 : Katelyn Carlisle, PTA)  Lower Extremity Range of Motion (Therapeutic Exercise): hip abduction/adduction, bilateral, ankle dorsiflexion/plantar flexion, bilateral (03/20/18 0908 : Katelyn Carlisle, PTA)  Exercise Type (Therapeutic Exercise): AROM (active range of motion) (03/20/18 0908 : Katelyn Carlisle PTA)  Position (Therapeutic Exercise): seated (03/20/18 0908 : Katelyn DARIO Carlisle, PTA)  Sets/Reps (Therapeutic Exercise): 20 (03/20/18 0908 : Katelyn Carlisle, PTA)           ROM/MMT             Sensory, Edema, Orthotics          Cognition, Communication, Swallow  Cognitive Assessment/Intervention- PT/OT  Orientation Status (Cognition): oriented x 4 (03/20/18 0908 : Katelyn Carlisle PTA)  Follows Commands (Cognition): WNL (03/20/18 0908 : Katelyn Carlisle PTA)  Speaking Valve  Pretreatment Heart Rate (beats/min): 60 (03/20/18 0908 : Katelyn Carlisle PTA)  Pre SpO2 (%): 97 (03/20/18 0908 : Katelyn Carlisle PTA)  Posttreatment Heart Rate (beats/min): 65 (03/20/18 0908 : Katelyn Carlisle PTA)  Post SpO2 (%): 93 (03/20/18 0908 : Katelyn Carlisle PTA)  General Eating/Swallowing  Observations  Pre SpO2 (%): 97 (03/20/18 0908 : Katelyn Carlisle PTA)  Post SpO2 (%): 93 (03/20/18 0908 : Katelyn Carlisle PTA)    Outcome Summary  Outcome Summary/Treatment Plan (PT)  Daily Summary of Progress (PT): progress toward functional goals is good (03/20/18 0908 : Katelyn Carlisle PTA)  Anticipated Discharge Disposition (PT): home with home health care (03/20/18 0908 : Katelyn Carlisle PTA)            PT Rehab Goals     Row Name 03/20/18 1100 03/19/18 6995          Transfer Goal 1 (PT)    Activity/Assistive Device (Transfer Goal 1, PT) sit-to-stand/stand-to-sit;bed-to-chair/chair-to-bed  -TA sit-to-stand/stand-to-sit;bed-to-chair/chair-to-bed;toilet;walker, rolling  -MN     [place] Level/Cues Needed (Transfer Goal 1, PT) conditional independence  -TA conditional independence  -MN     Time Frame (Transfer Goal 1, PT) 1 week  -TA 1 week  -MN     Progress/Outcome (Transfer Goal 1, PT) goal not met  -TA goal not met  -MN        Gait Training Goal 1 (PT)    Activity/Assistive Device (Gait Training Goal 1, PT) gait (walking locomotion)  -TA gait (walking locomotion);walker, rolling  -MN     [place] Level (Gait Training Goal 1, PT) conditional independence  -TA conditional independence  -MN     Distance (Gait Goal 1, PT) 150  -  -MN     Time Frame (Gait Training Goal 1, PT) 1 week  -TA 1 week  -MN     Progress/Outcome (Gait Training Goal 1, PT) goal not met  -TA goal not met  -MN        Stairs Goal 1 (PT)    Activity/Assistive Device (Stairs Goal 1, PT) other (see comments)   asc/desc ramp woth RW  -TA other (see comments)   asc/desc ramp with RW  -MN     [place] Level/Cues Needed (Stairs Goal 1, PT) conditional independence  -TA conditional independence  -MN     Progress/Outcome (Stairs Goal 1, PT) goal not met  -TA goal not met  -MN       User Key  (r) = Recorded By, (t) = Taken By, (c) = Cosigned By    Initials Name Provider Type    NISHA Perez, PT Physical Therapist    MIKE BISHOP  LYRIC Carlisle Physical Therapy Assistant          Physical Therapy Education     Title: PT OT SLP Therapies (Active)     Topic: Physical Therapy (Active)     Point: Mobility training (Done)    Learning Progress Summary     Learner Status Readiness Method Response Comment Documented by    Patient Done Acceptance E VU Role of PT. No OOB/chair without assist MN 03/19/18 1632          Point: Precautions (Done)    Learning Progress Summary     Learner Status Readiness Method Response Comment Documented by    Patient Done Acceptance E VU Role of PT. No OOB/chair without assist MN 03/19/18 1632                      User Key     Initials Effective Dates Name Provider Type Discipline    MN 03/07/18 -  Cynthia Perez PT Physical Therapist PT                    PT Recommendation and Plan  Anticipated Discharge Disposition (PT): home with home health care  Outcome Summary/Treatment Plan (PT)  Daily Summary of Progress (PT): progress toward functional goals is good  Anticipated Discharge Disposition (PT): home with home health care  Progress: improving  Outcome Summary: pt ambulated 130` with RW & SBA, pt's O2 stat decreased  to 89% with gait, pt would benefit from HHPT @ D/C          Outcome Measures     Row Name 03/20/18 1100 03/19/18 1355 03/19/18 1050       How much help from another person do you currently need...    Turning from your back to your side while in flat bed without using bedrails? 4  -TA 4  -MN  --    Moving from lying on back to sitting on the side of a flat bed without bedrails? 3  -TA 3  -MN  --    Moving to and from a bed to a chair (including a wheelchair)? 3  -TA 3  -MN  --    Standing up from a chair using your arms (e.g., wheelchair, bedside chair)? 3  -TA 3  -MN  --    Climbing 3-5 steps with a railing? 3  -TA 3  -MN  --    To walk in hospital room? 3  -TA 3  -MN  --    AM-PAC 6 Clicks Score 19  -TA 19  -MN  --       How much help from another is currently needed...    Putting on and taking off regular  lower body clothing?  --  -- 3  -RB    Bathing (including washing, rinsing, and drying)  --  -- 2  -RB    Toileting (which includes using toilet bed pan or urinal)  --  -- 3  -RB    Putting on and taking off regular upper body clothing  --  -- 3  -RB    Taking care of personal grooming (such as brushing teeth)  --  -- 4  -RB    Eating meals  --  -- 4  -RB    Score  --  -- 19  -RB       Functional Assessment    Outcome Measure Options AM-PAC 6 Clicks Basic Mobility (PT)  -TA AM-PAC 6 Clicks Basic Mobility (PT)  -MN AM-PAC 6 Clicks Daily Activity (OT)  -RB      User Key  (r) = Recorded By, (t) = Taken By, (c) = Cosigned By    Initials Name Provider Type    RB Mc Mueller, OT Occupational Therapist    NISHA Perez, PT Physical Therapist    MIKE Carlisle PTA Physical Therapy Assistant           Time Calculation:         PT Charges     Row Name 03/20/18 1139             Time Calculation    Start Time 0908  -TA      Stop Time 0957  -TA      Time Calculation (min) 49 min  -TA         Time Calculation- PT    Total Timed Code Minutes- PT 49 minute(s)  -TA        User Key  (r) = Recorded By, (t) = Taken By, (c) = Cosigned By    Initials Name Provider Type    MIKE Carlisle PTA Physical Therapy Assistant          Therapy Charges for Today     Code Description Service Date Service Provider Modifiers Qty    87951236976 HC GAIT TRAINING EA 15 MIN 3/20/2018 Katelyn Carlisle PTA GP 1    90967474590 HC PT THER PROC EA 15 MIN 3/20/2018 Katelyn Carlisle PTA GP 1    19063428689 HC PT THERAPEUTIC ACT EA 15 MIN 3/20/2018 Katelyn Carlisle PTA GP 1          PT G-Codes  PT Professional Judgement Used?: Yes  Outcome Measure Options: AM-PAC 6 Clicks Basic Mobility (PT)  Score: 19  Functional Limitation: Mobility: Walking and moving around  Mobility: Walking and Moving Around Current Status (): At least 20 percent but less than 40 percent impaired, limited or restricted  Mobility: Walking and Moving Around Goal Status  (): At least 1 percent but less than 20 percent impaired, limited or restricted    Katelyn Carlisle PTA  3/20/2018         Electronically signed by Katelyn Carlisle PTA at 3/20/2018 11:40 AM          Occupational Therapy Notes (last 24 hours) (Notes from 3/19/2018 11:50 AM through 3/20/2018 11:50 AM)      Mc Mueller, OT at 3/19/2018  2:22 PM          Acute Care - Occupational Therapy Initial Evaluation  [facility]     Patient Name: Norma Harkins  : 1931  MRN: [id number]  Today's Date: 3/19/2018  Onset of Illness/Injury or Date of Surgery: 18  Date of Referral to OT: 18  Referring Physician: Dr. Maldonado    Admit Date: 3/18/2018       ICD-10-CM ICD-9-CM   1. COPD exacerbation J44.1 491.21   2. Shortness of breath R06.02 786.05   3. Impaired mobility and activities of daily living Z74.09 799.89     Patient Active Problem List   Diagnosis   • Hypertension   • Low back pain   • Hyperlipidemia   • Paroxysmal atrial fibrillation   • Diabetes mellitus   • Chronic obstructive pulmonary disease   • Morbid obesity   • Physical deconditioning   • Chronic respiratory failure with hypoxia   • Atrial fibrillation   • COPD exacerbation   • Obesity   • CHF (congestive heart failure)   • Arthritis   • CAD (coronary artery disease)   • Disease of thyroid gland   • Shortness of breath     Past Medical History:   Diagnosis Date   • Arthritis    • CAD (coronary artery disease)    • CAD (coronary artery disease)    • CHF (congestive heart failure)    • Chronic obstructive pulmonary disease    • Chronic respiratory failure with hypoxia    • Diabetes mellitus    • Disease of thyroid gland    • Hyperlipidemia    • Hypertension    • Obesity    • Paroxysmal atrial fibrillation      Past Surgical History:   Procedure Laterality Date   • CATARACT EXTRACTION     • KNEE ARTHROPLASTY            OT ASSESSMENT FLOWSHEET (last 72 hours)      Occupational Therapy Evaluation     Row Name 18 1050                    OT Evaluation Time/Intention    Subjective Information complains of;weakness  -RB        Document Type evaluation  -RB        Mode of Treatment occupational therapy  -RB        Total Evaluation Minutes, Occupational Therapy 32  -RB        Patient Effort good  -RB        Symptoms Noted During/After Treatment fatigue  -RB           General Information    Patient Profile Reviewed? yes  -RB        Onset of Illness/Injury or Date of Surgery 03/18/18  -RB        Referring Physician ANGELIQUE Patel MD  -RB        Patient Observations alert;cooperative;agree to therapy  -RB        General Observations of Patient Sitting EOB with IV and 2L nasal canula.  -RB        Prior Level of Function independent:;gait;transfer;ADL's;cooking  -RB        Equipment Currently Used at Home rollator;oxygen;nebulizer  -RB        Pertinent History of Current Functional Problem Hosp with SOA, leg edema and weakness.  Dx with COPD exacerbation.    -RB        Existing Precautions/Restrictions fall;oxygen therapy device and L/min  -RB        Limitations/Impairments hearing  -RB        Equipment Issued to Patient gait belt  -RB        Risks Reviewed patient:  -RB        Benefits Reviewed patient:  -RB           Relationship/Environment    Primary Source of Support/Comfort child(zenon)   2 dtrs check on her often.  -RB        Lives With alone  -RB           Resource/Environmental Concerns    Current Living Arrangements home/apartment/condo  -RB           Cognitive Assessment/Intervention- PT/OT    Orientation Status (Cognition) oriented x 4  -RB        Follows Commands (Cognition) WFL  -RB           Safety Issues, Functional Mobility    Impairments Affecting Function (Mobility) endurance/activity tolerance;strength  -RB           Bed Mobility Assessment/Treatment    Comment (Bed Mobility) not observed.  -RB           Functional Mobility    Functional Mobility- Ind. Level contact guard assist  -RB        Functional Mobility- Device rolling walker  -RB         Functional Mobility-Distance (Feet) 30  -RB        Functional Mobility- Safety Issues supplemental O2  -RB           Transfer Assessment/Treatment    Transfer Assessment/Treatment sit-stand transfer;stand-sit transfer  -RB        Sit-Stand [place] (Transfers) supervision;contact guard  -RB        Stand-Sit [place] (Transfers) supervision;contact guard  -RB           Sit-Stand Transfer    Assistive Device (Sit-Stand Transfers) walker, front-wheeled  -RB           Stand-Sit Transfer    Assistive Device (Stand-Sit Transfers) walker, front-wheeled  -RB           General ROM    GENERAL ROM COMMENTS L shld flex ~ 80* and rest WNL.  -RB           General Assessment (Manual Muscle Testing)    General Manual Muscle Testing (MMT) Assessment upper extremity strength deficits identified  -RB        Comment, General Manual Muscle Testing (MMT) Assessment 2-/5 for L shld flex, 3+/5 for R shld flex and 4/5 for rest of B UE strength.  -RB           Sensory Assessment/Intervention    Sensory General Assessment no sensation deficits identified  -RB           Pain Assessment    Additional Documentation Pain Scale: Numbers Pre/Post-Treatment (Group)  -RB           Pain Scale: Numbers Pre/Post-Treatment    Pain Scale: Numbers, Pretreatment 0/10 - no pain  -RB        Pain Scale: Numbers, Post-Treatment 0/10 - no pain  -RB           Clinical Impression (OT)    Date of Referral to OT 03/19/18  -RB        OT Diagnosis Impaired mobility and ADLs   -RB        Functional Level at Time of Evaluation (OT Eval) Impaired mobility and ADLs.  -RB        Criteria for Skilled Therapeutic Interventions Met (OT Eval) yes;treatment indicated  -RB        Rehab Potential (OT Eval) good, to achieve stated therapy goals  -RB        Therapy Frequency (OT Eval) --   3-14x/wk  -RB        Predicted Duration of Therapy Intervention (OT Eval) Until D/C or goals met.  -RB        Care Plan Review (OT) evaluation/treatment results reviewed;care  plan/treatment goals reviewed;risks/benefits reviewed;patient/other agree to care plan  -RB        Anticipated Discharge Disposition (OT) home with home health  -RB           Vital Signs    Pre Systolic BP Rehab 178  -RB        Pre Treatment Diastolic BP 70  -RB        Post Systolic BP Rehab 164  -RB        Post Treatment Diastolic BP 62  -RB        Pretreatment Heart Rate (beats/min) 55  -RB        Posttreatment Heart Rate (beats/min) 57  -RB        Pre SpO2 (%) 96  -RB        O2 Delivery Pre Treatment supplemental O2  -RB        Post SpO2 (%) 94  -RB        O2 Delivery Post Treatment supplemental O2  -RB        Pre Patient Position Sitting  -RB        Intra Patient Position Standing  -RB        Post Patient Position Sitting  -RB           Planned OT Interventions    Planned Therapy Interventions (OT Eval) activity tolerance training  -RB           OT Goals    Transfer Goal Selection (OT) transfer, OT goal 1  -RB        Bathing Goal Selection (OT) bathing, OT goal 1  -RB        Dressing Goal Selection (OT) dressing, OT goal 1  -RB           Transfer Goal 1 (OT)    Activity/Assistive Device (Transfer Goal 1, OT) transfers, all  -RB        [place] Level/Cues Needed (Transfer Goal 1, OT) conditional independence  -RB        Time Frame (Transfer Goal 1, OT) long term goal (LTG);by discharge  -RB        Progress/Outcome (Transfer Goal 1, OT) goal not met  -RB           Bathing Goal 1 (OT)    Activity/Assistive Device (Bathing Goal 1, OT) bathing skills, all  -RB        [place] Level/Cues Needed (Bathing Goal 1, OT) conditional independence  -RB        Time Frame (Bathing Goal 1, OT) long term goal (LTG);by discharge  -RB        Progress/Outcomes (Bathing Goal 1, OT) goal not met  -RB           Dressing Goal 1 (OT)    Activity/Assistive Device (Dressing Goal 1, OT) dressing skills, all  -RB        [place]/Cues Needed (Dressing Goal 1, OT) conditional independence  -RB        Time Frame (Dressing Goal 1,  OT) long term goal (LTG);by discharge  -RB        Progress/Outcome (Dressing Goal 1, OT) goal not met  -RB           Patient Education Goal (OT)    Activity (Patient Education Goal, OT) B UE HEP with no shld exercise, EC/WS and home safety/fall prevention.  -RB        [place]/Cues/Accuracy (Memory Goal 2, OT) demonstrates adequately;verbalizes understanding  -RB        Time Frame (Patient Education Goal, OT) long term goal (LTG);by discharge  -RB        Progress/Outcome (Patient Education Goal, OT) goal not met  -RB           Living Environment    Home Accessibility other (see comments)   Ramp with one rail on R.  -RB          User Key  (r) = Recorded By, (t) = Taken By, (c) = Cosigned By    Initials Name Effective Dates    RB Mc Mueller OT 06/15/16 -            Occupational Therapy Education     Title: PT OT SLP Therapies (Active)     Topic: Occupational Therapy (Active)     Point: Precautions (Done)     Description: Instruct learner(s) on prescribed precautions during self-care and functional transfers.   Learning Progress Summary     Learner Status Readiness Method Response Comment Documented by    Patient Done Acceptance E VU Edu pt on use of gait belt and non skid socks/shoes when OOB and no OOB without assist. RB 03/19/18 1418                      User Key     Initials Effective Dates Name Provider Type Discipline     06/15/16 -  Mc Mueller, OT Occupational Therapist OT                  OT Recommendation and Plan  Rehab Outcome Summary/Treatment Plan  Anticipated Discharge Disposition (OT): home with home health  Planned Therapy Interventions (OT Eval): activity tolerance training  Therapy Frequency (OT Eval):  (3-14x/wk)  Plan of Care Review  Plan of Care Reviewed With: patient  Plan of Care Reviewed With: patient  Outcome Summary: OT eval on this date.  Pt required SBA/CGA for sit to stand and CGA for ambulation 30' and transfers.  Pt was independent prior and could benefit from OT services to  increase independence with ADLs and functional mobility/transfers.          Outcome Measures     Row Name 03/19/18 1050             How much help from another is currently needed...    Putting on and taking off regular lower body clothing? 3  -RB      Bathing (including washing, rinsing, and drying) 2  -RB      Toileting (which includes using toilet bed pan or urinal) 3  -RB      Putting on and taking off regular upper body clothing 3  -RB      Taking care of personal grooming (such as brushing teeth) 4  -RB      Eating meals 4  -RB      Score 19  -RB         Functional Assessment    Outcome Measure Options AM-PAC 6 Clicks Daily Activity (OT)  -RB        User Key  (r) = Recorded By, (t) = Taken By, (c) = Cosigned By    Initials Name Provider Type    RB Mc Mueller OT Occupational Therapist          Time Calculation:   OT Start Time: 1050  OT Stop Time: 1120  OT Time Calculation (min): 30 min    Therapy Charges for Today     Code Description Service Date Service Provider Modifiers Qty    05680334016  OT SELFCARE CURRENT 3/19/2018 JENNA Angulo CK 1    [id number]  OT SELFCARE PROJECTED 3/19/2018 JENNA Angulo,  1    [id number]  OT EVAL MOD COMPLEXITY 2 3/19/2018 Mc Mueller OT GO 1          OT G-codes  OT Professional Judgement Used?: Yes  OT Functional Scales Options: AM-PAC 6 Clicks Daily Activity (OT)  Score: 19  Functional Limitation: Self care  Self Care Current Status (): At least 40 percent but less than 60 percent impaired, limited or restricted  Self Care Goal Status (): At least 20 percent but less than 40 percent impaired, limited or restricted    Mc Mueller OT  3/19/2018    Electronically signed by Mc Mueller OT at 3/19/2018  2:22 PM     Mc Mueller OT at 3/19/2018  2:21 PM          Problem: Patient Care Overview  Goal: Plan of Care Review  Outcome: Ongoing (interventions implemented as appropriate)   03/19/18 5449   Coping/Psychosocial   Plan of Care Reviewed  With patient   OTHER   Outcome Summary OT eval on this date. Pt required SBA/CGA for sit to stand and CGA for ambulation 30' and transfers. Pt was independent prior and could benefit from OT services to increase independence with ADLs and functional mobility/transfers.           Electronically signed by Mc Mueller OT at 3/19/2018  2:21 PM

## 2018-03-20 NOTE — PLAN OF CARE
Problem: Patient Care Overview  Goal: Plan of Care Review  Outcome: Ongoing (interventions implemented as appropriate)   03/20/18 0900 03/20/18 0957   Coping/Psychosocial   Plan of Care Reviewed With patient --    Plan of Care Review   Progress --  improving   OTHER   Outcome Summary --  pt ambulated 130` with RW & SBA, pt's O2 stat decreased to 89% with gait, pt would benefit from HHPT @ D/C

## 2018-03-20 NOTE — PLAN OF CARE
Problem: Patient Care Overview  Goal: Plan of Care Review  Outcome: Ongoing (interventions implemented as appropriate)   03/20/18 1404 03/20/18 1420   Coping/Psychosocial   Plan of Care Reviewed With --  patient   Plan of Care Review   Progress --  improving   OTHER   Outcome Summary pt ambulated 250` with RW & SBA with 1 standiing rest break, pt would benefit from HHPT @ D/C --

## 2018-03-20 NOTE — PLAN OF CARE
Problem: Patient Care Overview  Goal: Plan of Care Review  Outcome: Ongoing (interventions implemented as appropriate)   03/20/18 0318   Coping/Psychosocial   Plan of Care Reviewed With patient   Plan of Care Review   Progress no change   OTHER   Outcome Summary Pt has slept well throughout the night. VS stable. Will continue to monitor       Problem: Fall Risk (Adult)  Goal: Absence of Fall  Outcome: Ongoing (interventions implemented as appropriate)      Problem: Chronic Obstructive Pulmonary Disease (Adult)  Goal: Signs and Symptoms of Listed Potential Problems Will be Absent, Minimized or Managed (Chronic Obstructive Pulmonary Disease)  Outcome: Ongoing (interventions implemented as appropriate)

## 2018-03-20 NOTE — PLAN OF CARE
Problem: Patient Care Overview  Goal: Plan of Care Review  Outcome: Ongoing (interventions implemented as appropriate)   03/20/18 1420   Coping/Psychosocial   Plan of Care Reviewed With patient   Plan of Care Review   Progress improving   OTHER   Outcome Summary improving toward all goals pt anticipates snf at dc although improving with adl funct tf and balance act

## 2018-03-20 NOTE — PLAN OF CARE
Problem: Patient Care Overview  Goal: Plan of Care Review  Outcome: Ongoing (interventions implemented as appropriate)   03/20/18 1417   Coping/Psychosocial   Plan of Care Reviewed With patient   Plan of Care Review   Progress improving   OTHER   Outcome Summary patient doing better today, no complaints     Goal: Individualization and Mutuality  Outcome: Ongoing (interventions implemented as appropriate)    Goal: Discharge Needs Assessment  Outcome: Ongoing (interventions implemented as appropriate)    Goal: Interprofessional Rounds/Family Conf  Outcome: Ongoing (interventions implemented as appropriate)      Problem: Fall Risk (Adult)  Goal: Absence of Fall  Outcome: Ongoing (interventions implemented as appropriate)      Problem: Chronic Obstructive Pulmonary Disease (Adult)  Goal: Signs and Symptoms of Listed Potential Problems Will be Absent, Minimized or Managed (Chronic Obstructive Pulmonary Disease)  Outcome: Ongoing (interventions implemented as appropriate)

## 2018-03-20 NOTE — THERAPY TREATMENT NOTE
Acute Care - Physical Therapy Treatment Note  [facility]     Patient Name: Norma Harkins  : 1931  MRN: [id number]  Today's Date: 3/20/2018  Onset of Illness/Injury or Date of Surgery: 18  Date of Referral to PT: 18  Referring Physician: Dr. Maldonado    Admit Date: 3/18/2018    Visit Dx:    ICD-10-CM ICD-9-CM   1. COPD exacerbation J44.1 491.21   2. Shortness of breath R06.02 786.05   3. Impaired mobility and activities of daily living Z74.09 799.89   4. Impaired functional mobility, balance, gait, and endurance Z74.09 V49.89     Patient Active Problem List   Diagnosis   • Hypertension   • Low back pain   • Hyperlipidemia   • Paroxysmal atrial fibrillation   • Diabetes mellitus   • Chronic obstructive pulmonary disease   • Morbid obesity   • Physical deconditioning   • Chronic respiratory failure with hypoxia   • Atrial fibrillation   • COPD exacerbation   • Obesity   • CHF (congestive heart failure)   • Arthritis   • CAD (coronary artery disease)   • Disease of thyroid gland   • Shortness of breath       Therapy Treatment    Therapy Treatment / Health Promotion    Treatment Time/Intention  Discipline: physical therapy assistant (18 1404 : Katelyn Carlisle PTA)  Document Type: therapy note (daily note) (18 1404 : Katelyn Carlisle PTA)  Subjective Information: no complaints (18 1404 : Katelyn Carlisle PTA)  Mode of Treatment: physical therapy (18 1404 : Katelyn Carlisle PTA)  Total Minutes, Physical Therapy Treatment: 24 (18 1404 : Katelyn Carlisle PTA)  Patient Effort: good (18 1404 : Katelyn Carlisle PTA)  Existing Precautions/Restrictions: fall, oxygen therapy device and L/min (18 1404 : Katelyn Carlisle PTA)    Vitals/Pain/Safety  Vital Signs  Pre Systolic BP Rehab: 124 (18 1404 : Katelyn Carlisle PTA)  Pre Treatment Diastolic BP: 64 (18 1404 : Katelyn Carlisle PTA)  Pretreatment Heart Rate (beats/min): 63 (18 1404 : Katelyn Carlisle  PTA)  Intratreatment Heart Rate (beats/min): 67 (03/20/18 0908 : Katelyn M Almon, PTA)  Posttreatment Heart Rate (beats/min): 67 (03/20/18 1404 : Katelyn M Almon, PTA)  Pre SpO2 (%): 98 (03/20/18 1404 : Katelyn M Almon, PTA)  O2 Delivery Pre Treatment: supplemental O2 (03/20/18 1404 : Katelyn M Almon, PTA)  Intra SpO2 (%): 89 (03/20/18 0908 : Katelyn M Almon, PTA)  Post SpO2 (%): 93 (03/20/18 1404 : Katelyn M Almon, PTA)  Pre Patient Position: Sitting (03/20/18 1404 : Katelyn M Almon, PTA)  Post Patient Position: Sitting (03/20/18 1404 : Katelyn M Almon, PTA)  Pain Scale: Numbers Pre/Post-Treatment  Pain Scale: Numbers, Pretreatment: 0/10 - no pain (03/20/18 1404 : Katelyn M Almon, PTA)  Pain Scale: Numbers, Post-Treatment: 0/10 - no pain (03/20/18 1404 : Katelyn M Almon, PTA)  Positioning and Restraints  Pre-Treatment Position: sitting in chair/recliner (03/20/18 1404 : Katelyn DARIO Almon, PTA)  Post Treatment Position: chair (03/20/18 1404 : Katelyn M Almon, PTA)  In Chair: sitting, call light within reach (03/20/18 1404 : Katelyn DARIO Vasquezon, PTA)    Mobility,ADL,Motor, Modality  Transfer Assessment/Treatment  Transfer Assessment/Treatment: sit-stand transfer (03/20/18 1404 : Katelyn M Almon, PTA)  Sit-Stand Transfer  Sit-Stand [place] (Transfers): supervision (03/20/18 1404 : Katelyn DARIO Vasquezon, PTA)  Assistive Device (Sit-Stand Transfers): walker, front-wheeled (03/20/18 0908 : Katelyn DARIO Vasquezon, PTA)  Stand-Sit Transfer  Stand-Sit [place] (Transfers): supervision (03/20/18 1404 : Katelyn Carlisle, PTA)  Assistive Device (Stand-Sit Transfers): walker, front-wheeled (03/20/18 0908 : Katelyn Carlisle PTA)  Gait/Stairs Assessment/Training  [place] Level (Gait): supervision (03/20/18 1404 : Katelyn Carlisle PTA)  Assistive Device (Gait): walker, front-wheeled (03/20/18 1404 : Katelyn Carlisle PTA)  Distance in Feet (Gait): 250 (03/20/18 1404 : Katelyn Carlisle PTA)     Therapeutic Exercise  Lower Extremity (Therapeutic Exercise):  gluteal sets, LAQ (long arc quad), bilateral, marching while seated (03/20/18 0908 : Katelyn Carlisle, PTA)  Lower Extremity Range of Motion (Therapeutic Exercise): hip abduction/adduction, bilateral, ankle dorsiflexion/plantar flexion, bilateral (03/20/18 0908 : Katelyn Carlisle, PTA)  Exercise Type (Therapeutic Exercise): AROM (active range of motion) (03/20/18 0908 : Katelyn DARIO Carlisle PTA)  Position (Therapeutic Exercise): seated (03/20/18 0908 : Katelyn DARIO Carlisle, PTA)  Sets/Reps (Therapeutic Exercise): 20 (03/20/18 0908 : Katelyn DARIO Carlisle, PTA)           ROM/MMT             Sensory, Edema, Orthotics          Cognition, Communication, Swallow  Cognitive Assessment/Intervention- PT/OT  Orientation Status (Cognition): oriented x 4 (03/20/18 1404 : Katelyn Carlisle PTA)  Follows Commands (Cognition): WNL (03/20/18 1404 : Katelyn Carlisle, PTA)  Speaking Valve  Pretreatment Heart Rate (beats/min): 63 (03/20/18 1404 : Katelyn DARIO Vasquezon, PTA)  Pre SpO2 (%): 98 (03/20/18 1404 : Katelyn DARIO Vasquezon, PTA)  Posttreatment Heart Rate (beats/min): 67 (03/20/18 1404 : Katelyn DARIO Vasquezon, PTA)  Post SpO2 (%): 93 (03/20/18 1404 : Katelyn DARIO Vasquezon, PTA)  General Eating/Swallowing Observations  Pre SpO2 (%): 98 (03/20/18 1404 : Katelyn DARIO Vasquezon, PTA)  Post SpO2 (%): 93 (03/20/18 1404 : Katelyn M Almon, PTA)    Outcome Summary  Rehab Outcome Summary/Treatment Plan  Daily Summary of Progress (OT): progress toward functional goals is good (03/20/18 1404 : Katelyn Carlisle PTA)  Anticipated Discharge Disposition (OT): home with home health (03/20/18 1404 : Katelyn Carlisle, PTA)  Outcome Summary/Treatment Plan (PT)  Daily Summary of Progress (PT): progress toward functional goals is good (03/20/18 0908 : Katelyn Carlisle PTA)  Anticipated Discharge Disposition (PT): home with home health care (03/20/18 0908 : Katelyn Carlisle PTA)            PT Rehab Goals     Row Name 03/20/18 1500 03/20/18 1100 03/19/18 1355       Transfer Goal 1 (PT)    Activity/Assistive Device  (Transfer Goal 1, PT) sit-to-stand/stand-to-sit;bed-to-chair/chair-to-bed;toilet  -TA sit-to-stand/stand-to-sit;bed-to-chair/chair-to-bed  -TA sit-to-stand/stand-to-sit;bed-to-chair/chair-to-bed;toilet;walker, rolling  -MN    [place] Level/Cues Needed (Transfer Goal 1, PT) conditional independence  -TA conditional independence  -TA conditional independence  -MN    Time Frame (Transfer Goal 1, PT) 1 week  -TA 1 week  -TA 1 week  -MN    Progress/Outcome (Transfer Goal 1, PT) goal not met  -TA goal not met  -TA goal not met  -MN       Gait Training Goal 1 (PT)    Activity/Assistive Device (Gait Training Goal 1, PT) gait (walking locomotion)  -TA gait (walking locomotion)  -TA gait (walking locomotion);walker, rolling  -MN    [place] Level (Gait Training Goal 1, PT) conditional independence  -TA conditional independence  -TA conditional independence  -MN    Distance (Gait Goal 1, PT) 150  -  -  -MN    Time Frame (Gait Training Goal 1, PT) 1 week  -TA 1 week  -TA 1 week  -MN    Progress/Outcome (Gait Training Goal 1, PT) goal not met  -TA goal not met  -TA goal not met  -MN       Stairs Goal 1 (PT)    Activity/Assistive Device (Stairs Goal 1, PT) other (see comments)   asc/desc ramp with rW  -TA other (see comments)   asc/desc ramp woth RW  -TA other (see comments)   asc/desc ramp with RW  -MN    [place] Level/Cues Needed (Stairs Goal 1, PT) conditional independence  -TA conditional independence  -TA conditional independence  -MN    Progress/Outcome (Stairs Goal 1, PT) goal not met  -TA goal not met  -TA goal not met  -MN      User Key  (r) = Recorded By, (t) = Taken By, (c) = Cosigned By    Initials Name Provider Type    NISHA Perez, PT Physical Therapist    MIKE Carlisle PTA Physical Therapy Assistant          Physical Therapy Education     Title: PT OT SLP Therapies (Active)     Topic: Physical Therapy (Active)     Point: Mobility training (Done)    Learning Progress Summary      Learner Status Readiness Method Response Comment Documented by    Patient Done Acceptance E VU Role of PT. No OOB/chair without assist MN 03/19/18 1632          Point: Precautions (Done)    Learning Progress Summary     Learner Status Readiness Method Response Comment Documented by    Patient Done Acceptance E VU Role of PT. No OOB/chair without assist MN 03/19/18 1632                      User Key     Initials Effective Dates Name Provider Type Discipline    MN 03/07/18 -  Cynthia Perez, PT Physical Therapist PT                    PT Recommendation and Plan  Anticipated Discharge Disposition (PT): home with home health care  Outcome Summary/Treatment Plan (PT)  Daily Summary of Progress (PT): progress toward functional goals is good  Anticipated Discharge Disposition (PT): home with home health care  Progress: improving  Outcome Summary: pt ambulated 250` with RW & SBA with 1 standiing rest break, pt would benefit from HHPT @ D/C          Outcome Measures     Row Name 03/20/18 1100 03/19/18 1355 03/19/18 1050       How much help from another person do you currently need...    Turning from your back to your side while in flat bed without using bedrails? 4  -TA 4  -MN  --    Moving from lying on back to sitting on the side of a flat bed without bedrails? 3  -TA 3  -MN  --    Moving to and from a bed to a chair (including a wheelchair)? 3  -TA 3  -MN  --    Standing up from a chair using your arms (e.g., wheelchair, bedside chair)? 3  -TA 3  -MN  --    Climbing 3-5 steps with a railing? 3  -TA 3  -MN  --    To walk in hospital room? 3  -TA 3  -MN  --    AM-PAC 6 Clicks Score 19  -TA 19  -MN  --       How much help from another is currently needed...    Putting on and taking off regular lower body clothing?  --  -- 3  -RB    Bathing (including washing, rinsing, and drying)  --  -- 2  -RB    Toileting (which includes using toilet bed pan or urinal)  --  -- 3  -RB    Putting on and taking off regular upper body  clothing  --  -- 3  -RB    Taking care of personal grooming (such as brushing teeth)  --  -- 4  -RB    Eating meals  --  -- 4  -RB    Score  --  -- 19  -RB       Functional Assessment    Outcome Measure Options AM-PAC 6 Clicks Basic Mobility (PT)  -TA AM-PAC 6 Clicks Basic Mobility (PT)  -MN AM-PAC 6 Clicks Daily Activity (OT)  -RB      User Key  (r) = Recorded By, (t) = Taken By, (c) = Cosigned By    Initials Name Provider Type    RB Mc Mueller, OT Occupational Therapist    [place] Cynthia Perez, PT Physical Therapist    MIKE Carlisle PTA Physical Therapy Assistant           Time Calculation:         PT Charges     Row Name 03/20/18 1518 03/20/18 1139          Time Calculation    Start Time 1404  -TA 0908  -TA     Stop Time 1428  -TA 0957  -TA     Time Calculation (min) 24 min  -TA 49 min  -TA        Time Calculation- PT    Total Timed Code Minutes- PT 24 minute(s)  -TA 49 minute(s)  -TA       User Key  (r) = Recorded By, (t) = Taken By, (c) = Cosigned By    Initials Name Provider Type    MIKE Carlisle PTA Physical Therapy Assistant          Therapy Charges for Today     Code Description Service Date Service Provider Modifiers Qty    69631081358 HC GAIT TRAINING EA 15 MIN 3/20/2018 Katelyn Carlisle, LYRIC GP 1    82638828930 HC PT THER PROC EA 15 MIN 3/20/2018 Katelyn Carlisle PTA GP 1    85702859649 HC PT THERAPEUTIC ACT EA 15 MIN 3/20/2018 Katelyn Carlisle PTA GP 1    71987568112 HC GAIT TRAINING EA 15 MIN 3/20/2018 Katelyn Carlisle PTA GP 2          PT G-Codes  PT Professional Judgement Used?: Yes  Outcome Measure Options: AM-PAC 6 Clicks Basic Mobility (PT)  Score: 19  Functional Limitation: Mobility: Walking and moving around  Mobility: Walking and Moving Around Current Status (): At least 20 percent but less than 40 percent impaired, limited or restricted  Mobility: Walking and Moving Around Goal Status (): At least 1 percent but less than 20 percent impaired, limited or restricted    Katelyn BISHOP  Orestes, PTA  3/20/2018

## 2018-03-20 NOTE — THERAPY TREATMENT NOTE
Inpatient Rehabilitation - Occupational Therapy Treatment Note  [facility]     Patient Name: Norma Harkins  : 1931  MRN: [id number]  Today's Date: 3/20/2018  Onset of Illness/Injury or Date of Surgery: 18  Date of Referral to OT: 18  Referring Physician: Dr. Maldonado    Admit Date: 3/18/2018       ICD-10-CM ICD-9-CM   1. COPD exacerbation J44.1 491.21   2. Shortness of breath R06.02 786.05   3. Impaired mobility and activities of daily living Z74.09 799.89   4. Impaired functional mobility, balance, gait, and endurance Z74.09 V49.89     Patient Active Problem List   Diagnosis   • Hypertension   • Low back pain   • Hyperlipidemia   • Paroxysmal atrial fibrillation   • Diabetes mellitus   • Chronic obstructive pulmonary disease   • Morbid obesity   • Physical deconditioning   • Chronic respiratory failure with hypoxia   • Atrial fibrillation   • COPD exacerbation   • Obesity   • CHF (congestive heart failure)   • Arthritis   • CAD (coronary artery disease)   • Disease of thyroid gland   • Shortness of breath     Past Medical History:   Diagnosis Date   • Arthritis    • CAD (coronary artery disease)    • CAD (coronary artery disease)    • CHF (congestive heart failure)    • Chronic obstructive pulmonary disease    • Chronic respiratory failure with hypoxia    • Diabetes mellitus    • Disease of thyroid gland    • Hyperlipidemia    • Hypertension    • Obesity    • Paroxysmal atrial fibrillation      Past Surgical History:   Procedure Laterality Date   • CATARACT EXTRACTION     • KNEE ARTHROPLASTY         Therapy Treatment    Therapy Treatment / Health Promotion    Treatment Time/Intention  Discipline: occupational therapy assistant (18 1025 : BRANDYN Magdaleno)  Document Type: therapy note (daily note) (18 1025 : BRANDYN Magdaleno)  Subjective Information: no complaints (18 1025 : BRANDYN Magdaleno)  Mode of Treatment: occupational therapy (18  1025 : Neha Mccray NGUYEN/L)  Total Minutes, Occupational Therapy Treatment:  (39) (03/20/18 1025 : Neha Mccray NGUYEN/L)  Plan of Care Review  Plan of Care Reviewed With: patient (03/20/18 1420 : Neha Mccray NGUYEN/L)    Vitals/Pain/Safety  Vital Signs  Pretreatment Heart Rate (beats/min): 58 (03/20/18 1025 : Neha Mccray NGUYEN/L)  Intratreatment Heart Rate (beats/min): 60 (03/20/18 1025 : Neha Mccray NGUYEN/L)  Posttreatment Heart Rate (beats/min): 62 (03/20/18 1025 : Neha Mccray NGUYEN/L)  Pre SpO2 (%): 92 (03/20/18 1025 : Neha Mccray NGUYEN/L)  Intra SpO2 (%): 93 (03/20/18 1025 : Neha Warrenre NGUYEN/L)  Pre Patient Position: Sitting (03/20/18 1025 : Neha Mccray NGUYEN/L)  Post Patient Position: Sitting (03/20/18 1025 : Neha Mccray NGUYEN/L)  Pain Scale: Numbers Pre/Post-Treatment  Pain Scale: Numbers, Pretreatment: 0/10 - no pain (03/20/18 1025 : Neha Mccray NGUYEN/L)  Pain Scale: Numbers, Post-Treatment: 0/10 - no pain (03/20/18 1025 : Neha Mccray NGUYEN/L)  Positioning and Restraints  Pre-Treatment Position: sitting in chair/recliner (03/20/18 1025 : Neha Mccray NGUYEN/L)  Post Treatment Position: chair (03/20/18 1025 : Neha Mccray NGUYEN/L)    Mobility,ADL,Motor, Modality  Sit-Stand Transfer  Sit-Stand [place] (Transfers): contact guard (03/20/18 1025 : LALITHA MagdalenoA/L)  Assistive Device (Sit-Stand Transfers): walker, front-wheeled (03/20/18 1025 : LALITHA MagdalenoA/L)  Stand-Sit Transfer  Stand-Sit [place] (Transfers): contact guard (03/20/18 1025 : PATRICK Magdaleno/L)  Assistive Device (Stand-Sit Transfers): walker, front-wheeled (03/20/18 1025 : PATRICK Magdaleno/L)  Toilet Transfer  Type (Toilet Transfer): sit-stand, stand-sit (03/20/18 1025 : PATRICK Magdaleno/L)  [place] Level (Toilet Transfer): supervision (03/20/18 1025 : PATRICK Magdaleno/L)  Lower Body Dressing  Assessment/Training  Lower Body Dressing [place] Level: lower body dressing skills, set up (03/20/18 1025 : BRANDYN Magdaleno)  Grooming Assessment/Training  [place] Level (Grooming): grooming skills, set up (03/20/18 1025 : BRANDYN Magdaleno)  Toileting Assessment/Training  [place] Level (Toileting): toileting skills, conditional independence (03/20/18 1025 : BRANDYN Magdaleno)  Motor Skills Assessment/Interventions  Additional Documentation:  (dynamic standing act x 8 min ) (03/20/18 1025 : BRANDYN Magdaleno)  Therapeutic Exercise  Exercise Type (Therapeutic Exercise): AROM (active range of motion) (ue ) (03/20/18 1025 : BRANDYN Magdaleno)           ROM/MMT             Sensory, Edema, Orthotics          Cognition, Communication, Swallow  Speaking Valve  Pretreatment Heart Rate (beats/min): 58 (03/20/18 1025 : BRANDYN Magdaleno)  Pre SpO2 (%): 92 (03/20/18 1025 : PATRICK Magdaleno/L)  Posttreatment Heart Rate (beats/min): 62 (03/20/18 1025 : PATRICK Magdaleno/L)  General Eating/Swallowing Observations  Pre SpO2 (%): 92 (03/20/18 1025 : BRANDYN Magdaleno)    Outcome Summary  Rehab Outcome Summary/Treatment Plan  Daily Summary of Progress (OT): progress toward functional goals as expected (03/20/18 1025 : BRANDYN Magdaleno)        OT Rehab Goals     Row Name 03/20/18 1100 03/19/18 1050          Transfer Goal 1 (OT)    Activity/Assistive Device (Transfer Goal 1, OT) transfers, all  -LM transfers, all  -RB     [place] Level/Cues Needed (Transfer Goal 1, OT) conditional independence  -LM conditional independence  -RB     Time Frame (Transfer Goal 1, OT) long term goal (LTG);by discharge  -LM long term goal (LTG);by discharge  -RB     Progress/Outcome (Transfer Goal 1, OT) goal not met  -LM goal not met  -RB        Bathing Goal 1 (OT)    Activity/Assistive Device (Bathing Goal 1, OT) bathing skills, all  -LM bathing skills,  all  -RB     [place] Level/Cues Needed (Bathing Goal 1, OT) conditional independence  -LM conditional independence  -RB     Time Frame (Bathing Goal 1, OT) long term goal (LTG)  -LM long term goal (LTG);by discharge  -RB     Progress/Outcomes (Bathing Goal 1, OT) goal not met  -LM goal not met  -RB        Dressing Goal 1 (OT)    Activity/Assistive Device (Dressing Goal 1, OT) dressing skills, all  -LM dressing skills, all  -RB     [place]/Cues Needed (Dressing Goal 1, OT) conditional independence  -LM conditional independence  -RB     Time Frame (Dressing Goal 1, OT) long term goal (LTG)  -LM long term goal (LTG);by discharge  -RB     Progress/Outcome (Dressing Goal 1, OT) goal not met  -LM goal not met  -RB        Patient Education Goal (OT)    Activity (Patient Education Goal, OT) --   B UE HEP with no shld exercise, EC/WS and home safety/fall p  -LM B UE HEP with no shld exercise, EC/WS and home safety/fall prevention.  -RB     [place]/Cues/Accuracy (Memory Goal 2, OT) demonstrates adequately  -LM demonstrates adequately;verbalizes understanding  -RB     Time Frame (Patient Education Goal, OT) long term goal (LTG)  -LM long term goal (LTG);by discharge  -RB     Progress/Outcome (Patient Education Goal, OT) goal not met  -LM goal not met  -RB       User Key  (r) = Recorded By, (t) = Taken By, (c) = Cosigned By    Initials Name Provider Type    RB Mc Mueller OT Occupational Therapist    PATRICK Vazquez/L Occupational Therapy Assistant        Occupational Therapy Education     Title: PT OT SLP Therapies (Active)     Topic: Occupational Therapy (Done)     Point: ADL training (Done)     Description: Instruct learner(s) on proper safety adaptation and remediation techniques during self care or transfers.   Instruct in proper use of assistive devices.   Learning Progress Summary     Learner Status Readiness Method Response Comment Documented by    Patient Done Edmund PIERCE LM 03/20/18  1420          Point: Home exercise program (Done)     Description: Instruct learner(s) on appropriate technique for monitoring, assisting and/or progressing therapeutic exercises/activities.   Learning Progress Summary     Learner Status Readiness Method Response Comment Documented by    Patient Done Acceptance E VU  LM 03/20/18 1420          Point: Precautions (Done)     Description: Instruct learner(s) on prescribed precautions during self-care and functional transfers.   Learning Progress Summary     Learner Status Readiness Method Response Comment Documented by    Patient Done Acceptance E VU  LM 03/20/18 1420     Done Acceptance E [facility] pt on use of gait belt and non skid socks/shoes when OOB and no OOB without assist. RB 03/19/18 1418          Point: Body mechanics (Done)     Description: Instruct learner(s) on proper positioning and spine alignment during self-care, functional mobility activities and/or exercises.   Learning Progress Summary     Learner Status Readiness Method Response Comment Documented by    Patient Done Acceptance E VU  LM 03/20/18 1420                      User Key     Initials Effective Dates Name Provider Type Discipline    RB 06/15/16 -  Mc Mueller, OT Occupational Therapist OT     03/07/18 -  BRANDYN Magdaleno Occupational Therapy Assistant OT                  OT Recommendation and Plan  Rehab Outcome Summary/Treatment Plan  Daily Summary of Progress (OT): progress toward functional goals as expected  Daily Summary of Progress (OT): progress toward functional goals as expected  Plan of Care Review  Plan of Care Reviewed With: patient  Plan of Care Reviewed With: patient  Outcome Summary: improving toward all goals  pt anticipates snf at [place] although improving with adl funct tf and balance act        Outcome Measures     Row Name 03/20/18 1100 03/19/18 1355 03/19/18 1050       How much help from another person do you currently need...    Turning from your back to your side  while in flat bed without using bedrails? 4  -TA 4  -MN  --    Moving from lying on back to sitting on the side of a flat bed without bedrails? 3  -TA 3  -MN  --    Moving to and from a bed to a chair (including a wheelchair)? 3  -TA 3  -MN  --    Standing up from a chair using your arms (e.g., wheelchair, bedside chair)? 3  -TA 3  -MN  --    Climbing 3-5 steps with a railing? 3  -TA 3  -MN  --    To walk in hospital room? 3  -TA 3  -MN  --    AM-PAC 6 Clicks Score 19  -TA 19  -MN  --       How much help from another is currently needed...    Putting on and taking off regular lower body clothing?  --  -- 3  -RB    Bathing (including washing, rinsing, and drying)  --  -- 2  -RB    Toileting (which includes using toilet bed pan or urinal)  --  -- 3  -RB    Putting on and taking off regular upper body clothing  --  -- 3  -RB    Taking care of personal grooming (such as brushing teeth)  --  -- 4  -RB    Eating meals  --  -- 4  -RB    Score  --  -- 19  -RB       Functional Assessment    Outcome Measure Options AM-PAC 6 Clicks Basic Mobility (PT)  -TA AM-PAC 6 Clicks Basic Mobility (PT)  -MN AM-PAC 6 Clicks Daily Activity (OT)  -RB      User Key  (r) = Recorded By, (t) = Taken By, (c) = Cosigned By    Initials Name Provider Type    RB Mc Mueller, OT Occupational Therapist    NISHA Perez, PT Physical Therapist    TA Katelyn Carlisle, PTA Physical Therapy Assistant           Time Calculation:         Time Calculation- OT     Row Name 03/20/18 1351             Time Calculation- OT    OT Start Time 1025  -LM      OT Stop Time 1109  -LM      OT Time Calculation (min) 44 min  -LM      Total Timed Code Minutes- OT 44 minute(s)  -LM      OT Received On 03/20/18  -LM        User Key  (r) = Recorded By, (t) = Taken By, (c) = Cosigned By    Initials Name Provider Type     PATRICK Magdaleno/L Occupational Therapy Assistant           Therapy Charges for Today     Code Description Service Date Service Provider Modifiers  Qty    16027892169  OT THERAPEUTIC ACT EA 15 MIN 3/20/2018 LALITHA MagdalenoA/L GO 1    27876577169 HC OT THER PROC EA 15 MIN 3/20/2018 LALITHA MagdalenoA/L GO 1    75604671319 HC OT SELF CARE/MGMT/TRAIN EA 15 MIN 3/20/2018 LALITHA MagdalenoA/L GO 1          OT G-codes  OT Professional Judgement Used?: Yes  OT Functional Scales Options: AM-PAC 6 Clicks Daily Activity (OT)  Score: 19  Functional Limitation: Self care  Self Care Current Status (): At least 40 percent but less than 60 percent impaired, limited or restricted  Self Care Goal Status (): At least 20 percent but less than 40 percent impaired, limited or restricted    PATRICK Magdaleno/ROBINSON  3/20/2018

## 2018-03-20 NOTE — PROGRESS NOTES
[facility] Medicine Services  INPATIENT PROGRESS NOTE    Length of Stay: 0  Date of Admission: 3/18/2018  Primary Care Physician: Krista Matos MD    Subjective   Chief Complaint: shortness of breath  HPI:  87-year-old  female past history of osteoarthritis, coronary artery disease, congestive heart failure, COPD, chronic hypoxic respiratory failure requiring oxygen dependence, type 2 diabetes, hypothyroidism, hypertension, hyperlipidemia, paroxysmal atrial fibrillation who presented on 3/18/2018 with complaints of worsening shortness of breath.  The patient reports being oxygen dependent on 3 L per nasal cannula at home, but reports that over the course of the past week prior to admission that her shortness of breath is worsening despite oxygen and use of her diuretics.  She also reports productive cough with yellow to white sputum production.  The patient has been hospitalized since currently being treated for acute hypoxic respiratory failure related to exacerbation of COPD.  During today's visit, patient voices concerns about having difficulty caring for herself alone at home and she wishes to pursue short term SNF placement for further rehabilitation.    Review of Systems   Constitutional: Negative for chills.   Respiratory: Positive for shortness of breath. Negative for cough and wheezing.    Cardiovascular: Negative for chest pain, palpitations and leg swelling.   Gastrointestinal: Negative for abdominal pain, constipation, diarrhea, nausea and vomiting.   Musculoskeletal: Negative for back pain.   Skin: Negative for pallor.   Neurological: Negative for dizziness and weakness.   Psychiatric/Behavioral: Negative for confusion.        All pertinent negatives and positives are as above. All other systems have been reviewed and are negative unless otherwise stated.     Objective    Temp:  [97.1 °F (36.2 °C)-99.5 °F (37.5 °C)] 99.5 °F (37.5 °C)  Heart Rate:   [56-97] 56  Resp:  [16-20] 18  BP: ()/(48-90) 98/48    Physical Exam   Constitutional: She is oriented to person, place, and time. She appears well-developed and well-nourished.   HENT:   Head: Normocephalic.   Eyes: Conjunctivae are normal.   Neck: Neck supple.   Cardiovascular: Normal rate, regular rhythm, normal heart sounds and intact distal pulses.    Pulmonary/Chest: Effort normal. She has wheezes.   Abdominal: Soft. Bowel sounds are normal. She exhibits no distension. There is no tenderness.   Musculoskeletal: Normal range of motion.   Neurological: She is alert and oriented to person, place, and time.   Skin: Skin is warm and dry.   Psychiatric: She has a normal mood and affect. Her behavior is normal.   Vitals reviewed.        Results Review:  I have reviewed the labs, radiology results, and diagnostic studies.    Laboratory Data:     Results from last 7 days  Lab Units 03/20/18  0540 03/19/18  0911 03/18/18  2305   SODIUM mmol/L 140 136* 136*   POTASSIUM mmol/L 3.7 3.2* 3.3*   CHLORIDE mmol/L 99 92* 90*   CO2 mmol/L 27.0 28.0 30.0   BUN mg/dL 38* 38* 43*   CREATININE mg/dL 1.26* 1.24* 1.51*   GLUCOSE mg/dL 175* 236* 119*   CALCIUM mg/dL 8.8 8.7 9.3   BILIRUBIN mg/dL 0.3  --   --    ALK PHOS U/L 133*  --   --    ALT (SGPT) U/L 107*  --   --    AST (SGOT) U/L 80*  --   --    ANION GAP mmol/L 14.0 16.0* 16.0*     Estimated Creatinine Clearance: 30.9 mL/min (by C-G formula based on SCr of 1.26 mg/dL (H)).    Results from last 7 days  Lab Units 03/20/18  0540 03/18/18  2305   MAGNESIUM mg/dL 2.3 2.2   PHOSPHORUS mg/dL  --  4.9*           Results from last 7 days  Lab Units 03/20/18  0540 03/19/18  0911 03/18/18  2305   WBC 10*3/mm3 10.77* 7.13 10.92*   HEMOGLOBIN g/dL 9.1* 9.2* 10.5*   HEMATOCRIT % 29.3* 29.5* 33.7*   PLATELETS 10*3/mm3 204 208 244       Results from last 7 days  Lab Units 03/18/18  2305   INR  1.42*       Culture Data:   Blood Culture   Date Value Ref Range Status   03/19/2018 No growth  at 24 hours  Preliminary   03/18/2018 No growth at 24 hours  Preliminary     No results found for: URINECX  Respiratory Culture   Date Value Ref Range Status   03/20/2018 Rejected  Final     No results found for: WOUNDCX  No results found for: STOOLCX  No components found for: BODYFLD    Radiology Data:   Imaging Results (last 24 hours)     ** No results found for the last 24 hours. **          I have reviewed the patient current medications.     Assessment/Plan     Active Hospital Problems (** Indicates Principal Problem)    Diagnosis Date Noted   • **COPD exacerbation [J44.1] 11/25/2017   • Shortness of breath [R06.02] 03/19/2018   • Disease of thyroid gland [E07.9]    • CAD (coronary artery disease) [I25.10]    • Morbid obesity [E66.01] 08/02/2017   • Diabetes mellitus [E11.9] 06/28/2017   • Hypertension [I10] 08/04/2016   • Paroxysmal atrial fibrillation [I48.0] 03/15/2016   • Hyperlipidemia [E78.5] 01/11/2015      Resolved Hospital Problems    Diagnosis Date Noted Date Resolved   No resolved problems to display.       Plan:    1. Acute on chronic hypoxic respiratory failure: o2, nebs, steroids, Symbicort  2. COPD exacerbation: solumedrol, 02, nebs, Symbicort  3. HTN:   4. HLD:   5. Hypothyroidism:   6. CAD:  7. Paroxysmal atrial fibrillation: Amiodarone, Cardizem for rate control, Eliquis for anticoagulation.  8. Hx CHF: Lasix  9. Deconditioning:continue Pt/Ot.  Case management following for discharge planning.  Patient wishes to pursue short term SNF stay for further rehab.  Referral being sent to SNF in [place].        This document has been electronically signed by STACEY Gómez on March 20, 2018 1:58 PM

## 2018-03-21 VITALS
HEART RATE: 74 BPM | OXYGEN SATURATION: 96 % | HEIGHT: 61 IN | DIASTOLIC BLOOD PRESSURE: 91 MMHG | TEMPERATURE: 96.3 F | BODY MASS INDEX: 35.33 KG/M2 | RESPIRATION RATE: 20 BRPM | SYSTOLIC BLOOD PRESSURE: 136 MMHG | WEIGHT: 187.1 LBS

## 2018-03-21 LAB
ALBUMIN SERPL-MCNC: 3.8 G/DL (ref 3.4–4.8)
ALBUMIN/GLOB SERPL: 1.1 G/DL (ref 1.1–1.8)
ALP SERPL-CCNC: 121 U/L (ref 38–126)
ALT SERPL W P-5'-P-CCNC: 115 U/L (ref 9–52)
ANION GAP SERPL CALCULATED.3IONS-SCNC: 13 MMOL/L (ref 5–15)
AST SERPL-CCNC: 59 U/L (ref 14–36)
BASOPHILS # BLD AUTO: 0.01 10*3/MM3 (ref 0–0.2)
BASOPHILS NFR BLD AUTO: 0.1 % (ref 0–2)
BILIRUB SERPL-MCNC: 0.3 MG/DL (ref 0.2–1.3)
BUN BLD-MCNC: 41 MG/DL (ref 7–21)
BUN/CREAT SERPL: 30.1 (ref 7–25)
CALCIUM SPEC-SCNC: 9.2 MG/DL (ref 8.4–10.2)
CHLORIDE SERPL-SCNC: 98 MMOL/L (ref 95–110)
CO2 SERPL-SCNC: 26 MMOL/L (ref 22–31)
CREAT BLD-MCNC: 1.36 MG/DL (ref 0.5–1)
DEPRECATED RDW RBC AUTO: 58.2 FL (ref 36.4–46.3)
EOSINOPHIL # BLD AUTO: 0 10*3/MM3 (ref 0–0.7)
EOSINOPHIL NFR BLD AUTO: 0 % (ref 0–7)
ERYTHROCYTE [DISTWIDTH] IN BLOOD BY AUTOMATED COUNT: 17.8 % (ref 11.5–14.5)
GFR SERPL CREATININE-BSD FRML MDRD: 37 ML/MIN/1.73
GLOBULIN UR ELPH-MCNC: 3.4 GM/DL (ref 2.3–3.5)
GLUCOSE BLD-MCNC: 166 MG/DL (ref 60–100)
GLUCOSE BLDC GLUCOMTR-MCNC: 222 MG/DL (ref 70–130)
GLUCOSE BLDC GLUCOMTR-MCNC: 253 MG/DL (ref 70–130)
HCT VFR BLD AUTO: 28.9 % (ref 35–45)
HGB BLD-MCNC: 8.7 G/DL (ref 12–15.5)
IMM GRANULOCYTES # BLD: 0.06 10*3/MM3 (ref 0–0.02)
IMM GRANULOCYTES NFR BLD: 0.4 % (ref 0–0.5)
LYMPHOCYTES # BLD AUTO: 0.28 10*3/MM3 (ref 0.6–4.2)
LYMPHOCYTES NFR BLD AUTO: 2.1 % (ref 10–50)
MAGNESIUM SERPL-MCNC: 2.2 MG/DL (ref 1.6–2.3)
MCH RBC QN AUTO: 26.6 PG (ref 26.5–34)
MCHC RBC AUTO-ENTMCNC: 30.1 G/DL (ref 31.4–36)
MCV RBC AUTO: 88.4 FL (ref 80–98)
MONOCYTES # BLD AUTO: 0.43 10*3/MM3 (ref 0–0.9)
MONOCYTES NFR BLD AUTO: 3.2 % (ref 0–12)
NEUTROPHILS # BLD AUTO: 12.75 10*3/MM3 (ref 2–8.6)
NEUTROPHILS NFR BLD AUTO: 94.2 % (ref 37–80)
NRBC BLD MANUAL-RTO: 0 /100 WBC (ref 0–0)
PLATELET # BLD AUTO: 228 10*3/MM3 (ref 150–450)
PMV BLD AUTO: 10.2 FL (ref 8–12)
POTASSIUM BLD-SCNC: 4.3 MMOL/L (ref 3.5–5.1)
PROT SERPL-MCNC: 7.2 G/DL (ref 6.3–8.6)
RBC # BLD AUTO: 3.27 10*6/MM3 (ref 3.77–5.16)
SODIUM BLD-SCNC: 137 MMOL/L (ref 137–145)
WBC NRBC COR # BLD: 13.53 10*3/MM3 (ref 3.2–9.8)

## 2018-03-21 PROCEDURE — G0378 HOSPITAL OBSERVATION PER HR: HCPCS

## 2018-03-21 PROCEDURE — 94799 UNLISTED PULMONARY SVC/PX: CPT

## 2018-03-21 PROCEDURE — 97530 THERAPEUTIC ACTIVITIES: CPT

## 2018-03-21 PROCEDURE — 83735 ASSAY OF MAGNESIUM: CPT | Performed by: INTERNAL MEDICINE

## 2018-03-21 PROCEDURE — 96376 TX/PRO/DX INJ SAME DRUG ADON: CPT

## 2018-03-21 PROCEDURE — 82962 GLUCOSE BLOOD TEST: CPT

## 2018-03-21 PROCEDURE — 25010000002 METHYLPREDNISOLONE PER 125 MG: Performed by: NURSE PRACTITIONER

## 2018-03-21 PROCEDURE — 80053 COMPREHEN METABOLIC PANEL: CPT | Performed by: INTERNAL MEDICINE

## 2018-03-21 PROCEDURE — 85025 COMPLETE CBC W/AUTO DIFF WBC: CPT | Performed by: INTERNAL MEDICINE

## 2018-03-21 PROCEDURE — 97535 SELF CARE MNGMENT TRAINING: CPT

## 2018-03-21 PROCEDURE — 96361 HYDRATE IV INFUSION ADD-ON: CPT

## 2018-03-21 PROCEDURE — 63710000001 INSULIN ASPART PER 5 UNITS: Performed by: NURSE PRACTITIONER

## 2018-03-21 PROCEDURE — 94760 N-INVAS EAR/PLS OXIMETRY 1: CPT

## 2018-03-21 RX ORDER — PREDNISONE 10 MG/1
TABLET ORAL
Qty: 20 TABLET | Refills: 0 | Status: SHIPPED | OUTPATIENT
Start: 2018-03-21 | End: 2018-05-09 | Stop reason: SDUPTHER

## 2018-03-21 RX ORDER — GUAIFENESIN 600 MG/1
1200 TABLET, EXTENDED RELEASE ORAL EVERY 12 HOURS SCHEDULED
Start: 2018-03-21

## 2018-03-21 RX ADMIN — POTASSIUM CHLORIDE 40 MEQ: 750 CAPSULE, EXTENDED RELEASE ORAL at 09:55

## 2018-03-21 RX ADMIN — INSULIN ASPART 4 UNITS: 100 INJECTION, SOLUTION INTRAVENOUS; SUBCUTANEOUS at 10:01

## 2018-03-21 RX ADMIN — IPRATROPIUM BROMIDE AND ALBUTEROL SULFATE 3 ML: 2.5; .5 SOLUTION RESPIRATORY (INHALATION) at 07:49

## 2018-03-21 RX ADMIN — METHYLPREDNISOLONE SODIUM SUCCINATE 60 MG: 125 INJECTION, POWDER, FOR SOLUTION INTRAMUSCULAR; INTRAVENOUS at 09:57

## 2018-03-21 RX ADMIN — INSULIN ASPART 6 UNITS: 100 INJECTION, SOLUTION INTRAVENOUS; SUBCUTANEOUS at 12:10

## 2018-03-21 RX ADMIN — SODIUM CHLORIDE 100 ML/HR: 9 INJECTION, SOLUTION INTRAVENOUS at 05:18

## 2018-03-21 RX ADMIN — APIXABAN 5 MG: 5 TABLET, FILM COATED ORAL at 09:55

## 2018-03-21 RX ADMIN — PANTOPRAZOLE SODIUM 40 MG: 40 TABLET, DELAYED RELEASE ORAL at 05:18

## 2018-03-21 RX ADMIN — IPRATROPIUM BROMIDE AND ALBUTEROL SULFATE 3 ML: 2.5; .5 SOLUTION RESPIRATORY (INHALATION) at 03:19

## 2018-03-21 RX ADMIN — IPRATROPIUM BROMIDE AND ALBUTEROL SULFATE 3 ML: 2.5; .5 SOLUTION RESPIRATORY (INHALATION) at 13:20

## 2018-03-21 RX ADMIN — FUROSEMIDE 40 MG: 40 TABLET ORAL at 09:55

## 2018-03-21 RX ADMIN — DILTIAZEM HYDROCHLORIDE 180 MG: 180 CAPSULE, COATED, EXTENDED RELEASE ORAL at 09:55

## 2018-03-21 RX ADMIN — DOCUSATE SODIUM 200 MG: 100 CAPSULE, LIQUID FILLED ORAL at 09:55

## 2018-03-21 RX ADMIN — GUAIFENESIN 1200 MG: 600 TABLET, EXTENDED RELEASE ORAL at 09:55

## 2018-03-21 RX ADMIN — AMIODARONE HYDROCHLORIDE 200 MG: 200 TABLET ORAL at 09:55

## 2018-03-21 NOTE — PLAN OF CARE
Problem: Patient Care Overview  Goal: Individualization and Mutuality  Outcome: Ongoing (interventions implemented as appropriate)    Goal: Discharge Needs Assessment  Outcome: Ongoing (interventions implemented as appropriate)    Goal: Interprofessional Rounds/Family Conf  Outcome: Ongoing (interventions implemented as appropriate)   03/21/18 0041   Interdisciplinary Rounds/Family Conf   Summary Patient rested well during the night. vvital signs maintianed stable. will continue to monitor.       Problem: Fall Risk (Adult)  Goal: Absence of Fall  Outcome: Ongoing (interventions implemented as appropriate)   03/21/18 0041   Fall Risk (Adult)   Absence of Fall achieves outcome

## 2018-03-21 NOTE — PLAN OF CARE
Problem: Patient Care Overview  Goal: Plan of Care Review   03/21/18 1454   OTHER   Outcome Summary pt demo low endurance with adl and funct tf act although progressing well

## 2018-03-21 NOTE — NURSING NOTE
Talked with patient this morning about her heater going out last night.  She stated that it stated smoking and she thought that a patient was smoking in the hospital.  She said she got up and walked into the hallway to let someone know and see who was smoking.  She stated they came and fixed her heater and she was nervous most of the night and didn't sleep well.

## 2018-03-21 NOTE — DISCHARGE SUMMARY
[facility] Medicine Services  DISCHARGE SUMMARY       Date of Admission: 3/18/2018  Date of Discharge:  3/21/2018  Primary Care Physician: Krista Matos MD    Presenting Problem/History of Present Illness:  Shortness of breath [R06.02]  COPD exacerbation [J44.1]       Final Discharge Diagnoses:  Hospital Problem List     * (Principal)COPD exacerbation    Hypertension (Chronic)    Hyperlipidemia    Paroxysmal atrial fibrillation    Diabetes mellitus (Chronic)    Morbid obesity    CAD (coronary artery disease)    Disease of thyroid gland    Shortness of breath          Consults:   Consults     Date and Time Order Name Status Description    3/19/2018 0029 Hospitalist (on-call MD unless specified)            Procedures Performed:                 Pertinent Test Results:   Lab Results (last 24 hours)     Procedure Component Value Units Date/Time    POC Glucose Once [[id number]]  (Abnormal) Collected:  03/21/18 1034    Specimen:  Blood Updated:  03/21/18 1050     Glucose 253 (H) mg/dL      Comment: RN NotifiedMeter: [id number]: [id number] BROWN LEON       POC Glucose Once [[id number]]  (Abnormal) Collected:  03/21/18 0738    Specimen:  Blood Updated:  03/21/18 0759     Glucose 222 (H) mg/dL      Comment: RN NotifiedMeter: [id number]: [id number] BROWN LEON       Comprehensive Metabolic Panel [[id number]]  (Abnormal) Collected:  03/21/18 0713    Specimen:  Blood Updated:  03/21/18 0733     Glucose 166 (H) mg/dL      BUN 41 (H) mg/dL      Creatinine 1.36 (H) mg/dL      Sodium 137 mmol/L      Potassium 4.3 mmol/L      Chloride 98 mmol/L      CO2 26.0 mmol/L      Calcium 9.2 mg/dL      Total Protein 7.2 g/dL      Albumin 3.80 g/dL      ALT (SGPT) 115 (H) U/L      AST (SGOT) 59 (H) U/L      Alkaline Phosphatase 121 U/L      Total Bilirubin 0.3 mg/dL      eGFR Non African Amer 37 (L) mL/min/1.73      Globulin 3.4 gm/dL      A/G Ratio 1.1 g/dL      BUN/Creatinine  Ratio 30.1 (H)     Anion Gap 13.0 mmol/L     Narrative:       The MDRD GFR formula is only valid for adults with stable renal function between ages 18 and 70.    Magnesium [[id number]]  (Normal) Collected:  03/21/18 0713    Specimen:  Blood Updated:  03/21/18 0733     Magnesium 2.2 mg/dL     CBC Auto Differential [[id number]]  (Abnormal) Collected:  03/21/18 0713    Specimen:  Blood Updated:  03/21/18 0725     WBC 13.53 (H) 10*3/mm3      RBC 3.27 (L) 10*6/mm3      Hemoglobin 8.7 (L) g/dL      Hematocrit 28.9 (L) %      MCV 88.4 fL      MCH 26.6 pg      MCHC 30.1 (L) g/dL      RDW 17.8 (H) %      RDW-SD 58.2 (H) fl      MPV 10.2 fL      Platelets 228 10*3/mm3      Neutrophil % 94.2 (H) %      Lymphocyte % 2.1 (L) %      Monocyte % 3.2 %      Eosinophil % 0.0 %      Basophil % 0.1 %      Immature Grans % 0.4 %      Neutrophils, Absolute 12.75 (H) 10*3/mm3      Lymphocytes, Absolute 0.28 (L) 10*3/mm3      Monocytes, Absolute 0.43 10*3/mm3      Eosinophils, Absolute 0.00 10*3/mm3      Basophils, Absolute 0.01 10*3/mm3      Immature Grans, Absolute 0.06 (H) 10*3/mm3      nRBC 0.0 /100 WBC     Blood Culture - Blood, [[id number]]  (Normal) Collected:  03/19/18 0046    Specimen:  Blood from Arm, Right Updated:  03/21/18 0108     Blood Culture No growth at 2 days    Blood Culture - Blood, [[id number]]  (Normal) Collected:  03/18/18 2305    Specimen:  Blood from Arm, Left Updated:  03/20/18 2316     Blood Culture No growth at 2 days    POC Glucose Once [[id number]]  (Abnormal) Collected:  03/20/18 1932    Specimen:  Blood Updated:  03/20/18 2109     Glucose 192 (H) mg/dL      Comment: RN NotifiedMeter: [id number]: [id number] BASIM YIP       POC Glucose Once [[id number]]  (Abnormal) Collected:  03/20/18 1703    Specimen:  Blood Updated:  03/20/18 1724     Glucose 190 (H) mg/dL      Comment: RN NotifiedMeter: [id number]: [id number] MAXIMINO FLOR       POC Glucose Once [[id number]]  (Abnormal) Collected:   03/20/18 1056    Specimen:  Blood Updated:  03/20/18 1120     Glucose 175 (H) mg/dL      Comment: RN NotifiedMeter: [id number]: [id number] RICKY HAINES           Imaging Results (all)     Procedure Component Value Units Date/Time    XR Chest 1 View [[id number]] Collected:  03/18/18 2305     Updated:  03/18/18 2320    Narrative:         Chest single view on  3/18/2018     CLINICAL INDICATION: Shortness of breath    COMPARISON: 11/25/2017    FINDINGS: Mild cardiomegaly is noted. Mild chronic interstitial  changes are noted. Vascular calcification is noted within a  tortuous aorta. No acute pulmonary opacity is noted. Degenerative  changes are noted in the shoulders.      Impression:       No significant change and no acute disease.    Electronically signed by:  Musa Molina  3/18/2018 11:19 PM  CDT Workstation: [id number]            Chief Complaint on Day of Discharge: none    Hospital Course:  87-year-old  female with past medical history of osteoarthritis, coronary artery disease, congestive heart failure, COPD, chronic hypoxic respiratory failure requiring oxygen dependence of 3-1/2 L, type 2 diabetes, hypothyroidism, hypertension, hyperlipidemia, paroxysmal atrial fibrillation who presented on 3/18/2018 with complaints of worsening shortness of breath.  The patient was treated during her hospitalization for hypoxic respiratory failure related to exacerbation of COPD.  Her treatment during hospitalization included oxygen support, nebulizer treatments, and IV steroid therapy.  Over the course of her 3 day hospital stay the patient was able to be weaned back to better than her home setting of oxygen.  Patient was previously on 3-1/2 L at home and is on 3 L per nasal cannula on day of discharge.  The patient remains on steroids which is been converted to oral prednisone taper pack for discharge.  The patient initially was interested in pursuing transferred to a skilled nursing facility  "at discharge for further physical and occupational therapy, however the patient would be private pay due to her observation status, and she has opted to go home with home health instead.  Home health has been arranged for the patient for further physical, occupational therapy, as well as respiratory assessments was skilled nursing.  Patient was discharged home today in stable condition with instructions to follow-up with her primary care provider within one week of discharge.    Condition on Discharge:  stable    Physical Exam on Discharge:  /58 (BP Location: Left arm, Patient Position: Sitting)   Pulse 62   Temp 97.1 °F (36.2 °C) (Temporal Artery )   Resp 20   Ht 154.9 cm (61\")   Wt 84.9 kg (187 lb 1.6 oz)   SpO2 97%   BMI 35.35 kg/m²   Physical Exam   Constitutional: She is oriented to person, place, and time. She appears well-developed and well-nourished.   HENT:   Head: Normocephalic.   Eyes: Conjunctivae are normal.   Neck: Neck supple.   Cardiovascular: Normal rate, regular rhythm, normal heart sounds and intact distal pulses.    Pulmonary/Chest: Effort normal. She has wheezes.   Mild expiratory wheeze   Abdominal: Soft. Bowel sounds are normal.   Musculoskeletal: Normal range of motion. She exhibits no edema.   Neurological: She is alert and oriented to person, place, and time.   Skin: Skin is warm and dry.   Psychiatric: She has a normal mood and affect. Her behavior is normal.   Vitals reviewed.        Discharge Disposition:  Home-Health Care [facility]    Discharge Medications:   Norma Harkins   Home Medication Instructions REHANA:[id number]    Printed on:03/21/18 1106   Medication Information                      acetaminophen (TYLENOL) 500 MG tablet  Take 500 mg by mouth 1 (one) time as needed for mild pain (1-3).             aclidinium bromide (TUDORZA PRESSAIR) 400 MCG/ACT aerosol powder  powder for inhalation  Inhale 1 puff 2 (Two) Times a Day.             albuterol (ACCUNEB) 0.63 MG/3ML " nebulizer solution  Take 3 mL by nebulization Every 6 (Six) Hours As Needed for Wheezing.             albuterol (PROVENTIL HFA;VENTOLIN HFA) 108 (90 Base) MCG/ACT inhaler  Inhale 2 puffs 4 (Four) Times a Day.             amiodarone (PACERONE) 200 MG tablet  Take 1 tablet by mouth 2 (Two) Times a Day.             apixaban (ELIQUIS) 5 MG tablet tablet  Take 1 tablet by mouth Every 12 (Twelve) Hours.             Calcium Carbonate ([employer] CALCIUM PO)  Take 1,200 mg by mouth 2 (Two) Times a Day.             Cholecalciferol (VITAMIN D3) 70762 units tablet  Take 1 tablet by mouth 1 (One) Time Per Week.             diltiaZEM CD (CARDIZEM CD) 180 MG 24 hr capsule  Take 1 capsule by mouth Daily.             docusate sodium (COLACE) 100 MG capsule  Take 300 mg by mouth Every Night.             furosemide (LASIX) 40 MG tablet  Take 1 tablet by mouth 2 (Two) Times a Day.             guaiFENesin (MUCINEX) 600 MG 12 hr tablet  Take 2 tablets by mouth Every 12 (Twelve) Hours.             HYDROcodone-acetaminophen (NORCO) 5-325 MG per tablet  Take 1 tablet by mouth Every 8 (Eight) Hours As Needed for Moderate Pain .             ondansetron ODT (ZOFRAN-ODT) 4 MG disintegrating tablet  Take 1 tablet by mouth Every 8 (Eight) Hours As Needed for Nausea or Vomiting.             potassium chloride (K-DUR,KLOR-CON) 10 MEQ CR tablet  take 1 tablet by mouth twice a day             predniSONE (DELTASONE) 10 MG tablet  40 mg daily x 2 days, then 30 mg daily x 2 days, then 20 mg daily x2 days, then 10 mg daily x2 days.             SYMBICORT 160-4.5 MCG/ACT inhaler  inhale 2 puffs by mouth twice a day Rinse mouth after use                 Discharge Diet:   Diet Instructions     Diet: Cardiac       Discharge Diet:  Cardiac          Activity at Discharge:   Activity Instructions     Activity as Tolerated             Discharge Care Plan/Instructions: Follow up with PCP within one week of discharge.     Follow-up Appointments:   Future  Appointments  Date Time Provider Department Center   3/27/2018 2:30 PM rKista Matos MD [facility] None   4/12/2018 2:45 PM Sofya Graham MD [facility] None   5/21/2018 2:30 PM MD PRASHANTH [facility] None       Test Results Pending at Discharge:  Order Current Status    Blood Culture - Blood, Preliminary result    Blood Culture - Blood, Preliminary result                This document has been electronically signed by STACEY Gómez on March 21, 2018 11:01 AM

## 2018-03-21 NOTE — THERAPY TREATMENT NOTE
Inpatient Rehabilitation - Occupational Therapy Treatment Note  [facility]     Patient Name: Norma Harkins  : 1931  MRN: [id number]  Today's Date: 3/21/2018  Onset of Illness/Injury or Date of Surgery: 18  Date of Referral to OT: 18  Referring Physician: Dr. Maldonado    Admit Date: 3/18/2018       ICD-10-CM ICD-9-CM   1. COPD exacerbation J44.1 491.21   2. Shortness of breath R06.02 786.05   3. Impaired mobility and activities of daily living Z74.09 799.89   4. Impaired functional mobility, balance, gait, and endurance Z74.09 V49.89     Patient Active Problem List   Diagnosis   • Hypertension   • Low back pain   • Hyperlipidemia   • Paroxysmal atrial fibrillation   • Diabetes mellitus   • Chronic obstructive pulmonary disease   • Morbid obesity   • Physical deconditioning   • Chronic respiratory failure with hypoxia   • Atrial fibrillation   • COPD exacerbation   • Obesity   • CHF (congestive heart failure)   • Arthritis   • CAD (coronary artery disease)   • Disease of thyroid gland   • Shortness of breath     Past Medical History:   Diagnosis Date   • Arthritis    • CAD (coronary artery disease)    • CAD (coronary artery disease)    • CHF (congestive heart failure)    • Chronic obstructive pulmonary disease    • Chronic respiratory failure with hypoxia    • Diabetes mellitus    • Disease of thyroid gland    • Hyperlipidemia    • Hypertension    • Obesity    • Paroxysmal atrial fibrillation      Past Surgical History:   Procedure Laterality Date   • CATARACT EXTRACTION     • KNEE ARTHROPLASTY         Therapy Treatment    Therapy Treatment / Health Promotion    Treatment Time/Intention  Discipline: occupational therapy assistant (18 1042 : BRANDYN Magdaleno)  Document Type: progress note/recertification (18 1042 : BRANDYN Magdaleno)  Subjective Information: complains of (18 1042 : BRANDYN Magdaleno)  Mode of Treatment: occupational therapy (18  1042 : Neha Mccray NGUYEN/L)    Vitals/Pain/Safety  Vital Signs  Intratreatment Heart Rate (beats/min): 65 (03/21/18 1042 : Neha Mccray NGUYEN/L)  Pre SpO2 (%): 92 (03/21/18 1042 : Neha Mccray NGUYEN/L)  Intra SpO2 (%): 88 (03/21/18 1042 : Neha Mccray NGUYEN/L)  Post SpO2 (%): 90 (03/21/18 1042 : Neha Mccray NGUYEN/L)  Positioning and Restraints  Pre-Treatment Position: sitting in chair/recliner (03/21/18 1042 : LALITHA MagdalenoA/L)  Post Treatment Position: chair (03/21/18 1042 : LALITHA MagdalenoA/L)    Mobility,ADL,Motor, Modality  Sit-Stand Transfer  Sit-Stand [place] (Transfers): supervision (03/21/18 1042 : PATRICK Magdaleno/L)  Stand-Sit Transfer  Stand-Sit [place] (Transfers): supervision (03/21/18 1042 : LALITHA MagdalenoA/L)  Toilet Transfer  Type (Toilet Transfer): sit-stand, stand-sit (03/21/18 1042 : LALITHA MagdalenoA/L)  [place] Level (Toilet Transfer): supervision (03/21/18 1042 : LALITHA MagdalenoA/L)  ADL Assessment/Intervention  BADL Assessment/Intervention: bathing, upper body dressing, lower body dressing, grooming, toileting (03/21/18 1042 : PATRICK Magdaleno/L)  Bathing Assessment/Intervention  Bathing [place] Level: bathing skills, lower body, upper body, contact guard assist, supervision (03/21/18 1042 : PATRICK Magdaleno/L)  Upper Body Dressing Assessment/Training  Upper Body Dressing [place] Level: upper body dressing skills, doff, don, pajama/robe, set up, independent (03/21/18 1042 : LALITHA MagdalenoA/L)  Lower Body Dressing Assessment/Training  Lower Body Dressing [place] Level: lower body dressing skills, doff, don, shoes/slippers, supervision (03/21/18 1042 : PATRICK Magdaleno/L)  Grooming Assessment/Training  [place] Level (Grooming): grooming skills, hair care, combing/brushing, independent (03/21/18 1042 : PATRICK Magdaleno/L)  Toileting  Assessment/Training  [place] Level (Toileting): toileting skills, supervision (03/21/18 1042 : Neha Mccray NGUYEN/L)              ROM/MMT             Sensory, Edema, Orthotics          Cognition, Communication, Swallow  Speaking Valve  Pre SpO2 (%): 92 (03/21/18 1042 : Neha Mccray NGUYEN/L)  Post SpO2 (%): 90 (03/21/18 1042 : Neha Mccray NGUYEN/L)  General Eating/Swallowing Observations  Pre SpO2 (%): 92 (03/21/18 1042 : Neha Mccray NGUYEN/L)  Post SpO2 (%): 90 (03/21/18 1042 : Neha Mccray NGUYEN/L)    Outcome Summary  Rehab Outcome Summary/Treatment Plan  Daily Summary of Progress (OT): progress toward functional goals as expected (03/21/18 1042 : Neha Mccray NGUYEN/L)        OT Rehab Goals     Row Name 03/21/18 1042 03/20/18 1100 03/19/18 1050       Transfer Goal 1 (OT)    Activity/Assistive Device (Transfer Goal 1, OT) transfers, all  -LM transfers, all  -LM transfers, all  -RB    [place] Level/Cues Needed (Transfer Goal 1, OT) conditional independence  -LM conditional independence  -LM conditional independence  -RB    Time Frame (Transfer Goal 1, OT) long term goal (LTG);by discharge  -LM long term goal (LTG);by discharge  -LM long term goal (LTG);by discharge  -RB    Progress/Outcome (Transfer Goal 1, OT) goal not met;continuing progress toward goal  -LM goal not met  -LM goal not met  -RB       Bathing Goal 1 (OT)    Activity/Assistive Device (Bathing Goal 1, OT) bathing skills, all  -LM bathing skills, all  -LM bathing skills, all  -RB    [place] Level/Cues Needed (Bathing Goal 1, OT) conditional independence  -LM conditional independence  -LM conditional independence  -RB    Time Frame (Bathing Goal 1, OT) long term goal (LTG)  -LM long term goal (LTG)  -LM long term goal (LTG);by discharge  -RB    Progress/Outcomes (Bathing Goal 1, OT) goal not met;continuing progress toward goal  -LM goal not met  -LM goal not met  -RB       Dressing Goal 1 (OT)     Activity/Assistive Device (Dressing Goal 1, OT) dressing skills, all  -LM dressing skills, all  -LM dressing skills, all  -RB    [place]/Cues Needed (Dressing Goal 1, OT) conditional independence  -LM conditional independence  -LM conditional independence  -RB    Time Frame (Dressing Goal 1, OT) long term goal (LTG)  -LM long term goal (LTG)  -LM long term goal (LTG);by discharge  -RB    Progress/Outcome (Dressing Goal 1, OT) goal not met  -LM goal not met  -LM goal not met  -RB       Patient Education Goal (OT)    Activity (Patient Education Goal, OT) --   B UE HEP with no shld exercise, EC/WS and home safety/fall p  -LM --   B UE HEP with no shld exercise, EC/WS and home safety/fall p  -LM B UE HEP with no shld exercise, EC/WS and home safety/fall prevention.  -RB    [place]/Cues/Accuracy (Memory Goal 2, OT) demonstrates adequately  -LM demonstrates adequately  -LM demonstrates adequately;verbalizes understanding  -RB    Time Frame (Patient Education Goal, OT) long term goal (LTG)  -LM long term goal (LTG)  -LM long term goal (LTG);by discharge  -RB    Progress/Outcome (Patient Education Goal, OT) goal not met  -LM goal not met  -LM goal not met  -RB      User Key  (r) = Recorded By, (t) = Taken By, (c) = Cosigned By    Initials Name Provider Type    RB Mc Mueller OT Occupational Therapist    BRANDYN Vazquez Occupational Therapy Assistant        Occupational Therapy Education     Title: PT OT SLP Therapies (Active)     Topic: Occupational Therapy (Active)     Point: ADL training (Active)     Description: Instruct learner(s) on proper safety adaptation and remediation techniques during self care or transfers.   Instruct in proper use of assistive devices.   Learning Progress Summary     Learner Status Readiness Method Response Comment Documented by    Patient Active Acceptance E NR  LM 03/21/18 7357     Done Acceptance E VU  LM 03/20/18 1420          Point: Home exercise program (Active)      Description: Instruct learner(s) on appropriate technique for monitoring, assisting and/or progressing therapeutic exercises/activities.   Learning Progress Summary     Learner Status Readiness Method Response Comment Documented by    Patient Active Acceptance E NR  LM 03/21/18 1454     Done Acceptance E VU  LM 03/20/18 1420          Point: Precautions (Active)     Description: Instruct learner(s) on prescribed precautions during self-care and functional transfers.   Learning Progress Summary     Learner Status Readiness Method Response Comment Documented by    Patient Active Acceptance E NR  LM 03/21/18 1454     Done Acceptance E VU  LM 03/20/18 1420     Done Acceptance E VU Edu pt on use of gait belt and non skid socks/shoes when OOB and no OOB without assist. RB 03/19/18 1418          Point: Body mechanics (Active)     Description: Instruct learner(s) on proper positioning and spine alignment during self-care, functional mobility activities and/or exercises.   Learning Progress Summary     Learner Status Readiness Method Response Comment Documented by    Patient Active Acceptance E NR  LM 03/21/18 1454     Done Acceptance E VU  LM 03/20/18 1420                      User Key     Initials Effective Dates Name Provider Type Discipline    RB 06/15/16 -  Mc Mueller, OT Occupational Therapist OT     03/07/18 -  BRANDYN Magdaleno Occupational Therapy Assistant OT                  OT Recommendation and Plan  Rehab Outcome Summary/Treatment Plan  Daily Summary of Progress (OT): progress toward functional goals as expected  Daily Summary of Progress (OT): progress toward functional goals as expected  Plan of Care Review  Plan of Care Reviewed With: patient  Plan of Care Reviewed With: patient  Outcome Summary: pt demo low endurance with adl and funct tf act although progressing well        Outcome Measures     Row Name 03/20/18 1100 03/19/18 1355 03/19/18 1050       How much help from another person do you  currently need...    Turning from your back to your side while in flat bed without using bedrails? 4  -TA 4  -MN  --    Moving from lying on back to sitting on the side of a flat bed without bedrails? 3  -TA 3  -MN  --    Moving to and from a bed to a chair (including a wheelchair)? 3  -TA 3  -MN  --    Standing up from a chair using your arms (e.g., wheelchair, bedside chair)? 3  -TA 3  -MN  --    Climbing 3-5 steps with a railing? 3  -TA 3  -MN  --    To walk in hospital room? 3  -TA 3  -MN  --    AM-PAC 6 Clicks Score 19  -TA 19  -MN  --       How much help from another is currently needed...    Putting on and taking off regular lower body clothing?  --  -- 3  -RB    Bathing (including washing, rinsing, and drying)  --  -- 2  -RB    Toileting (which includes using toilet bed pan or urinal)  --  -- 3  -RB    Putting on and taking off regular upper body clothing  --  -- 3  -RB    Taking care of personal grooming (such as brushing teeth)  --  -- 4  -RB    Eating meals  --  -- 4  -RB    Score  --  -- 19  -RB       Functional Assessment    Outcome Measure Options AM-PAC 6 Clicks Basic Mobility (PT)  -TA AM-PAC 6 Clicks Basic Mobility (PT)  -MN AM-PAC 6 Clicks Daily Activity (OT)  -RB      User Key  (r) = Recorded By, (t) = Taken By, (c) = Cosigned By    Initials Name Provider Type    RB Mc Mueller, OT Occupational Therapist    NISHA Perez, PT Physical Therapist    MIKE Carlisle, PTA Physical Therapy Assistant           Time Calculation:         Time Calculation- OT     Row Name 03/21/18 1436             Time Calculation- OT    OT Start Time 1042  -LM      OT Stop Time 1105  -LM      OT Time Calculation (min) 23 min  -LM      Total Timed Code Minutes- OT 23 minute(s)  -LM      OT Received On 03/21/18  -LM        User Key  (r) = Recorded By, (t) = Taken By, (c) = Cosigned By    Initials Name Provider Type    PATRICK Vazquez/L Occupational Therapy Assistant           Therapy Charges for Today      Code Description Service Date Service Provider Modifiers Qty    78965851010 HC OT THERAPEUTIC ACT EA 15 MIN 3/20/2018 Neha Mccray NGUYEN/L GO 1    98354573820 HC OT THER PROC EA 15 MIN 3/20/2018 Neha Mccray NGUYEN/L GO 1    78725431278 HC OT SELF CARE/MGMT/TRAIN EA 15 MIN 3/20/2018 Neha Mccray NGUYEN/L GO 1    53358236018 HC OT SELF CARE/MGMT/TRAIN EA 15 MIN 3/21/2018 Neha Mccray NGUYEN/L GO 1    09829081601 HC OT THERAPEUTIC ACT EA 15 MIN 3/21/2018 Neha Mccray NGUYEN/L GO 1          OT G-codes  OT Professional Judgement Used?: Yes  OT Functional Scales Options: AM-PAC 6 Clicks Daily Activity (OT)  Score: 19  Functional Limitation: Self care  Self Care Current Status (): At least 40 percent but less than 60 percent impaired, limited or restricted  Self Care Goal Status (): At least 20 percent but less than 40 percent impaired, limited or restricted    Neha Mccray NGUYEN/L  3/21/2018

## 2018-03-21 NOTE — DISCHARGE PLACEMENT REQUEST
"Norma Harkins (87 y.o. Female)     Date of Birth Social Security Number Address Home Phone MRN    01/05/1931  [address]  [place] [address] [phone] [id number]    Faith Marital Status          Mandaen        Admission Date Admission Type Admitting Provider Attending Provider Department, Room/Bed    3/18/18 Emergency Alfredo Maldonado MD Patel, Harshul Amrutlal, MD [address], 431/1    Discharge Date Discharge Disposition Discharge Destination         [place] [facility] [place]              Attending Provider:  Alfredo Maldonado MD    Allergies:  Rocephin [Ceftriaxone]    Isolation:  None   Infection:  None   Code Status:  FULL    Ht:  154.9 cm (61\")   Wt:  84.9 kg (187 lb 1.6 oz)    Admission Cmt:  None   Principal Problem:  COPD exacerbation [J44.1]                 Active Insurance as of 3/18/2018     Primary Coverage     Payor Plan Insurance Group Employer/Plan Group    MEDICARE MEDICARE A & B      Payor Plan Address Payor Plan Phone Number Effective From Effective To    [address] [phone] 1/1/1996     [place] [address]       Subscriber Name Subscriber Birth Date Member ID       NORMA HARKINS 1/5/1931 [id number]           Secondary Coverage     Payor Plan Insurance Group Employer/Plan Group    AARP MED SUPP AARP HEALTH CARE OPTIONS [id number]     Payor Plan Address Payor Plan Phone Number Effective From Effective To    [facility] [phone] 1/1/2016     [address]       [place] [address]       Subscriber Name Subscriber Birth Date Member ID       NORMA HARKINS 1/5/1931 [id number]                 Emergency Contacts      (Rel.) Home Phone Work Phone Mobile Phone    HarryDang teague (Daughter) [phone]    Rosie Dhaliwal (Daughter) [phone] -- [phone]               History & Physical      Alfredo Maldonado MD at 3/19/2018 12:31 AM                [facility] " [facility] Medicine Services  INPATIENT HISTORY AND PHYSICAL       Patient Care Team:  Krista Matos MD as PCP - General  Krista Matos MD as PCP - Claims Attributed  Jenny Moses, RN as Care Coordinator (Population Health)    Date of Admission March 19, 2018 6:56 AM      Chief complaint   Chief Complaint   Patient presents with   • Shortness of Breath       Subjective     Patient is a 87 y.o. female presents with Complaint of having shortness of breath which has been worsening over last 1 week.  Patient said her shortness of breath has been worsening despite of taking extra dose of Lasix.  Patient states she's been coughing up whitish to yellow colored sputum.  No fever or chills have been reported.  No nausea or vomiting have been reported.  Patient does complain of lower shortness swelling.  Patient denies any change in bowel habits or dietary intake.  Patient does complain of having proximal nocturnal dyspnea and states her excess tolerance has significantly reduced over past few days due to shortness of air.  No urinary complaint have been reported.  No chest chest pain or palpitation have been reported.    Review of Systems   Review of Systems   Constitutional: Negative for appetite change, chills, diaphoresis and fever.   HENT: Negative for congestion, rhinorrhea, sore throat and trouble swallowing.    Eyes: Negative for visual disturbance.   Respiratory: Positive for cough, chest tightness and shortness of breath. Negative for wheezing.    Cardiovascular: Positive for leg swelling. Negative for chest pain and palpitations.   Gastrointestinal: Negative for abdominal pain, blood in stool, diarrhea, nausea and vomiting.   Endocrine: Negative for cold intolerance and heat intolerance.   Genitourinary: Negative for decreased urine volume and difficulty urinating.   Musculoskeletal: Negative for back pain, gait problem and neck pain.   Skin: Negative for rash.   Neurological:  Positive for weakness. Negative for dizziness, syncope, light-headedness, numbness and headaches.   Psychiatric/Behavioral: The patient is not nervous/anxious.        History  Past Medical History:   Diagnosis Date   • Arthritis    • CAD (coronary artery disease)    • CAD (coronary artery disease)    • CHF (congestive heart failure)    • Chronic obstructive pulmonary disease    • Chronic respiratory failure with hypoxia    • Diabetes mellitus    • Disease of thyroid gland    • Hyperlipidemia    • Hypertension    • Obesity    • Paroxysmal atrial fibrillation      Past Surgical History:   Procedure Laterality Date   • CATARACT EXTRACTION     • KNEE ARTHROPLASTY       Family History   Problem Relation Age of Onset   • Hypertension Father      Social History   Substance Use Topics   • Smoking status: Former Smoker   • Smokeless tobacco: Never Used   • Alcohol use No     Prescriptions Prior to Admission   Medication Sig Dispense Refill Last Dose   • acetaminophen (TYLENOL) 500 MG tablet Take 500 mg by mouth 1 (one) time as needed for mild pain (1-3).   Taking   • albuterol (ACCUNEB) 0.63 MG/3ML nebulizer solution Take 3 mL by nebulization Every 6 (Six) Hours As Needed for Wheezing. 1290 vial 1    • albuterol (PROVENTIL HFA;VENTOLIN HFA) 108 (90 Base) MCG/ACT inhaler Inhale 2 puffs 4 (Four) Times a Day. 1 inhaler 1 3/18/2018 at Unknown time   • amiodarone (PACERONE) 200 MG tablet Take 1 tablet by mouth 2 (Two) Times a Day. 180 tablet 1 3/18/2018 at Unknown time   • apixaban (ELIQUIS) 5 MG tablet tablet Take 1 tablet by mouth Every 12 (Twelve) Hours. 180 tablet 1 3/18/2018 at Unknown time   • Calcium Carbonate ([facility] CALCIUM PO) Take 1,200 mg by mouth 2 (Two) Times a Day.   3/18/2018 at Unknown time   • Cholecalciferol (VITAMIN D3) 88134 units tablet Take 1 tablet by mouth 1 (One) Time Per Week. (Patient taking differently: Take 1 tablet by mouth 1 (One) Time Per Week. On Wednesdays.) 4 tablet 5 Past Week at Unknown time   •  diltiaZEM CD (CARDIZEM CD) 180 MG 24 hr capsule Take 1 capsule by mouth Daily. 90 capsule 3 3/18/2018 at Unknown time   • docusate sodium (COLACE) 100 MG capsule Take 300 mg by mouth Every Night.   3/18/2018 at Unknown time   • doxycycline (VIBRAMYCIN) 100 MG capsule Take 1 capsule by mouth 2 (Two) Times a Day. 14 capsule 0    • furosemide (LASIX) 40 MG tablet Take 1 tablet by mouth 2 (Two) Times a Day. 180 tablet 1 3/18/2018 at Unknown time   • HYDROcodone-acetaminophen (NORCO) 5-325 MG per tablet Take 1 tablet by mouth Every 8 (Eight) Hours As Needed for Moderate Pain . 12 tablet 0 Taking   • ondansetron ODT (ZOFRAN-ODT) 4 MG disintegrating tablet Take 1 tablet by mouth Every 8 (Eight) Hours As Needed for Nausea or Vomiting. 12 tablet 0 Taking   • potassium chloride (K-DUR,KLOR-CON) 10 MEQ CR tablet take 1 tablet by mouth twice a day 60 tablet 5 3/18/2018 at Unknown time   • SYMBICORT 160-4.5 MCG/ACT inhaler inhale 2 puffs by mouth twice a day Rinse mouth after use 30.6 g 3 Taking   • aclidinium bromide (TUDORZA PRESSAIR) 400 MCG/ACT aerosol powder  powder for inhalation Inhale 1 puff 2 (Two) Times a Day. 2 each 0 Taking   • MethylPREDNISolone (MEDROL) 4 MG tablet Take as directed on package instructions. 1 each 0 Taking     Allergies:  Rocephin [ceftriaxone]  Prior to Admission medications    Medication Sig Start Date End Date Taking? Authorizing Provider   acetaminophen (TYLENOL) 500 MG tablet Take 500 mg by mouth 1 (one) time as needed for mild pain (1-3).   Yes Historical Provider, MD   albuterol (ACCUNEB) 0.63 MG/3ML nebulizer solution Take 3 mL by nebulization Every 6 (Six) Hours As Needed for Wheezing. 2/26/18  Yes Krista Matos MD   albuterol (PROVENTIL HFA;VENTOLIN HFA) 108 (90 Base) MCG/ACT inhaler Inhale 2 puffs 4 (Four) Times a Day. 12/15/17  Yes Krista Matos MD   amiodarone (PACERONE) 200 MG tablet Take 1 tablet by mouth 2 (Two) Times a Day. 2/26/18  Yes Krista Matos MD   apixaban (ELIQUIS) 5  MG tablet tablet Take 1 tablet by mouth Every 12 (Twelve) Hours. 2/26/18  Yes Krista Matos MD   Calcium Carbonate (CVS CALCIUM PO) Take 1,200 mg by mouth 2 (Two) Times a Day.   Yes Historical Provider, MD   Cholecalciferol (VITAMIN D3) 24418 units tablet Take 1 tablet by mouth 1 (One) Time Per Week.  Patient taking differently: Take 1 tablet by mouth 1 (One) Time Per Week. On Wednesdays. 10/20/17  Yes Krista Matos MD   diltiaZEM CD (CARDIZEM CD) 180 MG 24 hr capsule Take 1 capsule by mouth Daily. 11/30/17  Yes Sofya Graham MD   docusate sodium (COLACE) 100 MG capsule Take 300 mg by mouth Every Night.   Yes Historical Provider, MD   doxycycline (VIBRAMYCIN) 100 MG capsule Take 1 capsule by mouth 2 (Two) Times a Day. 2/26/18  Yes Krista Matos MD   furosemide (LASIX) 40 MG tablet Take 1 tablet by mouth 2 (Two) Times a Day. 2/26/18  Yes Krista Matos MD   HYDROcodone-acetaminophen (NORCO) 5-325 MG per tablet Take 1 tablet by mouth Every 8 (Eight) Hours As Needed for Moderate Pain . 1/27/18  Yes Mustapha Peterson MD   ondansetron ODT (ZOFRAN-ODT) 4 MG disintegrating tablet Take 1 tablet by mouth Every 8 (Eight) Hours As Needed for Nausea or Vomiting. 1/27/18  Yes AMOS Godwin   potassium chloride (K-DUR,KLOR-CON) 10 MEQ CR tablet take 1 tablet by mouth twice a day 10/17/17  Yes Krista Matos MD   SYMBICORT 160-4.5 MCG/ACT inhaler inhale 2 puffs by mouth twice a day Rinse mouth after use 1/11/18  Yes Krista Matos MD   aclidinium bromide (TUDORZA PRESSAIR) 400 MCG/ACT aerosol powder  powder for inhalation Inhale 1 puff 2 (Two) Times a Day. 12/10/17   Krista Matos MD   MethylPREDNISolone (MEDROL) 4 MG tablet Take as directed on package instructions. 1/3/18   Krista Matos MD       Objective     Vital Signs    Temp:  [96.4 °F (35.8 °C)-97.8 °F (36.6 °C)] 96.4 °F (35.8 °C)  Heart Rate:  [68-82] 82  Resp:  [18-22] 18  BP: (109-135)/(54-75) 109/55    Physical Exam:      Physical Exam    Constitutional: She is oriented to person, place, and time. She appears well-developed and well-nourished.   HENT:   Head: Normocephalic and atraumatic.   Nose: Nose normal.   Eyes: Conjunctivae and EOM are normal. Pupils are equal, round, and reactive to light.   Neck: Normal range of motion. Neck supple. No JVD present. No tracheal deviation present. No thyromegaly present.   Cardiovascular: Normal rate, regular rhythm, normal heart sounds and intact distal pulses.    Pulmonary/Chest: She is in respiratory distress. She has wheezes. She has rales. She exhibits no tenderness.   Abdominal: Soft. Bowel sounds are normal. She exhibits no distension. There is no tenderness. There is no rebound and no guarding.   Musculoskeletal: Normal range of motion. She exhibits edema.   Lymphadenopathy:     She has no cervical adenopathy.   Neurological: She is alert and oriented to person, place, and time. She has normal reflexes. No cranial nerve deficit.   Skin: Skin is warm and dry.   Intact   Psychiatric: She has a normal mood and affect.   Nursing note and vitals reviewed.      Results Review:       Results from last 7 days  Lab Units 03/18/18  2305   SODIUM mmol/L 136*   POTASSIUM mmol/L 3.3*   CHLORIDE mmol/L 90*   CO2 mmol/L 30.0   BUN mg/dL 43*   CREATININE mg/dL 1.51*   GLUCOSE mg/dL 119*   CALCIUM mg/dL 9.3         Results from last 7 days  Lab Units 03/18/18  2305   MAGNESIUM mg/dL 2.2   PHOSPHORUS mg/dL 4.9*         Results from last 7 days  Lab Units 03/18/18  2305   WBC 10*3/mm3 10.92*   HEMOGLOBIN g/dL 10.5*   HEMATOCRIT % 33.7*   PLATELETS 10*3/mm3 244         Results from last 7 days  Lab Units 03/18/18  2305   INR  1.42*     Imaging Results (last 7 days)     Procedure Component Value Units Date/Time    XR Chest 1 View [[id number]] Collected:  03/18/18 2305     Updated:  03/18/18 2320    Narrative:         Chest single view on  3/18/2018     CLINICAL INDICATION: Shortness of breath    COMPARISON:  2017    FINDINGS: Mild cardiomegaly is noted. Mild chronic interstitial  changes are noted. Vascular calcification is noted within a  tortuous aorta. No acute pulmonary opacity is noted. Degenerative  changes are noted in the shoulders.      Impression:       No significant change and no acute disease.    Electronically signed by:  Musa Molina  3/18/2018 11:19 PM  CDT Workstation: [id number]          Assessment/Plan     Principal Problem:    COPD exacerbation  Active Problems:    Hypertension    Hyperlipidemia    Diabetes mellitus    Hematuria    Morbid obesity    CAD (coronary artery disease)    Disease of thyroid gland    Shortness of breath      -We'll give IV steroids and nebulizer treatment.  -We'll closely monitor patient's BUN/creatinine.  -We'll get PTOT evaluation.  -We'll hold off on antibiotics at this point in time.  -We'll resume patient's home medication as prior to coming to hospital.  -DVT and GI prophylaxis in place.  -We'll continue monitoring patient in hospital setting and treat patient as course dictates.    I discussed the patients findings and my recommendations with patient and nursing staff.         This document has been electronically signed by Alfredo Maldonado MD on 2018 6:56 AM        Total Time Spent: 43 minutes.    EMR Dragon/Transcription disclaimer:   Dictated utilizing Dragon dictation.           Electronically signed by Alfredo Maldonado MD at 3/19/2018  7:39 AM        [address] [address]  Phone:  [phone]  Fax:   Date: Mar 21, 2018      Ambulatory Referral to Home Health     Patient:  Norma Harkins MRN:  [id number]   [address] [place] [address] :  1931  SSN:    Phone: [phone] Sex:  F      INSURANCE PAYOR PLAN GROUP # SUBSCRIBER ID   Primary:  Secondary: MEDICARE  AARP MED SUPP [id number]  [id number]    [id number]  [id number]       Referring Provider Information:  HARJINDER CONKLIN Phone: [phone] Fax:       Referral Information:   # Visits:  1 Referral Type: Home Health [42]   Urgency:  Routine Referral Reason: Specialty Services Required   Start Date: Mar 21, 2018 End Date:  To be determined by Insurer   Diagnosis: COPD exacerbation (J44.1 [ICD-10-CM] 491.21 [ICD-9-CM])      Refer to Dept:   Refer to Provider:   Refer to Facility:       Face to Face Visit Date: 3/21/2018  Follow-up Provider for Plan of Care? I treated the patient in an acute care facility and will not continue treatment after discharge.  Follow-up Provider: TIFFANY FOX [[id number]]  Reason/Clinical Findings: deconditioning r/t copd  Describe mobility limitations that make leaving home difficult: deconditioning r/t copd  Nursing/Therapeutic Services Requested: Skilled Nursing  Nursing/Therapeutic Services Requested: Physical Therapy  Nursing/Therapeutic Services Requested: Occupational Therapy  Skilled nursing orders: COPD management  PT orders: Strengthening  Occupational orders: Activities of daily living  Occupational orders: Strengthening  Frequency: 1 Week 1     This document serves as a request of services and does not constitute Insurance authorization or approval of services.  To determine eligibility, please contact the members Insurance carrier to verify and review coverage.     If you have medical questions regarding this request for services. Please contact [address] at [phone] between the hours of 8:00am - 5:00pm (Mon-Fri).             Authorizing Provider:STACEY Gómez  Authorizing Provider's NPI: [id number]  Order Entered By: STACEY Gómez 3/21/2018 11:00 AM     Electronically signed by: STACEY Gómez 3/21/2018 11:00 AM

## 2018-03-21 NOTE — THERAPY DISCHARGE NOTE
Acute Care - Physical Therapy Discharge Summary  [facility]       Patient Name: Norma Harkins  : 1931  MRN: [id number]    Today's Date: 3/21/2018  Onset of Illness/Injury or Date of Surgery: 18    Date of Referral to PT: 18  Referring Physician: Dr. Maldonado      Admit Date: 3/18/2018      PT Recommendation and Plan    Visit Dx:    ICD-10-CM ICD-9-CM   1. COPD exacerbation J44.1 491.21   2. Shortness of breath R06.02 786.05   3. Impaired mobility and activities of daily living Z74.09 799.89   4. Impaired functional mobility, balance, gait, and endurance Z74.09 V49.89             Outcome Measures     Row Name 18 1100 18 1355 18 1050       How much help from another person do you currently need...    Turning from your back to your side while in flat bed without using bedrails? 4  -TA 4  -MN  --    Moving from lying on back to sitting on the side of a flat bed without bedrails? 3  -TA 3  -MN  --    Moving to and from a bed to a chair (including a wheelchair)? 3  -TA 3  -MN  --    Standing up from a chair using your arms (e.g., wheelchair, bedside chair)? 3  -TA 3  -MN  --    Climbing 3-5 steps with a railing? 3  -TA 3  -MN  --    To walk in hospital room? 3  -TA 3  -MN  --    AM-PAC 6 Clicks Score 19  -TA 19  -MN  --       How much help from another is currently needed...    Putting on and taking off regular lower body clothing?  --  -- 3  -RB    Bathing (including washing, rinsing, and drying)  --  -- 2  -RB    Toileting (which includes using toilet bed pan or urinal)  --  -- 3  -RB    Putting on and taking off regular upper body clothing  --  -- 3  -RB    Taking care of personal grooming (such as brushing teeth)  --  -- 4  -RB    Eating meals  --  -- 4  -RB    Score  --  -- 19  -RB       Functional Assessment    Outcome Measure Options AM-PAC 6 Clicks Basic Mobility (PT)  -TA AM-PAC 6 Clicks Basic Mobility (PT)  -MN AM-PAC 6 Clicks Daily Activity (OT)  -RB      User Key   (r) = Recorded By, (t) = Taken By, (c) = Cosigned By    Initials Name Provider Type    NICKY Mueller, OT Occupational Therapist    NISHA Perez, PT Physical Therapist    MIKE Carlisle PTA Physical Therapy Assistant                      PT Rehab Goals     Row Name 03/20/18 1500 03/20/18 1100 03/19/18 1903       Transfer Goal 1 (PT)    Activity/Assistive Device (Transfer Goal 1, PT) sit-to-stand/stand-to-sit;bed-to-chair/chair-to-bed;toilet  -TA sit-to-stand/stand-to-sit;bed-to-chair/chair-to-bed  -TA sit-to-stand/stand-to-sit;bed-to-chair/chair-to-bed;toilet;walker, rolling  -MN    [place] Level/Cues Needed (Transfer Goal 1, PT) conditional independence  -TA conditional independence  -TA conditional independence  -MN    Time Frame (Transfer Goal 1, PT) 1 week  -TA 1 week  -TA 1 week  -MN    Progress/Outcome (Transfer Goal 1, PT) goal not met  -TA goal not met  -TA goal not met  -MN       Gait Training Goal 1 (PT)    Activity/Assistive Device (Gait Training Goal 1, PT) gait (walking locomotion)  -TA gait (walking locomotion)  -TA gait (walking locomotion);walker, rolling  -MN    [place] Level (Gait Training Goal 1, PT) conditional independence  -TA conditional independence  -TA conditional independence  -MN    Distance (Gait Goal 1, PT) 150  -  -  -MN    Time Frame (Gait Training Goal 1, PT) 1 week  -TA 1 week  -TA 1 week  -MN    Progress/Outcome (Gait Training Goal 1, PT) goal not met  -TA goal not met  -TA goal not met  -MN       Stairs Goal 1 (PT)    Activity/Assistive Device (Stairs Goal 1, PT) other (see comments)   asc/desc ramp with rW  -TA other (see comments)   asc/desc ramp woth RW  -TA other (see comments)   asc/desc ramp with RW  -MN    [place] Level/Cues Needed (Stairs Goal 1, PT) conditional independence  -TA conditional independence  -TA conditional independence  -MN    Progress/Outcome (Stairs Goal 1, PT) goal not met  -TA goal not met  -TA goal not met  -MN       User Key  (r) = Recorded By, (t) = Taken By, (c) = Cosigned By    Initials Name Provider Type    NISHA Perez, PT Physical Therapist    MIKE Carlisle, PTA Physical Therapy Assistant              PT Discharge Summary  Anticipated Discharge Disposition (PT): home with home health care  Reason for Discharge: Discharge from facility, Per MD order  Outcomes Achieved: Refer to plan of care for updates on goals achieved  Discharge Destination: Home with home health      Adair Sue, PT   3/21/2018

## 2018-03-22 NOTE — THERAPY DISCHARGE NOTE
Acute Care - Occupational Therapy Initial Eval/Discharge  [facility]     Patient Name: Norma Harkins  : 1931  MRN: [id number]  Today's Date: 3/22/2018     Date of Referral to OT: 18         Admit Date: 3/18/2018       ICD-10-CM ICD-9-CM   1. COPD exacerbation J44.1 491.21   2. Shortness of breath R06.02 786.05   3. Impaired mobility and activities of daily living Z74.09 799.89   4. Impaired functional mobility, balance, gait, and endurance Z74.09 V49.89     Patient Active Problem List   Diagnosis   • Hypertension   • Low back pain   • Hyperlipidemia   • Paroxysmal atrial fibrillation   • Diabetes mellitus   • Chronic obstructive pulmonary disease   • Morbid obesity   • Physical deconditioning   • Chronic respiratory failure with hypoxia   • Atrial fibrillation   • COPD exacerbation   • Obesity   • CHF (congestive heart failure)   • Arthritis   • CAD (coronary artery disease)   • Disease of thyroid gland   • Shortness of breath     Past Medical History:   Diagnosis Date   • Arthritis    • CAD (coronary artery disease)    • CAD (coronary artery disease)    • CHF (congestive heart failure)    • Chronic obstructive pulmonary disease    • Chronic respiratory failure with hypoxia    • Diabetes mellitus    • Disease of thyroid gland    • Hyperlipidemia    • Hypertension    • Obesity    • Paroxysmal atrial fibrillation      Past Surgical History:   Procedure Laterality Date   • CATARACT EXTRACTION     • KNEE ARTHROPLASTY            OT ASSESSMENT FLOWSHEET (last 72 hours)      Occupational Therapy Evaluation     Row Name 18 1050                   OT Evaluation Time/Intention    Subjective Information complains of;weakness  -RB        Document Type evaluation  -RB        Mode of Treatment occupational therapy  -RB        Total Evaluation Minutes, Occupational Therapy 32  -RB        Patient Effort good  -RB        Symptoms Noted During/After Treatment fatigue  -RB           General Information     Patient Profile Reviewed? yes  -RB        Onset of Illness/Injury or Date of Surgery 03/18/18  -RB        Referring Physician ANGELIQUE Patel MD  -RB        Patient Observations alert;cooperative;agree to therapy  -RB        General Observations of Patient Sitting EOB with IV and 2L nasal canula.  -RB        Prior Level of Function independent:;gait;transfer;ADL's;cooking  -RB        Equipment Currently Used at Home rollator;oxygen;nebulizer  -RB        Pertinent History of Current Functional Problem Hosp with SOA, leg edema and weakness.  Dx with COPD exacerbation.    -RB        Existing Precautions/Restrictions fall;oxygen therapy device and L/min  -RB        Limitations/Impairments hearing  -RB        Equipment Issued to Patient gait belt  -RB        Risks Reviewed patient:  -RB        Benefits Reviewed patient:  -RB           Relationship/Environment    Primary Source of Support/Comfort child(zenon)   2 dtrs check on her often.  -RB        Lives With alone  -RB           Resource/Environmental Concerns    Current Living Arrangements home/apartment/condo  -RB           Cognitive Assessment/Intervention- PT/OT    Orientation Status (Cognition) oriented x 4  -RB        Follows Commands (Cognition) WFL  -RB           Safety Issues, Functional Mobility    Impairments Affecting Function (Mobility) endurance/activity tolerance;strength  -RB           Bed Mobility Assessment/Treatment    Comment (Bed Mobility) not observed.  -RB           Functional Mobility    Functional Mobility- Ind. Level contact guard assist  -RB        Functional Mobility- Device rolling walker  -RB        Functional Mobility-Distance (Feet) 30  -RB        Functional Mobility- Safety Issues supplemental O2  -RB           Transfer Assessment/Treatment    Transfer Assessment/Treatment sit-stand transfer;stand-sit transfer  -RB        Sit-Stand [place] (Transfers) supervision;contact guard  -RB        Stand-Sit [place] (Transfers)  supervision;contact guard  -RB           Sit-Stand Transfer    Assistive Device (Sit-Stand Transfers) walker, front-wheeled  -RB           Stand-Sit Transfer    Assistive Device (Stand-Sit Transfers) walker, front-wheeled  -RB           General ROM    GENERAL ROM COMMENTS L shld flex ~ 80* and rest WNL.  -RB           General Assessment (Manual Muscle Testing)    General Manual Muscle Testing (MMT) Assessment upper extremity strength deficits identified  -RB        Comment, General Manual Muscle Testing (MMT) Assessment 2-/5 for L shld flex, 3+/5 for R shld flex and 4/5 for rest of B UE strength.  -RB           Sensory Assessment/Intervention    Sensory General Assessment no sensation deficits identified  -RB           Pain Assessment    Additional Documentation Pain Scale: Numbers Pre/Post-Treatment (Group)  -RB           Pain Scale: Numbers Pre/Post-Treatment    Pain Scale: Numbers, Pretreatment 0/10 - no pain  -RB        Pain Scale: Numbers, Post-Treatment 0/10 - no pain  -RB           Clinical Impression (OT)    Date of Referral to OT 03/19/18  -RB        OT Diagnosis Impaired mobility and ADLs   -RB        Functional Level at Time of Evaluation (OT Eval) Impaired mobility and ADLs.  -RB        Criteria for Skilled Therapeutic Interventions Met (OT Eval) yes;treatment indicated  -RB        Rehab Potential (OT Eval) good, to achieve stated therapy goals  -RB        Therapy Frequency (OT Eval) --   3-14x/wk  -RB        Predicted Duration of Therapy Intervention (OT Eval) Until D/C or goals met.  -RB        Care Plan Review (OT) evaluation/treatment results reviewed;care plan/treatment goals reviewed;risks/benefits reviewed;patient/other agree to care plan  -RB        Anticipated Discharge Disposition (OT) home with home health  -RB           Vital Signs    Pre Systolic BP Rehab 178  -RB        Pre Treatment Diastolic BP 70  -RB        Post Systolic BP Rehab 164  -RB        Post Treatment Diastolic BP 62  -RB         Pretreatment Heart Rate (beats/min) 55  -RB        Posttreatment Heart Rate (beats/min) 57  -RB        Pre SpO2 (%) 96  -RB        O2 Delivery Pre Treatment supplemental O2  -RB        Post SpO2 (%) 94  -RB        O2 Delivery Post Treatment supplemental O2  -RB        Pre Patient Position Sitting  -RB        Intra Patient Position Standing  -RB        Post Patient Position Sitting  -RB           Planned OT Interventions    Planned Therapy Interventions (OT Eval) activity tolerance training  -RB           OT Goals    Transfer Goal Selection (OT) transfer, OT goal 1  -RB        Bathing Goal Selection (OT) bathing, OT goal 1  -RB        Dressing Goal Selection (OT) dressing, OT goal 1  -RB           Transfer Goal 1 (OT)    Activity/Assistive Device (Transfer Goal 1, OT) transfers, all  -RB        [place] Level/Cues Needed (Transfer Goal 1, OT) conditional independence  -RB        Time Frame (Transfer Goal 1, OT) long term goal (LTG);by discharge  -RB        Progress/Outcome (Transfer Goal 1, OT) goal not met  -RB           Bathing Goal 1 (OT)    Activity/Assistive Device (Bathing Goal 1, OT) bathing skills, all  -RB        [place] Level/Cues Needed (Bathing Goal 1, OT) conditional independence  -RB        Time Frame (Bathing Goal 1, OT) long term goal (LTG);by discharge  -RB        Progress/Outcomes (Bathing Goal 1, OT) goal not met  -RB           Dressing Goal 1 (OT)    Activity/Assistive Device (Dressing Goal 1, OT) dressing skills, all  -RB        [place]/Cues Needed (Dressing Goal 1, OT) conditional independence  -RB        Time Frame (Dressing Goal 1, OT) long term goal (LTG);by discharge  -RB        Progress/Outcome (Dressing Goal 1, OT) goal not met  -RB           Patient Education Goal (OT)    Activity (Patient Education Goal, OT) B UE HEP with no shld exercise, EC/WS and home safety/fall prevention.  -RB        [place]/Cues/Accuracy (Memory Goal 2, OT) demonstrates adequately;verbalizes  understanding  -RB        Time Frame (Patient Education Goal, OT) long term goal (LTG);by discharge  -RB        Progress/Outcome (Patient Education Goal, OT) goal not met  -RB           Living Environment    Home Accessibility other (see comments)   Ramp with one rail on R.  -RB          User Key  (r) = Recorded By, (t) = Taken By, (c) = Cosigned By    Initials Name Effective Dates    RB Mczach Mueller, OT 06/15/16 -           Occupational Therapy Education     Title: PT OT SLP Therapies (Done)     Topic: Occupational Therapy (Resolved)     Point: ADL training (Resolved)     Description: Instruct learner(s) on proper safety adaptation and remediation techniques during self care or transfers.   Instruct in proper use of assistive devices.   Learning Progress Summary     Learner Status Readiness Method Response Comment Documented by    Patient Active Acceptance E NR  LM 03/21/18 1454     Done Acceptance E VU  LM 03/20/18 1420          Point: Home exercise program (Resolved)     Description: Instruct learner(s) on appropriate technique for monitoring, assisting and/or progressing therapeutic exercises/activities.   Learning Progress Summary     Learner Status Readiness Method Response Comment Documented by    Patient Active Acceptance E NR  LM 03/21/18 1454     Done Acceptance E VU  LM 03/20/18 1420          Point: Precautions (Resolved)     Description: Instruct learner(s) on prescribed precautions during self-care and functional transfers.   Learning Progress Summary     Learner Status Readiness Method Response Comment Documented by    Patient Active Acceptance E NR  LM 03/21/18 1454     Done Acceptance E VU  LM 03/20/18 1420     Done Acceptance E VU Edu pt on use of gait belt and non skid socks/shoes when OOB and no OOB without assist. RB 03/19/18 1418          Point: Body mechanics (Resolved)     Description: Instruct learner(s) on proper positioning and spine alignment during self-care, functional mobility  activities and/or exercises.   Learning Progress Summary     Learner Status Readiness Method Response Comment Documented by    Patient Active Acceptance E NR  LM 03/21/18 1454     Done Acceptance E VU  LM 03/20/18 1420                      User Key     Initials Effective Dates Name Provider Type Discipline    RB 06/15/16 -  Mc Mueller, OT Occupational Therapist OT    LM 03/07/18 -  PATRICK Magdaleno/L Occupational Therapy Assistant OT                OT Recommendation and Plan                 OT Rehab Goals     Row Name 03/21/18 1042 03/20/18 1100 03/19/18 1050       Transfer Goal 1 (OT)    Activity/Assistive Device (Transfer Goal 1, OT) transfers, all  -LM transfers, all  -LM transfers, all  -RB    [place] Level/Cues Needed (Transfer Goal 1, OT) conditional independence  -LM conditional independence  -LM conditional independence  -RB    Time Frame (Transfer Goal 1, OT) long term goal (LTG);by discharge  -LM long term goal (LTG);by discharge  -LM long term goal (LTG);by discharge  -RB    Progress/Outcome (Transfer Goal 1, OT) goal not met;continuing progress toward goal  -LM goal not met  -LM goal not met  -RB       Bathing Goal 1 (OT)    Activity/Assistive Device (Bathing Goal 1, OT) bathing skills, all  -LM bathing skills, all  -LM bathing skills, all  -RB    [place] Level/Cues Needed (Bathing Goal 1, OT) conditional independence  -LM conditional independence  -LM conditional independence  -RB    Time Frame (Bathing Goal 1, OT) long term goal (LTG)  -LM long term goal (LTG)  -LM long term goal (LTG);by discharge  -RB    Progress/Outcomes (Bathing Goal 1, OT) goal not met;continuing progress toward goal  -LM goal not met  -LM goal not met  -RB       Dressing Goal 1 (OT)    Activity/Assistive Device (Dressing Goal 1, OT) dressing skills, all  -LM dressing skills, all  -LM dressing skills, all  -RB    [place]/Cues Needed (Dressing Goal 1, OT) conditional independence  -LM conditional  independence  -LM conditional independence  -RB    Time Frame (Dressing Goal 1, OT) long term goal (LTG)  -LM long term goal (LTG)  -LM long term goal (LTG);by discharge  -RB    Progress/Outcome (Dressing Goal 1, OT) goal not met  -LM goal not met  -LM goal not met  -RB       Patient Education Goal (OT)    Activity (Patient Education Goal, OT) --   B UE HEP with no shld exercise, EC/WS and home safety/fall p  -LM --   B UE HEP with no shld exercise, EC/WS and home safety/fall p  -LM B UE HEP with no shld exercise, EC/WS and home safety/fall prevention.  -RB    [place]/Cues/Accuracy (Memory Goal 2, OT) demonstrates adequately  -LM demonstrates adequately  -LM demonstrates adequately;verbalizes understanding  -RB    Time Frame (Patient Education Goal, OT) long term goal (LTG)  -LM long term goal (LTG)  -LM long term goal (LTG);by discharge  -RB    Progress/Outcome (Patient Education Goal, OT) goal not met  -LM goal not met  -LM goal not met  -RB      User Key  (r) = Recorded By, (t) = Taken By, (c) = Cosigned By    Initials Name Provider Type    RB Mc Mueller OT Occupational Therapist    PATRICK Vazquez/L Occupational Therapy Assistant                Outcome Measures     Row Name 03/20/18 1100 03/19/18 1355 03/19/18 1050       How much help from another person do you currently need...    Turning from your back to your side while in flat bed without using bedrails? 4  -TA 4  -MN  --    Moving from lying on back to sitting on the side of a flat bed without bedrails? 3  -TA 3  -MN  --    Moving to and from a bed to a chair (including a wheelchair)? 3  -TA 3  -MN  --    Standing up from a chair using your arms (e.g., wheelchair, bedside chair)? 3  -TA 3  -MN  --    Climbing 3-5 steps with a railing? 3  -TA 3  -MN  --    To walk in hospital room? 3  -TA 3  -MN  --    AM-PAC 6 Clicks Score 19  -TA 19  -MN  --       How much help from another is currently needed...    Putting on and taking off regular  lower body clothing?  --  -- 3  -RB    Bathing (including washing, rinsing, and drying)  --  -- 2  -RB    Toileting (which includes using toilet bed pan or urinal)  --  -- 3  -RB    Putting on and taking off regular upper body clothing  --  -- 3  -RB    Taking care of personal grooming (such as brushing teeth)  --  -- 4  -RB    Eating meals  --  -- 4  -RB    Score  --  -- 19  -RB       Functional Assessment    Outcome Measure Options AM-PAC 6 Clicks Basic Mobility (PT)  -TA AM-PAC 6 Clicks Basic Mobility (PT)  -MN AM-PAC 6 Clicks Daily Activity (OT)  -RB      User Key  (r) = Recorded By, (t) = Taken By, (c) = Cosigned By    Initials Name Provider Type    NICKY Mueller, OT Occupational Therapist    NISHA Perez, PT Physical Therapist    MIKE Carlisle, PTA Physical Therapy Assistant          Time Calculation:           OT G-codes  OT Professional Judgement Used?: Yes  OT Functional Scales Options: AM-PAC 6 Clicks Daily Activity (OT)  Score: 19  Functional Limitation: Self care  Self Care Current Status (): At least 40 percent but less than 60 percent impaired, limited or restricted  Self Care Goal Status (): At least 20 percent but less than 40 percent impaired, limited or restricted    OT Discharge Summary  Anticipated Discharge Disposition (OT): home with home health  Reason for Discharge: Discharge from facility  Outcomes Achieved: Refer to plan of care for updates on goals achieved  Discharge Destination: Home with home health    АННА Frank/ROBINSON  3/22/2018

## 2018-03-23 LAB — BACTERIA SPEC AEROBE CULT: NORMAL

## 2018-03-24 LAB — BACTERIA SPEC AEROBE CULT: NORMAL

## 2018-04-06 ENCOUNTER — OUTSIDE FACILITY SERVICE (OUTPATIENT)
Dept: INTERNAL MEDICINE | Facility: CLINIC | Age: 83
End: 2018-04-06

## 2018-04-06 PROCEDURE — G0180 MD CERTIFICATION HHA PATIENT: HCPCS | Performed by: INTERNAL MEDICINE

## 2018-04-09 ENCOUNTER — OFFICE VISIT (OUTPATIENT)
Dept: INTERNAL MEDICINE | Facility: CLINIC | Age: 83
End: 2018-04-09

## 2018-04-09 VITALS
BODY MASS INDEX: 33.15 KG/M2 | DIASTOLIC BLOOD PRESSURE: 60 MMHG | SYSTOLIC BLOOD PRESSURE: 100 MMHG | HEIGHT: 61 IN | WEIGHT: 175.6 LBS

## 2018-04-09 DIAGNOSIS — I50.32 CHRONIC DIASTOLIC CHF (CONGESTIVE HEART FAILURE) (HCC): ICD-10-CM

## 2018-04-09 DIAGNOSIS — R03.1 LOW BLOOD PRESSURE READING: Primary | ICD-10-CM

## 2018-04-09 DIAGNOSIS — J44.9 CHRONIC OBSTRUCTIVE PULMONARY DISEASE, UNSPECIFIED COPD TYPE (HCC): ICD-10-CM

## 2018-04-09 DIAGNOSIS — J96.11 CHRONIC RESPIRATORY FAILURE WITH HYPOXIA (HCC): Chronic | ICD-10-CM

## 2018-04-09 DIAGNOSIS — I48.0 PAROXYSMAL ATRIAL FIBRILLATION (HCC): ICD-10-CM

## 2018-04-09 PROCEDURE — 99214 OFFICE O/P EST MOD 30 MIN: CPT | Performed by: INTERNAL MEDICINE

## 2018-04-09 RX ORDER — DILTIAZEM HYDROCHLORIDE 120 MG/1
120 CAPSULE, COATED, EXTENDED RELEASE ORAL DAILY
Qty: 30 CAPSULE | Refills: 5 | Status: SHIPPED | OUTPATIENT
Start: 2018-04-09 | End: 2019-08-28

## 2018-04-18 ENCOUNTER — TELEPHONE (OUTPATIENT)
Dept: INTERNAL MEDICINE | Facility: CLINIC | Age: 83
End: 2018-04-18

## 2018-04-18 NOTE — TELEPHONE ENCOUNTER
SPOKE  WITH PT LET HER KNOW LAB SHOWS SHE IS DIABETIC DR FOX WOULD LIKE TO TRY DIET FIRST ADA DIET AND SHE WILL NEED TO RECHECK  IN 3 MONTHS

## 2018-04-30 RX ORDER — ERGOCALCIFEROL 1.25 MG/1
CAPSULE ORAL
Qty: 4 CAPSULE | Refills: 5 | Status: SHIPPED | OUTPATIENT
Start: 2018-04-30

## 2018-05-09 RX ORDER — DOXYCYCLINE 100 MG/1
100 CAPSULE ORAL 2 TIMES DAILY
Qty: 14 CAPSULE | Refills: 0 | Status: SHIPPED | OUTPATIENT
Start: 2018-05-09 | End: 2018-06-11

## 2018-05-09 RX ORDER — PREDNISONE 10 MG/1
TABLET ORAL
Qty: 20 TABLET | Refills: 0 | Status: ON HOLD | OUTPATIENT
Start: 2018-05-09 | End: 2019-09-12 | Stop reason: SDUPTHER

## 2018-05-09 NOTE — PLAN OF CARE
Problem: Patient Care Overview (Adult)  Goal: Plan of Care Review  Outcome: Ongoing (interventions implemented as appropriate)    04/01/17 0522   Coping/Psychosocial Response Interventions   Plan Of Care Reviewed With patient   Patient Care Overview   Progress progress towards functional goals is fair   Outcome Evaluation   Outcome Summary/Follow up Plan pts legs still pitting edema, pt has also developed cough that has not been improved with neb treatments. pt received neb treatment last night and heart rate went up to 140s after treatment.       Goal: Adult Individualization and Mutuality  Outcome: Ongoing (interventions implemented as appropriate)    Problem: Fluid Volume Deficit (Adult)  Goal: Identify Related Risk Factors and Signs and Symptoms  Outcome: Ongoing (interventions implemented as appropriate)  Goal: Fluid/Electrolyte Balance  Outcome: Ongoing (interventions implemented as appropriate)  Goal: Comfort/Well Being  Outcome: Ongoing (interventions implemented as appropriate)       (0) independent

## 2018-05-22 ENCOUNTER — TELEPHONE (OUTPATIENT)
Dept: INTERNAL MEDICINE | Facility: CLINIC | Age: 83
End: 2018-05-22

## 2018-05-22 NOTE — TELEPHONE ENCOUNTER
Spoke with alda moore let her know to draw a potassium on patientalso for her edema issues she should contact her cardiologist

## 2018-05-24 DIAGNOSIS — Z79.899 ENCOUNTER FOR LONG-TERM (CURRENT) USE OF MEDICATIONS: Primary | ICD-10-CM

## 2018-05-24 DIAGNOSIS — E87.6 HYPOKALEMIA: ICD-10-CM

## 2018-05-30 ENCOUNTER — TELEPHONE (OUTPATIENT)
Dept: INTERNAL MEDICINE | Facility: CLINIC | Age: 83
End: 2018-05-30

## 2018-05-30 DIAGNOSIS — E07.9 DISEASE OF THYROID GLAND: Primary | ICD-10-CM

## 2018-05-30 NOTE — TELEPHONE ENCOUNTER
Spoke with nazia at [facility] let her know her lab shows potassium was ok she is to continue the same dose  And her tsh is elevated we need to draw a freet t 4 and I have faxed order for this to be done

## 2018-06-07 DIAGNOSIS — E87.6 HYPOKALEMIA: ICD-10-CM

## 2018-06-07 DIAGNOSIS — Z79.899 ENCOUNTER FOR LONG-TERM (CURRENT) USE OF MEDICATIONS: ICD-10-CM

## 2018-06-11 ENCOUNTER — OFFICE VISIT (OUTPATIENT)
Dept: CARDIOLOGY | Facility: CLINIC | Age: 83
End: 2018-06-11

## 2018-06-11 VITALS
BODY MASS INDEX: 34.74 KG/M2 | SYSTOLIC BLOOD PRESSURE: 120 MMHG | HEART RATE: 70 BPM | HEIGHT: 61 IN | OXYGEN SATURATION: 95 % | DIASTOLIC BLOOD PRESSURE: 70 MMHG | WEIGHT: 184 LBS

## 2018-06-11 DIAGNOSIS — I48.0 PAROXYSMAL ATRIAL FIBRILLATION (HCC): Primary | ICD-10-CM

## 2018-06-11 DIAGNOSIS — I10 ESSENTIAL HYPERTENSION: Chronic | ICD-10-CM

## 2018-06-11 DIAGNOSIS — E03.9 ACQUIRED HYPOTHYROIDISM: ICD-10-CM

## 2018-06-11 DIAGNOSIS — E78.2 MIXED HYPERLIPIDEMIA: ICD-10-CM

## 2018-06-11 DIAGNOSIS — I25.10 CORONARY ARTERY DISEASE INVOLVING NATIVE CORONARY ARTERY OF NATIVE HEART WITHOUT ANGINA PECTORIS: ICD-10-CM

## 2018-06-11 PROCEDURE — 99214 OFFICE O/P EST MOD 30 MIN: CPT | Performed by: INTERNAL MEDICINE

## 2018-06-11 RX ORDER — LEVOTHYROXINE SODIUM 0.05 MG/1
50 TABLET ORAL DAILY
Qty: 30 TABLET | Refills: 2 | Status: SHIPPED | OUTPATIENT
Start: 2018-06-11

## 2018-06-11 NOTE — PROGRESS NOTES
Norma Harkins  87 y.o. female    06/11/2018  1. Paroxysmal atrial fibrillation    2. Coronary artery disease involving native coronary artery of native heart without angina pectoris    3. Essential hypertension    4. Mixed hyperlipidemia    5. Acquired hypothyroidism        History of Present Illness    Mrs. Harkins Here for follow-up of her above stated problems.  She was admitted to [facility] for observation in March this year and subsequently treated at [facility] for management of pneumonia.  Discharge diagnoses included:   HCAP (healthcare-associated pneumonia)  Active Problems:  COPD with acute exacerbation  Essential hypertension  Mixed hyperlipidemia  Acute on chronic respiratory failure with hypoxia    Following discharge, she is almost back to her baseline but does have chronic fatigue and dyspnea on exertion.  She is on home oxygen.  She has been compliant with all her medications.  A TSH performed recently was markedly elevated at 58.  She is known to have normal left ventricle systolic function, pulmonary hypertension by echocardiogram.      SUBJECTIVE    Allergies   Allergen Reactions   • Rocephin [Ceftriaxone] Anaphylaxis and Shortness Of Breath         Past Medical History:   Diagnosis Date   • Arthritis    • CAD (coronary artery disease)    • CAD (coronary artery disease)    • CHF (congestive heart failure)    • Chronic obstructive pulmonary disease    • Chronic respiratory failure with hypoxia    • Diabetes mellitus    • Disease of thyroid gland    • Hyperlipidemia    • Hypertension    • Obesity    • Paroxysmal atrial fibrillation          Past Surgical History:   Procedure Laterality Date   • CATARACT EXTRACTION     • KNEE ARTHROPLASTY           Family History   Problem Relation Age of Onset   • Hypertension Father          Social History     Social History   • Marital status:      Spouse name: N/A   • Number of children: N/A   • Years of education: N/A     Occupational  History   • Not on file.     Social History Main Topics   • Smoking status: Former Smoker   • Smokeless tobacco: Never Used   • Alcohol use No   • Drug use: No   • Sexual activity: Not Currently     Other Topics Concern   • Not on file     Social History Narrative   • No narrative on file         Current Outpatient Prescriptions   Medication Sig Dispense Refill   • acetaminophen (TYLENOL) 500 MG tablet Take 500 mg by mouth 1 (one) time as needed for mild pain (1-3).     • aclidinium bromide (TUDORZA PRESSAIR) 400 MCG/ACT aerosol powder  powder for inhalation Inhale 1 puff 2 (Two) Times a Day. 2 each 0   • albuterol (ACCUNEB) 0.63 MG/3ML nebulizer solution Take 3 mL by nebulization Every 6 (Six) Hours As Needed for Wheezing. 1290 vial 1   • albuterol (PROVENTIL HFA;VENTOLIN HFA) 108 (90 Base) MCG/ACT inhaler Inhale 2 puffs 4 (Four) Times a Day. 1 inhaler 1   • amiodarone (PACERONE) 200 MG tablet Take 1 tablet by mouth 2 (Two) Times a Day. 180 tablet 1   • apixaban (ELIQUIS) 5 MG tablet tablet Take 1 tablet by mouth Every 12 (Twelve) Hours. 180 tablet 1   • Calcium Carbonate ([employer] CALCIUM PO) Take 1,200 mg by mouth 2 (Two) Times a Day.     • diltiaZEM CD (CARDIZEM CD) 120 MG 24 hr capsule Take 1 capsule by mouth Daily. 30 capsule 5   • docusate sodium (COLACE) 100 MG capsule Take 300 mg by mouth Every Night.     • furosemide (LASIX) 40 MG tablet Take 1 tablet by mouth 2 (Two) Times a Day. 180 tablet 1   • guaiFENesin (MUCINEX) 600 MG 12 hr tablet Take 2 tablets by mouth Every 12 (Twelve) Hours.     • nystatin (MYCOSTATIN) 229951 UNIT/ML suspension Swish and swallow 1 mL 4 (Four) Times a Day. 60 mL 0   • ondansetron ODT (ZOFRAN-ODT) 4 MG disintegrating tablet Take 1 tablet by mouth Every 8 (Eight) Hours As Needed for Nausea or Vomiting. 12 tablet 0   • potassium chloride (K-DUR,KLOR-CON) 10 MEQ CR tablet take 1 tablet by mouth twice a day 60 tablet 5   • predniSONE (DELTASONE) 10 MG tablet 40 mg daily x 2 days, then 30  "mg daily x 2 days, then 20 mg daily x2 days, then 10 mg daily x2 days. 20 tablet 0   • SYMBICORT 160-4.5 MCG/ACT inhaler inhale 2 puffs by mouth twice a day Rinse mouth after use 30.6 g 3   • vitamin D (ERGOCALCIFEROL) 15084 units capsule capsule take 1 capsule by mouth every week 4 capsule 5   • levothyroxine (SYNTHROID) 50 MCG tablet Take 1 tablet by mouth Daily. 30 tablet 2     No current facility-administered medications for this visit.          OBJECTIVE    /70   Pulse 70   Ht 154.9 cm (60.98\")   Wt 83.5 kg (184 lb)   SpO2 95%   BMI 34.78 kg/m²         Review of Systems     Constitutional:  Denies recent weight loss, weight gain, fever or chills. Has fatigue     HENT:  Denies any hearing loss, epistaxis, hoarseness, or difficulty speaking.     Eyes: Wears eyeglasses or contact lenses     Respiratory:  Dyspnea with exertion, no cough, wheezing, or hemoptysis.     Cardiovascular: Negative for palpations, chest pain,     Gastrointestinal:  Denies change in bowel habits, dyspepsia, ulcer disease, hematochezia, or melena.     Endocrine: Negative for cold intolerance, heat intolerance, polydipsia, polyphagia and polyuria.     Genitourinary: Negative.      Musculoskeletal: DJD    Skin:  Denies any change in hair or nails. Bruising present.    Physical Exam     Constitutional: Cooperative, alert and oriented,  in no acute distress. On O2    HENT:   Head: Normocephalic, normal hair patterns, no masses or tenderness.  Ears, Nose, and Throat: No gross abnormalities. No pallor or cyanosis.   Eyes: EOMS intact, PERRL, conjunctivae and lids unremarkable. Fundoscopic exam and visual fields not performed.   Neck: No palpable masses or adenopathy, no thyromegaly, no JVD, carotid pulses are full and equal bilaterally and without  Bruits.     Cardiovascular: Regular rhythm, S1 and S2 normal, no S3 or S4.No murmurs, gallops, or rubs detected.     Pulmonary/Chest: Chest:  Increased AP diameter of the chest. normal " respiratory excursion, no intercostal retraction, no use of accessory muscles.            Pulmonary: Normal breath sounds. No rales or ronchi.    Abdominal: Abdomen soft, bowel sounds normoactive, no masses, no hepatosplenomegaly, non-tender, no bruits.     Musculoskeletal: No deformities, clubbing, cyanosis, erythema, 1-2 + edema lower extremeties.    Procedures      Lab Results   Component Value Date    WBC 13.53 (H) 03/21/2018    HGB 8.7 (L) 03/21/2018    HCT 28.9 (L) 03/21/2018    MCV 88.4 03/21/2018     03/21/2018     Lab Results   Component Value Date    GLUCOSE 166 (H) 03/21/2018    BUN 41 (H) 03/21/2018    CREATININE 1.36 (H) 03/21/2018    EGFRIFNONA 37 (L) 03/21/2018    BCR 30.1 (H) 03/21/2018    CO2 26.0 03/21/2018    CALCIUM 9.2 03/21/2018    ALBUMIN 3.80 03/21/2018    LABIL2 1.1 03/21/2018    AST 59 (H) 03/21/2018     (H) 03/21/2018     No results found for: CHOL  Lab Results   Component Value Date    TRIG 128 07/22/2015     No results found for: HDL  No components found for: LDLCALC  Lab Results   Component Value Date     07/22/2015     No results found for: HDLLDLRATIO  No components found for: CHOLHDL  No results found for: HGBA1C  Lab Results   Component Value Date    TSH 40.900 (H) 03/18/2018           ASSESSMENT AND PLAN  Mrs. Harkins has multiple medical problems but is clinically compensated with no evidence of bronchospasm or pulmonary congestion.  She has profound hypothyroidism based on TSH findings which is most likely secondary to amiodarone.  I have started her on Synthroid 50 µg daily.  Antiarrhythmic therapy with amiodarone, anticoagulation with Eliquis Cardizem CD for rate control, Lasix 40 mg daily along with potassium supplements have been continued. Her pulmonary status is stable at the present time though unpredictable.  She will be seeing Dr. Matos, and her LFTs and thyroid function studies will be followed closely.    Norma was seen today for  follow-up.    Diagnoses and all orders for this visit:    Paroxysmal atrial fibrillation    Coronary artery disease involving native coronary artery of native heart without angina pectoris    Essential hypertension    Mixed hyperlipidemia    Acquired hypothyroidism    Other orders  -     levothyroxine (SYNTHROID) 50 MCG tablet; Take 1 tablet by mouth Daily.        Sofya Graham MD  6/11/2018  5:35 PM

## 2018-06-18 ENCOUNTER — OFFICE VISIT (OUTPATIENT)
Dept: INTERNAL MEDICINE | Facility: CLINIC | Age: 83
End: 2018-06-18

## 2018-06-18 VITALS
OXYGEN SATURATION: 93 % | TEMPERATURE: 99.1 F | SYSTOLIC BLOOD PRESSURE: 100 MMHG | WEIGHT: 174.8 LBS | BODY MASS INDEX: 33 KG/M2 | DIASTOLIC BLOOD PRESSURE: 60 MMHG | HEIGHT: 61 IN

## 2018-06-18 DIAGNOSIS — I50.30 DIASTOLIC CONGESTIVE HEART FAILURE, UNSPECIFIED CONGESTIVE HEART FAILURE CHRONICITY: ICD-10-CM

## 2018-06-18 DIAGNOSIS — J96.11 CHRONIC RESPIRATORY FAILURE WITH HYPOXIA (HCC): Chronic | ICD-10-CM

## 2018-06-18 DIAGNOSIS — J43.9 PULMONARY EMPHYSEMA, UNSPECIFIED EMPHYSEMA TYPE (HCC): Chronic | ICD-10-CM

## 2018-06-18 DIAGNOSIS — J20.9 ACUTE BRONCHITIS, UNSPECIFIED ORGANISM: Primary | ICD-10-CM

## 2018-06-18 DIAGNOSIS — Z79.899 LONG-TERM USE OF HIGH-RISK MEDICATION: ICD-10-CM

## 2018-06-18 DIAGNOSIS — E66.01 MORBID OBESITY (HCC): ICD-10-CM

## 2018-06-18 DIAGNOSIS — E03.9 ACQUIRED HYPOTHYROIDISM: ICD-10-CM

## 2018-06-18 DIAGNOSIS — I48.0 PAROXYSMAL ATRIAL FIBRILLATION (HCC): ICD-10-CM

## 2018-06-18 DIAGNOSIS — E11.9 TYPE 2 DIABETES MELLITUS WITHOUT COMPLICATION, WITHOUT LONG-TERM CURRENT USE OF INSULIN (HCC): Chronic | ICD-10-CM

## 2018-06-18 PROCEDURE — 99214 OFFICE O/P EST MOD 30 MIN: CPT | Performed by: INTERNAL MEDICINE

## 2018-06-18 RX ORDER — DOXYCYCLINE 100 MG/1
100 CAPSULE ORAL 2 TIMES DAILY
Qty: 14 CAPSULE | Refills: 0 | Status: SHIPPED | OUTPATIENT
Start: 2018-06-18 | End: 2019-09-12 | Stop reason: HOSPADM

## 2018-06-18 RX ORDER — METHYLPREDNISOLONE 4 MG/1
TABLET ORAL
Qty: 21 TABLET | Refills: 0 | Status: SHIPPED | OUTPATIENT
Start: 2018-06-18 | End: 2019-09-12 | Stop reason: HOSPADM

## 2018-06-18 NOTE — PROGRESS NOTES
Subjective   Norma Harkins is a 87 y.o. female         Patient iss in complaining of chest congestion, cough, wheezing, shortness of breath for about a week.  History of frequent COPD exacerbations and chronic respiratory failure.    Regarding CHF, her weight is s table, and she denies any orthopnea or increased swelling in lower extremities.    Regarding hypothyroidism, she was started on Levothyroxine 50 mcg daily about 4 weeks ago.  Hypothyroidism is related to therapy with Amiodarone for recurrent atrial fibrillation.  She remains anticoagulated with Eliquis.      Past Medical History:   Diagnosis Date   • Arthritis    • CAD (coronary artery disease)    • CAD (coronary artery disease)    • CHF (congestive heart failure)    • Chronic obstructive pulmonary disease    • Chronic respiratory failure with hypoxia    • Diabetes mellitus    • Disease of thyroid gland    • Hyperlipidemia    • Hypertension    • Obesity    • Paroxysmal atrial fibrillation      Past Surgical History:   Procedure Laterality Date   • CATARACT EXTRACTION     • KNEE ARTHROPLASTY         Social History   Substance Use Topics   • Smoking status: Former Smoker   • Smokeless tobacco: Never Used   • Alcohol use No     Family History   Problem Relation Age of Onset   • Hypertension Father      Allergies   Allergen Reactions   • Rocephin [Ceftriaxone] Anaphylaxis and Shortness Of Breath     Current Outpatient Prescriptions on File Prior to Visit   Medication Sig Dispense Refill   • acetaminophen (TYLENOL) 500 MG tablet Take 500 mg by mouth 1 (one) time as needed for mild pain (1-3).     • albuterol (ACCUNEB) 0.63 MG/3ML nebulizer solution Take 3 mL by nebulization Every 6 (Six) Hours As Needed for Wheezing. 1290 vial 1   • albuterol (PROVENTIL HFA;VENTOLIN HFA) 108 (90 Base) MCG/ACT inhaler Inhale 2 puffs 4 (Four) Times a Day. 1 inhaler 1   • amiodarone (PACERONE) 200 MG tablet Take 1 tablet by mouth 2 (Two) Times a Day. 180 tablet 1   •  apixaban (ELIQUIS) 5 MG tablet tablet Take 1 tablet by mouth Every 12 (Twelve) Hours. 180 tablet 1   • Calcium Carbonate ([employer] CALCIUM PO) Take 1,200 mg by mouth 2 (Two) Times a Day.     • diltiaZEM CD (CARDIZEM CD) 120 MG 24 hr capsule Take 1 capsule by mouth Daily. 30 capsule 5   • docusate sodium (COLACE) 100 MG capsule Take 300 mg by mouth Every Night.     • furosemide (LASIX) 40 MG tablet Take 1 tablet by mouth 2 (Two) Times a Day. 180 tablet 1   • guaiFENesin (MUCINEX) 600 MG 12 hr tablet Take 2 tablets by mouth Every 12 (Twelve) Hours.     • levothyroxine (SYNTHROID) 50 MCG tablet Take 1 tablet by mouth Daily. 30 tablet 2   • potassium chloride (K-DUR,KLOR-CON) 10 MEQ CR tablet take 1 tablet by mouth twice a day 60 tablet 5   • predniSONE (DELTASONE) 10 MG tablet 40 mg daily x 2 days, then 30 mg daily x 2 days, then 20 mg daily x2 days, then 10 mg daily x2 days. 20 tablet 0   • SYMBICORT 160-4.5 MCG/ACT inhaler inhale 2 puffs by mouth twice a day Rinse mouth after use 30.6 g 3   • vitamin D (ERGOCALCIFEROL) 85566 units capsule capsule take 1 capsule by mouth every week 4 capsule 5     No current facility-administered medications on file prior to visit.      Review of Systems   Constitutional: Positive for activity change and chills. Negative for fever.   HENT: Positive for congestion.    Respiratory: Positive for cough, chest tightness and shortness of breath.    Cardiovascular: Positive for leg swelling. Negative for chest pain and palpitations.   Gastrointestinal: Negative for abdominal pain, constipation and diarrhea.   Endocrine: Negative for cold intolerance, heat intolerance, polydipsia and polyuria.   Genitourinary: Negative for difficulty urinating and dysuria.   Musculoskeletal: Positive for gait problem. Negative for arthralgias.   Skin: Negative for color change and rash.   Neurological: Negative for dizziness, light-headedness and headaches.   Psychiatric/Behavioral: Negative for sleep disturbance.  The patient is not nervous/anxious.        Objective   Physical Exam   Constitutional: She is oriented to person, place, and time. She appears well-developed and well-nourished.   HENT:   Nose: Mucosal edema present.   Mouth/Throat: Oropharynx is clear and moist.   Eyes: Conjunctivae are normal. Pupils are equal, round, and reactive to light.   Neck: Neck supple. No JVD present.   Cardiovascular: Normal rate and regular rhythm.    Mild swelling in lower extremities   Pulmonary/Chest: Effort normal. No respiratory distress. She has wheezes. She has no rales. She exhibits no tenderness.   Abdominal: Soft. Bowel sounds are normal. She exhibits no mass. There is no tenderness.   Musculoskeletal: Normal range of motion. She exhibits no deformity.   She uses walker to ambulate   Neurological: She is alert and oriented to person, place, and time. No cranial nerve deficit. Coordination normal.   Skin: Skin is warm and dry. No rash noted.   Psychiatric: She has a normal mood and affect. Her behavior is normal.   Nursing note and vitals reviewed.          Patient Active Problem List   Diagnosis   • Hypertension   • Low back pain   • Hyperlipidemia   • Paroxysmal atrial fibrillation   • Diabetes mellitus   • Chronic obstructive pulmonary disease   • Morbid obesity   • Physical deconditioning   • Chronic respiratory failure with hypoxia   • Atrial fibrillation   • COPD exacerbation   • Obesity   • CHF (congestive heart failure)   • Arthritis   • CAD (coronary artery disease)   • Disease of thyroid gland   • Shortness of breath   • Acquired hypothyroidism         No visits with results within 1 Month(s) from this visit.   Latest known visit with results is:   Admission on 03/18/2018, Discharged on 03/21/2018   No results displayed because visit has over 200 results.              Assessment/Plan   Norma was seen today for uri and follow-up.    Diagnoses and all orders for this visit:    Acute bronchitis, unspecified  organism    Pulmonary emphysema, unspecified emphysema type  -     Ambulatory Referral to Pulmonology    Chronic respiratory failure with hypoxia  -     Ambulatory Referral to Pulmonology    Paroxysmal atrial fibrillation    Diastolic congestive heart failure, unspecified congestive heart failure chronicity    Type 2 diabetes mellitus without complication, without long-term current use of insulin    Acquired hypothyroidism  -     TSH    Long-term use of high-risk medication  -     Comprehensive metabolic panel    Morbid obesity    Other orders  -     MethylPREDNISolone (MEDROL, LEANDRO,) 4 MG tablet; Take as directed on package instructions.  -     doxycycline (MONODOX) 100 MG capsule; Take 1 capsule by mouth 2 (Two) Times a Day.             Plan        1. Will treat with Doxycycline and Medrol.  Symbicort, Nebs.  2. Will refer to Pulmonology in view of frequent exacerbations to see if therapy can be optimized.  Patient was seen by pulmonologist at [facility] but would like somebody else - will refer to Dr. Gilbert at [facility].  Her most recent hospitalization for pneumonia and COPD was at [facility] - she resides at [place] and was taken by ambulance [place] last time.  3. Oxygen at 3-4 Liters.  Nebs qid.  4. Amiodarone, Cardiazem.  Anticoagulation with Eliquis.  Cardiology -  recently evaluated with no change in therapy.  5.Lasix.  Off ARB due to low BP.  Home Health Heart Failure Program.  She is stable.  Presented an overview of heart failure, expected course, considerations, risk factors and exacerbation prevention.  Recommended daily weight monitoring. Information provided.  Recommended restricted dietary sodium intake of 2g/day  Fluid restrictions of 2L/day.  Discussed patient action plan for heart failure;  Recommended avoiding NSAIDs use.  Discussed warning signs requiring additional medical attention for heart failure.  Do not hesitate to contact this office for further symptoms or concerns. Contact  information provided to the patient and understanding verbalized.   6. Steroid induced DM.  ADA diet.  Will monitor glucose.  7.Levoxyl - dose adjustment if needed.  TSH ordered.  8.  Weight loss discussed. Calories restricted diet reviewed.  Activity: Approximately 150 minutes of moderate activity (such as walking program)  per week (20-30 minutes most days of the week)  Diet: Heart healthy foods - more fresh fruits and vegetables and whole grains; less red meat; more fish and poultry that is baked or grilled - not fried; less salt not to exceed 2000 mg daily - less processed food; No trans or saturated fat  Diabetes management  Blood pressure management  Weight management: Patient's Body mass index is 33.05 kg/m². BMI is above normal parameters. Recommendations include: nutrition counseling.        Follow up in 1 month.                            This document has been electronically signed by Krista Matos MD on June 21, 2018 6:45 AM

## 2018-06-21 NOTE — PATIENT INSTRUCTIONS
Heart-Healthy Eating Plan  Many factors influence your heart health, including eating and exercise habits. Heart (coronary) risk increases with abnormal blood fat (lipid) levels. Heart-healthy meal planning includes limiting unhealthy fats, increasing healthy fats, and making other small dietary changes. This includes maintaining a healthy body weight to help keep lipid levels within a normal range.  What is my plan?  Your health care provider recommends that you:  · Get no more than _________% of the total calories in your daily diet from fat.  · Limit your intake of saturated fat to less than _________% of your total calories each day.  · Limit the amount of cholesterol in your diet to less than _________ mg per day.    What types of fat should I choose?  · Choose healthy fats more often. Choose monounsaturated and polyunsaturated fats, such as olive oil and canola oil, flaxseeds, walnuts, almonds, and seeds.  · Eat more omega-3 fats. Good choices include salmon, mackerel, sardines, tuna, flaxseed oil, and ground flaxseeds. Aim to eat fish at least two times each week.  · Limit saturated fats. Saturated fats are primarily found in animal products, such as meats, butter, and cream. Plant sources of saturated fats include palm oil, palm kernel oil, and coconut oil.  · Avoid foods with partially hydrogenated oils in them. These contain trans fats. Examples of foods that contain trans fats are stick margarine, some tub margarines, cookies, crackers, and other baked goods.  What general guidelines do I need to follow?  · Check food labels carefully to identify foods with trans fats or high amounts of saturated fat.  · Fill one half of your plate with vegetables and green salads. Eat 4-5 servings of vegetables per day. A serving of vegetables equals 1 cup of raw leafy vegetables, ½ cup of raw or cooked cut-up vegetables, or ½ cup of vegetable juice.  · Fill one fourth of your plate with whole grains. Look for the word  "\"whole\" as the first word in the ingredient list.  · Fill one fourth of your plate with lean protein foods.  · Eat 4-5 servings of fruit per day. A serving of fruit equals one medium whole fruit, ¼ cup of dried fruit, ½ cup of fresh, frozen, or canned fruit, or ½ cup of 100% fruit juice.  · Eat more foods that contain soluble fiber. Examples of foods that contain this type of fiber are apples, broccoli, carrots, beans, peas, and barley. Aim to get 20-30 g of fiber per day.  · Eat more home-cooked food and less restaurant, buffet, and fast food.  · Limit or avoid alcohol.  · Limit foods that are high in starch and sugar.  · Avoid fried foods.  · Cook foods by using methods other than frying. Baking, boiling, grilling, and broiling are all great options. Other fat-reducing suggestions include:  ? Removing the skin from poultry.  ? Removing all visible fats from meats.  ? Skimming the fat off of stews, soups, and gravies before serving them.  ? Steaming vegetables in water or broth.  · Lose weight if you are overweight. Losing just 5-10% of your initial body weight can help your overall health and prevent diseases such as diabetes and heart disease.  · Increase your consumption of nuts, legumes, and seeds to 4-5 servings per week. One serving of dried beans or legumes equals ½ cup after being cooked, one serving of nuts equals 1½ ounces, and one serving of seeds equals ½ ounce or 1 tablespoon.  · You may need to monitor your salt (sodium) intake, especially if you have high blood pressure. Talk with your health care provider or dietitian to get more information about reducing sodium.  What foods can I eat?  Grains    Breads, including Guamanian, white, chyna, wheat, raisin, rye, oatmeal, and Italian. Tortillas that are neither fried nor made with lard or trans fat. Low-fat rolls, including hotdog and hamburger buns and English muffins. Biscuits. Muffins. Waffles. Pancakes. Light popcorn. Whole-grain cereals. Flatbread. " Janet toast. Pretzels. Breadsticks. Rusks. Low-fat snacks and crackers, including oyster, saltine, matzo, brynn, animal, and rye. Rice and pasta, including brown rice and those that are made with whole wheat.  Vegetables  All vegetables.  Fruits  All fruits, but limit coconut.  Meats and Other Protein Sources  Lean, well-trimmed beef, veal, pork, and lamb. Chicken and turkey without skin. All fish and shellfish. Wild duck, rabbit, pheasant, and venison. Egg whites or low-cholesterol egg substitutes. Dried beans, peas, lentils, and tofu. Seeds and most nuts.  Dairy  Low-fat or nonfat cheeses, including ricotta, string, and mozzarella. Skim or 1% milk that is liquid, powdered, or evaporated. Buttermilk that is made with low-fat milk. Nonfat or low-fat yogurt.  Beverages  Mineral water. Diet carbonated beverages.  Sweets and Desserts  Sherbets and fruit ices. Honey, jam, marmalade, jelly, and syrups. Meringues and gelatins. Pure sugar candy, such as hard candy, jelly beans, gumdrops, mints, marshmallows, and small amounts of dark chocolate. Stephan food cake.  Eat all sweets and desserts in moderation.  Fats and Oils  Nonhydrogenated (trans-free) margarines. Vegetable oils, including soybean, sesame, sunflower, olive, peanut, safflower, corn, canola, and cottonseed. Salad dressings or mayonnaise that are made with a vegetable oil. Limit added fats and oils that you use for cooking, baking, salads, and as spreads.  Other  Cocoa powder. Coffee and tea. All seasonings and condiments.  The items listed above may not be a complete list of recommended foods or beverages. Contact your dietitian for more options.  What foods are not recommended?  Grains  Breads that are made with saturated or trans fats, oils, or whole milk. Croissants. Butter rolls. Cheese breads. Sweet rolls. Donuts. Buttered popcorn. Chow mein noodles. High-fat crackers, such as cheese or butter crackers.  Meats and Other Protein Sources  Fatty meats, such  as hotdogs, short ribs, sausage, spareribs, georges, ribeye roast or steak, and mutton. High-fat deli meats, such as salami and bologna. Caviar. Domestic duck and goose. Organ meats, such as kidney, liver, sweetbreads, brains, gizzard, chitterlings, and heart.  Dairy  Cream, sour cream, cream cheese, and creamed cottage cheese. Whole milk cheeses, including blue (doreen), Sidney Carlos, Brie, Corbin, American, Havarti, Swiss, cheddar, Camembert, and [place]. Whole or 2% milk that is liquid, evaporated, or condensed. Whole buttermilk. Cream sauce or high-fat cheese sauce. Yogurt that is made from whole milk.  Beverages  Regular sodas and drinks with added sugar.  Sweets and Desserts  Frosting. Pudding. Cookies. Cakes other than ana food cake. Candy that has milk chocolate or white chocolate, hydrogenated fat, butter, coconut, or unknown ingredients. Buttered syrups. Full-fat ice cream or ice cream drinks.  Fats and Oils  Gravy that has suet, meat fat, or shortening. Cocoa butter, hydrogenated oils, palm oil, coconut oil, palm kernel oil. These can often be found in baked products, candy, fried foods, nondairy creamers, and whipped toppings. Solid fats and shortenings, including georges fat, salt pork, lard, and butter. Nondairy cream substitutes, such as coffee creamers and sour cream substitutes. Salad dressings that are made of unknown oils, cheese, or sour cream.  The items listed above may not be a complete list of foods and beverages to avoid. Contact your dietitian for more information.  This information is not intended to replace advice given to you by your health care provider. Make sure you discuss any questions you have with your health care provider.  Document Released: 09/26/2009 Document Revised: 07/07/2017 Document Reviewed: 06/11/2015  [employer] Patient Education © 2018 [employer]

## 2018-07-24 ENCOUNTER — HOSPITAL ENCOUNTER (EMERGENCY)
Facility: HOSPITAL | Age: 83
Discharge: HOME | End: 2018-07-24
Attending: EMERGENCY MEDICINE | Admitting: EMERGENCY MEDICINE

## 2018-07-24 ENCOUNTER — APPOINTMENT (OUTPATIENT)
Dept: CT IMAGING | Facility: HOSPITAL | Age: 83
End: 2018-07-24

## 2018-07-24 VITALS
DIASTOLIC BLOOD PRESSURE: 97 MMHG | HEART RATE: 70 BPM | HEIGHT: 62 IN | SYSTOLIC BLOOD PRESSURE: 186 MMHG | BODY MASS INDEX: 31.28 KG/M2 | TEMPERATURE: 98.6 F | OXYGEN SATURATION: 100 % | WEIGHT: 170 LBS | RESPIRATION RATE: 20 BRPM

## 2018-07-24 DIAGNOSIS — S01.81XA LACERATION OF FOREHEAD WITHOUT COMPLICATION, INITIAL ENCOUNTER: Primary | ICD-10-CM
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
## 2018-07-24 DIAGNOSIS — S00.03XA CONTUSION OF SCALP, INITIAL ENCOUNTER: ICD-10-CM

## 2018-07-24 PROCEDURE — 99283 EMERGENCY DEPT VISIT LOW MDM: CPT

## 2018-07-24 PROCEDURE — 70450 CT HEAD/BRAIN W/O DYE: CPT

## 2018-07-24 RX ORDER — ACETAMINOPHEN 500 MG
1000 TABLET ORAL ONCE
Status: COMPLETED | OUTPATIENT
Start: 2018-07-24 | End: 2018-07-24

## 2018-07-24 RX ORDER — LIDOCAINE HYDROCHLORIDE 10 MG/ML
10 INJECTION, SOLUTION EPIDURAL; INFILTRATION; INTRACAUDAL; PERINEURAL ONCE
Status: COMPLETED | OUTPATIENT
Start: 2018-07-24 | End: 2018-07-24

## 2018-07-24 RX ORDER — LIDOCAINE HYDROCHLORIDE 10 MG/ML
INJECTION, SOLUTION EPIDURAL; INFILTRATION; INTRACAUDAL; PERINEURAL
Status: DISCONTINUED
Start: 2018-07-24 | End: 2018-07-24 | Stop reason: HOSPADM

## 2018-07-24 RX ADMIN — ACETAMINOPHEN 1000 MG: 500 TABLET ORAL at 14:38

## 2018-07-24 RX ADMIN — LIDOCAINE HYDROCHLORIDE 10 ML: 10 INJECTION, SOLUTION EPIDURAL; INFILTRATION; INTRACAUDAL; PERINEURAL at 14:00

## 2018-07-27 ENCOUNTER — EPISODE CHANGES (OUTPATIENT)
Dept: CASE MANAGEMENT | Facility: OTHER | Age: 83
End: 2018-07-27

## 2018-07-30 ENCOUNTER — PATIENT OUTREACH (OUTPATIENT)
Dept: CASE MANAGEMENT | Facility: OTHER | Age: 83
End: 2018-07-30

## 2018-09-25 ENCOUNTER — EPISODE CHANGES (OUTPATIENT)
Dept: CASE MANAGEMENT | Facility: OTHER | Age: 83
End: 2018-09-25

## 2018-09-30 NOTE — PROGRESS NOTES
Subjective   Norma Harkins is a 87 y.o. female with COPD, resp. failure, on Oxygen 24 hr,Paroxysmal atrial fibrillation..    Patient is in for recheck and also complaining of episodes of low blood pressure with systolic below 90    Hypotension   This is a new problem. The current episode started 1 to 4 weeks ago. Associated symptoms include coughing and weakness. Pertinent negatives include no abdominal pain, anorexia, chest pain, congestion, fatigue or rash. Nothing aggravates the symptoms. She has tried rest for the symptoms. The treatment provided mild relief.   Congestive Heart Failure   Associated symptoms include edema and shortness of breath. Pertinent negatives include no abdominal pain, chest pain, chest pressure, fatigue, muscle weakness, palpitations or paroxysmal nocturnal dyspnea. Compliance with total regimen is %. Compliance problems include adherence to diet.  Compliance with diet is 51-75%. Compliance with exercise is 51-75%. Compliance with medications is %.   Atrial Fibrillation   Presents for follow-up visit. Symptoms include dizziness, shortness of breath and weakness. Symptoms are negative for bradycardia, chest pain, palpitations, syncope and tachycardia. Past medical history includes atrial fibrillation and CHF. There are no medication compliance problems.   COPD   This is a chronic problem. The current episode started more than 1 year ago. The problem has been waxing and waning. Associated symptoms include coughing and weakness. Pertinent negatives include no abdominal pain, anorexia, chest pain, congestion, fatigue or rash. The symptoms are aggravated by walking and exertion. Treatments tried: Nebulizers. The treatment provided moderate relief.     Past Medical History:   Diagnosis Date   • Arthritis    • CAD (coronary artery disease)    • CAD (coronary artery disease)    • CHF (congestive heart failure) (CMS/[facility])    • Chronic obstructive pulmonary disease (CMS/[facility])    •  Chronic respiratory failure with hypoxia (CMS/HCC)    • Diabetes mellitus (CMS/HCC)    • Disease of thyroid gland    • History of TIAs    • Hyperlipidemia    • Hypertension    • Obesity    • Paroxysmal atrial fibrillation (CMS/HCC)      Past Surgical History:   Procedure Laterality Date   • CATARACT EXTRACTION     • KNEE ARTHROPLASTY         Social History   Substance Use Topics   • Smoking status: Former Smoker   • Smokeless tobacco: Never Used   • Alcohol use No     Family History   Problem Relation Age of Onset   • Hypertension Father      Allergies   Allergen Reactions   • Rocephin [Ceftriaxone] Anaphylaxis and Shortness Of Breath     Current Outpatient Prescriptions on File Prior to Visit   Medication Sig Dispense Refill   • acetaminophen (TYLENOL) 500 MG tablet Take 500 mg by mouth 1 (one) time as needed for mild pain (1-3).     • albuterol (ACCUNEB) 0.63 MG/3ML nebulizer solution Take 3 mL by nebulization Every 6 (Six) Hours As Needed for Wheezing. 1290 vial 1   • albuterol (PROVENTIL HFA;VENTOLIN HFA) 108 (90 Base) MCG/ACT inhaler Inhale 2 puffs 4 (Four) Times a Day. 1 inhaler 1   • amiodarone (PACERONE) 200 MG tablet Take 1 tablet by mouth 2 (Two) Times a Day. 180 tablet 1   • apixaban (ELIQUIS) 5 MG tablet tablet Take 1 tablet by mouth Every 12 (Twelve) Hours. 180 tablet 1   • Calcium Carbonate ([facility] CALCIUM PO) Take 1,200 mg by mouth 2 (Two) Times a Day.     • docusate sodium (COLACE) 100 MG capsule Take 300 mg by mouth Every Night.     • furosemide (LASIX) 40 MG tablet Take 1 tablet by mouth 2 (Two) Times a Day. 180 tablet 1   • guaiFENesin (MUCINEX) 600 MG 12 hr tablet Take 2 tablets by mouth Every 12 (Twelve) Hours.     • potassium chloride (K-DUR,KLOR-CON) 10 MEQ CR tablet take 1 tablet by mouth twice a day 60 tablet 5   • SYMBICORT 160-4.5 MCG/ACT inhaler inhale 2 puffs by mouth twice a day Rinse mouth after use 30.6 g 3     No current facility-administered medications on file prior to visit.       Review of Systems   Constitutional: Negative for fatigue.   HENT: Negative.  Negative for congestion.    Eyes: Negative.    Respiratory: Positive for cough and shortness of breath. Negative for wheezing.    Cardiovascular: Positive for leg swelling. Negative for chest pain, palpitations and syncope.   Gastrointestinal: Negative for abdominal distention, abdominal pain and anorexia.   Endocrine: Negative for cold intolerance, heat intolerance, polydipsia and polyuria.   Musculoskeletal: Positive for back pain. Negative for muscle weakness.   Skin: Negative for color change and rash.   Neurological: Positive for dizziness and weakness. Negative for tremors and syncope.   Hematological: Negative for adenopathy. Does not bruise/bleed easily.       Objective   Physical Exam   Constitutional: She is oriented to person, place, and time. She appears well-developed and well-nourished.   Eyes: Pupils are equal, round, and reactive to light. No scleral icterus.   Neck: Neck supple. JVD present.   Cardiovascular: Normal rate and regular rhythm.    Pulmonary/Chest: Effort normal.   Decreased breath sounds.   Abdominal: Soft. Bowel sounds are normal. She exhibits no mass.   Musculoskeletal: She exhibits no tenderness or deformity.   Neurological: She is alert and oriented to person, place, and time.   Skin: Skin is warm and dry. No rash noted.   Psychiatric: She has a normal mood and affect. Her behavior is normal.   Nursing note and vitals reviewed.          Patient Active Problem List   Diagnosis   • Hypertension   • Low back pain   • Hyperlipidemia   • Paroxysmal atrial fibrillation (CMS/HCC)   • Diabetes mellitus (CMS/HCC)   • Chronic obstructive pulmonary disease (CMS/HCC)   • Morbid obesity (CMS/HCC)   • Physical deconditioning   • Chronic respiratory failure with hypoxia (CMS/HCC)   • Atrial fibrillation (CMS/HCC)   • COPD exacerbation (CMS/HCC)   • Obesity   • CHF (congestive heart failure) (CMS/HCC)   • Arthritis   •  CAD (coronary artery disease)   • Disease of thyroid gland   • Shortness of breath   • Acquired hypothyroidism               Assessment/Plan    Diagnosis Plan   1. Low blood pressure reading     2. Chronic diastolic CHF (congestive heart failure) (CMS/[facility])     3. Paroxysmal atrial fibrillation (CMS/[facility])     4. Chronic respiratory failure with hypoxia (CMS/[facility])     5. Chronic obstructive pulmonary disease, unspecified COPD type (CMS/[facility])                  Plan       1. Decrease Cardizem to 120 mg daily.  Liberalize fluids.  2.Patient without any evidence of fluid overload.  Guidelines based therapy.  Not on b-blocker due to COPD.  Not on ACE or ARB due to low BP.  Lasix.  Low sodium diet.  3.Amiodarone.  Medication benefits and side effects discussed in detail.  Anticoagulation with Eliquis - risks and benefits discussed.  4.Supplemental Oxygen at 2 liters 24hrs a day,   Patient is compensated at present.  5. Nebs qid.  Symbicort.        Follow up in 1 month.        This document has been electronically signed by Krista Matos MD on September 30, 2018 2:58 PM

## 2018-10-10 ENCOUNTER — EPISODE CHANGES (OUTPATIENT)
Dept: CASE MANAGEMENT | Facility: OTHER | Age: 83
End: 2018-10-10

## 2019-08-27 DIAGNOSIS — I48.91 ATRIAL FIBRILLATION, UNSPECIFIED TYPE (HCC): Primary | ICD-10-CM

## 2019-08-28 ENCOUNTER — OFFICE VISIT (OUTPATIENT)
Dept: CARDIOLOGY | Facility: CLINIC | Age: 84
End: 2019-08-28

## 2019-08-28 VITALS
BODY MASS INDEX: 30.4 KG/M2 | SYSTOLIC BLOOD PRESSURE: 132 MMHG | OXYGEN SATURATION: 98 % | WEIGHT: 161 LBS | HEART RATE: 68 BPM | DIASTOLIC BLOOD PRESSURE: 88 MMHG | HEIGHT: 61 IN

## 2019-08-28 DIAGNOSIS — I25.10 CORONARY ARTERY DISEASE INVOLVING NATIVE CORONARY ARTERY OF NATIVE HEART WITHOUT ANGINA PECTORIS: ICD-10-CM

## 2019-08-28 DIAGNOSIS — I48.0 PAROXYSMAL ATRIAL FIBRILLATION (HCC): Primary | ICD-10-CM

## 2019-08-28 DIAGNOSIS — I10 ESSENTIAL HYPERTENSION: Chronic | ICD-10-CM

## 2019-08-28 DIAGNOSIS — E78.2 MIXED HYPERLIPIDEMIA: ICD-10-CM

## 2019-08-28 PROCEDURE — 93000 ELECTROCARDIOGRAM COMPLETE: CPT | Performed by: INTERNAL MEDICINE

## 2019-08-28 PROCEDURE — 99215 OFFICE O/P EST HI 40 MIN: CPT | Performed by: INTERNAL MEDICINE

## 2019-08-28 RX ORDER — OXYCODONE HYDROCHLORIDE 5 MG/1
5 CAPSULE ORAL EVERY 6 HOURS PRN
COMMUNITY

## 2019-08-28 NOTE — PROGRESS NOTES
Norma Harkins  88 y.o. female    08/28/2019  1. Paroxysmal atrial fibrillation (CMS/HCC)    2. Essential hypertension    3. Mixed hyperlipidemia    4. Coronary artery disease involving native coronary artery of native heart without angina pectoris        History of Present Illness:  Ms. Harkins is being seen by me after very long interval.  I had last seen in June 2018.  The patient had apparently moved to [place] and now has moved back to [place] and is a resident of [facility].  She has had a very eventful year and has had several admissions to [facility] for acute on chronic respiratory failure, recurrent falls, GI bleeding.  Her multiple medical issues are listed below:  Pulmonary emphysema   • COPD with acute exacerbation   • Essential hypertension   • Mixed hyperlipidemia   • Acute on chronic respiratory failure with hypoxia   • Chronic atrial fibrillation   • Sepsis, due to unspecified organism   • Anemia in chronic kidney disease   • Other specified hypothyroidism   • Vitamin D deficiency   • (HFpEF) heart failure with preserved ejection fraction   • LUIS (acute kidney injury)   • Fall   • GIB (gastrointestinal bleeding)     She was admitted to the hospital May this year and had evidence of severe anemia, recurrent falls.  She also had episodes of bradycardia.  She was seen by cardiology and anticoagulation was discontinued and both amiodarone and Cardizem CD were discontinued.    Echocardiogram showed:  1. Asymmetric LVH moderate with normal LVEF of 65%  2. Mild RV thickening with moderator band.   3. Mitral valvular annular calcification with restriction of the  posterior leaflet.   4. Moderately dilated LA and RA  5. Severe TR central with elevated RVSP of 80mmHg consistent with severe  pulmonary hypertension.   6. ACVD of the aV with thickening with mild stenosis. Peak 20mmhg and  mean of 11mmHg.     EKG today showed sinus rhythm with first-degree AV block.  Baseline  artifacts.  Right bundle branch block, left axis deviation.  Her extensive past medical history was reviewed in detail.  She has had large hematoma around the knee and this required treatment by surgeons.      SUBJECTIVE    Allergies   Allergen Reactions   • Rocephin [Ceftriaxone] Anaphylaxis and Shortness Of Breath         Past Medical History:   Diagnosis Date   • Arthritis    • CAD (coronary artery disease)    • CAD (coronary artery disease)    • CHF (congestive heart failure) (CMS/HCC)    • Chronic obstructive pulmonary disease (CMS/HCC)    • Chronic respiratory failure with hypoxia (CMS/[facility])    • Diabetes mellitus (CMS/HCC)    • Disease of thyroid gland    • History of TIAs    • Hyperlipidemia    • Hypertension    • Obesity    • Paroxysmal atrial fibrillation (CMS/HCC)          Past Surgical History:   Procedure Laterality Date   • CATARACT EXTRACTION     • KNEE ARTHROPLASTY           Family History   Problem Relation Age of Onset   • Hypertension Father          Social History     Socioeconomic History   • Marital status:      Spouse name: Not on file   • Number of children: Not on file   • Years of education: Not on file   • Highest education level: Not on file   Tobacco Use   • Smoking status: Former Smoker   • Smokeless tobacco: Never Used   Substance and Sexual Activity   • Alcohol use: No   • Drug use: No   • Sexual activity: Not Currently         Current Outpatient Medications   Medication Sig Dispense Refill   • acetaminophen (TYLENOL) 500 MG tablet Take 500 mg by mouth 1 (one) time as needed for mild pain (1-3).     • albuterol (ACCUNEB) 0.63 MG/3ML nebulizer solution Take 3 mL by nebulization Every 6 (Six) Hours As Needed for Wheezing. 1290 vial 1   • albuterol (PROVENTIL HFA;VENTOLIN HFA) 108 (90 Base) MCG/ACT inhaler Inhale 2 puffs 4 (Four) Times a Day. 1 inhaler 1   • Calcium Carbonate ([employer] CALCIUM PO) Take 1,200 mg by mouth 2 (Two) Times a Day.     • docusate sodium (COLACE) 100 MG capsule  "Take 300 mg by mouth Every Night.     • doxycycline (MONODOX) 100 MG capsule Take 1 capsule by mouth 2 (Two) Times a Day. 14 capsule 0   • furosemide (LASIX) 40 MG tablet Take 1 tablet by mouth 2 (Two) Times a Day. 180 tablet 1   • guaiFENesin (MUCINEX) 600 MG 12 hr tablet Take 2 tablets by mouth Every 12 (Twelve) Hours.     • MethylPREDNISolone (MEDROL, LEANDRO,) 4 MG tablet Take as directed on package instructions. 21 tablet 0   • oxyCODONE (OXY-IR) 5 MG capsule Take 5 mg by mouth Every 4 (Four) Hours As Needed for Moderate Pain .     • SYMBICORT 160-4.5 MCG/ACT inhaler inhale 2 puffs by mouth twice a day Rinse mouth after use 30.6 g 3   • vitamin D (ERGOCALCIFEROL) 17496 units capsule capsule take 1 capsule by mouth every week 4 capsule 5   • levothyroxine (SYNTHROID) 50 MCG tablet Take 1 tablet by mouth Daily. 30 tablet 2   • potassium chloride (K-DUR,KLOR-CON) 10 MEQ CR tablet take 1 tablet by mouth twice a day 60 tablet 5   • predniSONE (DELTASONE) 10 MG tablet 40 mg daily x 2 days, then 30 mg daily x 2 days, then 20 mg daily x2 days, then 10 mg daily x2 days. 20 tablet 0     No current facility-administered medications for this visit.          OBJECTIVE    /88   Pulse 68   Ht 154.9 cm (61\")   Wt 73 kg (161 lb)   SpO2 98%   BMI 30.42 kg/m²         Review of Systems     Constitutional:  Denies recent weight loss, weight gain, fever or chills     HENT:  Denies any hearing loss, epistaxis, hoarseness, or difficulty speaking.     Eyes: Wears eyeglasses or contact lenses     Respiratory:  Dyspnea with exertion, cough.  On home oxygen    Cardiovascular: Negative for palpations, chest pain    Gastrointestinal:  Denies change in bowel habits, dyspepsia, ulcer disease, hematochezia, or melena.     Endocrine: Negative for cold intolerance, heat intolerance, polydipsia, polyphagia and polyuria.     Genitourinary: Negative.      Musculoskeletal: Knee surgery.  Hematoma around left knee    Neurological:  Denies any " history of recurrent headaches, strokes, TIA, or seizure disorder.     Hematological: Anemia    Physical Exam     Constitutional: Cooperative, alert and oriented, chronically ill-appearing    HENT:   Head: Normocephalic, normal hair patterns, no masses or tenderness.  Ears, Nose, and Throat: No gross abnormalities. No pallor or cyanosis.   Eyes: EOMS intact, PERRL, conjunctivae and lids unremarkable. Fundoscopic exam and visual fields not performed.   Neck: No palpable masses or adenopathy, no thyromegaly, no JVD, carotid pulses are full and equal bilaterally and without  Bruits.     Cardiovascular: Regular rhythm, S1 and S2 normal, no S3 or S4. 3/6  Systolic murmur,  gallops, or rubs detected.     Pulmonary/Chest: Chest: Increased AP diameter of the chest,  normal respiratory excursion, no intercostal retraction, no use of accessory muscles.            Pulmonary: Normal breath sounds. No rales or ronchi.    Abdominal: Abdomen soft, bowel sounds normoactive, no masses, no hepatosplenomegaly, non-tender, no bruits.     Musculoskeletal: Cast left knee    Neurological: No gross motor or sensory deficits noted, affect appropriate, oriented to time, person, place.     Procedures      Lab Results   Component Value Date    WBC 9.3 08/02/2019    HGB 9.5 (L) 08/02/2019    HCT 28.6 (L) 08/02/2019    MCV 92.7 08/02/2019     (L) 08/02/2019     Lab Results   Component Value Date    GLUCOSE 166 (H) 03/21/2018    BUN 37 (H) 07/29/2019    CREATININE 1.6 (H) 07/29/2019    EGFRIFNONA 37 (L) 03/21/2018    EGFRIFAFRI 37 07/29/2019    BCR 30.1 (H) 03/21/2018    CO2 29 07/29/2019    CALCIUM 7.5 (L) 07/29/2019    ALBUMIN 3.0 (L) 03/25/2018    AST 38 03/25/2018    ALT 97 (H) 03/25/2018     No results found for: CHOL  Lab Results   Component Value Date    TRIG 128 07/22/2015     No results found for: HDL  No components found for: LDLCALC  Lab Results   Component Value Date     07/22/2015     No results found for:  HDLLDLRATIO  No components found for: CHOLHDL  Lab Results   Component Value Date    HGBA1C 6.1 (H) 10/23/2018     Lab Results   Component Value Date    TSH 19.17 (H) 07/12/2018           ASSESSMENT AND PLAN  Ms. Harkins multiple medical issues as discussed in detail under history of present illness.  She does have paroxysmal atrial fibrillation but unfortunately is unable to take anti-arrhythmic drugs or rate lowering medication secondary to bradycardia.  I agree that she is not a candidate for anticoagulation secondary to recurrent falls, anemia.  I explained the situation in detail and her daughter understands .  She has multiple comorbid issues and I understand that she is DNR.  Management would be conservative at this point and I agree with continuing diuretics.  The patient does not wish to consider a pacemaker that could be considered if she has recurrence of bradycardia or tachyarrhythmias requiring antiarrhythmic therapy.  Under the circumstances, our options are limited.  She will continue follow-up with her physician at the nursing home and will be glad to see her on an as-needed basis.  About 40 minutes was spent in the assessment and evaluation of this patient.    Norma was seen today for dizziness.    Diagnoses and all orders for this visit:    Paroxysmal atrial fibrillation (CMS/HCC)    Essential hypertension    Mixed hyperlipidemia    Coronary artery disease involving native coronary artery of native heart without angina pectoris        Patient's Body mass index is 30.42 kg/m². BMI is above normal parameters. Recommendations include: nutrition counseling.  She is a non-smoker.    Sofya Graham MD  8/28/2019  10:12 AM

## 2019-09-10 ENCOUNTER — HOSPITAL ENCOUNTER (INPATIENT)
Facility: HOSPITAL | Age: 84
LOS: 2 days | Discharge: NURSING, REHAB OR LONG-TERM CARE | End: 2019-09-12
Attending: EMERGENCY MEDICINE | Admitting: INTERNAL MEDICINE

## 2019-09-10 ENCOUNTER — APPOINTMENT (OUTPATIENT)
Dept: GENERAL RADIOLOGY | Facility: HOSPITAL | Age: 84
End: 2019-09-10

## 2019-09-10 DIAGNOSIS — J96.01 ACUTE RESPIRATORY FAILURE WITH HYPOXIA (HCC): Chronic | ICD-10-CM

## 2019-09-10 DIAGNOSIS — E78.2 MIXED HYPERLIPIDEMIA: ICD-10-CM

## 2019-09-10 DIAGNOSIS — R06.02 SHORTNESS OF BREATH: ICD-10-CM

## 2019-09-10 DIAGNOSIS — I48.0 PAROXYSMAL ATRIAL FIBRILLATION (HCC): ICD-10-CM

## 2019-09-10 DIAGNOSIS — R53.81 PHYSICAL DECONDITIONING: ICD-10-CM

## 2019-09-10 DIAGNOSIS — I10 ESSENTIAL HYPERTENSION: Chronic | ICD-10-CM

## 2019-09-10 DIAGNOSIS — I25.10 CORONARY ARTERY DISEASE INVOLVING NATIVE CORONARY ARTERY OF NATIVE HEART WITHOUT ANGINA PECTORIS: ICD-10-CM

## 2019-09-10 DIAGNOSIS — E11.9 TYPE 2 DIABETES MELLITUS WITHOUT COMPLICATION, WITHOUT LONG-TERM CURRENT USE OF INSULIN (HCC): ICD-10-CM

## 2019-09-10 DIAGNOSIS — M19.90 ARTHRITIS: ICD-10-CM

## 2019-09-10 DIAGNOSIS — J44.1 COPD EXACERBATION (HCC): Primary | ICD-10-CM

## 2019-09-10 DIAGNOSIS — J44.9 CHRONIC OBSTRUCTIVE PULMONARY DISEASE, UNSPECIFIED COPD TYPE (HCC): Chronic | ICD-10-CM

## 2019-09-10 DIAGNOSIS — M54.50 CHRONIC BILATERAL LOW BACK PAIN WITHOUT SCIATICA: ICD-10-CM

## 2019-09-10 DIAGNOSIS — J96.11 CHRONIC RESPIRATORY FAILURE WITH HYPOXIA (HCC): Chronic | ICD-10-CM

## 2019-09-10 DIAGNOSIS — G89.29 CHRONIC BILATERAL LOW BACK PAIN WITHOUT SCIATICA: ICD-10-CM

## 2019-09-10 DIAGNOSIS — E03.4 HYPOTHYROIDISM DUE TO ACQUIRED ATROPHY OF THYROID: ICD-10-CM

## 2019-09-10 DIAGNOSIS — E66.01 MORBID OBESITY (HCC): ICD-10-CM

## 2019-09-10 DIAGNOSIS — E03.9 HYPOTHYROIDISM (ACQUIRED): ICD-10-CM

## 2019-09-10 DIAGNOSIS — Z74.09 IMPAIRED PHYSICAL MOBILITY: ICD-10-CM

## 2019-09-10 DIAGNOSIS — E07.9 DISEASE OF THYROID GLAND: ICD-10-CM

## 2019-09-10 LAB
ALBUMIN SERPL-MCNC: 3.7 G/DL (ref 3.5–5.2)
ALBUMIN/GLOB SERPL: 0.9 G/DL
ALP SERPL-CCNC: 133 U/L (ref 39–117)
ALT SERPL W P-5'-P-CCNC: 31 U/L (ref 1–33)
ANION GAP SERPL CALCULATED.3IONS-SCNC: 9 MMOL/L (ref 5–15)
ARTERIAL PATENCY WRIST A: ABNORMAL
ARTERIAL PATENCY WRIST A: ABNORMAL
AST SERPL-CCNC: 24 U/L (ref 1–32)
ATMOSPHERIC PRESS: 752 MMHG
ATMOSPHERIC PRESS: 752 MMHG
BASE EXCESS BLDA CALC-SCNC: 12.1 MMOL/L (ref 0–2)
BASE EXCESS BLDA CALC-SCNC: 12.2 MMOL/L (ref 0–2)
BASOPHILS # BLD AUTO: 0.06 10*3/MM3 (ref 0–0.2)
BASOPHILS NFR BLD AUTO: 0.5 % (ref 0–1.5)
BDY SITE: ABNORMAL
BDY SITE: ABNORMAL
BILIRUB SERPL-MCNC: 0.5 MG/DL (ref 0.2–1.2)
BUN BLD-MCNC: 26 MG/DL (ref 8–23)
BUN/CREAT SERPL: 17.9 (ref 7–25)
CALCIUM SPEC-SCNC: 9.5 MG/DL (ref 8.6–10.5)
CHLORIDE SERPL-SCNC: 90 MMOL/L (ref 98–107)
CO2 SERPL-SCNC: 38 MMOL/L (ref 22–29)
CREAT BLD-MCNC: 1.45 MG/DL (ref 0.57–1)
DEPRECATED RDW RBC AUTO: 68.5 FL (ref 37–54)
EOSINOPHIL # BLD AUTO: 4.29 10*3/MM3 (ref 0–0.4)
EOSINOPHIL NFR BLD AUTO: 33.7 % (ref 0.3–6.2)
EPAP: 5
EPAP: 5
ERYTHROCYTE [DISTWIDTH] IN BLOOD BY AUTOMATED COUNT: 19.2 % (ref 12.3–15.4)
GFR SERPL CREATININE-BSD FRML MDRD: 34 ML/MIN/1.73
GLOBULIN UR ELPH-MCNC: 3.9 GM/DL
GLUCOSE BLD-MCNC: 122 MG/DL (ref 65–99)
GLUCOSE BLDC GLUCOMTR-MCNC: 198 MG/DL (ref 70–130)
GLUCOSE BLDC GLUCOMTR-MCNC: 219 MG/DL (ref 70–130)
GLUCOSE BLDC GLUCOMTR-MCNC: 230 MG/DL (ref 70–130)
HCO3 BLDA-SCNC: 39 MMOL/L (ref 20–26)
HCO3 BLDA-SCNC: 39 MMOL/L (ref 20–26)
HCT VFR BLD AUTO: 28.5 % (ref 34–46.6)
HGB BLD-MCNC: 8.8 G/DL (ref 12–15.9)
HOLD SPECIMEN: NORMAL
HOROWITZ INDEX BLD+IHG-RTO: 40 %
HOROWITZ INDEX BLD+IHG-RTO: 50 %
IMM GRANULOCYTES # BLD AUTO: 0.1 10*3/MM3 (ref 0–0.05)
IMM GRANULOCYTES NFR BLD AUTO: 0.8 % (ref 0–0.5)
IPAP: 12
IPAP: 12
LYMPHOCYTES # BLD AUTO: 0.68 10*3/MM3 (ref 0.7–3.1)
LYMPHOCYTES NFR BLD AUTO: 5.3 % (ref 19.6–45.3)
Lab: ABNORMAL
Lab: ABNORMAL
MCH RBC QN AUTO: 30.3 PG (ref 26.6–33)
MCHC RBC AUTO-ENTMCNC: 30.9 G/DL (ref 31.5–35.7)
MCV RBC AUTO: 98.3 FL (ref 79–97)
MODALITY: ABNORMAL
MODALITY: ABNORMAL
MONOCYTES # BLD AUTO: 0.93 10*3/MM3 (ref 0.1–0.9)
MONOCYTES NFR BLD AUTO: 7.3 % (ref 5–12)
NEUTROPHILS # BLD AUTO: 6.68 10*3/MM3 (ref 1.7–7)
NEUTROPHILS NFR BLD AUTO: 52.4 % (ref 42.7–76)
NRBC BLD AUTO-RTO: 0 /100 WBC (ref 0–0.2)
NT-PROBNP SERPL-MCNC: 8514 PG/ML (ref 5–1800)
PCO2 BLDA: 61 MM HG (ref 35–45)
PCO2 BLDA: 64.9 MM HG (ref 35–45)
PH BLDA: 7.39 PH UNITS (ref 7.35–7.45)
PH BLDA: 7.41 PH UNITS (ref 7.35–7.45)
PLATELET # BLD AUTO: 155 10*3/MM3 (ref 140–450)
PMV BLD AUTO: 10.4 FL (ref 6–12)
PO2 BLDA: 101 MM HG (ref 83–108)
PO2 BLDA: 167 MM HG (ref 83–108)
POTASSIUM BLD-SCNC: 3.7 MMOL/L (ref 3.5–5.2)
PROT SERPL-MCNC: 7.6 G/DL (ref 6–8.5)
RBC # BLD AUTO: 2.9 10*6/MM3 (ref 3.77–5.28)
SAO2 % BLDCOA: 98.2 % (ref 94–99)
SAO2 % BLDCOA: 99.9 % (ref 94–99)
SET MECH RESP RATE: 12
SET MECH RESP RATE: 12
SODIUM BLD-SCNC: 137 MMOL/L (ref 136–145)
TROPONIN T SERPL-MCNC: 0.02 NG/ML (ref 0–0.03)
VENTILATOR MODE: ABNORMAL
VENTILATOR MODE: ABNORMAL
WBC NRBC COR # BLD: 12.74 10*3/MM3 (ref 3.4–10.8)
WHOLE BLOOD HOLD SPECIMEN: NORMAL

## 2019-09-10 PROCEDURE — 25010000002 MAGNESIUM SULFATE IN D5W 1G/100ML (PREMIX) 1-5 GM/100ML-% SOLUTION: Performed by: INTERNAL MEDICINE

## 2019-09-10 PROCEDURE — 93005 ELECTROCARDIOGRAM TRACING: CPT | Performed by: EMERGENCY MEDICINE

## 2019-09-10 PROCEDURE — 99285 EMERGENCY DEPT VISIT HI MDM: CPT

## 2019-09-10 PROCEDURE — 25010000002 CALCIUM GLUCONATE PER 10 ML: Performed by: INTERNAL MEDICINE

## 2019-09-10 PROCEDURE — 25010000002 FUROSEMIDE PER 20 MG: Performed by: INTERNAL MEDICINE

## 2019-09-10 PROCEDURE — 97162 PT EVAL MOD COMPLEX 30 MIN: CPT

## 2019-09-10 PROCEDURE — 94760 N-INVAS EAR/PLS OXIMETRY 1: CPT

## 2019-09-10 PROCEDURE — 25010000002 AZITHROMYCIN PER 500 MG: Performed by: INTERNAL MEDICINE

## 2019-09-10 PROCEDURE — 25010000002 METHYLPREDNISOLONE PER 125 MG: Performed by: EMERGENCY MEDICINE

## 2019-09-10 PROCEDURE — 94640 AIRWAY INHALATION TREATMENT: CPT

## 2019-09-10 PROCEDURE — 94799 UNLISTED PULMONARY SVC/PX: CPT

## 2019-09-10 PROCEDURE — 25010000002 METHYLPREDNISOLONE PER 40 MG: Performed by: INTERNAL MEDICINE

## 2019-09-10 PROCEDURE — 5A09357 ASSISTANCE WITH RESPIRATORY VENTILATION, LESS THAN 24 CONSECUTIVE HOURS, CONTINUOUS POSITIVE AIRWAY PRESSURE: ICD-10-PCS | Performed by: INTERNAL MEDICINE

## 2019-09-10 PROCEDURE — 63710000001 INSULIN ASPART PER 5 UNITS: Performed by: INTERNAL MEDICINE

## 2019-09-10 PROCEDURE — 25010000002 METHYLPREDNISOLONE PER 125 MG: Performed by: INTERNAL MEDICINE

## 2019-09-10 PROCEDURE — 82803 BLOOD GASES ANY COMBINATION: CPT

## 2019-09-10 PROCEDURE — 82962 GLUCOSE BLOOD TEST: CPT

## 2019-09-10 PROCEDURE — 25010000002 HYDRALAZINE PER 20 MG: Performed by: INTERNAL MEDICINE

## 2019-09-10 PROCEDURE — 84484 ASSAY OF TROPONIN QUANT: CPT | Performed by: EMERGENCY MEDICINE

## 2019-09-10 PROCEDURE — 85025 COMPLETE CBC W/AUTO DIFF WBC: CPT | Performed by: EMERGENCY MEDICINE

## 2019-09-10 PROCEDURE — 83880 ASSAY OF NATRIURETIC PEPTIDE: CPT | Performed by: EMERGENCY MEDICINE

## 2019-09-10 PROCEDURE — 36600 WITHDRAWAL OF ARTERIAL BLOOD: CPT

## 2019-09-10 PROCEDURE — 93010 ELECTROCARDIOGRAM REPORT: CPT | Performed by: INTERNAL MEDICINE

## 2019-09-10 PROCEDURE — 80053 COMPREHEN METABOLIC PANEL: CPT | Performed by: EMERGENCY MEDICINE

## 2019-09-10 PROCEDURE — 71045 X-RAY EXAM CHEST 1 VIEW: CPT

## 2019-09-10 PROCEDURE — 25010000002 ENOXAPARIN PER 10 MG: Performed by: INTERNAL MEDICINE

## 2019-09-10 PROCEDURE — 94660 CPAP INITIATION&MGMT: CPT

## 2019-09-10 RX ORDER — FUROSEMIDE 10 MG/ML
40 INJECTION INTRAMUSCULAR; INTRAVENOUS EVERY 12 HOURS
Status: DISCONTINUED | OUTPATIENT
Start: 2019-09-10 | End: 2019-09-12 | Stop reason: HOSPADM

## 2019-09-10 RX ORDER — ONDANSETRON 2 MG/ML
4 INJECTION INTRAMUSCULAR; INTRAVENOUS EVERY 6 HOURS PRN
Status: DISCONTINUED | OUTPATIENT
Start: 2019-09-10 | End: 2019-09-12 | Stop reason: HOSPADM

## 2019-09-10 RX ORDER — LEVOTHYROXINE SODIUM 0.05 MG/1
50 TABLET ORAL
Status: DISCONTINUED | OUTPATIENT
Start: 2019-09-10 | End: 2019-09-12 | Stop reason: HOSPADM

## 2019-09-10 RX ORDER — IPRATROPIUM BROMIDE AND ALBUTEROL SULFATE 2.5; .5 MG/3ML; MG/3ML
SOLUTION RESPIRATORY (INHALATION)
Status: COMPLETED
Start: 2019-09-10 | End: 2019-09-10

## 2019-09-10 RX ORDER — FERROUS SULFATE 325(65) MG
325 TABLET ORAL 2 TIMES DAILY WITH MEALS
COMMUNITY

## 2019-09-10 RX ORDER — ACETAMINOPHEN 650 MG/1
650 SUPPOSITORY RECTAL EVERY 4 HOURS PRN
Status: DISCONTINUED | OUTPATIENT
Start: 2019-09-10 | End: 2019-09-12 | Stop reason: HOSPADM

## 2019-09-10 RX ORDER — IPRATROPIUM BROMIDE AND ALBUTEROL SULFATE 2.5; .5 MG/3ML; MG/3ML
3 SOLUTION RESPIRATORY (INHALATION)
Status: DISCONTINUED | OUTPATIENT
Start: 2019-09-10 | End: 2019-09-12 | Stop reason: HOSPADM

## 2019-09-10 RX ORDER — ACETAMINOPHEN 500 MG
500 TABLET ORAL ONCE AS NEEDED
Status: DISCONTINUED | OUTPATIENT
Start: 2019-09-10 | End: 2019-09-12 | Stop reason: HOSPADM

## 2019-09-10 RX ORDER — MONTELUKAST SODIUM 10 MG/1
10 TABLET ORAL NIGHTLY
Status: DISCONTINUED | OUTPATIENT
Start: 2019-09-10 | End: 2019-09-12 | Stop reason: HOSPADM

## 2019-09-10 RX ORDER — METHYLPREDNISOLONE SODIUM SUCCINATE 125 MG/2ML
80 INJECTION, POWDER, LYOPHILIZED, FOR SOLUTION INTRAMUSCULAR; INTRAVENOUS EVERY 8 HOURS
Status: DISCONTINUED | OUTPATIENT
Start: 2019-09-10 | End: 2019-09-11

## 2019-09-10 RX ORDER — BUDESONIDE AND FORMOTEROL FUMARATE DIHYDRATE 160; 4.5 UG/1; UG/1
2 AEROSOL RESPIRATORY (INHALATION)
Status: DISCONTINUED | OUTPATIENT
Start: 2019-09-10 | End: 2019-09-12 | Stop reason: HOSPADM

## 2019-09-10 RX ORDER — SODIUM CHLORIDE 0.9 % (FLUSH) 0.9 %
10 SYRINGE (ML) INJECTION AS NEEDED
Status: DISCONTINUED | OUTPATIENT
Start: 2019-09-10 | End: 2019-09-12 | Stop reason: HOSPADM

## 2019-09-10 RX ORDER — ACETAMINOPHEN 325 MG/1
650 TABLET ORAL EVERY 4 HOURS PRN
Status: DISCONTINUED | OUTPATIENT
Start: 2019-09-10 | End: 2019-09-12 | Stop reason: HOSPADM

## 2019-09-10 RX ORDER — HYDRALAZINE HYDROCHLORIDE 20 MG/ML
20 INJECTION INTRAMUSCULAR; INTRAVENOUS EVERY 6 HOURS PRN
Status: DISCONTINUED | OUTPATIENT
Start: 2019-09-10 | End: 2019-09-12 | Stop reason: HOSPADM

## 2019-09-10 RX ORDER — FERROUS SULFATE TAB EC 324 MG (65 MG FE EQUIVALENT) 324 (65 FE) MG
324 TABLET DELAYED RESPONSE ORAL 2 TIMES DAILY WITH MEALS
Status: DISCONTINUED | OUTPATIENT
Start: 2019-09-10 | End: 2019-09-12 | Stop reason: HOSPADM

## 2019-09-10 RX ORDER — METHYLPREDNISOLONE SODIUM SUCCINATE 125 MG/2ML
125 INJECTION, POWDER, LYOPHILIZED, FOR SOLUTION INTRAMUSCULAR; INTRAVENOUS ONCE
Status: COMPLETED | OUTPATIENT
Start: 2019-09-10 | End: 2019-09-10

## 2019-09-10 RX ORDER — ACETYLCYSTEINE 200 MG/ML
2 SOLUTION ORAL; RESPIRATORY (INHALATION)
Status: DISCONTINUED | OUTPATIENT
Start: 2019-09-10 | End: 2019-09-10

## 2019-09-10 RX ORDER — ACETAMINOPHEN 160 MG/5ML
650 SOLUTION ORAL EVERY 4 HOURS PRN
Status: DISCONTINUED | OUTPATIENT
Start: 2019-09-10 | End: 2019-09-12 | Stop reason: HOSPADM

## 2019-09-10 RX ORDER — METHYLPREDNISOLONE SODIUM SUCCINATE 40 MG/ML
40 INJECTION, POWDER, LYOPHILIZED, FOR SOLUTION INTRAMUSCULAR; INTRAVENOUS EVERY 8 HOURS
Status: DISCONTINUED | OUTPATIENT
Start: 2019-09-10 | End: 2019-09-10

## 2019-09-10 RX ORDER — DEXTROSE MONOHYDRATE 25 G/50ML
25 INJECTION, SOLUTION INTRAVENOUS
Status: DISCONTINUED | OUTPATIENT
Start: 2019-09-10 | End: 2019-09-12 | Stop reason: HOSPADM

## 2019-09-10 RX ORDER — GUAIFENESIN 600 MG/1
1200 TABLET, EXTENDED RELEASE ORAL EVERY 12 HOURS SCHEDULED
Status: DISCONTINUED | OUTPATIENT
Start: 2019-09-10 | End: 2019-09-12 | Stop reason: HOSPADM

## 2019-09-10 RX ORDER — NICOTINE POLACRILEX 4 MG
15 LOZENGE BUCCAL
Status: DISCONTINUED | OUTPATIENT
Start: 2019-09-10 | End: 2019-09-12 | Stop reason: HOSPADM

## 2019-09-10 RX ORDER — ONDANSETRON 4 MG/1
4 TABLET, FILM COATED ORAL EVERY 6 HOURS PRN
COMMUNITY

## 2019-09-10 RX ORDER — HYDROXYZINE PAMOATE 25 MG/1
25 CAPSULE ORAL EVERY 6 HOURS PRN
Status: DISCONTINUED | OUTPATIENT
Start: 2019-09-10 | End: 2019-09-12 | Stop reason: HOSPADM

## 2019-09-10 RX ORDER — MAGNESIUM SULFATE 1 G/100ML
1 INJECTION INTRAVENOUS ONCE
Status: COMPLETED | OUTPATIENT
Start: 2019-09-10 | End: 2019-09-10

## 2019-09-10 RX ORDER — BENZONATATE 200 MG/1
200 CAPSULE ORAL 3 TIMES DAILY
COMMUNITY

## 2019-09-10 RX ORDER — SODIUM CHLORIDE 0.9 % (FLUSH) 0.9 %
10 SYRINGE (ML) INJECTION EVERY 12 HOURS SCHEDULED
Status: DISCONTINUED | OUTPATIENT
Start: 2019-09-10 | End: 2019-09-12 | Stop reason: HOSPADM

## 2019-09-10 RX ORDER — ACETYLCYSTEINE 100 MG/ML
4 SOLUTION ORAL; RESPIRATORY (INHALATION)
Status: DISCONTINUED | OUTPATIENT
Start: 2019-09-10 | End: 2019-09-12 | Stop reason: HOSPADM

## 2019-09-10 RX ORDER — FUROSEMIDE 40 MG/1
40 TABLET ORAL 2 TIMES DAILY
Status: DISCONTINUED | OUTPATIENT
Start: 2019-09-10 | End: 2019-09-10

## 2019-09-10 RX ORDER — METHYLPREDNISOLONE SODIUM SUCCINATE 40 MG/ML
40 INJECTION, POWDER, LYOPHILIZED, FOR SOLUTION INTRAMUSCULAR; INTRAVENOUS ONCE
Status: COMPLETED | OUTPATIENT
Start: 2019-09-10 | End: 2019-09-10

## 2019-09-10 RX ORDER — IPRATROPIUM BROMIDE AND ALBUTEROL SULFATE 2.5; .5 MG/3ML; MG/3ML
3 SOLUTION RESPIRATORY (INHALATION)
Status: COMPLETED | OUTPATIENT
Start: 2019-09-10 | End: 2019-09-10

## 2019-09-10 RX ADMIN — ACETYLCYSTEINE 4 ML: 100 INHALANT RESPIRATORY (INHALATION) at 19:10

## 2019-09-10 RX ADMIN — BUDESONIDE AND FORMOTEROL FUMARATE DIHYDRATE 2 PUFF: 160; 4.5 AEROSOL RESPIRATORY (INHALATION) at 19:11

## 2019-09-10 RX ADMIN — METHYLPREDNISOLONE SODIUM SUCCINATE 40 MG: 40 INJECTION, POWDER, FOR SOLUTION INTRAMUSCULAR; INTRAVENOUS at 07:26

## 2019-09-10 RX ADMIN — FUROSEMIDE 40 MG: 10 INJECTION, SOLUTION INTRAMUSCULAR; INTRAVENOUS at 23:49

## 2019-09-10 RX ADMIN — IPRATROPIUM BROMIDE AND ALBUTEROL SULFATE 3 ML: 2.5; .5 SOLUTION RESPIRATORY (INHALATION) at 05:10

## 2019-09-10 RX ADMIN — INSULIN ASPART 4 UNITS: 100 INJECTION, SOLUTION INTRAVENOUS; SUBCUTANEOUS at 18:02

## 2019-09-10 RX ADMIN — IPRATROPIUM BROMIDE AND ALBUTEROL SULFATE 3 ML: 2.5; .5 SOLUTION RESPIRATORY (INHALATION) at 23:20

## 2019-09-10 RX ADMIN — HYDRALAZINE HYDROCHLORIDE 20 MG: 20 INJECTION INTRAMUSCULAR; INTRAVENOUS at 05:03

## 2019-09-10 RX ADMIN — IPRATROPIUM BROMIDE AND ALBUTEROL SULFATE 3 ML: 2.5; .5 SOLUTION RESPIRATORY (INHALATION) at 01:36

## 2019-09-10 RX ADMIN — IPRATROPIUM BROMIDE AND ALBUTEROL SULFATE 3 ML: 2.5; .5 SOLUTION RESPIRATORY (INHALATION) at 07:09

## 2019-09-10 RX ADMIN — IPRATROPIUM BROMIDE AND ALBUTEROL SULFATE 3 ML: 2.5; .5 SOLUTION RESPIRATORY (INHALATION) at 10:40

## 2019-09-10 RX ADMIN — METHYLPREDNISOLONE SODIUM SUCCINATE 125 MG: 125 INJECTION, POWDER, FOR SOLUTION INTRAMUSCULAR; INTRAVENOUS at 01:30

## 2019-09-10 RX ADMIN — AZITHROMYCIN MONOHYDRATE 500 MG: 500 INJECTION, POWDER, LYOPHILIZED, FOR SOLUTION INTRAVENOUS at 09:22

## 2019-09-10 RX ADMIN — MAGNESIUM SULFATE HEPTAHYDRATE 1 G: 1 INJECTION, SOLUTION INTRAVENOUS at 09:24

## 2019-09-10 RX ADMIN — INSULIN ASPART 4 UNITS: 100 INJECTION, SOLUTION INTRAVENOUS; SUBCUTANEOUS at 13:00

## 2019-09-10 RX ADMIN — IPRATROPIUM BROMIDE AND ALBUTEROL SULFATE 3 ML: 2.5; .5 SOLUTION RESPIRATORY (INHALATION) at 14:23

## 2019-09-10 RX ADMIN — HYDROXYZINE PAMOATE 25 MG: 25 CAPSULE ORAL at 23:49

## 2019-09-10 RX ADMIN — SODIUM CHLORIDE, PRESERVATIVE FREE 10 ML: 5 INJECTION INTRAVENOUS at 20:30

## 2019-09-10 RX ADMIN — FUROSEMIDE 40 MG: 10 INJECTION, SOLUTION INTRAMUSCULAR; INTRAVENOUS at 12:15

## 2019-09-10 RX ADMIN — METHYLPREDNISOLONE SODIUM SUCCINATE 80 MG: 125 INJECTION, POWDER, FOR SOLUTION INTRAMUSCULAR; INTRAVENOUS at 18:00

## 2019-09-10 RX ADMIN — IPRATROPIUM BROMIDE AND ALBUTEROL SULFATE 3 ML: 2.5; .5 SOLUTION RESPIRATORY (INHALATION) at 01:24

## 2019-09-10 RX ADMIN — BUDESONIDE AND FORMOTEROL FUMARATE DIHYDRATE 2 PUFF: 160; 4.5 AEROSOL RESPIRATORY (INHALATION) at 07:09

## 2019-09-10 RX ADMIN — IPRATROPIUM BROMIDE AND ALBUTEROL SULFATE 3 ML: 2.5; .5 SOLUTION RESPIRATORY (INHALATION) at 19:08

## 2019-09-10 RX ADMIN — SODIUM CHLORIDE, PRESERVATIVE FREE 10 ML: 5 INJECTION INTRAVENOUS at 09:25

## 2019-09-10 RX ADMIN — METHYLPREDNISOLONE SODIUM SUCCINATE 80 MG: 125 INJECTION, POWDER, FOR SOLUTION INTRAMUSCULAR; INTRAVENOUS at 23:49

## 2019-09-10 RX ADMIN — GUAIFENESIN 1200 MG: 600 TABLET, EXTENDED RELEASE ORAL at 20:30

## 2019-09-10 RX ADMIN — METHYLPREDNISOLONE SODIUM SUCCINATE 40 MG: 40 INJECTION, POWDER, FOR SOLUTION INTRAMUSCULAR; INTRAVENOUS at 09:19

## 2019-09-10 RX ADMIN — ENOXAPARIN SODIUM 30 MG: 30 INJECTION SUBCUTANEOUS at 07:29

## 2019-09-10 RX ADMIN — IPRATROPIUM BROMIDE AND ALBUTEROL SULFATE 3 ML: 2.5; .5 SOLUTION RESPIRATORY (INHALATION) at 01:34

## 2019-09-10 RX ADMIN — MONTELUKAST SODIUM 10 MG: 10 TABLET, COATED ORAL at 20:30

## 2019-09-10 RX ADMIN — FERROUS SULFATE TAB EC 324 MG (65 MG FE EQUIVALENT) 324 MG: 324 (65 FE) TABLET DELAYED RESPONSE at 18:01

## 2019-09-10 RX ADMIN — INSULIN ASPART 2 UNITS: 100 INJECTION, SOLUTION INTRAVENOUS; SUBCUTANEOUS at 20:30

## 2019-09-10 RX ADMIN — CALCIUM GLUCONATE 1 G: 98 INJECTION, SOLUTION INTRAVENOUS at 07:30

## 2019-09-10 RX ADMIN — ACETYLCYSTEINE 4 ML: 100 INHALANT RESPIRATORY (INHALATION) at 14:22

## 2019-09-10 RX ADMIN — ACETAMINOPHEN 650 MG: 325 TABLET, FILM COATED ORAL at 20:36

## 2019-09-10 NOTE — SIGNIFICANT NOTE
09/10/19 1000   Rehab Time/Intention   Evaluation Not Performed unable to evaluate, medical status change   Rehab Treatment   Discipline physical therapist   Recommendation   PT - Next Appointment 09/11/19     Initial PT evaluation attempted.  Nurse reports patient currently unable to tolerate PT.

## 2019-09-10 NOTE — PROGRESS NOTES
[facility] Medicine Services  INPATIENT PROGRESS NOTE    Length of Stay: 0  Date of Admission: 9/10/2019  Primary Care Physician: Krista Matos MD    Subjective   Chief Complaint: Shortness of breath  HPI: This is an 88-year-old woman with past medical history of diabetes mellitus, hypothyroidism, hypertension, hyperlipidemia, proximal A. fib, CAD, COPD on 3 L O2 at home came to the hospital complaining of shortness of breath and coughing for 1 week for which patient was admitted with COPD exacerbation patient is being treated with azithromycin, Solu-Medrol, Singulair, BiPAP, bronchodilator, Symbicort    Review of Systems   Constitutional: Positive for activity change, appetite change and fatigue. Negative for chills and fever.   HENT: Positive for congestion and mouth sores. Negative for ear pain, hearing loss, sinus pressure, sinus pain, sneezing and sore throat.    Eyes: Negative for pain, discharge, redness and itching.   Respiratory: Positive for cough, shortness of breath and wheezing. Negative for apnea.    Cardiovascular: Negative for chest pain, palpitations and leg swelling.   Gastrointestinal: Negative for abdominal distention, abdominal pain, constipation and nausea.   Genitourinary: Negative for difficulty urinating, dyspareunia and frequency.   Musculoskeletal: Positive for myalgias.   Skin: Negative for pallor and rash.   Neurological: Negative for dizziness, seizures, syncope, facial asymmetry, light-headedness and numbness.   Psychiatric/Behavioral: Negative for agitation, behavioral problems, confusion, decreased concentration and hallucinations.        All pertinent negatives and positives are as above. All other systems have been reviewed and are negative unless otherwise stated.     Objective    Temp:  [97.7 °F (36.5 °C)-97.8 °F (36.6 °C)] 97.7 °F (36.5 °C)  Heart Rate:  [77-92] 77  Resp:  [16-24] 16  BP: (102-190)/(55-89) 102/64    Physical Exam    Constitutional: She is oriented to person, place, and time. She appears well-developed and well-nourished. She appears distressed.   HENT:   Head: Normocephalic and atraumatic.   Right Ear: External ear normal.   Left Ear: External ear normal.   Nose: Nose normal.   Mouth/Throat: Oropharynx is clear and moist.   Eyes: Conjunctivae and EOM are normal. Pupils are equal, round, and reactive to light. Right eye exhibits no discharge. Left eye exhibits no discharge. No scleral icterus.   Neck: Normal range of motion. Neck supple. No tracheal deviation present.   Cardiovascular: Normal rate, normal heart sounds and intact distal pulses. Exam reveals no gallop and no friction rub.   Tachycardic   Pulmonary/Chest: She has wheezes. She has rales.   Expiratory wheeze more than inspiratory  Decreased breath sounds at the bases  Mild respiratory distress   Abdominal: Soft. Bowel sounds are normal. She exhibits no distension and no mass.   Musculoskeletal: Normal range of motion. She exhibits no edema or deformity.   Neurological: She is alert and oriented to person, place, and time. She displays normal reflexes. No cranial nerve deficit. She exhibits normal muscle tone. Coordination normal.   Skin: Skin is warm. No rash noted. No erythema.   Psychiatric: She has a normal mood and affect. Her behavior is normal. Judgment and thought content normal.   Nursing note and vitals reviewed.          Results Review:  I have reviewed the labs, radiology results, and diagnostic studies.    Laboratory Data:   Results from last 7 days   Lab Units 09/10/19  0134   SODIUM mmol/L 137   POTASSIUM mmol/L 3.7   CHLORIDE mmol/L 90*   CO2 mmol/L 38.0*   BUN mg/dL 26*   CREATININE mg/dL 1.45*   GLUCOSE mg/dL 122*   CALCIUM mg/dL 9.5   BILIRUBIN mg/dL 0.5   ALK PHOS U/L 133*   ALT (SGPT) U/L 31   AST (SGOT) U/L 24   ANION GAP mmol/L 9.0     Estimated Creatinine Clearance: 27.5 mL/min (A) (by C-G formula based on SCr of 1.45 mg/dL (H)).           Results from last 7 days   Lab Units 09/10/19  0134   WBC 10*3/mm3 12.74*   HEMOGLOBIN g/dL 8.8*   HEMATOCRIT % 28.5*   PLATELETS 10*3/mm3 155           Culture Data:   No results found for: BLOODCX  No results found for: URINECX  No results found for: RESPCX  No results found for: WOUNDCX  No results found for: STOOLCX  No components found for: BODYFLD    Radiology Data:   Imaging Results (last 24 hours)     Procedure Component Value Units Date/Time    XR Chest 1 View [[id number]] Collected:  09/10/19 0159     Updated:  09/10/19 0213    Narrative:         Chest single view on  9/10/2019     CLINICAL INDICATION: Shortness of breath, cough, COPD    COMPARISON: 3/18/2018    FINDINGS: Cardiomegaly is noted. Mild chronic interstitial  changes are noted. No acute pulmonary opacity is noted. Vascular  calcification is noted in the aorta. Hilar and mediastinal  contours are within normal limits. Degenerative changes are noted  in the shoulders.      Impression:       No significant change in the appearance of the chest.    Electronically signed by:  Musa Molina  9/10/2019 2:11 AM  CDT Workstation: [id number]          Scheduled Meds:  acetylcysteine 4 mL Nebulization Q4H - RT   azithromycin 500 mg Intravenous Q24H   budesonide-formoterol 2 puff Inhalation BID - RT   enoxaparin 30 mg Subcutaneous Q24H   ferrous sulfate 324 mg Oral BID With Meals   furosemide 40 mg Intravenous Q12H   guaiFENesin 1,200 mg Oral Q12H   insulin aspart 0-9 Units Subcutaneous 4x Daily AC & at Bedtime   ipratropium-albuterol 3 mL Nebulization Q4H - RT   levothyroxine 50 mcg Oral Q AM   methylPREDNISolone sodium succinate 80 mg Intravenous Q8H   montelukast 10 mg Oral Nightly   sodium chloride 10 mL Intravenous Q12H     Continuous Infusions:   PRN Meds:.•  acetaminophen **OR** acetaminophen **OR** acetaminophen  •  acetaminophen  •  dextrose  •  dextrose  •  glucagon (human recombinant)  •  hydrALAZINE  •  ondansetron  •  [COMPLETED] Insert  peripheral IV **AND** sodium chloride  •  sodium chloride    Assessment/Plan       COPD exacerbation (CMS/HCC)    Acute respiratory failure with hypoxia (CMS/HCC)    DM (diabetes mellitus) (CMS/HCC)    Hypothyroidism (acquired)        Plan:   1.  Acute respiratory failure with hypoxia  Secondary to acute COPD exacerbation  Solu-Medrol 80 mg IV every 8 hour  Azithromycin daily x5 days  Singulair 10 mg once per day  Bronchodilator as needed  Continue Symbicort  Mucomyst respiratory treatment  BiPAP      2.Diabetes mellitus  Fingerstick  Cover with regular insulin      3.  Hypothyroidism  Continue levothyroxine 50 mcg/day    DVT prophylaxis with Lovenox      Discharge Planning: I expect patient to be discharged to home in 3-5 days.    Winsome Mccarty MD  09/10/19  11:24 AM

## 2019-09-10 NOTE — ED PROVIDER NOTES
Subjective   88-year-old female with reported history of COPD who denies history of congestive heart failure however it is listed in her documentation presents emergency department with complaint of 1 week of worsening shortness of breath and cough.  She has history of coronary artery disease as well as diabetes, hypertension, hyperlipidemia and paroxysmal atrial fibrillation.  She does wear oxygen 3 L nasal cannula at home and denies that she has turned this up any.  She is found to be with 81% oxygen saturation on her 3 L upon arrival in the emergency department.  She states she has been using her inhalers as directed.  Has not been on any recent steroids or antibiotics.  States a nonproductive cough.  Denies fever or chills.  Denies chest pain.    Family history, surgical history, social history, current medications and allergies are reviewed with the patient and triage documentation and vitals are reviewed.          History provided by:  Patient and EMS personnel   used: No        Review of Systems   Constitutional: Negative for chills and fever.   HENT: Negative for congestion, sinus pressure, sinus pain, sore throat and trouble swallowing.    Eyes: Negative.    Respiratory: Positive for cough, chest tightness, shortness of breath and wheezing.    Cardiovascular: Negative for chest pain, palpitations and leg swelling.   Gastrointestinal: Negative for abdominal pain, constipation, diarrhea, nausea and vomiting.   Endocrine: Negative.    Genitourinary: Negative for dysuria, frequency and urgency.   Musculoskeletal: Negative for arthralgias, back pain, myalgias and neck pain.   Skin: Negative for color change, pallor, rash and wound.   Allergic/Immunologic: Negative.    Neurological: Negative.    Hematological: Negative.    Psychiatric/Behavioral: Negative.        Past Medical History:   Diagnosis Date   • Arthritis    • CAD (coronary artery disease)    • CAD (coronary artery disease)    • CHF  (congestive heart failure) (CMS/HCC)    • Chronic obstructive pulmonary disease (CMS/HCC)    • Chronic respiratory failure with hypoxia (CMS/HCC)    • Diabetes mellitus (CMS/HCC)    • Disease of thyroid gland    • History of TIAs    • Hyperlipidemia    • Hypertension    • Obesity    • Paroxysmal atrial fibrillation (CMS/HCC)        Allergies   Allergen Reactions   • Rocephin [Ceftriaxone] Anaphylaxis and Shortness Of Breath   • Aspirin Other (See Comments)     On nursing home form, patient states it was on her allergies due to already taking blood thinner       Past Surgical History:   Procedure Laterality Date   • CATARACT EXTRACTION     • KNEE ARTHROPLASTY         Family History   Problem Relation Age of Onset   • Hypertension Father        Social History     Socioeconomic History   • Marital status:      Spouse name: Not on file   • Number of children: Not on file   • Years of education: Not on file   • Highest education level: Not on file   Tobacco Use   • Smoking status: Former Smoker   • Smokeless tobacco: Never Used   Substance and Sexual Activity   • Alcohol use: No   • Drug use: No   • Sexual activity: Not Currently           Objective   Physical Exam   Constitutional: She is oriented to person, place, and time. She appears well-developed and well-nourished.  Non-toxic appearance. She does not appear ill. She appears distressed.   HENT:   Head: Normocephalic.   Mouth/Throat: Oropharynx is clear and moist.   Eyes: Pupils are equal, round, and reactive to light.   Neck: Normal range of motion. Neck supple. No hepatojugular reflux and no JVD present.   Cardiovascular: Normal rate, regular rhythm, normal heart sounds and intact distal pulses.   No murmur heard.  Pulmonary/Chest: Accessory muscle usage present. Tachypnea noted. She is in respiratory distress. She has decreased breath sounds in the right lower field and the left lower field. She has wheezes in the right upper field, the right lower field,  the left upper field and the left lower field. She has no rhonchi. She has no rales.   Abdominal: Soft. Bowel sounds are normal.   Musculoskeletal: Normal range of motion.        Right lower leg: She exhibits no tenderness and no edema.        Left lower leg: She exhibits no tenderness and no edema.   Neurological: She is alert and oriented to person, place, and time.   Skin: Skin is warm and dry. Capillary refill takes less than 2 seconds. She is not diaphoretic.   Psychiatric: She has a normal mood and affect. Her behavior is normal. Her mood appears not anxious. She is not agitated.   Nursing note and vitals reviewed.      Procedures  none         ED Course      Labs Reviewed   COMPREHENSIVE METABOLIC PANEL - Abnormal; Notable for the following components:       Result Value    Glucose 122 (*)     BUN 26 (*)     Creatinine 1.45 (*)     Chloride 90 (*)     CO2 38.0 (*)     Alkaline Phosphatase 133 (*)     eGFR Non  Amer 34 (*)     All other components within normal limits    Narrative:     GFR Normal >60  Chronic Kidney Disease <60  Kidney Failure <15   BNP (IN-HOUSE) - Abnormal; Notable for the following components:    proBNP 8,514.0 (*)     All other components within normal limits    Narrative:     Among patients with dyspnea, NT-proBNP is highly sensitive for the detection of acute congestive heart failure. In addition NT-proBNP of <300 pg/ml effectively rules out acute congestive heart failure with 99% negative predictive value.   CBC WITH AUTO DIFFERENTIAL - Abnormal; Notable for the following components:    WBC 12.74 (*)     RBC 2.90 (*)     Hemoglobin 8.8 (*)     Hematocrit 28.5 (*)     MCV 98.3 (*)     MCHC 30.9 (*)     RDW 19.2 (*)     RDW-SD 68.5 (*)     Lymphocyte % 5.3 (*)     Eosinophil % 33.7 (*)     Immature Grans % 0.8 (*)     Lymphocytes, Absolute 0.68 (*)     Monocytes, Absolute 0.93 (*)     Eosinophils, Absolute 4.29 (*)     Immature Grans, Absolute 0.10 (*)     All other components  within normal limits   TROPONIN (IN-HOUSE) - Normal    Narrative:     Troponin T Reference Range:  <= 0.03 ng/mL-   Negative for AMI  >0.03 ng/mL-     Abnormal for myocardial necrosis.  Clinicians would have to utilize clinical acumen, EKG, Troponin and serial changes to determine if it is an Acute Myocardial Infarction or myocardial injury due to an underlying chronic condition.    CBC AND DIFFERENTIAL    Narrative:     The following orders were created for panel order CBC & Differential.  Procedure                               Abnormality         Status                     ---------                               -----------         ------                     CBC Auto Differential[[id number]]        Abnormal            Final result                 Please view results for these tests on the individual orders.   EXTRA TUBES    Narrative:     The following orders were created for panel order Extra Tubes.  Procedure                               Abnormality         Status                     ---------                               -----------         ------                     Light Blue Top[[id number]]                                   Final result               Gold Top - SST[[id number]]                                   Final result                 Please view results for these tests on the individual orders.   LIGHT BLUE TOP   GOLD TOP - SST     Xr Chest 1 View    Result Date: 9/10/2019  Narrative: Chest single view on  9/10/2019 CLINICAL INDICATION: Shortness of breath, cough, COPD COMPARISON: 3/18/2018 FINDINGS: Cardiomegaly is noted. Mild chronic interstitial changes are noted. No acute pulmonary opacity is noted. Vascular calcification is noted in the aorta. Hilar and mediastinal contours are within normal limits. Degenerative changes are noted in the shoulders.     Impression: No significant change in the appearance of the chest. Electronically signed by:  Musa Molina  9/10/2019 2:11 AM CDT Workstation:  [id number]    EKG September 10, 2019 at 0113 reveals sinus rhythm with first-degree AV block and PVCs.  Rate of 88 bpm.  Unchanged from EKG of August 28, 2019.        MDM  Number of Diagnoses or Management Options  COPD exacerbation (CMS/[facility]):      Amount and/or Complexity of Data Reviewed  Clinical lab tests: reviewed  Tests in the radiology section of CPT®: reviewed  Discuss the patient with other providers: yes    Patient Progress  Patient progress: stable    Patient was significant improvement with DuoNeb x3 as well as IV Solu-Medrol.  Patient has no further hypoxia and maintains on her 3 L nasal cannula that she wears chronically.  Chest x-ray shows improvement from previous.  BNP is elevated at 8000 but clinically this appears to be COPD.  Patient continues with cough and wheezing and is agreeable to admission.    Final diagnoses:   COPD exacerbation (CMS/[facility])              Emerson Encarnacion DO  09/10/19 0642

## 2019-09-10 NOTE — H&P
[facility] Medicine Services  INPATIENT HISTORY AND PHYSICAL       Patient Care Team:  Krista Matos MD as PCP - General  Krista Matos MD as PCP - Claims Attributed    Chief complaint   Chief Complaint   Patient presents with   • Shortness of Breath       Subjective     Patient is a 88 y.o. female with a past medical history of COPD with chronic hypoxic respiratory failure on 3 L of home oxygen at baseline, paroxysmal atrial fibrillation, essential hypertension, type 2 diabetes mellitus, and hypothyroidism.  She presents with complaints of worsening shortness of breath, cough and wheezing of one week duration.  Cough is productive of whitish-brownish phlegm.  She denies fevers or chills.  She was found to have O2 sats of 81% on 3 L of oxygen at presentation in the emergency room.  Chest x-ray done showed no acute pulmonary opacities.  However patient did receive Lasix on route to the emergency room that was given by EMS.    Of note patient has a nonhealing left knee wound which she has been dressing over the past 2 months.    Review of Systems   Constitutional: Negative for activity change, appetite change, chills, fatigue and fever.   HENT: Negative for congestion, sore throat and trouble swallowing.    Respiratory: Positive for cough, shortness of breath and wheezing. Negative for chest tightness.    Cardiovascular: Negative for chest pain, palpitations and leg swelling.   Gastrointestinal: Negative for abdominal distention, abdominal pain, diarrhea, nausea and vomiting.   Genitourinary: Negative for difficulty urinating, dysuria and hematuria.   Musculoskeletal: Negative for arthralgias, back pain and myalgias.   Skin: Positive for wound. Negative for pallor and rash.   Neurological: Negative for dizziness, syncope, weakness, light-headedness and headaches.   Hematological: Negative for adenopathy. Does not bruise/bleed easily.   Psychiatric/Behavioral: Negative  for agitation and confusion. The patient is not nervous/anxious.      History  Past Medical History:   Diagnosis Date   • Arthritis    • CAD (coronary artery disease)    • CAD (coronary artery disease)    • CHF (congestive heart failure) (CMS/[facility])    • Chronic obstructive pulmonary disease (CMS/HCC)    • Chronic respiratory failure with hypoxia (CMS/[facility])    • Diabetes mellitus (CMS/[facility])    • Disease of thyroid gland    • History of TIAs    • Hyperlipidemia    • Hypertension    • Obesity    • Paroxysmal atrial fibrillation (CMS/[facility])      Past Surgical History:   Procedure Laterality Date   • CATARACT EXTRACTION     • KNEE ARTHROPLASTY       Family History   Problem Relation Age of Onset   • Hypertension Father      Social History     Tobacco Use   • Smoking status: Former Smoker   • Smokeless tobacco: Never Used   Substance Use Topics   • Alcohol use: No   • Drug use: No       (Not in a hospital admission)  Allergies:  Rocephin [ceftriaxone] and Aspirin  Prior to Admission medications    Medication Sig Start Date End Date Taking? Authorizing Provider   acetaminophen (TYLENOL) 500 MG tablet Take 500 mg by mouth 1 (one) time as needed for mild pain (1-3).    ProviderAisha MD   albuterol (ACCUNEB) 0.63 MG/3ML nebulizer solution Take 3 mL by nebulization Every 6 (Six) Hours As Needed for Wheezing. 2/26/18   Krista Matos MD   albuterol (PROVENTIL HFA;VENTOLIN HFA) 108 (90 Base) MCG/ACT inhaler Inhale 2 puffs 4 (Four) Times a Day. 12/15/17   Krista Matos MD   Calcium Carbonate ([employer] CALCIUM PO) Take 1,200 mg by mouth 2 (Two) Times a Day.    ProviderAisha MD   docusate sodium (COLACE) 100 MG capsule Take 300 mg by mouth Every Night.    ProviderAisha MD   doxycycline (MONODOX) 100 MG capsule Take 1 capsule by mouth 2 (Two) Times a Day. 6/18/18   Krista Matos MD   furosemide (LASIX) 40 MG tablet Take 1 tablet by mouth 2 (Two) Times a Day. 2/26/18   Krista Matos MD   guaiFENesin (MUCINEX)  600 MG 12 hr tablet Take 2 tablets by mouth Every 12 (Twelve) Hours. 3/21/18   Zamzam Ledbetter APRN   levothyroxine (SYNTHROID) 50 MCG tablet Take 1 tablet by mouth Daily. 6/11/18   Sofya Graham MD   MethylPREDNISolone (MEDROL, LEANDRO,) 4 MG tablet Take as directed on package instructions. 6/18/18   Krista Matos MD   oxyCODONE (OXY-IR) 5 MG capsule Take 5 mg by mouth Every 4 (Four) Hours As Needed for Moderate Pain .    Provider, MD Aisha   potassium chloride (K-DUR,KLOR-CON) 10 MEQ CR tablet take 1 tablet by mouth twice a day 10/17/17   Krista Matos MD   predniSONE (DELTASONE) 10 MG tablet 40 mg daily x 2 days, then 30 mg daily x 2 days, then 20 mg daily x2 days, then 10 mg daily x2 days. 5/9/18   Krista Matos MD   SYMBICORT 160-4.5 MCG/ACT inhaler inhale 2 puffs by mouth twice a day Rinse mouth after use 1/11/18   Krista Matos MD   vitamin D (ERGOCALCIFEROL) 75127 units capsule capsule take 1 capsule by mouth every week 4/30/18   Krista Matos MD       Objective        Vital Signs  Temp:  [97.8 °F (36.6 °C)] 97.8 °F (36.6 °C)  Heart Rate:  [86-90] 86  Resp:  [20-24] 20  BP: (156-189)/(72-83) 189/77      Physical Exam   Constitutional: She is oriented to person, place, and time. She appears well-developed and well-nourished. No distress.   HENT:   Head: Normocephalic and atraumatic.   Mouth/Throat: Oropharynx is clear and moist. No oropharyngeal exudate.   Eyes: Conjunctivae and EOM are normal. Pupils are equal, round, and reactive to light. No scleral icterus.   Neck: Normal range of motion. Neck supple. No JVD present. No tracheal deviation present. No thyromegaly present.   Cardiovascular: Normal rate, regular rhythm, normal heart sounds and intact distal pulses. Exam reveals no gallop and no friction rub.   No murmur heard.  Pulmonary/Chest: No stridor. She is in respiratory distress. She has wheezes. She has no rales. She exhibits no tenderness.   Loud scattered wheeze  bilaterally in lung fields   Abdominal: Soft. Bowel sounds are normal. She exhibits no distension and no mass. There is no tenderness. There is no rebound and no guarding. No hernia.   Musculoskeletal: Normal range of motion. She exhibits no edema, tenderness or deformity.   Dressing over left knee wound clean and dry.   Lymphadenopathy:     She has no cervical adenopathy.   Neurological: She is alert and oriented to person, place, and time. No cranial nerve deficit. She exhibits normal muscle tone.   Skin: Skin is warm and dry. No rash noted. She is not diaphoretic. No erythema. No pallor.   Psychiatric: She has a normal mood and affect. Her behavior is normal. Judgment and thought content normal.     Results Review:     Results from last 7 days   Lab Units 09/10/19  0134   SODIUM mmol/L 137   POTASSIUM mmol/L 3.7   CHLORIDE mmol/L 90*   CO2 mmol/L 38.0*   BUN mg/dL 26*   CREATININE mg/dL 1.45*   GLUCOSE mg/dL 122*   CALCIUM mg/dL 9.5   BILIRUBIN mg/dL 0.5   ALK PHOS U/L 133*   ALT (SGPT) U/L 31   AST (SGOT) U/L 24             Results from last 7 days   Lab Units 09/10/19  0134   WBC 10*3/mm3 12.74*   HEMOGLOBIN g/dL 8.8*   HEMATOCRIT % 28.5*   PLATELETS 10*3/mm3 155           Imaging Results (last 7 days)     Procedure Component Value Units Date/Time    XR Chest 1 View [[id number]] Collected:  09/10/19 0159     Updated:  09/10/19 0213    Narrative:         Chest single view on  9/10/2019     CLINICAL INDICATION: Shortness of breath, cough, COPD    COMPARISON: 3/18/2018    FINDINGS: Cardiomegaly is noted. Mild chronic interstitial  changes are noted. No acute pulmonary opacity is noted. Vascular  calcification is noted in the aorta. Hilar and mediastinal  contours are within normal limits. Degenerative changes are noted  in the shoulders.      Impression:       No significant change in the appearance of the chest.    Electronically signed by:  Musa Molina  9/10/2019 2:11 AM  CDT Workstation: [id number]           Assessment / Plan       Hospital Problem List:  Active Problems:  Acute on chronic hypoxic respiratory failure  COPD exacerbation  Paroxysmal atrial fibrillation  Essential hypertension  Type 2 diabetes mellitus  Hypothyroidism  Left knee chronic nonhealing wound    Plan  - Patient has acute on chronic hypoxic respiratory failure likely from COPD exacerbation.  - Continue oxygen by nasal cannula to keep O2 sat greater than 90%  - IV Solu-Medrol 40 mg every 6 hours  - Duo nebs every 4 hours around-the-clock.  -IV azithromycin for COPD exacerbation.  Start Symbicort  - We will consider BiPAP if patient continues to have shortness of breath and significant work of breathing  - Continue home medication of Lasix p.o. patient denies history of congestive heart failure although this is listed in her past medical history  - IV hydralazine PRN for essential hypertension and systolic blood pressure greater than 160 mmHg  - NovoLog sliding scale and Accu-Cheks 3 times daily AC at bedtime for type 2 diabetes mellitus  - Dressing changes and wound care for left knee nonhealing wound  - DVT prophylaxis with subcutaneous Lovenox  - Patient's CODE STATUS is DNR/DNI as per her expressed wishes the presence of the emergency room nursing staff    I discussed the patient's findings and my recommendations with patient.     Ruddy Ramirez MD  09/10/19  3:49 AM        Part of this note may be an electronic transcription/translation of spoken language to printed text using the Dragon Dictation System.

## 2019-09-10 NOTE — ED NOTES
"Patient states \"I'm still wheezing but I can breath better\"     Rosaura Greer RN  09/10/19 0213    "

## 2019-09-11 LAB
ANION GAP SERPL CALCULATED.3IONS-SCNC: 12 MMOL/L (ref 5–15)
ARTERIAL PATENCY WRIST A: ABNORMAL
ATMOSPHERIC PRESS: 752 MMHG
BASE EXCESS BLDA CALC-SCNC: 14.1 MMOL/L (ref 0–2)
BDY SITE: ABNORMAL
BUN BLD-MCNC: 37 MG/DL (ref 8–23)
BUN/CREAT SERPL: 19.1 (ref 7–25)
CALCIUM SPEC-SCNC: 9.5 MG/DL (ref 8.6–10.5)
CHLORIDE SERPL-SCNC: 92 MMOL/L (ref 98–107)
CO2 SERPL-SCNC: 34 MMOL/L (ref 22–29)
CREAT BLD-MCNC: 1.94 MG/DL (ref 0.57–1)
DEPRECATED RDW RBC AUTO: 69.6 FL (ref 37–54)
ERYTHROCYTE [DISTWIDTH] IN BLOOD BY AUTOMATED COUNT: 19.4 % (ref 12.3–15.4)
GAS FLOW AIRWAY: 2.5 LPM
GFR SERPL CREATININE-BSD FRML MDRD: 24 ML/MIN/1.73
GLUCOSE BLD-MCNC: 172 MG/DL (ref 65–99)
GLUCOSE BLDC GLUCOMTR-MCNC: 118 MG/DL (ref 70–130)
GLUCOSE BLDC GLUCOMTR-MCNC: 142 MG/DL (ref 70–130)
GLUCOSE BLDC GLUCOMTR-MCNC: 147 MG/DL (ref 70–130)
GLUCOSE BLDC GLUCOMTR-MCNC: 196 MG/DL (ref 70–130)
HCO3 BLDA-SCNC: 38.6 MMOL/L (ref 20–26)
HCT VFR BLD AUTO: 28.8 % (ref 34–46.6)
HGB BLD-MCNC: 8.8 G/DL (ref 12–15.9)
Lab: ABNORMAL
MCH RBC QN AUTO: 30.3 PG (ref 26.6–33)
MCHC RBC AUTO-ENTMCNC: 30.6 G/DL (ref 31.5–35.7)
MCV RBC AUTO: 99.3 FL (ref 79–97)
MODALITY: ABNORMAL
PCO2 BLDA: 47.7 MM HG (ref 35–45)
PH BLDA: 7.52 PH UNITS (ref 7.35–7.45)
PLATELET # BLD AUTO: 151 10*3/MM3 (ref 140–450)
PMV BLD AUTO: 11.5 FL (ref 6–12)
PO2 BLDA: 88 MM HG (ref 83–108)
POTASSIUM BLD-SCNC: 4 MMOL/L (ref 3.5–5.2)
RBC # BLD AUTO: 2.9 10*6/MM3 (ref 3.77–5.28)
SAO2 % BLDCOA: 98 % (ref 94–99)
SODIUM BLD-SCNC: 138 MMOL/L (ref 136–145)
VENTILATOR MODE: ABNORMAL
WBC NRBC COR # BLD: 10.38 10*3/MM3 (ref 3.4–10.8)

## 2019-09-11 PROCEDURE — 82962 GLUCOSE BLOOD TEST: CPT

## 2019-09-11 PROCEDURE — 25010000002 METHYLPREDNISOLONE PER 125 MG: Performed by: INTERNAL MEDICINE

## 2019-09-11 PROCEDURE — 25010000002 AZITHROMYCIN PER 500 MG: Performed by: INTERNAL MEDICINE

## 2019-09-11 PROCEDURE — 85027 COMPLETE CBC AUTOMATED: CPT | Performed by: INTERNAL MEDICINE

## 2019-09-11 PROCEDURE — 80048 BASIC METABOLIC PNL TOTAL CA: CPT | Performed by: INTERNAL MEDICINE

## 2019-09-11 PROCEDURE — 25010000002 ENOXAPARIN PER 10 MG: Performed by: INTERNAL MEDICINE

## 2019-09-11 PROCEDURE — 94799 UNLISTED PULMONARY SVC/PX: CPT

## 2019-09-11 PROCEDURE — 63710000001 INSULIN ASPART PER 5 UNITS: Performed by: INTERNAL MEDICINE

## 2019-09-11 PROCEDURE — 82803 BLOOD GASES ANY COMBINATION: CPT

## 2019-09-11 PROCEDURE — 94760 N-INVAS EAR/PLS OXIMETRY 1: CPT

## 2019-09-11 PROCEDURE — 94660 CPAP INITIATION&MGMT: CPT

## 2019-09-11 PROCEDURE — 36600 WITHDRAWAL OF ARTERIAL BLOOD: CPT

## 2019-09-11 PROCEDURE — 97110 THERAPEUTIC EXERCISES: CPT

## 2019-09-11 PROCEDURE — 97530 THERAPEUTIC ACTIVITIES: CPT

## 2019-09-11 PROCEDURE — 25010000002 FUROSEMIDE PER 20 MG: Performed by: INTERNAL MEDICINE

## 2019-09-11 PROCEDURE — 97116 GAIT TRAINING THERAPY: CPT

## 2019-09-11 RX ORDER — METHYLPREDNISOLONE SODIUM SUCCINATE 125 MG/2ML
60 INJECTION, POWDER, LYOPHILIZED, FOR SOLUTION INTRAMUSCULAR; INTRAVENOUS EVERY 8 HOURS
Status: DISCONTINUED | OUTPATIENT
Start: 2019-09-11 | End: 2019-09-12 | Stop reason: HOSPADM

## 2019-09-11 RX ORDER — DOCUSATE SODIUM 100 MG/1
100 CAPSULE, LIQUID FILLED ORAL NIGHTLY
Status: DISCONTINUED | OUTPATIENT
Start: 2019-09-11 | End: 2019-09-12 | Stop reason: HOSPADM

## 2019-09-11 RX ADMIN — METHYLPREDNISOLONE SODIUM SUCCINATE 80 MG: 125 INJECTION, POWDER, FOR SOLUTION INTRAMUSCULAR; INTRAVENOUS at 06:15

## 2019-09-11 RX ADMIN — METHYLPREDNISOLONE SODIUM SUCCINATE 60 MG: 125 INJECTION, POWDER, FOR SOLUTION INTRAMUSCULAR; INTRAVENOUS at 16:17

## 2019-09-11 RX ADMIN — BUDESONIDE AND FORMOTEROL FUMARATE DIHYDRATE 2 PUFF: 160; 4.5 AEROSOL RESPIRATORY (INHALATION) at 19:48

## 2019-09-11 RX ADMIN — GUAIFENESIN 1200 MG: 600 TABLET, EXTENDED RELEASE ORAL at 09:56

## 2019-09-11 RX ADMIN — BUDESONIDE AND FORMOTEROL FUMARATE DIHYDRATE 2 PUFF: 160; 4.5 AEROSOL RESPIRATORY (INHALATION) at 07:42

## 2019-09-11 RX ADMIN — IPRATROPIUM BROMIDE AND ALBUTEROL SULFATE 3 ML: 2.5; .5 SOLUTION RESPIRATORY (INHALATION) at 19:48

## 2019-09-11 RX ADMIN — DOCUSATE SODIUM 100 MG: 100 CAPSULE, LIQUID FILLED ORAL at 20:42

## 2019-09-11 RX ADMIN — INSULIN ASPART 2 UNITS: 100 INJECTION, SOLUTION INTRAVENOUS; SUBCUTANEOUS at 20:42

## 2019-09-11 RX ADMIN — IPRATROPIUM BROMIDE AND ALBUTEROL SULFATE 3 ML: 2.5; .5 SOLUTION RESPIRATORY (INHALATION) at 16:23

## 2019-09-11 RX ADMIN — LEVOTHYROXINE SODIUM 50 MCG: 50 TABLET ORAL at 06:15

## 2019-09-11 RX ADMIN — METHYLPREDNISOLONE SODIUM SUCCINATE 60 MG: 125 INJECTION, POWDER, FOR SOLUTION INTRAMUSCULAR; INTRAVENOUS at 22:17

## 2019-09-11 RX ADMIN — IPRATROPIUM BROMIDE AND ALBUTEROL SULFATE 3 ML: 2.5; .5 SOLUTION RESPIRATORY (INHALATION) at 22:57

## 2019-09-11 RX ADMIN — FERROUS SULFATE TAB EC 324 MG (65 MG FE EQUIVALENT) 324 MG: 324 (65 FE) TABLET DELAYED RESPONSE at 09:57

## 2019-09-11 RX ADMIN — AZITHROMYCIN MONOHYDRATE 500 MG: 500 INJECTION, POWDER, LYOPHILIZED, FOR SOLUTION INTRAVENOUS at 09:57

## 2019-09-11 RX ADMIN — ENOXAPARIN SODIUM 30 MG: 30 INJECTION SUBCUTANEOUS at 06:15

## 2019-09-11 RX ADMIN — FERROUS SULFATE TAB EC 324 MG (65 MG FE EQUIVALENT) 324 MG: 324 (65 FE) TABLET DELAYED RESPONSE at 18:52

## 2019-09-11 RX ADMIN — IPRATROPIUM BROMIDE AND ALBUTEROL SULFATE 3 ML: 2.5; .5 SOLUTION RESPIRATORY (INHALATION) at 03:02

## 2019-09-11 RX ADMIN — MONTELUKAST SODIUM 10 MG: 10 TABLET, COATED ORAL at 22:14

## 2019-09-11 RX ADMIN — SODIUM CHLORIDE, PRESERVATIVE FREE 10 ML: 5 INJECTION INTRAVENOUS at 09:58

## 2019-09-11 RX ADMIN — SODIUM CHLORIDE, PRESERVATIVE FREE 10 ML: 5 INJECTION INTRAVENOUS at 20:42

## 2019-09-11 RX ADMIN — GUAIFENESIN 1200 MG: 600 TABLET, EXTENDED RELEASE ORAL at 20:41

## 2019-09-11 RX ADMIN — INSULIN ASPART 2 UNITS: 100 INJECTION, SOLUTION INTRAVENOUS; SUBCUTANEOUS at 08:30

## 2019-09-11 RX ADMIN — FUROSEMIDE 40 MG: 10 INJECTION, SOLUTION INTRAMUSCULAR; INTRAVENOUS at 11:55

## 2019-09-11 RX ADMIN — IPRATROPIUM BROMIDE AND ALBUTEROL SULFATE 3 ML: 2.5; .5 SOLUTION RESPIRATORY (INHALATION) at 11:18

## 2019-09-11 RX ADMIN — IPRATROPIUM BROMIDE AND ALBUTEROL SULFATE 3 ML: 2.5; .5 SOLUTION RESPIRATORY (INHALATION) at 07:37

## 2019-09-11 NOTE — PROGRESS NOTES
[facility] Medicine Services  INPATIENT PROGRESS NOTE    Length of Stay: 1  Date of Admission: 9/10/2019  Primary Care Physician: Krista Matos MD    Subjective   Chief Complaint: Shortness of breath  HPI: This is an 88-year-old woman with past medical history of diabetes mellitus, hypothyroidism, hypertension, hyperlipidemia, proximal A. fib, CAD, COPD on 3 L O2 at home, and came to the hospital complaining of shortness of breath coughing for for 1 week for which patient was admitted with COPD exacerbation treated with Solu-Medrol, azithromycin, Singulair, BiPAP, bronchodilator, Symbicort, today we will decrease the Solu-Medrol to 60 mg every 8 hours, we will wean BiPAP tonight only,    Review of Systems   Constitutional: Positive for appetite change and fatigue. Negative for activity change and fever.   HENT: Negative for congestion, drooling, ear pain and sinus pain.    Eyes: Negative for discharge and redness.   Respiratory: Positive for cough and shortness of breath.    Gastrointestinal: Negative for abdominal distention, blood in stool and diarrhea.   Genitourinary: Negative for difficulty urinating, dyspareunia and genital sores.   Musculoskeletal: Positive for myalgias. Negative for back pain.   Neurological: Negative for dizziness and light-headedness.   Psychiatric/Behavioral: Negative for agitation and decreased concentration.        All pertinent negatives and positives are as above. All other systems have been reviewed and are negative unless otherwise stated.     Objective    Temp:  [97.4 °F (36.3 °C)-98.2 °F (36.8 °C)] 97.5 °F (36.4 °C)  Heart Rate:  [76-95] 88  Resp:  [16-20] 20  BP: (118-178)/(61-87) 132/62    Physical Exam   Constitutional: She is oriented to person, place, and time. She appears well-developed and well-nourished. No distress.   HENT:   Head: Normocephalic and atraumatic.   Right Ear: External ear normal.   Left Ear: External ear normal.    Nose: Nose normal.   Mouth/Throat: Oropharynx is clear and moist.   Eyes: Conjunctivae and EOM are normal. Pupils are equal, round, and reactive to light. Left eye exhibits no discharge. No scleral icterus.   Neck: Normal range of motion. Neck supple. No JVD present. No thyromegaly present.   Cardiovascular: Normal rate, regular rhythm and intact distal pulses. Exam reveals no gallop and no friction rub.   Pulmonary/Chest: She has wheezes.   Decreased breath sounds at the bases  Mild expiratory wheeze   Abdominal: Soft. Bowel sounds are normal. She exhibits no distension and no mass.   Musculoskeletal: She exhibits tenderness. She exhibits no edema.   Neurological: She is alert and oriented to person, place, and time. No cranial nerve deficit or sensory deficit. She exhibits normal muscle tone. Coordination normal.   Skin: No rash noted. She is not diaphoretic. No erythema. No pallor.   Psychiatric: She has a normal mood and affect. Her behavior is normal. Judgment and thought content normal.           Results Review:  I have reviewed the labs, radiology results, and diagnostic studies.    Laboratory Data:   Results from last 7 days   Lab Units 09/11/19 0636 09/10/19  0134   SODIUM mmol/L 138 137   POTASSIUM mmol/L 4.0 3.7   CHLORIDE mmol/L 92* 90*   CO2 mmol/L 34.0* 38.0*   BUN mg/dL 37* 26*   CREATININE mg/dL 1.94* 1.45*   GLUCOSE mg/dL 172* 122*   CALCIUM mg/dL 9.5 9.5   BILIRUBIN mg/dL  --  0.5   ALK PHOS U/L  --  133*   ALT (SGPT) U/L  --  31   AST (SGOT) U/L  --  24   ANION GAP mmol/L 12.0 9.0     Estimated Creatinine Clearance: 20.6 mL/min (A) (by C-G formula based on SCr of 1.94 mg/dL (H)).          Results from last 7 days   Lab Units 09/11/19  0636 09/10/19  0134   WBC 10*3/mm3 10.38 12.74*   HEMOGLOBIN g/dL 8.8* 8.8*   HEMATOCRIT % 28.8* 28.5*   PLATELETS 10*3/mm3 151 155           Culture Data:   No results found for: BLOODCX  No results found for: URINECX  No results found for: RESPCX  No results  found for: WOUNDCX  No results found for: STOOLCX  No components found for: BODYFLD    Radiology Data:   Imaging Results (last 24 hours)     ** No results found for the last 24 hours. **          Scheduled Meds:  acetylcysteine 4 mL Nebulization Q4H - RT   azithromycin 500 mg Intravenous Q24H   budesonide-formoterol 2 puff Inhalation BID - RT   enoxaparin 30 mg Subcutaneous Q24H   ferrous sulfate 324 mg Oral BID With Meals   furosemide 40 mg Intravenous Q12H   guaiFENesin 1,200 mg Oral Q12H   insulin aspart 0-9 Units Subcutaneous 4x Daily AC & at Bedtime   ipratropium-albuterol 3 mL Nebulization Q4H - RT   levothyroxine 50 mcg Oral Q AM   methylPREDNISolone sodium succinate 60 mg Intravenous Q8H   montelukast 10 mg Oral Nightly   sodium chloride 10 mL Intravenous Q12H     Continuous Infusions:   PRN Meds:.•  acetaminophen **OR** acetaminophen **OR** acetaminophen  •  acetaminophen  •  dextrose  •  dextrose  •  glucagon (human recombinant)  •  hydrALAZINE  •  hydrOXYzine pamoate  •  ondansetron  •  [COMPLETED] Insert peripheral IV **AND** sodium chloride  •  sodium chloride    Assessment/Plan       COPD exacerbation (CMS/[facility])    Acute respiratory failure with hypoxia (CMS/[facility])    DM (diabetes mellitus) (CMS/[facility])    Hypothyroidism (acquired)        Plan:   1.  Acute respiratory failure with hypoxia  Secondary to acute COPD exacerbation  We will taper Solu-Medrol to 60 mg IV every 8 hours  Continue azithromycin daily for total of 5 days, today is day #2  Sick continue Singulair 10 mg once per day  Continue bronchodilator as needed  Continue Symbicort  Mucomyst respiratory treatment  We will wean BiPAP will use only BiPAP at night        2.  Diabetes mellitus  Fingerstick  Cover with Reglan insulin    3.  Hypothyroidism  Continue levothyroxine 50 mcg once daily    DVT prophylaxis with Lovenox  Discharge Planning: I expect patient to be discharged to home in 3-5 days.    Winsome Mccarty MD  09/11/19  10:18 AM

## 2019-09-11 NOTE — THERAPY TREATMENT NOTE
Acute Care - Physical Therapy Treatment Note  [facility]     Patient Name: Norma Harkins  : 1931  MRN: [id number]  Today's Date: 2019  Onset of Illness/Injury or Date of Surgery: 09/10/19  Date of Referral to PT: 09/10/19  Referring Physician: Ruddy Ramirez MD    Admit Date: 9/10/2019    Visit Dx:    ICD-10-CM ICD-9-CM   1. COPD exacerbation (CMS/HCC) J44.1 491.21   2. Impaired physical mobility Z74.09 781.99     Patient Active Problem List   Diagnosis   • Hypertension   • Acute respiratory failure with hypoxia (CMS/HCC)   • Low back pain   • Hyperlipidemia   • Paroxysmal atrial fibrillation (CMS/HCC)   • DM (diabetes mellitus) (CMS/HCC)   • Chronic obstructive pulmonary disease (CMS/HCC)   • Morbid obesity (CMS/HCC)   • Physical deconditioning   • Chronic respiratory failure with hypoxia (CMS/HCC)   • Atrial fibrillation (CMS/HCC)   • COPD exacerbation (CMS/HCC)   • Obesity   • CHF (congestive heart failure) (CMS/HCC)   • Arthritis   • CAD (coronary artery disease)   • Disease of thyroid gland   • Shortness of breath   • Hypothyroidism due to acquired atrophy of thyroid   • Hypothyroidism (acquired)       Therapy Treatment    Rehabilitation Treatment Summary     Row Name 19             Treatment Time/Intention    Discipline  physical therapy assistant  -CA      Document Type  therapy note (daily note)  -CA      Subjective Information  no complaints  -CA      Mode of Treatment  individual therapy;physical therapy  -CA      Patient/Family Observations  no family present  -CA      Patient Effort  good  -CA      Existing Precautions/Restrictions  fall;oxygen therapy device and L/min  -CA      Recorded by [CA] Michelet Starr, PTA 19 0850      Row Name 19 0840             Vital Signs    Pre Patient Position  Supine  -CA      Intra Patient Position  Sitting  -CA2      Post Patient Position  Supine  -CA2      Recorded by [CA] Michelet Starr, PTA 19 0928  [CA2] Matty  Michelet YUSUF, PTA 09/11/19 1013      Row Name 09/11/19 0840             Cognitive Assessment/Intervention- PT/OT    Orientation Status (Cognition)  oriented x 4  -CA      Follows Commands (Cognition)  WNL  -CA      Recorded by [CA] Michelet Starr, PTA 09/11/19 0850      Row Name 09/11/19 0840             Bed Mobility Assessment/Treatment    Bed Mobility Assessment/Treatment  supine-sit;sit-supine  -CA      Supine-Sit [place] (Bed Mobility)  supervision  -CA      Sit-Supine [place] (Bed Mobility)  supervision  -CA      Assistive Device (Bed Mobility)  bed rails;head of bed elevated  -CA      Recorded by [CA] Michelet Starr, PTA 09/11/19 0924      Row Name 09/11/19 0840             Transfer Assessment/Treatment    Transfer Assessment/Treatment  sit-stand transfer;stand-sit transfer  -CA      Recorded by [CA] Michelet Starr, PTA 09/11/19 0924      Row Name 09/11/19 0840             Sit-Stand Transfer    Sit-Stand [place] (Transfers)  contact guard  -CA      Assistive Device (Sit-Stand Transfers)  walker, front-wheeled  -CA      Recorded by [CA] Michelet Starr, PTA 09/11/19 0924      Row Name 09/11/19 0840             Stand-Sit Transfer    Stand-Sit [place] (Transfers)  contact guard  -CA      Assistive Device (Stand-Sit Transfers)  walker, front-wheeled  -CA      Recorded by [CA] Michelet Starr, PTA 09/11/19 0924      Row Name 09/11/19 0840             Gait/Stairs Assessment/Training    [place] Level (Gait)  contact guard  -CA      Assistive Device (Gait)  walker, front-wheeled  -CA      Distance in Feet (Gait)  12'  -CA      Recorded by [CA] Michelet Starr, PTA 09/11/19 0924      Row Name 09/11/19 0840             Therapeutic Exercise    Therapeutic Exercise  seated, lower extremities  -CA      Recorded by [CA] Michelet Starr, PTA 09/11/19 0924      Row Name 09/11/19 0840             Lower Extremity Seated Therapeutic Exercise    Performed, Seated Lower Extremity (Therapeutic Exercise)  hip  flexion/extension;ankle dorsiflexion/plantarflexion;LAQ (long arc quad), knee extension  -CA      Exercise Type, Seated Lower Extremity (Therapeutic Exercise)  AROM (active range of motion)  -CA      Sets/Reps Detail, Seated Lower Extremity (Therapeutic Exercise)  10x1  -CA      Recorded by [CA] Michelet Starr, PTA 09/11/19 0924      Row Name 09/11/19 0840             Positioning and Restraints    Pre-Treatment Position  in bed  -CA      Post Treatment Position  bed  -CA      In Bed  supine;call light within reach;encouraged to call for assist;exit alarm on  -CA      Recorded by [CA] Michelet Starr, PTA 09/11/19 0924      Row Name 09/11/19 0840             Pain Scale: Numbers Pre/Post-Treatment    Pain Scale: Numbers, Pretreatment  0/10 - no pain  -CA      Pain Scale: Numbers, Post-Treatment  0/10 - no pain  -CA      Recorded by [CA] Michelet Starr, PTA 09/11/19 0924      Row Name                Wound 09/10/19 0630 Left anterior knee Other (comment)    Wound - Properties Group Date first assessed: 09/10/19 [KH] Time first assessed: 0630 [] Side: Left [KH] Orientation: anterior [] Location: knee [KH] Primary Wound Type: Other [] Recorded by:  [] Liza Pugh RN 09/10/19 0805      User Key  (r) = Recorded By, (t) = Taken By, (c) = Cosigned By    Initials Name Effective Dates Discipline     Liza Pugh RN 09/02/16 -  Nurse    CA Michelet Starr, [facility] 03/07/18 -  PT          Wound 09/10/19 0630 Left anterior knee Other (comment) (Active)   Dressing Appearance dry;dressing loose 9/11/2019  5:30 AM   Closure LYN 9/10/2019 10:00 PM   Base blanchable;granulating;red;bleeding 9/11/2019  5:30 AM   Periwound intact 9/11/2019  5:30 AM   Drainage Characteristics/Odor bleeding controlled 9/11/2019  5:30 AM   Drainage Amount scant 9/11/2019  5:30 AM   Care, Wound cleansed with;sterile normal saline 9/11/2019  5:30 AM   Dressing Care, Wound dressing changed;dressing applied;gauze, wet-to-dry 9/11/2019  5:30 AM        Rehab Goal Summary     Row Name 09/11/19 0840             Physical Therapy Goals    Bed Mobility Goal Selection (PT)  bed mobility, PT goal 1  -CA      Transfer Goal Selection (PT)  transfer, PT goal 1  -CA      Gait Training Goal Selection (PT)  gait training, PT goal 1  -CA         Bed Mobility Goal 1 (PT)    Activity/Assistive Device (Bed Mobility Goal 1, PT)  sit to supine/supine to sit  -CA      [place] Level/Cues Needed (Bed Mobility Goal 1, PT)  conditional independence  -CA      Time Frame (Bed Mobility Goal 1, PT)  long term goal (LTG);by discharge  -CA      Barriers (Bed Mobility Goal 1, PT)  Maintain spO2 above 90%  -CA      Progress/Outcomes (Bed Mobility Goal 1, PT)  goal not met  -CA         Transfer Goal 1 (PT)    Activity/Assistive Device (Transfer Goal 1, PT)  sit-to-stand/stand-to-sit  -CA      [place] Level/Cues Needed (Transfer Goal 1, PT)  conditional independence  -CA      Time Frame (Transfer Goal 1, PT)  long term goal (LTG);by discharge  -CA      Barriers (Transfers Goal 1, PT)  Maintain spO2 above 90%  -CA      Progress/Outcome (Transfer Goal 1, PT)  goal not met  -CA         Gait Training Goal 1 (PT)    Activity/Assistive Device (Gait Training Goal 1, PT)  walker, rolling  -CA      [place] Level (Gait Training Goal 1, PT)  conditional independence  -CA      Distance (Gait Goal 1, PT)  100' x 1  -CA      Time Frame (Gait Training Goal 1, PT)  long term goal (LTG);by discharge  -CA      Barriers (Gait Training Goal 1, PT)  Maintain spO2 above 90%  -CA      Progress/Outcome (Gait Training Goal 1, PT)  goal not met  -CA        User Key  (r) = Recorded By, (t) = Taken By, (c) = Cosigned By    Initials Name Provider Type Discipline    Michelet Ling, PTA Physical Therapy Assistant PT          Physical Therapy Education     Title: PT OT SLP Therapies (In Progress)     Topic: Physical Therapy (In Progress)     Point: Mobility training (Done)     Learning Progress Summary            Patient Acceptance, STAN LUNA,NR by TERRY at 9/10/2019  3:31 PM    Comment:  Patient was educated on safety with mobility, hands placement and correct body mechanics for transfers, and pursed lip breathing.                               User Key     Initials Effective Dates Name Provider Type Discipline    TERRY 08/06/19 -  Sushil Huang, PT Student PT Student PT                PT Recommendation and Plan     Plan of Care Reviewed With: patient  Progress: improving  Outcome Summary: pt. was able to t/f EOB with Supvervision and pt. stood with CGA. pt. ambulated 12' with RW and CGA.  pt.  met no new goals this tx.   Outcome Measures     Row Name 09/11/19 0840 09/10/19 1500          How much help from another person do you currently need...    Turning from your back to your side while in flat bed without using bedrails?  3  -CA  3  -CZ (r) JE (t) CZ (c)     Moving from lying on back to sitting on the side of a flat bed without bedrails?  3  -CA  3  -CZ (r) JE (t) CZ (c)     Moving to and from a bed to a chair (including a wheelchair)?  3  -CA  3  -CZ (r) JE (t) CZ (c)     Standing up from a chair using your arms (e.g., wheelchair, bedside chair)?  3  -CA  3  -CZ (r) JE (t) CZ (c)     Climbing 3-5 steps with a railing?  3  -CA  3  -CZ (r) JE (t) CZ (c)     To walk in hospital room?  3  -CA  3  -CZ (r) JE (t) CZ (c)     AM-PAC 6 Clicks Score (PT)  18  -CA  18  -CZ (r) JE (t)        Functional Assessment    Outcome Measure Options  AM-PAC 6 Clicks Basic Mobility (PT)  -CA  AM-PAC 6 Clicks Basic Mobility (PT)  -CZ (r) JE (t) CZ (c)       User Key  (r) = Recorded By, (t) = Taken By, (c) = Cosigned By    Initials Name Provider Type    CA Michelet Starr, PTA Physical Therapy Assistant    Adair Weller, PT Physical Therapist    Sushil Diaz, PT Student PT Student         Time Calculation:   PT Charges     Row Name 09/11/19 1308             Time Calculation    Start Time  0840  -CA      Stop Time  0919  -CA      Time  Calculation (min)  39 min  -CA      PT Received On  09/11/19  -CA         Time Calculation- PT    Total Timed Code Minutes- PT  39 minute(s)  -CA        User Key  (r) = Recorded By, (t) = Taken By, (c) = Cosigned By    Initials Name Provider Type    Michelet Ling PTA Physical Therapy Assistant        Therapy Charges for Today     Code Description Service Date Service Provider Modifiers Qty    92192710253 HC PT THERAPEUTIC ACT EA 15 MIN 9/11/2019 Michelet Starr, LYRIC GP 1    89715295573 HC GAIT TRAINING EA 15 MIN 9/11/2019 Michelet Starr, LYRIC GP 1    41575978826 HC PT THER PROC EA 15 MIN 9/11/2019 Michelet Starr, LYRIC GP 1          PT G-Codes  Outcome Measure Options: AM-PAC 6 Clicks Basic Mobility (PT)  AM-PAC 6 Clicks Score (PT): 18    Michelet Starr PTA  9/11/2019

## 2019-09-12 VITALS
HEART RATE: 90 BPM | HEIGHT: 62 IN | SYSTOLIC BLOOD PRESSURE: 152 MMHG | WEIGHT: 175.3 LBS | DIASTOLIC BLOOD PRESSURE: 88 MMHG | OXYGEN SATURATION: 98 % | TEMPERATURE: 98.1 F | RESPIRATION RATE: 18 BRPM | BODY MASS INDEX: 32.26 KG/M2

## 2019-09-12 LAB
ANION GAP SERPL CALCULATED.3IONS-SCNC: 15 MMOL/L (ref 5–15)
BUN BLD-MCNC: 51 MG/DL (ref 8–23)
BUN/CREAT SERPL: 28.8 (ref 7–25)
CALCIUM SPEC-SCNC: 9.1 MG/DL (ref 8.6–10.5)
CHLORIDE SERPL-SCNC: 93 MMOL/L (ref 98–107)
CO2 SERPL-SCNC: 31 MMOL/L (ref 22–29)
CREAT BLD-MCNC: 1.77 MG/DL (ref 0.57–1)
DEPRECATED RDW RBC AUTO: 71.1 FL (ref 37–54)
ERYTHROCYTE [DISTWIDTH] IN BLOOD BY AUTOMATED COUNT: 19.8 % (ref 12.3–15.4)
GFR SERPL CREATININE-BSD FRML MDRD: 27 ML/MIN/1.73
GLUCOSE BLD-MCNC: 163 MG/DL (ref 65–99)
GLUCOSE BLDC GLUCOMTR-MCNC: 122 MG/DL (ref 70–130)
GLUCOSE BLDC GLUCOMTR-MCNC: 164 MG/DL (ref 70–130)
GLUCOSE BLDC GLUCOMTR-MCNC: 165 MG/DL (ref 70–130)
GLUCOSE BLDC GLUCOMTR-MCNC: 166 MG/DL (ref 70–130)
HCT VFR BLD AUTO: 29 % (ref 34–46.6)
HGB BLD-MCNC: 9 G/DL (ref 12–15.9)
MCH RBC QN AUTO: 30.7 PG (ref 26.6–33)
MCHC RBC AUTO-ENTMCNC: 31 G/DL (ref 31.5–35.7)
MCV RBC AUTO: 99 FL (ref 79–97)
PLATELET # BLD AUTO: 136 10*3/MM3 (ref 140–450)
PMV BLD AUTO: 11.7 FL (ref 6–12)
POTASSIUM BLD-SCNC: 4.2 MMOL/L (ref 3.5–5.2)
RBC # BLD AUTO: 2.93 10*6/MM3 (ref 3.77–5.28)
SODIUM BLD-SCNC: 139 MMOL/L (ref 136–145)
WBC NRBC COR # BLD: 10.45 10*3/MM3 (ref 3.4–10.8)

## 2019-09-12 PROCEDURE — 82962 GLUCOSE BLOOD TEST: CPT

## 2019-09-12 PROCEDURE — 25010000002 METHYLPREDNISOLONE PER 125 MG: Performed by: INTERNAL MEDICINE

## 2019-09-12 PROCEDURE — 80048 BASIC METABOLIC PNL TOTAL CA: CPT | Performed by: INTERNAL MEDICINE

## 2019-09-12 PROCEDURE — 25010000002 ENOXAPARIN PER 10 MG: Performed by: INTERNAL MEDICINE

## 2019-09-12 PROCEDURE — 94660 CPAP INITIATION&MGMT: CPT

## 2019-09-12 PROCEDURE — 97110 THERAPEUTIC EXERCISES: CPT

## 2019-09-12 PROCEDURE — 94799 UNLISTED PULMONARY SVC/PX: CPT

## 2019-09-12 PROCEDURE — 97116 GAIT TRAINING THERAPY: CPT

## 2019-09-12 PROCEDURE — 63710000001 INSULIN ASPART PER 5 UNITS: Performed by: INTERNAL MEDICINE

## 2019-09-12 PROCEDURE — 97530 THERAPEUTIC ACTIVITIES: CPT

## 2019-09-12 PROCEDURE — 85027 COMPLETE CBC AUTOMATED: CPT | Performed by: INTERNAL MEDICINE

## 2019-09-12 PROCEDURE — 25010000002 FUROSEMIDE PER 20 MG: Performed by: INTERNAL MEDICINE

## 2019-09-12 PROCEDURE — 94760 N-INVAS EAR/PLS OXIMETRY 1: CPT

## 2019-09-12 RX ORDER — PREDNISONE 10 MG/1
TABLET ORAL
Qty: 20 TABLET | Refills: 1 | Status: SHIPPED | OUTPATIENT
Start: 2019-09-12

## 2019-09-12 RX ORDER — PREDNISONE 10 MG/1
TABLET ORAL
Qty: 20 TABLET | Refills: 0 | Status: SHIPPED | OUTPATIENT
Start: 2019-09-12 | End: 2019-09-12 | Stop reason: SDUPTHER

## 2019-09-12 RX ORDER — AZITHROMYCIN 500 MG/1
500 TABLET, FILM COATED ORAL DAILY
Qty: 5 TABLET | Refills: 0 | Status: SHIPPED | OUTPATIENT
Start: 2019-09-12 | End: 2019-09-17

## 2019-09-12 RX ADMIN — FERROUS SULFATE TAB EC 324 MG (65 MG FE EQUIVALENT) 324 MG: 324 (65 FE) TABLET DELAYED RESPONSE at 08:45

## 2019-09-12 RX ADMIN — SODIUM CHLORIDE, PRESERVATIVE FREE 10 ML: 5 INJECTION INTRAVENOUS at 08:46

## 2019-09-12 RX ADMIN — IPRATROPIUM BROMIDE AND ALBUTEROL SULFATE 3 ML: 2.5; .5 SOLUTION RESPIRATORY (INHALATION) at 07:47

## 2019-09-12 RX ADMIN — METHYLPREDNISOLONE SODIUM SUCCINATE 60 MG: 125 INJECTION, POWDER, FOR SOLUTION INTRAMUSCULAR; INTRAVENOUS at 05:56

## 2019-09-12 RX ADMIN — IPRATROPIUM BROMIDE AND ALBUTEROL SULFATE 3 ML: 2.5; .5 SOLUTION RESPIRATORY (INHALATION) at 14:49

## 2019-09-12 RX ADMIN — ENOXAPARIN SODIUM 30 MG: 30 INJECTION SUBCUTANEOUS at 05:52

## 2019-09-12 RX ADMIN — BUDESONIDE AND FORMOTEROL FUMARATE DIHYDRATE 2 PUFF: 160; 4.5 AEROSOL RESPIRATORY (INHALATION) at 07:50

## 2019-09-12 RX ADMIN — FUROSEMIDE 40 MG: 10 INJECTION, SOLUTION INTRAMUSCULAR; INTRAVENOUS at 00:43

## 2019-09-12 RX ADMIN — IPRATROPIUM BROMIDE AND ALBUTEROL SULFATE 3 ML: 2.5; .5 SOLUTION RESPIRATORY (INHALATION) at 02:58

## 2019-09-12 RX ADMIN — INSULIN ASPART 2 UNITS: 100 INJECTION, SOLUTION INTRAVENOUS; SUBCUTANEOUS at 08:48

## 2019-09-12 RX ADMIN — GUAIFENESIN 1200 MG: 600 TABLET, EXTENDED RELEASE ORAL at 08:45

## 2019-09-12 RX ADMIN — LEVOTHYROXINE SODIUM 50 MCG: 50 TABLET ORAL at 05:52

## 2019-09-12 NOTE — PLAN OF CARE
Problem: NPPV/CPAP (Adult)  Goal: Signs and Symptoms of Listed Potential Problems Will be Absent, Minimized or Managed (NPPV/CPAP)  Outcome: Ongoing (interventions implemented as appropriate)  bipap in room on standby      
Problem: NPPV/CPAP (Adult)  Goal: Signs and Symptoms of Listed Potential Problems Will be Absent, Minimized or Managed (NPPV/CPAP)  Pt placed on Bipap for increased WOB/ pt tolerated well for approximately 4 hours and pt wanted off bipap. Pt doing well and WOB significantly decreased from bipap use. Pt being monitored and doing well on 3 lpm nc. Bipap placed on standby and explained to pt if WOB increased machine would need to be placed back on       
Problem: NPPV/CPAP (Adult)  Goal: Signs and Symptoms of Listed Potential Problems Will be Absent, Minimized or Managed (NPPV/CPAP)  Pt refused to wear BIPAP. BIPAP is currently on standby.       
Problem: Patient Care Overview  Goal: Plan of Care Review  Outcome: Ongoing (interventions implemented as appropriate)      Problem: Fall Risk (Adult)  Goal: Absence of Fall  Outcome: Ongoing (interventions implemented as appropriate)      Problem: Skin Injury Risk (Adult)  Goal: Skin Health and Integrity  Outcome: Ongoing (interventions implemented as appropriate)      Problem: Pain, Chronic (Adult)  Goal: Acceptable Pain/Comfort Level and Functional Ability  Outcome: Ongoing (interventions implemented as appropriate)        
Problem: Patient Care Overview  Goal: Plan of Care Review  Outcome: Ongoing (interventions implemented as appropriate)   09/10/19 1111   Coping/Psychosocial   Plan of Care Reviewed With patient   Plan of Care Review   Progress no change   OTHER   Outcome Summary Abg's obtained this am and patient placed on bipap. Extra dose of solumedrol administered per md. Will continue to monitor.          
Problem: Patient Care Overview  Goal: Plan of Care Review  Outcome: Ongoing (interventions implemented as appropriate)   09/10/19 6908   Coping/Psychosocial   Plan of Care Reviewed With patient   Plan of Care Review   Progress no change     Goal: Individualization and Mutuality  Outcome: Ongoing (interventions implemented as appropriate)    Goal: Discharge Needs Assessment  Outcome: Ongoing (interventions implemented as appropriate)    Goal: Interprofessional Rounds/Family Conf  Outcome: Ongoing (interventions implemented as appropriate)      Problem: Fall Risk (Adult)  Goal: Identify Related Risk Factors and Signs and Symptoms  Outcome: Ongoing (interventions implemented as appropriate)    Goal: Absence of Fall  Outcome: Ongoing (interventions implemented as appropriate)      Problem: Skin Injury Risk (Adult)  Goal: Identify Related Risk Factors and Signs and Symptoms  Outcome: Ongoing (interventions implemented as appropriate)    Goal: Skin Health and Integrity  Outcome: Ongoing (interventions implemented as appropriate)      Problem: Pain, Chronic (Adult)  Goal: Identify Related Risk Factors and Signs and Symptoms  Outcome: Ongoing (interventions implemented as appropriate)    Goal: Acceptable Pain/Comfort Level and Functional Ability  Outcome: Ongoing (interventions implemented as appropriate)      Problem: ARDS (Acute Resp Distress Syndrome) (Adult)  Goal: Signs and Symptoms of Listed Potential Problems Will be Absent, Minimized or Managed (ARDS)  Outcome: Ongoing (interventions implemented as appropriate)      Problem: NPPV/CPAP (Adult)  Goal: Signs and Symptoms of Listed Potential Problems Will be Absent, Minimized or Managed (NPPV/CPAP)  Outcome: Ongoing (interventions implemented as appropriate)        
Problem: Patient Care Overview  Goal: Plan of Care Review  Outcome: Ongoing (interventions implemented as appropriate)   09/10/19 7853   OTHER   Outcome Summary Initial PT evaluation complete. Patient required supervision for supine <> sit transfers but max Ax1 to roll L or R. Patient required CGA and RW for sit <> stand transfers and to side step 3' x 1 trial. Patient's spO2 > 95% throughout session on 3 L of supplemental O2. Patient will be appropriate to go to her daughter's home with assistance from her daughter as needed; recommend HHPT. Goals established, continue skilled PT intervention.         
Problem: Patient Care Overview  Goal: Plan of Care Review  Outcome: Ongoing (interventions implemented as appropriate)   09/11/19 2696   Coping/Psychosocial   Plan of Care Reviewed With patient   Plan of Care Review   Progress improving   OTHER   Outcome Summary pt. was able to t/f EOB with Supvervision and pt. stood with CGA. pt. ambulated 12' with RW and CGA. pt. met no new goals this tx.          
Problem: Patient Care Overview  Goal: Plan of Care Review  Outcome: Ongoing (interventions implemented as appropriate)   09/12/19 0953 09/12/19 1026   Coping/Psychosocial   Plan of Care Reviewed With --  patient   Plan of Care Review   Progress improving --    OTHER   Outcome Summary pt sup<>sit with SBA, sit<>stand with CGA, pt ambulated 150` with RW & CGA. pt would benefit from assist @ home & HHPT @ D/C --          
Problem: Patient Care Overview  Goal: Plan of Care Review  Outcome: Ongoing (interventions implemented as appropriate)   09/12/19 1372   Coping/Psychosocial   Plan of Care Reviewed With patient   Plan of Care Review   Progress improving   OTHER   Outcome Summary Pt has been resting. Lasix given. VSS. Will continue to monitor.        Problem: Fall Risk (Adult)  Goal: Identify Related Risk Factors and Signs and Symptoms  Outcome: Ongoing (interventions implemented as appropriate)      Problem: Chronic Obstructive Pulmonary Disease (Adult)  Goal: Signs and Symptoms of Listed Potential Problems Will be Absent, Minimized or Managed (Chronic Obstructive Pulmonary Disease)  Outcome: Ongoing (interventions implemented as appropriate)        
18

## 2019-09-12 NOTE — DISCHARGE SUMMARY
[facility] Medicine Services  DISCHARGE SUMMARY       Date of Admission: 9/10/2019  Date of Discharge:  9/12/2019  Primary Care Physician: Roberta Allen APRN    Presenting Problem/History of Present Illness:  COPD exacerbation (CMS/HCC) [J44.1]  COPD exacerbation (CMS/HCC) [J44.1]       Final Discharge Diagnoses:  Active Hospital Problems    Diagnosis   • **COPD exacerbation (CMS/HCC)   • Hypothyroidism (acquired)   • DM (diabetes mellitus) (CMS/HCC)   • Acute respiratory failure with hypoxia (CMS/HCC)       Consults:   Consults     Date and Time Order Name Status Description    9/10/2019 0400 Hospitalist (on-call MD unless specified)            Pertinent Test Results:   Lab Results (last 24 hours)     Procedure Component Value Units Date/Time    POC Glucose Once [[id number]]  (Abnormal) Collected:  09/12/19 1132    Specimen:  Blood Updated:  09/12/19 1203     Glucose 165 mg/dL      Comment: RN NotifiedOperator: [id number] [employer] ID: [id number]       POC Glucose Once [[id number]]  (Abnormal) Collected:  09/12/19 0728    Specimen:  Blood Updated:  09/12/19 0747     Glucose 164 mg/dL      Comment: RN NotifiedOperator: [id number] [employer] ID: [id number]       Basic Metabolic Panel [[id number]]  (Abnormal) Collected:  09/12/19 0620    Specimen:  Blood Updated:  09/12/19 0653     Glucose 163 mg/dL      BUN 51 mg/dL      Creatinine 1.77 mg/dL      Sodium 139 mmol/L      Potassium 4.2 mmol/L      Chloride 93 mmol/L      CO2 31.0 mmol/L      Calcium 9.1 mg/dL      eGFR Non African Amer 27 mL/min/1.73      BUN/Creatinine Ratio 28.8     Anion Gap 15.0 mmol/L     Narrative:       GFR Normal >60  Chronic Kidney Disease <60  Kidney Failure <15    CBC (No Diff) [[id number]]  (Abnormal) Collected:  09/12/19 0620    Specimen:  Blood Updated:  09/12/19 0637     WBC 10.45 10*3/mm3      RBC 2.93 10*6/mm3      Hemoglobin 9.0 g/dL      Hematocrit 29.0 %      MCV 99.0  fL      MCH 30.7 pg      MCHC 31.0 g/dL      RDW 19.8 %      RDW-SD 71.1 fl      MPV 11.7 fL      Platelets 136 10*3/mm3     POC Glucose Once [[id number]]  (Abnormal) Collected:  09/11/19 0811    Specimen:  Blood Updated:  09/12/19 0521     Glucose 166 mg/dL      Comment: RN NotifiedOperator: [id number] BEBO EDWARDSMeter ID: [id number]       POC Glucose Once [[id number]]  (Abnormal) Collected:  09/11/19 1921    Specimen:  Blood Updated:  09/11/19 2018     Glucose 196 mg/dL      Comment: RN NotifiedOperator: [id number] TIM ROSEHMeter ID: [id number]       POC Glucose Once [[id number]]  (Abnormal) Collected:  09/11/19 1635    Specimen:  Blood Updated:  09/11/19 1659     Glucose 142 mg/dL      Comment: : [id number] BEBO EDWARDSMeter ID: [id number]           Imaging Results (last 24 hours)     ** No results found for the last 24 hours. **        Chief Complaint on Day of Discharge: No complaints    Hospital Course:  This is an 88-year-old  female with past medical history of COPD with chronic hypoxic respiratory failure on 3 L of oxygen at baseline, paroxysmal atrial fibrillation, essential hypertension, type 2 diabetes and hypothyroidism that presented to [facility] on 9/10/2019 with complaints of worsening shortness of air, cough and wheezing x1 week.  Patient was found to have oxygen saturations of 81% on 3 L in the emergency room.  Patient was started on IV steroids, azithromycin, Singulair, BiPAP and bronchodilator.  It was recommended for the patient to undergo a pulse oximetry overnight to see if she would qualify for home BiPAP.  Patient states she will not wear a BiPAP and therefore did not wish to proceed with the overnight testing.  Patient is currently only requiring 1 L of oxygen.  A prescription has been given for azithromycin and a steroid Dosepak.  Patient will follow-up with her primary care physician within 1 week.  Continue wound care of the left knee with saline moistened  "dressings bid.  Home health ordered for physical and occupational therapy along with skilled nursing for COPD and oxygen management.    Condition on Discharge: Stable    Physical Exam on Discharge:  /90 (BP Location: Left arm, Patient Position: Sitting)   Pulse 90   Temp 97.7 °F (36.5 °C) (Oral)   Resp 20   Ht 157.5 cm (62\")   Wt 79.5 kg (175 lb 4.8 oz)   SpO2 93%   BMI 32.06 kg/m²   Physical Exam   Constitutional: She appears well-developed and well-nourished.   HENT:   Head: Normocephalic and atraumatic.   Eyes: EOM are normal. Pupils are equal, round, and reactive to light.   Neck: Normal range of motion. Neck supple.   Cardiovascular: Normal rate and regular rhythm.   Pulmonary/Chest: Effort normal and breath sounds normal.   Abdominal: Soft. Bowel sounds are normal.   Musculoskeletal: Normal range of motion.   Neurological: She is alert.   Skin: Skin is warm and dry.   Psychiatric: She has a normal mood and affect. Her behavior is normal.     Discharge Disposition:  Home-Health Care [facility]    Discharge Medications:     Discharge Medications      New Medications      Instructions Start Date   azithromycin 500 MG tablet  Commonly known as:  ZITHROMAX   500 mg, Oral, Daily         Changes to Medications      Instructions Start Date   furosemide 40 MG tablet  Commonly known as:  LASIX  What changed:  additional instructions   40 mg, Oral, 2 Times Daily         Continue These Medications      Instructions Start Date   acetaminophen 500 MG tablet  Commonly known as:  TYLENOL   500 mg, Oral, Every 6 Hours PRN      albuterol sulfate  (90 Base) MCG/ACT inhaler  Commonly known as:  PROVENTIL HFA;VENTOLIN HFA;PROAIR HFA   2 puffs, Inhalation, 4 Times Daily - RT      albuterol 0.63 MG/3ML nebulizer solution  Commonly known as:  ACCUNEB   1 ampule, Nebulization, Every 6 Hours PRN      benzonatate 200 MG capsule  Commonly known as:  TESSALON   200 mg, Oral, 3 Times Daily, For 7 days starting on 9/9/2019     "   [employer] CALCIUM PO   1,200 mg, Oral, 2 Times Daily      docusate sodium 100 MG capsule  Commonly known as:  COLACE   300 mg, Oral, Nightly      ferrous sulfate 325 (65 FE) MG tablet   325 mg, Oral, 2 Times Daily With Meals      guaiFENesin 600 MG 12 hr tablet  Commonly known as:  MUCINEX   1,200 mg, Oral, Every 12 Hours Scheduled      levothyroxine 50 MCG tablet  Commonly known as:  SYNTHROID   50 mcg, Oral, Daily      ondansetron 4 MG tablet  Commonly known as:  ZOFRAN   4 mg, Oral, Every 6 Hours PRN      oxyCODONE 5 MG capsule  Commonly known as:  OXY-IR   5 mg, Oral, Every 6 Hours PRN      potassium chloride 10 MEQ CR tablet  Commonly known as:  K-DUR,KLOR-CON   take 1 tablet by mouth twice a day      predniSONE 10 MG tablet  Commonly known as:  DELTASONE   40 mg daily x 2 days, then 30 mg daily x 2 days, then 20 mg daily x2 days, then 10 mg daily x2 days.      SYMBICORT 160-4.5 MCG/ACT inhaler  Generic drug:  budesonide-formoterol   inhale 2 puffs by mouth twice a day Rinse mouth after use      vitamin D 68877 units capsule capsule  Commonly known as:  ERGOCALCIFEROL   take 1 capsule by mouth every week         Stop These Medications    doxycycline 100 MG capsule  Commonly known as:  MONODOX     methylPREDNISolone 4 MG tablet  Commonly known as:  MEDROL (LEANDRO)            Discharge Diet: Cardiac/consistent carbohydrate    Activity at Discharge: As tolerated    Discharge Care Plan/Instructions: As above    Follow-up Appointment:  Additional Instructions for the Follow-ups that You Need to Schedule     Ambulatory Referral to Home Health   As directed      Face to Face Visit Date:  9/12/2019    Follow-up provider for Plan of Care?:  I treated the patient in an acute care facility and will not continue treatment after discharge.    Follow-up provider:  PATI CROOKS [[id number]]    Reason/Clinical Findings:  copd/ chf/ dizziness    Describe mobility limitations that make leaving home difficult:  copd    Nursing/Therapeutic  Services Requested:  Skilled Nursing Physical Therapy Occupational Therapy    Skilled nursing orders:  Cardiopulmonary assessments Neurovascular assessments COPD management    PT orders:  Therapeutic exercise Gait Training Strengthening Home safety assessment    Weight Bearing Status:  As Tolerated    Occupational orders:  Activities of daily living Energy conservation Strengthening Home safety assessment    Frequency:  1 Week 1           Follow-up Information     Suresh Osullivan DO Follow up on 9/18/2019.    Specialty:  Family Medicine  Why:  Hospital follow up appointment Wednesday 09/18/19 at 10:15am.  Contact information:  [address] [place] [address]  [phone]             [facility] HOME CARE REFERRAL Follow up.    Specialty:  Home Health Services  Contact information:  [address]  [place] [address]           Roberta Allen APRN Follow up in 1 week(s).    Specialty:  Nurse Practitioner  Contact information:  [address] [place] [id number]  [phone]             [facility] HOME CARE REFERRAL Follow up.    Specialty:  Home Health Services  Contact information:  [address] [place] [address]                 This document has been electronically signed by STACEY Arvizu on September 12, 2019 2:10 PM        Time: Greater than 30 minutes.

## 2019-09-12 NOTE — SIGNIFICANT NOTE
Spoke to the patient about doing an overnight pulse oximetry to try to qualify her for a home BiPAP.  Patient states she will not wear BiPAP, therefore the testing would be useless.  She is currently on 1 L of oxygen, with home use of 3 L.  I spoke with patient's daughter Rosie Dhaliwal about discharging the patient.  She states the patient came from [facility] and can return there until her belongings are set up at her house. I have ordered oxygen as she will need this at home.  Rosie states that use [facility] for home health.  She states the patient's POA is [place], but she is coming to her house to live.  Notified .

## 2019-09-12 NOTE — THERAPY TREATMENT NOTE
Acute Care - Physical Therapy Treatment Note  [facility]     Patient Name: Norma Harkins  : 1931  MRN: [id number]  Today's Date: 2019  Onset of Illness/Injury or Date of Surgery: 09/10/19  Date of Referral to PT: 09/10/19  Referring Physician: Ruddy Ramirez MD    Admit Date: 9/10/2019    Visit Dx:    ICD-10-CM ICD-9-CM   1. COPD exacerbation (CMS/HCC) J44.1 491.21   2. Impaired physical mobility Z74.09 781.99   3. Acute respiratory failure with hypoxia (CMS/HCC) J96.01 518.81   4. Type 2 diabetes mellitus without complication, without long-term current use of insulin (CMS/HCC) E11.9 250.00   5. Hypothyroidism (acquired) E03.9 244.9   6. Essential hypertension I10 401.9   7. Chronic bilateral low back pain without sciatica M54.5 724.2    G89.29 338.29   8. Mixed hyperlipidemia E78.2 272.2   9. Paroxysmal atrial fibrillation (CMS/HCC) I48.0 427.31   10. Chronic obstructive pulmonary disease, unspecified COPD type (CMS/HCC) J44.9 496   11. Morbid obesity (CMS/HCC) E66.01 278.01   12. Physical deconditioning R53.81 799.3   13. Chronic respiratory failure with hypoxia (CMS/HCC) J96.11 518.83     799.02   14. Arthritis M19.90 716.90   15. Coronary artery disease involving native coronary artery of native heart without angina pectoris I25.10 414.01   16. Disease of thyroid gland E07.9 246.9   17. Shortness of breath R06.02 786.05   18. Hypothyroidism due to acquired atrophy of thyroid E03.4 244.8     246.8     Patient Active Problem List   Diagnosis   • Hypertension   • Acute respiratory failure with hypoxia (CMS/HCC)   • Low back pain   • Hyperlipidemia   • Paroxysmal atrial fibrillation (CMS/HCC)   • DM (diabetes mellitus) (CMS/HCC)   • Chronic obstructive pulmonary disease (CMS/HCC)   • Morbid obesity (CMS/HCC)   • Physical deconditioning   • Chronic respiratory failure with hypoxia (CMS/HCC)   • Atrial fibrillation (CMS/HCC)   • COPD exacerbation (CMS/HCC)   • Obesity   • CHF (congestive  heart failure) (CMS/HCC)   • Arthritis   • CAD (coronary artery disease)   • Disease of thyroid gland   • Shortness of breath   • Hypothyroidism due to acquired atrophy of thyroid   • Hypothyroidism (acquired)       Therapy Treatment    Rehabilitation Treatment Summary     Row Name 09/12/19 0953             Treatment Time/Intention    Discipline  physical therapy assistant  -TA      Document Type  therapy note (daily note)  -TA      Subjective Information  no complaints  -TA      Mode of Treatment  individual therapy;physical therapy  -TA      Patient Effort  good  -TA      Existing Precautions/Restrictions  fall;oxygen therapy device and L/min  -TA      Recorded by [TA] Katelyn Carlisle, \[facility] [place] [facility]\"" 09/12/19 1306      Row Name 09/12/19 0953             Vital Signs    Pre Systolic BP Rehab  170  -TA      Pre Treatment Diastolic BP  86  -TA      Post Systolic BP Rehab  199  -TA      Post Treatment Diastolic BP  82  -TA      Pretreatment Heart Rate (beats/min)  79  -TA      Intratreatment Heart Rate (beats/min)  76  -TA      Posttreatment Heart Rate (beats/min)  60  -TA      Pre SpO2 (%)  92  -TA      O2 Delivery Pre Treatment  supplemental O2  -TA      Intra SpO2 (%)  87  -TA      O2 Delivery Intra Treatment  supplemental O2  -TA      Post SpO2 (%)  90  -TA      O2 Delivery Post Treatment  supplemental O2  -TA      Pre Patient Position  Supine  -TA      Post Patient Position  Supine  -TA      Recorded by [TA] Katelyn Carlisle, PTA 09/12/19 1306      Row Name 09/12/19 0915             Cognitive Assessment/Intervention- PT/OT    Orientation Status (Cognition)  oriented x 4  -TA      Follows Commands (Cognition)  WNL  -TA      Personal Safety Interventions  fall prevention program maintained;gait belt;nonskid shoes/slippers when out of bed;supervised activity  -TA      Recorded by [TA] Katelyn Carlisle, PTA 09/12/19 1306      Row Name 09/12/19 0953             Bed Mobility Assessment/Treatment    Bed Mobility Assessment/Treatment   supine-sit;sit-supine  -TA      Supine-Sit [place] (Bed Mobility)  supervision  -TA      Sit-Supine [place] (Bed Mobility)  supervision  -TA      Assistive Device (Bed Mobility)  bed rails;head of bed elevated  -TA      Recorded by [TA] Katelyn Carlisle, PTA 09/12/19 1306      Row Name 09/12/19 0953             Functional Mobility    Functional Mobility- Ind. Level  contact guard assist  -TA      Functional Mobility- Device  rolling walker  -TA      Functional Mobility-Distance (Feet)  20 x 2  -TA      Recorded by [TA] Katelyn Carlisle, PTA 09/12/19 1306      Row Name 09/12/19 0953             Transfer Assessment/Treatment    Transfer Assessment/Treatment  sit-stand transfer;stand-sit transfer  -TA      Recorded by [TA] Katelyn Carlisle, PTA 09/12/19 1306      Row Name 09/12/19 0953             Sit-Stand Transfer    Sit-Stand [place] (Transfers)  contact guard  -TA      Assistive Device (Sit-Stand Transfers)  walker, front-wheeled  -TA      Recorded by [TA] Katelyn Carlisle, PTA 09/12/19 1306      Row Name 09/12/19 0953             Stand-Sit Transfer    Stand-Sit [place] (Transfers)  contact guard  -TA      Assistive Device (Stand-Sit Transfers)  walker, front-wheeled  -TA      Recorded by [TA] Katelyn Carlisle, PTA 09/12/19 1306      Row Name 09/12/19 0953             Gait/Stairs Assessment/Training    [place] Level (Gait)  contact guard  -TA      Assistive Device (Gait)  walker, front-wheeled  -TA      Distance in Feet (Gait)  150`  -TA      Recorded by [TA] Katelyn Carlisle, PTA 09/12/19 1306      Row Name 09/12/19 0953             Therapeutic Exercise    Lower Extremity (Therapeutic Exercise)  gluteal sets;quad sets, bilateral  -TA      Lower Extremity Range of Motion (Therapeutic Exercise)  ankle dorsiflexion/plantar flexion, bilateral  -TA      Exercise Type (Therapeutic Exercise)  AROM (active range of motion)  -TA      Position (Therapeutic Exercise)  supine  -TA      Expected Outcome  (Therapeutic Exercise)  facilitate normal movement patterns;improve functional stability;improve motor control;increase active range of motion  -TA      Recorded by [TA] Katelyn Carlisle, PTA 09/12/19 1306      Row Name 09/12/19 0953             Positioning and Restraints    Pre-Treatment Position  in bed  -TA      Post Treatment Position  bed  -TA      In Bed  supine;call light within reach;exit alarm on  -TA      Recorded by [TA] Katelyn Carlisle, PTA 09/12/19 1306      Row Name 09/12/19 0953             Pain Scale: Numbers Pre/Post-Treatment    Pain Scale: Numbers, Pretreatment  0/10 - no pain  -TA      Pain Scale: Numbers, Post-Treatment  0/10 - no pain  -TA      Recorded by [TA] Katelyn Carlisle, PTA 09/12/19 1306      Row Name                Wound 09/10/19 0630 Left anterior knee Other (comment)    Wound - Properties Group Date first assessed: 09/10/19 [KH] Time first assessed: 0630 [KH] Side: Left [KH] Orientation: anterior [KH] Location: knee [KH] Primary Wound Type: Other [KH] Recorded by:  [KH] Liza Pugh RN 09/10/19 0805    Row Name 09/12/19 0953             Outcome Summary/Treatment Plan (PT)    Daily Summary of Progress (PT)  progress toward functional goals is good  -TA      Plan for Continued Treatment (PT)  continue  -TA      Anticipated Discharge Disposition (PT)  home with 24/7 care;home with home health;skilled nursing facility;anticipate therapy at next level of care  -TA      Recorded by [TA] Katelyn Carlisle, PTA 09/12/19 1306        User Key  (r) = Recorded By, (t) = Taken By, (c) = Cosigned By    Initials Name Effective Dates Discipline     Liza Pugh RN 09/02/16 -  Nurse    TA Katelyn Carlisle PTA 03/07/18 -  PT          Wound 09/10/19 0630 Left anterior knee Other (comment) (Active)   Dressing Appearance dry;intact 9/12/2019 10:26 AM   Closure LYN 9/12/2019 10:26 AM   Base red;granulating 9/12/2019 10:26 AM   Periwound intact 9/12/2019 10:26 AM   Periwound Temperature warm  9/12/2019 10:26 AM   Periwound Skin Turgor soft 9/12/2019 10:26 AM   Drainage Characteristics/Odor bleeding controlled 9/12/2019 10:26 AM   Drainage Amount scant 9/12/2019 10:26 AM   Care, Wound cleansed with;sterile normal saline 9/11/2019  4:00 PM   Dressing Care, Wound dressing changed 9/12/2019 12:00 AM       Rehab Goal Summary     Row Name 09/12/19 0953             Physical Therapy Goals    Bed Mobility Goal Selection (PT)  bed mobility, PT goal 1  -TA      Transfer Goal Selection (PT)  transfer, PT goal 1  -TA      Gait Training Goal Selection (PT)  gait training, PT goal 1  -TA         Bed Mobility Goal 1 (PT)    Activity/Assistive Device (Bed Mobility Goal 1, PT)  sit to supine/supine to sit  -TA      [place] Level/Cues Needed (Bed Mobility Goal 1, PT)  conditional independence  -TA      Time Frame (Bed Mobility Goal 1, PT)  long term goal (LTG);by discharge  -TA      Barriers (Bed Mobility Goal 1, PT)  Maintain spO2 above 90%  -TA      Progress/Outcomes (Bed Mobility Goal 1, PT)  goal not met  -TA         Transfer Goal 1 (PT)    Activity/Assistive Device (Transfer Goal 1, PT)  sit-to-stand/stand-to-sit  -TA      [place] Level/Cues Needed (Transfer Goal 1, PT)  conditional independence  -TA      Time Frame (Transfer Goal 1, PT)  long term goal (LTG);by discharge  -TA      Barriers (Transfers Goal 1, PT)  Maintain spO2 above 90%  -TA      Progress/Outcome (Transfer Goal 1, PT)  goal not met  -TA         Gait Training Goal 1 (PT)    Activity/Assistive Device (Gait Training Goal 1, PT)  walker, rolling  -TA      [place] Level (Gait Training Goal 1, PT)  conditional independence  -TA      Distance (Gait Goal 1, PT)  100' x 1  -TA      Time Frame (Gait Training Goal 1, PT)  long term goal (LTG);by discharge  -TA      Barriers (Gait Training Goal 1, PT)  Maintain spO2 above 90%  -TA      Progress/Outcome (Gait Training Goal 1, PT)  goal not met  -TA        User Key  (r) = Recorded By, (t) = Taken  By, (c) = Cosigned By    Initials Name Provider Type Discipline    Katelyn Goldstein, PTA Physical Therapy Assistant PT          Physical Therapy Education     Title: PT OT SLP Therapies (Resolved)     Topic: Physical Therapy (Resolved)     Point: Mobility training (Resolved)     Learning Progress Summary           Patient Acceptance, E, VU,NR by TERRY at 9/10/2019  3:31 PM    Comment:  Patient was educated on safety with mobility, hands placement and correct body mechanics for transfers, and pursed lip breathing.                               User Key     Initials Effective Dates Name Provider Type Discipline     08/06/19 -  Sushil Huang, PT Student PT Student PT                PT Recommendation and Plan  Anticipated Discharge Disposition (PT): home with 24/7 care, home with home health, skilled nursing facility, anticipate therapy at next level of care  Outcome Summary/Treatment Plan (PT)  Daily Summary of Progress (PT): progress toward functional goals is good  Plan for Continued Treatment (PT): continue  Anticipated Discharge Disposition (PT): home with 24/7 care, home with home health, skilled nursing facility, anticipate therapy at next level of care  Progress: improving  Outcome Summary: pt sup<>sit with SBA, sit<>stand with CGA, pt ambulated 150` with RW & CGA. pt would benefit from assist @ home & HHPT @ D/C  Outcome Measures     Row Name 09/12/19 1300 09/11/19 0840 09/10/19 1500       How much help from another person do you currently need...    Turning from your back to your side while in flat bed without using bedrails?  3  -TA  3  -CA  3  -CZ (r) JE (t) CZ (c)    Moving from lying on back to sitting on the side of a flat bed without bedrails?  3  -TA  3  -CA  3  -CZ (r) JE (t) CZ (c)    Moving to and from a bed to a chair (including a wheelchair)?  3  -TA  3  -CA  3  -CZ (r) JE (t) CZ (c)    Standing up from a chair using your arms (e.g., wheelchair, bedside chair)?  3  -TA  3  -CA  3  -CZ (r) JE (t)  CZ (c)    Climbing 3-5 steps with a railing?  3  -TA  3  -CA  3  -CZ (r) JE (t) CZ (c)    To walk in hospital room?  3  -TA  3  -CA  3  -CZ (r) JE (t) CZ (c)    AM-PAC 6 Clicks Score (PT)  18  -TA  18  -CA  18  -CZ (r) JE (t)       Functional Assessment    Outcome Measure Options  AM-PAC 6 Clicks Basic Mobility (PT)  -TA  AM-PAC 6 Clicks Basic Mobility (PT)  -CA  AM-PAC 6 Clicks Basic Mobility (PT)  -CZ (r) JE (t) CZ (c)      User Key  (r) = Recorded By, (t) = Taken By, (c) = Cosigned By    Initials Name Provider Type    Katelyn Goldstein, PTA Physical Therapy Assistant    Michelet Ling, PTA Physical Therapy Assistant    Adair Weller, PT Physical Therapist    Sushil Diaz, PT Student PT Student         Time Calculation:   PT Charges     Row Name 09/12/19 1308             Time Calculation    Start Time  0953  -TA      Stop Time  1041  -TA      Time Calculation (min)  48 min  -TA         Time Calculation- PT    Total Timed Code Minutes- PT  48 minute(s)  -TA        User Key  (r) = Recorded By, (t) = Taken By, (c) = Cosigned By    Initials Name Provider Type    Katelyn Goldstein, PTA Physical Therapy Assistant        Therapy Charges for Today     Code Description Service Date Service Provider Modifiers Qty    54927081491 HC GAIT TRAINING EA 15 MIN 9/12/2019 Katelyn Carlisle, PTA GP 1    60287516849 HC PT THERAPEUTIC ACT EA 15 MIN 9/12/2019 Katelyn Carlisle PTA GP 1    46057054014 HC PT THER PROC EA 15 MIN 9/12/2019 Katelyn Carlisle, PTA GP 1          PT G-Codes  Outcome Measure Options: AM-PAC 6 Clicks Basic Mobility (PT)  AM-PAC 6 Clicks Score (PT): nAa Carlisle PTA  9/12/2019

## 2019-09-13 ENCOUNTER — READMISSION MANAGEMENT (OUTPATIENT)
Dept: CALL CENTER | Facility: HOSPITAL | Age: 84
End: 2019-09-13

## 2019-09-13 ENCOUNTER — PATIENT OUTREACH (OUTPATIENT)
Dept: CASE MANAGEMENT | Facility: OTHER | Age: 84
End: 2019-09-13

## 2019-09-13 NOTE — OUTREACH NOTE
Prep Survey      Responses   Facility patient discharged from?  [place]   Is patient eligible?  No   What are the reasons patient is not eligible?  [facility]   Does the patient have one of the following disease processes/diagnoses(primary or secondary)?  COPD/Pneumonia   Prep survey completed?  Yes          Azucena Burris RN

## 2019-09-13 NOTE — OUTREACH NOTE
SNF Follow-up Note      Responses   Acute Facility Discharged From  [facility]   Acute Discharge Date  09/12/19   Name of the Skilled Nursing Facility?  [facility]   Estimated length of stay for the patient?  anticipated temporary placement then relocation to daughter's home. (per acute case management notes)   Progression of Patient?  Restablished care          Toña Benitez RN  Community Care Coordinator    9/13/2019, 10:30 AM

## 2019-09-16 ENCOUNTER — EPISODE CHANGES (OUTPATIENT)
Dept: CASE MANAGEMENT | Facility: OTHER | Age: 84
End: 2019-09-16

## 2019-09-19 ENCOUNTER — PATIENT OUTREACH (OUTPATIENT)
Dept: CASE MANAGEMENT | Facility: OTHER | Age: 84
End: 2019-09-19

## 2019-09-19 NOTE — OUTREACH NOTE
SNF Follow-up Note      Responses   Acute Facility Discharged From  [facility]   Acute Discharge Date  09/12/19   Name of the Skilled Nursing Facility?  [facility]   Progression of Patient?  Discharged   Skilled Nursing Discharge Date?  08/17/19   Where was the patient discharged to?  Home   Home Health Agency at discharge?  Yes   Name of Home Health Agency?  Tori Benitez RN  Community Care Coordinator    9/19/2019, 4:01 PM

## 2019-09-19 NOTE — OUTREACH NOTE
"Patient Outreach Note    RN-CC outreach to patient.  Patient discharged to home from [facility] on 9/17/2019.  Call answered by patient's daughter Regi.  Patient reported to be doing \"fine.\" Regi indicated [facility] had been in contact with family since discharge.  Other needs denied. NO desire for additional calls.     Toña Benitez RN  Community Care Coordinator    9/19/2019, 4:01 PM      "

## 2021-07-24 NOTE — DISCHARGE SUMMARY
Discharge Summary  Daren Wu MD  Hospitalist     Date of Discharge:  3/28/2017    Discharge Diagnosis:    Principal Problem:    Atrial fibrillation with RVR    Active Problems:    Hypertension    Chronic obstructive pulmonary disease with acute exacerbation    Chronic respiratory failure with hypoxia      Presenting Problem/History of Present Illness    Dyspnea.    Hospital Course  Patient is a 86 y.o. female presented for dyspnea, cough, congestion, palpitations. She was found to be in atrial fibrillation with a rate of 120 to 130 min. She remains in sinus after cardioversion. She is pain free at present, her dyspnea is back at baseline.       Procedures Performed      Consults:   Consults     Date and Time Order Name Status Description    3/25/2017 8669 Inpatient Consult to Cardiology Completed     3/24/2017 1709 Hospitalist (on-call MD unless specified)            Pertinent Test Results: cardiac graphics: ECG: Atrial Fibrillation with a rapid rate    Condition on Discharge:  stable    Vital Signs  Temp:  [97.6 °F (36.4 °C)-98.7 °F (37.1 °C)] 98.4 °F (36.9 °C)  Heart Rate:  [75-94] 84  Resp:  [18-20] 18  BP: ()/(57-80) 112/64    Physical Exam:  Physical Exam   Constitutional: She is oriented to person, place, and time. She appears well-developed and well-nourished.   HENT:   Head: Normocephalic and atraumatic.   Eyes: EOM are normal. Pupils are equal, round, and reactive to light. No scleral icterus.   Neck: Neck supple. No thyromegaly present.   Cardiovascular: Normal rate and regular rhythm.  Exam reveals no gallop.    No murmur heard.  Pulmonary/Chest: Effort normal. No respiratory distress. She has wheezes.   Abdominal: Soft. Bowel sounds are normal. She exhibits no distension. There is no tenderness.   Musculoskeletal: She exhibits no edema or tenderness.   Neurological: She is alert and oriented to person, place, and time.   Skin: Skin is warm and dry. No rash noted.   Psychiatric: She has a normal  mood and affect. Her behavior is normal.   Vitals reviewed.      Discharge Disposition      Discharge Medications   Norma Harkins   Home Medication Instructions REHANA:[id number]    Printed on:03/28/17 6938   Medication Information                      acetaminophen (TYLENOL) 500 MG tablet  Take 500 mg by mouth 1 (one) time as needed for mild pain (1-3).             albuterol (ACCUNEB) 0.63 MG/3ML nebulizer solution  Take 3 mL by nebulization Every 6 (Six) Hours As Needed for wheezing.             albuterol (PROVENTIL HFA;VENTOLIN HFA) 108 (90 BASE) MCG/ACT inhaler  Inhale 2 puffs 4 (Four) Times a Day.             amiodarone (PACERONE) 200 MG tablet  Take 1 tablet by mouth Daily.             apixaban (ELIQUIS) 5 MG tablet tablet  Take 1 tablet by mouth Every 12 (Twelve) Hours.             aspirin 81 MG tablet  Take 1 tablet by mouth Daily.             diltiaZEM (CARDIZEM) 60 MG tablet  Take 60 mg by mouth 2 (Two) Times a Day.             docusate sodium (COLACE) 100 MG capsule  Take 300 mg by mouth Daily.             furosemide (LASIX) 40 MG tablet  Take 1 tablet by mouth Daily.             HYDROcodone-acetaminophen (NORCO) 5-325 MG per tablet  Take 1 tablet by mouth Every 4 (Four) Hours As Needed for Moderate Pain (4-6).             potassium chloride (K-DUR,KLOR-CON) 10 MEQ CR tablet  Take 1 tablet by mouth 2 (Two) Times a Day.             pravastatin (PRAVACHOL) 80 MG tablet  Take 1 tablet by mouth Every Night.             SYMBICORT 160-4.5 MCG/ACT inhaler  inhale 2 puffs by mouth twice a day                 Discharge Diet:   Diet Instructions     Advance Diet As Tolerated                     Activity at Discharge:   Activity Instructions     Activity as Tolerated                     Follow-up Appointments  Future Appointments  Date Time Provider Department Center   5/12/2017 10:00 AM Krista Matos MD [facility] None   7/27/2017 2:15 PM MD [facility] None       She will be discharged  to a local Nursing Home for further PT/OT     Daren Wu MD  03/28/17  12:09 PM         No